# Patient Record
Sex: FEMALE | Race: BLACK OR AFRICAN AMERICAN | Employment: UNEMPLOYED | URBAN - METROPOLITAN AREA
[De-identification: names, ages, dates, MRNs, and addresses within clinical notes are randomized per-mention and may not be internally consistent; named-entity substitution may affect disease eponyms.]

---

## 2017-01-03 ENCOUNTER — HOSPITAL ENCOUNTER (OUTPATIENT)
Dept: INFUSION THERAPY | Age: 82
Discharge: HOME | End: 2017-01-03
Payer: MEDICARE

## 2017-01-03 VITALS
DIASTOLIC BLOOD PRESSURE: 71 MMHG | SYSTOLIC BLOOD PRESSURE: 137 MMHG | OXYGEN SATURATION: 93 % | TEMPERATURE: 97.2 F | RESPIRATION RATE: 18 BRPM | HEART RATE: 57 BPM

## 2017-01-03 LAB
BASO+EOS+MONOS # BLD AUTO: 0.4 K/UL (ref 0–2.3)
BASO+EOS+MONOS # BLD AUTO: 8 % (ref 0.1–17)
DIFFERENTIAL METHOD BLD: ABNORMAL
ERYTHROCYTE [DISTWIDTH] IN BLOOD BY AUTOMATED COUNT: 12.4 % (ref 11.5–14.5)
HCT VFR BLD AUTO: 30.8 % (ref 36–48)
HGB BLD-MCNC: 9.6 G/DL (ref 12–16)
LYMPHOCYTES # BLD AUTO: 48 % (ref 14–44)
LYMPHOCYTES # BLD: 2.6 K/UL (ref 1.1–5.9)
MCH RBC QN AUTO: 27.7 PG (ref 25–35)
MCHC RBC AUTO-ENTMCNC: 31.2 G/DL (ref 31–37)
MCV RBC AUTO: 88.8 FL (ref 78–102)
NEUTS SEG # BLD: 2.4 K/UL (ref 1.8–9.5)
NEUTS SEG NFR BLD AUTO: 44 % (ref 40–70)
PLATELET # BLD AUTO: 186 K/UL (ref 140–440)
RBC # BLD AUTO: 3.47 M/UL (ref 4.1–5.1)
WBC # BLD AUTO: 5.4 K/UL (ref 4.5–13)

## 2017-01-03 PROCEDURE — 36415 COLL VENOUS BLD VENIPUNCTURE: CPT

## 2017-01-03 PROCEDURE — 85025 COMPLETE CBC W/AUTO DIFF WBC: CPT | Performed by: INTERNAL MEDICINE

## 2017-01-03 PROCEDURE — 74011250636 HC RX REV CODE- 250/636: Performed by: INTERNAL MEDICINE

## 2017-01-03 PROCEDURE — 96372 THER/PROPH/DIAG INJ SC/IM: CPT

## 2017-01-03 RX ADMIN — ERYTHROPOIETIN 6000 UNITS: 3000 INJECTION, SOLUTION INTRAVENOUS; SUBCUTANEOUS at 14:19

## 2017-01-03 NOTE — PROGRESS NOTES
[facility] Progress Note    Date: January 3, 2017    Name: Lieutenant Madrigal    MRN: [id number]         : 1922     PROCRIT INJECTON    Ms. Harrison Craven was assessed and education was provided. Pt arrived to [facility] in wheelchair accompanied by her daughter at 0. Ms. Antony's vitals were reviewed and patient was observed for 5 minutes prior to treatment. Visit Vitals    /71 (BP 1 Location: Left arm, BP Patient Position: Sitting)    Pulse (!) 57    Temp 97.2 °F (36.2 °C)    Resp 18    SpO2 93%    Breastfeeding No       Blood drawn via left forearm venipuncture X1 attempt. Gauze and tape applied to site. Lab results were obtained and reviewed. Recent Results (from the past 24 hour(s))   CBC WITH 3 PART DIFF    Collection Time: 17  2:12 PM   Result Value Ref Range    WBC 5.4 4.5 - 13.0 K/uL    RBC 3.47 (L) 4.10 - 5.10 M/uL    HGB 9.6 (L) 12.0 - 16 g/dL    HCT 30.8 (L) 36 - 48 %    MCV 88.8 78 - 102 FL    MCH 27.7 25.0 - 35.0 PG    MCHC 31.2 31 - 37 g/dL    RDW 12.4 11.5 - 14.5 %    PLATELET 547 025 - 191 K/uL    NEUTROPHILS 44 40 - 70 %    MIXED CELLS 8 0.1 - 17 %    LYMPHOCYTES 48 (H) 14 - 44 %    ABS. NEUTROPHILS 2.4 1.8 - 9.5 K/UL    ABS. MIXED CELLS 0.4 0.0 - 2.3 K/uL    ABS. LYMPHOCYTES 2.6 1.1 - 5.9 K/UL    DF AUTOMATED       Results within ordered parameters to give procrit today. Procrit 6,000 units administered SQ in the back of the left arm. Band-aid applied. Patient armband removed and shredded. Ms. Harrison Craven was discharged from [place] [address] in stable condition at 1425. She is to return on 2017 at 1400 for her next Procrit appointment.     Audrey Kirkpatrick RN  January 3, 2017

## 2017-01-17 ENCOUNTER — HOSPITAL ENCOUNTER (OUTPATIENT)
Dept: INFUSION THERAPY | Age: 82
Discharge: HOME | End: 2017-01-17
Payer: MEDICARE

## 2017-01-17 VITALS
TEMPERATURE: 97.3 F | HEART RATE: 69 BPM | DIASTOLIC BLOOD PRESSURE: 64 MMHG | OXYGEN SATURATION: 95 % | SYSTOLIC BLOOD PRESSURE: 143 MMHG | RESPIRATION RATE: 18 BRPM

## 2017-01-17 LAB
BASO+EOS+MONOS # BLD AUTO: 0.4 K/UL (ref 0–2.3)
BASO+EOS+MONOS # BLD AUTO: 7 % (ref 0.1–17)
DIFFERENTIAL METHOD BLD: ABNORMAL
ERYTHROCYTE [DISTWIDTH] IN BLOOD BY AUTOMATED COUNT: 12.7 % (ref 11.5–14.5)
HCT VFR BLD AUTO: 31.6 % (ref 36–48)
HGB BLD-MCNC: 9.8 G/DL (ref 12–16)
LYMPHOCYTES # BLD AUTO: 34 % (ref 14–44)
LYMPHOCYTES # BLD: 1.8 K/UL (ref 1.1–5.9)
MCH RBC QN AUTO: 27.5 PG (ref 25–35)
MCHC RBC AUTO-ENTMCNC: 31 G/DL (ref 31–37)
MCV RBC AUTO: 88.8 FL (ref 78–102)
NEUTS SEG # BLD: 3.1 K/UL (ref 1.8–9.5)
NEUTS SEG NFR BLD AUTO: 59 % (ref 40–70)
PLATELET # BLD AUTO: 179 K/UL (ref 140–440)
RBC # BLD AUTO: 3.56 M/UL (ref 4.1–5.1)
WBC # BLD AUTO: 5.3 K/UL (ref 4.5–13)

## 2017-01-17 PROCEDURE — 96372 THER/PROPH/DIAG INJ SC/IM: CPT

## 2017-01-17 PROCEDURE — 85025 COMPLETE CBC W/AUTO DIFF WBC: CPT | Performed by: INTERNAL MEDICINE

## 2017-01-17 PROCEDURE — 36415 COLL VENOUS BLD VENIPUNCTURE: CPT

## 2017-01-17 PROCEDURE — 74011250636 HC RX REV CODE- 250/636: Performed by: INTERNAL MEDICINE

## 2017-01-17 RX ADMIN — ERYTHROPOIETIN 6000 UNITS: 3000 INJECTION, SOLUTION INTRAVENOUS; SUBCUTANEOUS at 14:08

## 2017-01-17 NOTE — PROGRESS NOTES
[facility] Progress Note    Date: 2017    Name: Dariel Vargas    MRN: [id number]         : 1922     PROCRIT INJECTON    Ms. Juanis Crowell was assessed and education was provided. Pt arrived to [facility] in wheelchair accompanied by her daughter at [phone]. Ms. Antony's vitals were reviewed and patient was observed for 5 minutes prior to treatment. Visit Vitals    /64 (BP 1 Location: Left arm, BP Patient Position: Sitting)    Pulse 69    Temp 97.3 °F (36.3 °C)    Resp 18    SpO2 95%       Blood drawn via left forearm venipuncture X1 attempt. Gauze and tape applied to site. Lab results were obtained and reviewed. Recent Results (from the past 24 hour(s))   CBC WITH 3 PART DIFF    Collection Time: 17  1:58 PM   Result Value Ref Range    WBC 5.3 4.5 - 13.0 K/uL    RBC 3.56 (L) 4.10 - 5.10 M/uL    HGB 9.8 (L) 12.0 - 16 g/dL    HCT 31.6 (L) 36 - 48 %    MCV 88.8 78 - 102 FL    MCH 27.5 25.0 - 35.0 PG    MCHC 31.0 31 - 37 g/dL    RDW 12.7 11.5 - 14.5 %    PLATELET 854 099 - 964 K/uL    NEUTROPHILS 59 40 - 70 %    MIXED CELLS 7 0.1 - 17 %    LYMPHOCYTES 34 14 - 44 %    ABS. NEUTROPHILS 3.1 1.8 - 9.5 K/UL    ABS. MIXED CELLS 0.4 0.0 - 2.3 K/uL    ABS. LYMPHOCYTES 1.8 1.1 - 5.9 K/UL    DF AUTOMATED       Results within ordered parameters to give procrit today. Procrit 6,000 units administered SQ in the back of the left arm. Band-aid applied. Patient armband removed and shredded. Ms. Juanis Crowell was discharged from [place] [address] in stable condition at [phone]. She is to return on 2017 at 1400 for her next Procrit appointment.     Luna Paiz RN  2017

## 2017-01-31 ENCOUNTER — HOSPITAL ENCOUNTER (OUTPATIENT)
Dept: INFUSION THERAPY | Age: 82
Discharge: HOME | End: 2017-01-31
Payer: MEDICARE

## 2017-01-31 VITALS
OXYGEN SATURATION: 96 % | RESPIRATION RATE: 18 BRPM | HEART RATE: 55 BPM | SYSTOLIC BLOOD PRESSURE: 152 MMHG | TEMPERATURE: 97.4 F | DIASTOLIC BLOOD PRESSURE: 57 MMHG

## 2017-01-31 LAB
BASO+EOS+MONOS # BLD AUTO: 0.4 K/UL (ref 0–2.3)
BASO+EOS+MONOS # BLD AUTO: 8 % (ref 0.1–17)
DIFFERENTIAL METHOD BLD: ABNORMAL
ERYTHROCYTE [DISTWIDTH] IN BLOOD BY AUTOMATED COUNT: 12.5 % (ref 11.5–14.5)
HCT VFR BLD AUTO: 32.9 % (ref 36–48)
HGB BLD-MCNC: 10 G/DL (ref 12–16)
LYMPHOCYTES # BLD AUTO: 53 % (ref 14–44)
LYMPHOCYTES # BLD: 2.4 K/UL (ref 1.1–5.9)
MCH RBC QN AUTO: 27.3 PG (ref 25–35)
MCHC RBC AUTO-ENTMCNC: 30.4 G/DL (ref 31–37)
MCV RBC AUTO: 89.9 FL (ref 78–102)
NEUTS SEG # BLD: 1.7 K/UL (ref 1.8–9.5)
NEUTS SEG NFR BLD AUTO: 39 % (ref 40–70)
PLATELET # BLD AUTO: 182 K/UL (ref 140–440)
RBC # BLD AUTO: 3.66 M/UL (ref 4.1–5.1)
WBC # BLD AUTO: 4.5 K/UL (ref 4.5–13)

## 2017-01-31 PROCEDURE — 36415 COLL VENOUS BLD VENIPUNCTURE: CPT

## 2017-01-31 PROCEDURE — 85025 COMPLETE CBC W/AUTO DIFF WBC: CPT | Performed by: INTERNAL MEDICINE

## 2017-01-31 NOTE — PROGRESS NOTES
[facility] Progress Note    Date: 2017    Name: Luz Campbell    MRN: [id number]         : 1922     PROCRIT INJECTON    Ms. Gus Covington was assessed and education was provided. Pt arrived to [facility] in wheelchair accompanied by her daughter at 26. Ms. Antony's vitals were reviewed and patient was observed for 5 minutes prior to treatment. Visit Vitals    /57 (BP 1 Location: Left arm, BP Patient Position: Sitting)    Pulse (!) 55    Temp 97.4 °F (36.3 °C)    Resp 18    SpO2 96%       Blood drawn via left AC venipuncture X1 attempt. Gauze and tape applied to site. Lab results were obtained and reviewed. Recent Results (from the past 24 hour(s))   CBC WITH 3 PART DIFF    Collection Time: 17  2:16 PM   Result Value Ref Range    WBC 4.5 4.5 - 13.0 K/uL    RBC 3.66 (L) 4.10 - 5.10 M/uL    HGB 10.0 (L) 12.0 - 16 g/dL    HCT 32.9 (L) 36 - 48 %    MCV 89.9 78 - 102 FL    MCH 27.3 25.0 - 35.0 PG    MCHC 30.4 (L) 31 - 37 g/dL    RDW 12.5 11.5 - 14.5 %    PLATELET 838 070 - 603 K/uL    NEUTROPHILS 39 (L) 40 - 70 %    MIXED CELLS 8 0.1 - 17 %    LYMPHOCYTES 53 (H) 14 - 44 %    ABS. NEUTROPHILS 1.7 (L) 1.8 - 9.5 K/UL    ABS. MIXED CELLS 0.4 0.0 - 2.3 K/uL    ABS. LYMPHOCYTES 2.4 1.1 - 5.9 K/UL    DF AUTOMATED       Procrit held per order parameter. Patient armband removed and shredded. Ms. Gus Covington was discharged from [place] [address] in stable condition at 1425. She is to return on 2017 at 1400 for her next Procrit appointment.     Bessie Peace RN  2017

## 2017-02-02 ENCOUNTER — OFFICE VISIT (OUTPATIENT)
Dept: PAIN MANAGEMENT | Age: 82
End: 2017-02-02

## 2017-02-02 VITALS
HEIGHT: 66 IN | DIASTOLIC BLOOD PRESSURE: 69 MMHG | WEIGHT: 135 LBS | SYSTOLIC BLOOD PRESSURE: 142 MMHG | HEART RATE: 53 BPM | BODY MASS INDEX: 21.69 KG/M2

## 2017-02-02 DIAGNOSIS — M25.569 ARTHRALGIA OF LOWER LEG, UNSPECIFIED LATERALITY: ICD-10-CM

## 2017-02-02 DIAGNOSIS — M25.50 PAIN IN JOINT, MULTIPLE SITES: ICD-10-CM

## 2017-02-02 DIAGNOSIS — M54.2 CERVICALGIA: ICD-10-CM

## 2017-02-02 DIAGNOSIS — Z79.899 ENCOUNTER FOR LONG-TERM (CURRENT) DRUG USE: ICD-10-CM

## 2017-02-02 DIAGNOSIS — Z79.899 ENCOUNTER FOR LONG-TERM (CURRENT) USE OF HIGH-RISK MEDICATION: ICD-10-CM

## 2017-02-02 DIAGNOSIS — G89.4 CHRONIC PAIN SYNDROME: Primary | ICD-10-CM

## 2017-02-02 RX ORDER — OXYCODONE HCL 20 MG/1
20 TABLET, FILM COATED, EXTENDED RELEASE ORAL EVERY 12 HOURS
Qty: 60 TAB | Refills: 0 | Status: SHIPPED | OUTPATIENT
Start: 2017-02-02 | End: 2017-03-04

## 2017-02-02 RX ORDER — OXYCODONE HCL 20 MG/1
20 TABLET, FILM COATED, EXTENDED RELEASE ORAL EVERY 12 HOURS
Qty: 60 TAB | Refills: 0 | Status: SHIPPED | OUTPATIENT
Start: 2017-02-02 | End: 2017-05-03 | Stop reason: SDUPTHER

## 2017-02-02 NOTE — PROGRESS NOTES
Nursing Notes    Patient presents to the office today in follow-up. Patient rates her pain at 5/10 on the numerical pain scale. Reviewed medications with counts as follows:    Rx Date filled Qty Dispensed Pill Count Last Dose Short   oxycontin 20 mg  01/01/17 60 2 today no                                  POC UDS was not performed in office today    Any new labs or imaging since last appointment? NO    Have you been to an emergency room (ER) or urgent care clinic since your last visit? NO            Have you been hospitalized since your last visit? NO     If yes, where, when, and reason for visit? Have you seen or consulted any other health care providers outside of the [address] [place] [address]  since your last visit? NO     If yes, where, when, and reason for visit? Pt is getting iron infusions    HM deferred to pcp.

## 2017-02-02 NOTE — PATIENT INSTRUCTIONS
1.  Continue medications as prescribed. 2.  Follow up in three months  3.   Letter for prior auth put on Bekah's desk after visit

## 2017-02-02 NOTE — PROGRESS NOTES
HISTORY OF PRESENT ILLNESS  Gareth Jiménez is a 80 y.o. female. HPI  The patient presents today for follow up of chronic  left knee pain, cervical and lumbar pain. The patient denies any significant changes since last f/u. She tolerates medications without side effects. Gareth Jiménez reports no change in sleep or constipation. She seroquel at bedtime. She takes miralax for constipation. The patient reports 50% relief with current medications. Her pain is constant and achy and increases with walking and activity. She has to use a wheelchair if she anticipates a lot of walking. Her neck and back pain comes and goes. Her daughter manages her medication. She is happy with her treatment plan and QOL. Her pain medication along with the compounding cream helps to manage her pain. Review of Systems   Constitutional: Negative for chills, fever and malaise/fatigue. HENT: Negative for congestion and sore throat. Eyes: Negative for blurred vision and double vision. Respiratory: Negative for shortness of breath. Cardiovascular: Negative for chest pain and leg swelling. Gastrointestinal: Positive for constipation. Negative for diarrhea, heartburn, nausea and vomiting. Genitourinary:        Some frequency \"weak bladder\"   Musculoskeletal: Positive for back pain, joint pain (left knee) and neck pain. Negative for falls and myalgias. Skin: Negative. Neurological: Negative for dizziness, tingling, tremors, seizures, weakness and headaches. Endo/Heme/Allergies: Positive for environmental allergies. Does not bruise/bleed easily. Psychiatric/Behavioral: Positive for depression and memory loss. Negative for suicidal ideas. The patient is nervous/anxious and has insomnia (varies). All other systems reviewed and are negative. Physical Exam   Constitutional: She is oriented to person, place, and time. She appears well-developed and well-nourished. No distress.    Daughter present HENT:   Head: Normocephalic. Right Ear: External ear normal.   Left Ear: External ear normal.   Eyes: Conjunctivae and EOM are normal. Pupils are equal, round, and reactive to light. Pulmonary/Chest: Effort normal. No respiratory distress. Musculoskeletal: She exhibits tenderness (left knee). She exhibits no edema. Right knee: She exhibits decreased range of motion (crepitus). No tenderness found. Left knee: She exhibits decreased range of motion (crepitus). Tenderness found. Cervical back: She exhibits pain. She exhibits normal range of motion and no tenderness. Lumbar back: She exhibits decreased range of motion, tenderness and pain. Back:    Neurological: She is alert and oriented to person, place, and time. Gait (antalgic) abnormal.   Skin: Skin is warm and dry. Psychiatric: She has a normal mood and affect. Her behavior is normal. Judgment and thought content normal.   Nursing note and vitals reviewed. ASSESSMENT and PLAN  Encounter Diagnoses   Name Primary?  Chronic pain syndrome Yes    Encounter for long-term (current) use of high-risk medication     Arthralgia of lower leg, unspecified laterality     Pain in joint, multiple sites     Cervicalgia     Encounter for long-term (current) drug use      Orders Placed This Encounter    oxyCODONE ER (OXYCONTIN) 20 mg ER tablet    oxyCODONE ER (OXYCONTIN) 20 mg ER tablet    oxyCODONE ER (OXYCONTIN) 20 mg ER tablet        Patient Instructions   1. Continue medications as prescribed. 2.  Follow up in three months  3.   Letter for prior auth put on Bekah's desk after visit

## 2017-02-02 NOTE — MR AVS SNAPSHOT
Visit Information Date & Time Provider Department Dept. Phone Encounter #  
 2/2/2017  3:20 PM Marco Cruz, [address] [facility] Management [phone] [id number] Follow-up Instructions Return in about 3 months (around 5/2/2017). Upcoming Health Maintenance Date Due DTaP/Tdap/Td series (1 - Tdap) 9/2/1943 ZOSTER VACCINE AGE 60> 9/2/1982 GLAUCOMA SCREENING Q2Y 9/2/1987 MEDICARE YEARLY EXAM 9/2/1987 Pneumococcal 65+ High/Highest Risk (2 of 2 - PCV13) 11/1/2016 Allergies as of 2/2/2017  Review Complete On: 2/2/2017 By: OJ Aguillon Severity Noted Reaction Type Reactions Celebrex [Celecoxib]    Unable to Obtain Codeine    Unable to Obtain Flexeril [Cyclobenzaprine]    Unable to Obtain Macrobid [Nitrofurantoin Monohyd/m-cryst]    Unable to Consolidated Alex Pcn [Penicillins]    Unable to Obtain Remeron [Mirtazapine]    Unable to Obtain  
 Sulfa (Sulfonamide Antibiotics)    Other (comments) Swelling and low heart rate Vicodin [Hydrocodone-acetaminophen]    Unable to Obtain Current Immunizations  Reviewed on 1/31/2017 Name Date Influenza Vaccine 11/1/2016, 11/1/2015 Pneumococcal Polysaccharide (PPSV-23) 11/1/2015 Not reviewed this visit You Were Diagnosed With   
  
 Codes Comments Chronic pain syndrome    -  Primary ICD-10-CM: G89.4 ICD-9-CM: 338.4 Encounter for long-term (current) use of high-risk medication     ICD-10-CM: Z79.899 ICD-9-CM: V58.69 Vitals BP Pulse Height(growth percentile) Weight(growth percentile) BMI OB Status 142/69 (!) 53 5' 6\" (1.676 m) 135 lb (61.2 kg) 21.79 kg/m2 Hysterectomy Smoking Status Never Smoker Vitals History BMI and BSA Data Body Mass Index Body Surface Area 21.79 kg/m 2 1.69 m 2 Your Updated Medication List  
  
   
This list is accurate as of: 2/2/17  4:48 PM.  Always use your most recent med list. amLODIPine 10 mg tablet Commonly known as:  Martín Presser Take  by mouth daily. donepezil 10 mg tablet Commonly known as:  ARICEPT Take 10 mg by mouth nightly. furosemide 20 mg tablet Commonly known as:  LASIX Take  by mouth daily. glipiZIDE 5 mg tablet Commonly known as:  Malik Meuse Take  by mouth two (2) times a day. NexIUM 40 mg capsule Generic drug:  esomeprazole Take 40 mg by mouth daily. * OxyCONTIN 20 mg CR tablet Generic drug:  oxyCODONE CR Take 20 mg by mouth every twelve (12) hours. * oxyCODONE ER 20 mg ER tablet Commonly known as:  OxyCONTIN Take 1 Tab by mouth every twelve (12) hours for 30 days. Max Daily Amount: 40 mg. For chronic pain. Due to fill 02/02/17 * oxyCODONE ER 20 mg ER tablet Commonly known as:  OxyCONTIN Take 1 Tab by mouth every twelve (12) hours for 30 days. Max Daily Amount: 40 mg. For chronic pain. Due to fill 03/03/17 * oxyCODONE ER 20 mg ER tablet Commonly known as:  OxyCONTIN Take 1 Tab by mouth every twelve (12) hours for 30 days. Max Daily Amount: 40 mg. For chronic pain. Due to fill 04/01/17  
  
 pantoprazole 40 mg tablet Commonly known as:  PROTONIX Take 40 mg by mouth daily. polyethylene glycol 17 gram packet Commonly known as:  [place] Take 17 g by mouth daily. SEROquel 100 mg tablet Generic drug:  QUEtiapine Take 50 mg by mouth two (2) times a day. * Notice: This list has 4 medication(s) that are the same as other medications prescribed for you. Read the directions carefully, and ask your doctor or other care provider to review them with you. Prescriptions Printed Refills  
 oxyCODONE ER (OXYCONTIN) 20 mg ER tablet 0 Sig: Take 1 Tab by mouth every twelve (12) hours for 30 days. Max Daily Amount: 40 mg. For chronic pain. Due to fill 02/02/17 Class: Print  Route: Oral  
 oxyCODONE ER (OXYCONTIN) 20 mg ER tablet 0  
 Sig: Take 1 Tab by mouth every twelve (12) hours for 30 days. Max Daily Amount: 40 mg. For chronic pain. Due to fill 03/03/17 Class: Print Route: Oral  
 oxyCODONE ER (OXYCONTIN) 20 mg ER tablet 0 Sig: Take 1 Tab by mouth every twelve (12) hours for 30 days. Max Daily Amount: 40 mg. For chronic pain. Due to fill 04/01/17 Class: Print Route: Oral  
  
Follow-up Instructions Return in about 3 months (around 5/2/2017). To-Do List   
 02/14/2017 2:00 PM  
  Appointment with [facility] FA[facility] TRACK NURSE at [facility]  
  
 02/28/2017 2:00 PM  
  Appointment with [facility] FAST TRACK NURSE at [facility] Patient Instructions 1. Continue medications as prescribed. 2.  Follow up in three months 3. Letter for prior auth put on Rexly's desk after visit Introducing [facility]! Pauline Del Castillo introduces [employer] patient portal. Now you can access parts of your medical record, email your doctor's office, and request medication refills online. 1. In your internet browser, go to [email or web address] "[email or web address] 2. Click on the First Time User? Click Here link in the Sign In box. You will see the New Member Sign Up page. 3. Enter your [employer] Access Code exactly as it appears below. You will not need to use this code after youve completed the sign-up process. If you do not sign up before the expiration date, you must request a new code. · [employer] Access Code: [email or web address] Expires: 3/30/2017  9:12 AM 
 
4. Enter the last four digits of your Social Security Number (xxxx) and Date of Birth (mm/dd/yyyy) as indicated and click Submit. You will be taken to the next sign-up page. 5. Create a [employer] ID. This will be your [employer] login ID and cannot be changed, so think of one that is secure and easy to remember. 6. Create a [employer] password. You can change your password at any time. 7. Enter your Password Reset Question and Answer.  This can be used at a later time if you forget your password. 8. Enter your e-mail address. You will receive e-mail notification when new information is available in [address] 9. Click Sign Up. You can now view and download portions of your medical record. 10. Click the Download Summary menu link to download a portable copy of your medical information. If you have questions, please visit the Frequently Asked Questions section of the [employer] website. Remember, [employer] is NOT to be used for urgent needs. For medical emergencies, dial 911. Now available from your iPhone and Android! Please provide this summary of care documentation to your next provider. Your primary care clinician is listed as [facility]. If you have any questions after today's visit, please call [phone].

## 2017-02-14 ENCOUNTER — HOSPITAL ENCOUNTER (OUTPATIENT)
Dept: INFUSION THERAPY | Age: 82
Discharge: HOME | End: 2017-02-14
Payer: MEDICARE

## 2017-02-14 VITALS
RESPIRATION RATE: 16 BRPM | SYSTOLIC BLOOD PRESSURE: 160 MMHG | OXYGEN SATURATION: 95 % | TEMPERATURE: 97.5 F | DIASTOLIC BLOOD PRESSURE: 90 MMHG | HEART RATE: 69 BPM

## 2017-02-14 LAB
BASO+EOS+MONOS # BLD AUTO: 0.5 K/UL (ref 0–2.3)
BASO+EOS+MONOS # BLD AUTO: 8 % (ref 0.1–17)
DIFFERENTIAL METHOD BLD: ABNORMAL
ERYTHROCYTE [DISTWIDTH] IN BLOOD BY AUTOMATED COUNT: 12.1 % (ref 11.5–14.5)
HCT VFR BLD AUTO: 35.4 % (ref 36–48)
HGB BLD-MCNC: 10.9 G/DL (ref 12–16)
LYMPHOCYTES # BLD AUTO: 47 % (ref 14–44)
LYMPHOCYTES # BLD: 2.8 K/UL (ref 1.1–5.9)
MCH RBC QN AUTO: 27.5 PG (ref 25–35)
MCHC RBC AUTO-ENTMCNC: 30.8 G/DL (ref 31–37)
MCV RBC AUTO: 89.2 FL (ref 78–102)
NEUTS SEG # BLD: 2.7 K/UL (ref 1.8–9.5)
NEUTS SEG NFR BLD AUTO: 45 % (ref 40–70)
PLATELET # BLD AUTO: 162 K/UL (ref 140–440)
RBC # BLD AUTO: 3.97 M/UL (ref 4.1–5.1)
WBC # BLD AUTO: 6 K/UL (ref 4.5–13)

## 2017-02-14 PROCEDURE — 85025 COMPLETE CBC W/AUTO DIFF WBC: CPT | Performed by: INTERNAL MEDICINE

## 2017-02-14 PROCEDURE — 36415 COLL VENOUS BLD VENIPUNCTURE: CPT

## 2017-02-14 NOTE — PROGRESS NOTES
[facility] Progress Note    Date: 2017    Name: Mounika Price    MRN: [id number]         : 1922     PROCRIT INJECTON    Ms. Francois Zamora was assessed and education was provided. Pt arrived to [facility] in wheelchair accompanied by her daughter at 0. Ms. Antony's vitals were reviewed and patient was observed for 5 minutes prior to treatment. Visit Vitals    /90 (BP 1 Location: Left arm, BP Patient Position: At rest)    Pulse 69    Temp 97.5 °F (36.4 °C)    Resp 16    SpO2 95%    Breastfeeding No       Blood drawn via left arm venipuncture X1 attempt. Gauze and tape applied to site. Lab results were obtained and reviewed. Recent Results (from the past 24 hour(s))   CBC WITH 3 PART DIFF    Collection Time: 17  2:05 PM   Result Value Ref Range    WBC 6.0 4.5 - 13.0 K/uL    RBC 3.97 (L) 4.10 - 5.10 M/uL    HGB 10.9 (L) 12.0 - 16 g/dL    HCT 35.4 (L) 36 - 48 %    MCV 89.2 78 - 102 FL    MCH 27.5 25.0 - 35.0 PG    MCHC 30.8 (L) 31 - 37 g/dL    RDW 12.1 11.5 - 14.5 %    PLATELET 657 435 - 565 K/uL    NEUTROPHILS 45 40 - 70 %    MIXED CELLS 8 0.1 - 17 %    LYMPHOCYTES 47 (H) 14 - 44 %    ABS. NEUTROPHILS 2.7 1.8 - 9.5 K/UL    ABS. MIXED CELLS 0.5 0.0 - 2.3 K/uL    ABS. LYMPHOCYTES 2.8 1.1 - 5.9 K/UL    DF AUTOMATED       Procrit held per order parameter. Patient armband removed and shredded. Ms. Francois Zamora was discharged from [place] [address] in stable condition at [phone]. She is to return on 2017 at 1400 for her next Procrit appointment.     Michelle Rajput RN  2017

## 2017-02-20 ENCOUNTER — TELEPHONE (OUTPATIENT)
Dept: PAIN MANAGEMENT | Age: 82
End: 2017-02-20

## 2017-02-20 NOTE — TELEPHONE ENCOUNTER
Returned dtr's Anitra Harrison) call re pa for oxycontin. Explained I had submitted the pa, but got a fax back saying [facility] could not find the pt in their system. Asked dtr what the id number is - [id number]. Called Derek - spoke with Edgar Carnes - was able to find pt. Did pa over the phone and oxycontin 20mg was approved. Called dtr to let her know the med was approved.

## 2017-02-28 ENCOUNTER — HOSPITAL ENCOUNTER (OUTPATIENT)
Dept: INFUSION THERAPY | Age: 82
Discharge: HOME | End: 2017-02-28
Payer: MEDICARE

## 2017-02-28 VITALS
TEMPERATURE: 97.7 F | OXYGEN SATURATION: 96 % | DIASTOLIC BLOOD PRESSURE: 68 MMHG | RESPIRATION RATE: 18 BRPM | SYSTOLIC BLOOD PRESSURE: 127 MMHG | HEART RATE: 57 BPM

## 2017-02-28 LAB
BASO+EOS+MONOS # BLD AUTO: 0.5 K/UL (ref 0–2.3)
BASO+EOS+MONOS # BLD AUTO: 9 % (ref 0.1–17)
DIFFERENTIAL METHOD BLD: ABNORMAL
ERYTHROCYTE [DISTWIDTH] IN BLOOD BY AUTOMATED COUNT: 11.7 % (ref 11.5–14.5)
HCT VFR BLD AUTO: 30.9 % (ref 36–48)
HGB BLD-MCNC: 9.6 G/DL (ref 12–16)
LYMPHOCYTES # BLD AUTO: 45 % (ref 14–44)
LYMPHOCYTES # BLD: 2.4 K/UL (ref 1.1–5.9)
MCH RBC QN AUTO: 27.6 PG (ref 25–35)
MCHC RBC AUTO-ENTMCNC: 31.1 G/DL (ref 31–37)
MCV RBC AUTO: 88.8 FL (ref 78–102)
NEUTS SEG # BLD: 2.4 K/UL (ref 1.8–9.5)
NEUTS SEG NFR BLD AUTO: 46 % (ref 40–70)
PLATELET # BLD AUTO: 176 K/UL (ref 140–440)
RBC # BLD AUTO: 3.48 M/UL (ref 4.1–5.1)
WBC # BLD AUTO: 5.3 K/UL (ref 4.5–13)

## 2017-02-28 PROCEDURE — 36415 COLL VENOUS BLD VENIPUNCTURE: CPT

## 2017-02-28 PROCEDURE — 85025 COMPLETE CBC W/AUTO DIFF WBC: CPT | Performed by: INTERNAL MEDICINE

## 2017-02-28 NOTE — PROGRESS NOTES
[facility] Progress Note    Date: 2017    Name: Tobi Coelho    MRN: [id number]         : 1922      Ms. Larissa Mahan arrived to [facility] at 1400. Ms. Larissa Mahan was assessed and education was provided. Ms. Antony's vitals were reviewed. Visit Vitals    /68 (BP 1 Location: Left arm, BP Patient Position: Sitting)    Pulse (!) 57    Temp 97.7 °F (36.5 °C)    Resp 18    SpO2 96%       Blood drawn for labs via right forearm venipuncture x1 attempt. Lab results were obtained and reviewed. Recent Results (from the past 12 hour(s))   CBC WITH 3 PART DIFF    Collection Time: 17  2:11 PM   Result Value Ref Range    WBC 5.3 4.5 - 13.0 K/uL    RBC 3.48 (L) 4.10 - 5.10 M/uL    HGB 9.6 (L) 12.0 - 16 g/dL    HCT 30.9 (L) 36 - 48 %    MCV 88.8 78 - 102 FL    MCH 27.6 25.0 - 35.0 PG    MCHC 31.1 31 - 37 g/dL    RDW 11.7 11.5 - 14.5 %    PLATELET 433 896 - 203 K/uL    NEUTROPHILS 46 40 - 70 %    MIXED CELLS 9 0.1 - 17 %    LYMPHOCYTES 45 (H) 14 - 44 %    ABS. NEUTROPHILS 2.4 1.8 - 9.5 K/UL    ABS. MIXED CELLS 0.5 0.0 - 2.3 K/uL    ABS. LYMPHOCYTES 2.4 1.1 - 5.9 K/UL    DF AUTOMATED         Procrit held at this time per order. Ms. Larissa Mahan tolerated well without complaints. Ms. Larissa Mahan was discharged from [place] [address] in stable condition at [phone]. She is to return on 3/14/17 at 1400 for her next appointment.     Pavan Wetzel RN  2017

## 2017-03-14 ENCOUNTER — HOSPITAL ENCOUNTER (OUTPATIENT)
Dept: INFUSION THERAPY | Age: 82
Discharge: HOME | End: 2017-03-14
Payer: MEDICARE

## 2017-03-14 VITALS
RESPIRATION RATE: 18 BRPM | TEMPERATURE: 97.3 F | OXYGEN SATURATION: 97 % | HEART RATE: 55 BPM | DIASTOLIC BLOOD PRESSURE: 63 MMHG | SYSTOLIC BLOOD PRESSURE: 146 MMHG

## 2017-03-14 LAB
BASO+EOS+MONOS # BLD AUTO: 0.5 K/UL (ref 0–2.3)
BASO+EOS+MONOS # BLD AUTO: 9 % (ref 0.1–17)
DIFFERENTIAL METHOD BLD: ABNORMAL
ERYTHROCYTE [DISTWIDTH] IN BLOOD BY AUTOMATED COUNT: 12.2 % (ref 11.5–14.5)
HCT VFR BLD AUTO: 31.7 % (ref 36–48)
HGB BLD-MCNC: 9.8 G/DL (ref 12–16)
LYMPHOCYTES # BLD AUTO: 42 % (ref 14–44)
LYMPHOCYTES # BLD: 2.4 K/UL (ref 1.1–5.9)
MCH RBC QN AUTO: 27.3 PG (ref 25–35)
MCHC RBC AUTO-ENTMCNC: 30.9 G/DL (ref 31–37)
MCV RBC AUTO: 88.3 FL (ref 78–102)
NEUTS SEG # BLD: 2.7 K/UL (ref 1.8–9.5)
NEUTS SEG NFR BLD AUTO: 49 % (ref 40–70)
PLATELET # BLD AUTO: 190 K/UL (ref 140–440)
RBC # BLD AUTO: 3.59 M/UL (ref 4.1–5.1)
WBC # BLD AUTO: 5.6 K/UL (ref 4.5–13)

## 2017-03-14 PROCEDURE — 36415 COLL VENOUS BLD VENIPUNCTURE: CPT

## 2017-03-14 PROCEDURE — 85025 COMPLETE CBC W/AUTO DIFF WBC: CPT | Performed by: INTERNAL MEDICINE

## 2017-03-14 NOTE — PROGRESS NOTES
[facility] Progress Note    Date: 2017    Name: Isabella Gonzales    MRN: [id number]         : 1922      Ms. Tammy Dior arrived to [facility] at [phone]. Ms. Tammy Dior was assessed and education was provided. Ms. Antony's vitals were reviewed. Visit Vitals    /63 (BP 1 Location: Left arm, BP Patient Position: Sitting)    Pulse (!) 55    Temp 97.3 °F (36.3 °C)    Resp 18    SpO2 97%    Breastfeeding No       Blood drawn for labs via right forearm venipuncture x2 attempts. Lab results were obtained and reviewed. Recent Results (from the past 12 hour(s))   CBC WITH 3 PART DIFF    Collection Time: 17  2:27 PM   Result Value Ref Range    WBC 5.6 4.5 - 13.0 K/uL    RBC 3.59 (L) 4.10 - 5.10 M/uL    HGB 9.8 (L) 12.0 - 16 g/dL    HCT 31.7 (L) 36 - 48 %    MCV 88.3 78 - 102 FL    MCH 27.3 25.0 - 35.0 PG    MCHC 30.9 (L) 31 - 37 g/dL    RDW 12.2 11.5 - 14.5 %    PLATELET 321 991 - 529 K/uL    NEUTROPHILS 49 40 - 70 %    MIXED CELLS 9 0.1 - 17 %    LYMPHOCYTES 42 14 - 44 %    ABS. NEUTROPHILS 2.7 1.8 - 9.5 K/UL    ABS. MIXED CELLS 0.5 0.0 - 2.3 K/uL    ABS. LYMPHOCYTES 2.4 1.1 - 5.9 K/UL    DF AUTOMATED         HGB= 9.8 and HCT= 13.7. Procrit HELD at this time per order. Ms. Tammy Dior tolerated well without complaints. Ms. Tammy Dior was discharged from [place] [address] in stable condition at 1436. She is to return on 3/28/17 at 1400 for her next appointment.     Queen Mirlande RN  2017

## 2017-03-28 ENCOUNTER — HOSPITAL ENCOUNTER (OUTPATIENT)
Dept: INFUSION THERAPY | Age: 82
Discharge: HOME | End: 2017-03-28
Payer: MEDICARE

## 2017-03-28 VITALS
TEMPERATURE: 98.2 F | HEART RATE: 62 BPM | OXYGEN SATURATION: 93 % | RESPIRATION RATE: 18 BRPM | DIASTOLIC BLOOD PRESSURE: 53 MMHG | SYSTOLIC BLOOD PRESSURE: 146 MMHG

## 2017-03-28 LAB
ALBUMIN SERPL BCP-MCNC: 3.3 G/DL (ref 3.4–5)
ANION GAP BLD CALC-SCNC: 7 MMOL/L (ref 3–18)
BASO+EOS+MONOS # BLD AUTO: 0.5 K/UL (ref 0–2.3)
BASO+EOS+MONOS # BLD AUTO: 12 % (ref 0.1–17)
BUN SERPL-MCNC: 26 MG/DL (ref 7–18)
BUN/CREAT SERPL: 13 (ref 12–20)
CALCIUM SERPL-MCNC: 8.7 MG/DL (ref 8.5–10.1)
CHLORIDE SERPL-SCNC: 101 MMOL/L (ref 100–108)
CO2 SERPL-SCNC: 32 MMOL/L (ref 21–32)
CREAT SERPL-MCNC: 1.96 MG/DL (ref 0.6–1.3)
DIFFERENTIAL METHOD BLD: ABNORMAL
ERYTHROCYTE [DISTWIDTH] IN BLOOD BY AUTOMATED COUNT: 12.2 % (ref 11.5–14.5)
FERRITIN SERPL-MCNC: 734 NG/ML (ref 8–388)
GLUCOSE SERPL-MCNC: 192 MG/DL (ref 74–99)
HCT VFR BLD AUTO: 28.9 % (ref 36–48)
HGB BLD-MCNC: 9.4 G/DL (ref 12–16)
IRON SATN MFR SERPL: 29 %
IRON SERPL-MCNC: 44 UG/DL (ref 50–175)
LYMPHOCYTES # BLD AUTO: 43 % (ref 14–44)
LYMPHOCYTES # BLD: 1.8 K/UL (ref 1.1–5.9)
MCH RBC QN AUTO: 28.5 PG (ref 25–35)
MCHC RBC AUTO-ENTMCNC: 32.5 G/DL (ref 31–37)
MCV RBC AUTO: 87.6 FL (ref 78–102)
NEUTS SEG # BLD: 1.9 K/UL (ref 1.8–9.5)
NEUTS SEG NFR BLD AUTO: 45 % (ref 40–70)
PHOSPHATE SERPL-MCNC: 3.2 MG/DL (ref 2.5–4.9)
PLATELET # BLD AUTO: 196 K/UL (ref 140–440)
POTASSIUM SERPL-SCNC: 4.3 MMOL/L (ref 3.5–5.5)
RBC # BLD AUTO: 3.3 M/UL (ref 4.1–5.1)
SODIUM SERPL-SCNC: 140 MMOL/L (ref 136–145)
TIBC SERPL-MCNC: 150 UG/DL (ref 250–450)
WBC # BLD AUTO: 4.2 K/UL (ref 4.5–13)

## 2017-03-28 PROCEDURE — 74011250636 HC RX REV CODE- 250/636: Performed by: INTERNAL MEDICINE

## 2017-03-28 PROCEDURE — 96372 THER/PROPH/DIAG INJ SC/IM: CPT

## 2017-03-28 PROCEDURE — 83540 ASSAY OF IRON: CPT | Performed by: INTERNAL MEDICINE

## 2017-03-28 PROCEDURE — 82728 ASSAY OF FERRITIN: CPT | Performed by: INTERNAL MEDICINE

## 2017-03-28 PROCEDURE — 85025 COMPLETE CBC W/AUTO DIFF WBC: CPT | Performed by: INTERNAL MEDICINE

## 2017-03-28 PROCEDURE — 36415 COLL VENOUS BLD VENIPUNCTURE: CPT

## 2017-03-28 PROCEDURE — 80069 RENAL FUNCTION PANEL: CPT | Performed by: INTERNAL MEDICINE

## 2017-03-28 RX ADMIN — ERYTHROPOIETIN 3000 UNITS: 3000 INJECTION, SOLUTION INTRAVENOUS; SUBCUTANEOUS at 14:40

## 2017-03-28 RX ADMIN — ERYTHROPOIETIN 4000 UNITS: 4000 INJECTION, SOLUTION INTRAVENOUS; SUBCUTANEOUS at 14:40

## 2017-03-28 NOTE — PROGRESS NOTES
[facility] Progress Note    Date: 2017    Name: Kirstie Duncan    MRN: [id number]         : 1922      Patient assessed and education was provided. Patient vitals were reviewed. Visit Vitals    /53 (BP 1 Location: Left arm, BP Patient Position: Sitting)    Pulse 62    Temp 98.2 °F (36.8 °C)    Resp 18    SpO2 93%     Blood sample by mere-puncture on 1st stick to left hand for cbc, renal panel, ferritin, iron. Cbc will be tested in [facility] but rest of labs sent out    Lab results were obtained and reviewed. Recent Results (from the past 12 hour(s))   CBC WITH 3 PART DIFF    Collection Time: 17  2:20 PM   Result Value Ref Range    WBC 4.2 (L) 4.5 - 13.0 K/uL    RBC 3.30 (L) 4.10 - 5.10 M/uL    HGB 9.4 (L) 12.0 - 16 g/dL    HCT 28.9 (L) 36 - 48 %    MCV 87.6 78 - 102 FL    MCH 28.5 25.0 - 35.0 PG    MCHC 32.5 31 - 37 g/dL    RDW 12.2 11.5 - 14.5 %    PLATELET 133 804 - 483 K/uL    NEUTROPHILS 45 40 - 70 %    MIXED CELLS 12 0.1 - 17 %    LYMPHOCYTES 43 14 - 44 %    ABS. NEUTROPHILS 1.9 1.8 - 9.5 K/UL    ABS. MIXED CELLS 0.5 0.0 - 2.3 K/uL    ABS. LYMPHOCYTES 1.8 1.1 - 5.9 K/UL    DF AUTOMATED       Procrit 7000 units given SQ to upper back of left arm. Tolerated well. bandaid appied to site      Patient was discharged from [place] [address] in stable condition at [phone] via wheel chair, accompanied by her daughter. She is to return on 17 at 2 pm for her next appointment.     Justin Thayer RN  2017  4:45 PM

## 2017-04-11 ENCOUNTER — HOSPITAL ENCOUNTER (OUTPATIENT)
Dept: INFUSION THERAPY | Age: 82
Discharge: HOME | End: 2017-04-11
Payer: MEDICARE

## 2017-04-11 VITALS
TEMPERATURE: 97.7 F | DIASTOLIC BLOOD PRESSURE: 62 MMHG | SYSTOLIC BLOOD PRESSURE: 162 MMHG | OXYGEN SATURATION: 94 % | RESPIRATION RATE: 18 BRPM | HEART RATE: 67 BPM

## 2017-04-11 LAB
BASO+EOS+MONOS # BLD AUTO: 0.7 K/UL (ref 0–2.3)
BASO+EOS+MONOS # BLD AUTO: 15 % (ref 0.1–17)
DIFFERENTIAL METHOD BLD: ABNORMAL
ERYTHROCYTE [DISTWIDTH] IN BLOOD BY AUTOMATED COUNT: 12.7 % (ref 11.5–14.5)
HCT VFR BLD AUTO: 30.6 % (ref 36–48)
HGB BLD-MCNC: 9.3 G/DL (ref 12–16)
LYMPHOCYTES # BLD AUTO: 47 % (ref 14–44)
LYMPHOCYTES # BLD: 2.1 K/UL (ref 1.1–5.9)
MCH RBC QN AUTO: 27.3 PG (ref 25–35)
MCHC RBC AUTO-ENTMCNC: 30.4 G/DL (ref 31–37)
MCV RBC AUTO: 89.7 FL (ref 78–102)
NEUTS SEG # BLD: 1.7 K/UL (ref 1.8–9.5)
NEUTS SEG NFR BLD AUTO: 39 % (ref 40–70)
PLATELET # BLD AUTO: 218 K/UL (ref 140–440)
RBC # BLD AUTO: 3.41 M/UL (ref 4.1–5.1)
WBC # BLD AUTO: 4.5 K/UL (ref 4.5–13)

## 2017-04-11 PROCEDURE — 36415 COLL VENOUS BLD VENIPUNCTURE: CPT

## 2017-04-11 PROCEDURE — 85025 COMPLETE CBC W/AUTO DIFF WBC: CPT | Performed by: INTERNAL MEDICINE

## 2017-04-11 NOTE — PROGRESS NOTES
[facility] Progress Note    Date: 2017    Name: Laith Phillips    MRN: [id number]         : 1922     PROCRIT INJECTON    Ms. Esvin Metz was assessed and education was provided. Pt arrived to [facility] in wheelchair accompanied by her daughter at 80. Ms. Antony's vitals were reviewed and patient was observed for 5 minutes prior to treatment. Visit Vitals    /62 (BP 1 Location: Left arm, BP Patient Position: Sitting)    Pulse 67    Temp 97.7 °F (36.5 °C)    Resp 18    SpO2 94%    Breastfeeding No       Blood drawn via left arm venipuncture X1 attempt. Gauze and band-aid applied to site. Lab results were obtained and reviewed. Recent Results (from the past 24 hour(s))   CBC WITH 3 PART DIFF    Collection Time: 17  2:28 PM   Result Value Ref Range    WBC 4.5 4.5 - 13.0 K/uL    RBC 3.41 (L) 4.10 - 5.10 M/uL    HGB 9.3 (L) 12.0 - 16 g/dL    HCT 30.6 (L) 36 - 48 %    MCV 89.7 78 - 102 FL    MCH 27.3 25.0 - 35.0 PG    MCHC 30.4 (L) 31 - 37 g/dL    RDW 12.7 11.5 - 14.5 %    PLATELET 829 257 - 073 K/uL    NEUTROPHILS 39 (L) 40 - 70 %    MIXED CELLS 15 0.1 - 17 %    LYMPHOCYTES 47 (H) 14 - 44 %    ABS. NEUTROPHILS 1.7 (L) 1.8 - 9.5 K/UL    ABS. MIXED CELLS 0.7 0.0 - 2.3 K/uL    ABS. LYMPHOCYTES 2.1 1.1 - 5.9 K/UL    DF AUTOMATED         HGB= 9.3 and HCT= 30.6. Procrit held per order parameter. Patient armband removed and shredded. Ms. Esvin Metz was discharged from [place] [address] in stable condition at 1440. She is to return on 2017 at 1400 for her next Procrit appointment.     Peter Sheldon RN  2017

## 2017-04-25 ENCOUNTER — HOSPITAL ENCOUNTER (OUTPATIENT)
Dept: INFUSION THERAPY | Age: 82
Discharge: HOME | End: 2017-04-25
Payer: MEDICARE

## 2017-04-25 VITALS
DIASTOLIC BLOOD PRESSURE: 66 MMHG | RESPIRATION RATE: 18 BRPM | HEART RATE: 83 BPM | TEMPERATURE: 97.5 F | OXYGEN SATURATION: 91 % | SYSTOLIC BLOOD PRESSURE: 147 MMHG

## 2017-04-25 LAB
BASO+EOS+MONOS # BLD AUTO: 0.5 K/UL (ref 0–2.3)
BASO+EOS+MONOS # BLD AUTO: 8 % (ref 0.1–17)
DIFFERENTIAL METHOD BLD: ABNORMAL
ERYTHROCYTE [DISTWIDTH] IN BLOOD BY AUTOMATED COUNT: 12.5 % (ref 11.5–14.5)
HCT VFR BLD AUTO: 31.2 % (ref 36–48)
HGB BLD-MCNC: 9.6 G/DL (ref 12–16)
LYMPHOCYTES # BLD AUTO: 60 % (ref 14–44)
LYMPHOCYTES # BLD: 3.5 K/UL (ref 1.1–5.9)
MCH RBC QN AUTO: 27.3 PG (ref 25–35)
MCHC RBC AUTO-ENTMCNC: 30.8 G/DL (ref 31–37)
MCV RBC AUTO: 88.6 FL (ref 78–102)
NEUTS SEG # BLD: 1.8 K/UL (ref 1.8–9.5)
NEUTS SEG NFR BLD AUTO: 32 % (ref 40–70)
PLATELET # BLD AUTO: 202 K/UL (ref 140–440)
RBC # BLD AUTO: 3.52 M/UL (ref 4.1–5.1)
WBC # BLD AUTO: 5.8 K/UL (ref 4.5–13)

## 2017-04-25 PROCEDURE — 36415 COLL VENOUS BLD VENIPUNCTURE: CPT

## 2017-04-25 PROCEDURE — 85025 COMPLETE CBC W/AUTO DIFF WBC: CPT | Performed by: INTERNAL MEDICINE

## 2017-04-25 NOTE — PROGRESS NOTES
[facility] Progress Note    Date: 2017    Name: Caterina Gongora    MRN: [id number]         : 1922     patient arrived via wheelchair, accompanied by her daughter. Alert and oriented x 3. No complaints voiced      Patient assessed and education was provided. Patient vitals were reviewed. Visit Vitals    /66 (BP 1 Location: Left arm, BP Patient Position: Sitting)    Pulse 83    Temp 97.5 °F (36.4 °C)    Resp 18    SpO2 91%       Blood sample by mere-puncture on 1st stick to left FA for cbc      Lab results were obtained and reviewed. Recent Results (from the past 12 hour(s))   CBC WITH 3 PART DIFF    Collection Time: 17  2:25 PM   Result Value Ref Range    WBC 5.8 4.5 - 13.0 K/uL    RBC 3.52 (L) 4.10 - 5.10 M/uL    HGB 9.6 (L) 12.0 - 16 g/dL    HCT 31.2 (L) 36 - 48 %    MCV 88.6 78 - 102 FL    MCH 27.3 25.0 - 35.0 PG    MCHC 30.8 (L) 31 - 37 g/dL    RDW 12.5 11.5 - 14.5 %    PLATELET 438 076 - 404 K/uL    NEUTROPHILS 32 (L) 40 - 70 %    MIXED CELLS 8 0.1 - 17 %    LYMPHOCYTES 60 (H) 14 - 44 %    ABS. NEUTROPHILS 1.8 1.8 - 9.5 K/UL    ABS. MIXED CELLS 0.5 0.0 - 2.3 K/uL    ABS. LYMPHOCYTES 3.5 1.1 - 5.9 K/UL    DF AUTOMATED           Procrit injection held per parameters. Patient was discharged from [place] [address] in stable condition at 1430 via wheel chair, accompanied by her daughter. She is to return on 17 at 2 pm for her next appointment.     Elsa Orozco RN  2017  4:45 PM

## 2017-05-03 RX ORDER — OXYCODONE HCL 20 MG/1
20 TABLET, FILM COATED, EXTENDED RELEASE ORAL EVERY 12 HOURS
Qty: 60 TAB | Refills: 0 | Status: SHIPPED | OUTPATIENT
Start: 2017-05-03 | End: 2017-05-19 | Stop reason: SDUPTHER

## 2017-05-08 ENCOUNTER — TELEPHONE (OUTPATIENT)
Dept: PAIN MANAGEMENT | Age: 82
End: 2017-05-08

## 2017-05-09 ENCOUNTER — HOSPITAL ENCOUNTER (OUTPATIENT)
Dept: INFUSION THERAPY | Age: 82
Discharge: HOME | End: 2017-05-09
Payer: MEDICARE

## 2017-05-09 VITALS
DIASTOLIC BLOOD PRESSURE: 55 MMHG | HEART RATE: 64 BPM | SYSTOLIC BLOOD PRESSURE: 146 MMHG | OXYGEN SATURATION: 94 % | RESPIRATION RATE: 18 BRPM | TEMPERATURE: 97 F

## 2017-05-09 LAB
BASO+EOS+MONOS # BLD AUTO: 0.4 K/UL (ref 0–2.3)
BASO+EOS+MONOS # BLD AUTO: 10 % (ref 0.1–17)
DIFFERENTIAL METHOD BLD: ABNORMAL
ERYTHROCYTE [DISTWIDTH] IN BLOOD BY AUTOMATED COUNT: 12.5 % (ref 11.5–14.5)
HCT VFR BLD AUTO: 30.4 % (ref 36–48)
HGB BLD-MCNC: 9.6 G/DL (ref 12–16)
LYMPHOCYTES # BLD AUTO: 54 % (ref 14–44)
LYMPHOCYTES # BLD: 2.5 K/UL (ref 1.1–5.9)
MCH RBC QN AUTO: 27.7 PG (ref 25–35)
MCHC RBC AUTO-ENTMCNC: 31.6 G/DL (ref 31–37)
MCV RBC AUTO: 87.9 FL (ref 78–102)
NEUTS SEG # BLD: 1.8 K/UL (ref 1.8–9.5)
NEUTS SEG NFR BLD AUTO: 37 % (ref 40–70)
PLATELET # BLD AUTO: 225 K/UL (ref 140–440)
RBC # BLD AUTO: 3.46 M/UL (ref 4.1–5.1)
WBC # BLD AUTO: 4.7 K/UL (ref 4.5–13)

## 2017-05-09 PROCEDURE — 36415 COLL VENOUS BLD VENIPUNCTURE: CPT

## 2017-05-09 PROCEDURE — 85025 COMPLETE CBC W/AUTO DIFF WBC: CPT | Performed by: INTERNAL MEDICINE

## 2017-05-09 NOTE — PROGRESS NOTES
[facility] Progress Note    Date: May 9, 2017    Name: Razia Gonzalez    MRN: [id number]         : 1922     PROCRIT INJECTON    Ms. Henna Sanchez was assessed and education was provided. Pt arrived to [facility] in wheelchair accompanied by her daughter at 0. Ms. Antony's vitals were reviewed and patient was observed for 5 minutes prior to treatment. Visit Vitals    /55 (BP 1 Location: Left arm, BP Patient Position: Sitting)    Pulse 64    Temp 97 °F (36.1 °C)    Resp 18    SpO2 94%       Blood drawn via left arm venipuncture X1 attempt. Gauze and tape applied to site. Lab results were obtained and reviewed. Recent Results (from the past 24 hour(s))   CBC WITH 3 PART DIFF    Collection Time: 17  2:05 PM   Result Value Ref Range    WBC 4.7 4.5 - 13.0 K/uL    RBC 3.46 (L) 4.10 - 5.10 M/uL    HGB 9.6 (L) 12.0 - 16 g/dL    HCT 30.4 (L) 36 - 48 %    MCV 87.9 78 - 102 FL    MCH 27.7 25.0 - 35.0 PG    MCHC 31.6 31 - 37 g/dL    RDW 12.5 11.5 - 14.5 %    PLATELET 450 829 - 446 K/uL    NEUTROPHILS 37 (L) 40 - 70 %    MIXED CELLS 10 0.1 - 17 %    LYMPHOCYTES 54 (H) 14 - 44 %    ABS. NEUTROPHILS 1.8 1.8 - 9.5 K/UL    ABS. MIXED CELLS 0.4 0.0 - 2.3 K/uL    ABS. LYMPHOCYTES 2.5 1.1 - 5.9 K/UL    DF AUTOMATED       Procrit held per order parameter. Patient armband removed and shredded. Ms. Henna Sanchez was discharged from [place] [address] in stable condition at [phone]. She is to return on 2017 at 1400 for her next Procrit appointment.     Promise Quiroz RN  May 9, 2017

## 2017-05-19 ENCOUNTER — OFFICE VISIT (OUTPATIENT)
Dept: PAIN MANAGEMENT | Age: 82
End: 2017-05-19

## 2017-05-19 VITALS
DIASTOLIC BLOOD PRESSURE: 50 MMHG | WEIGHT: 135 LBS | SYSTOLIC BLOOD PRESSURE: 133 MMHG | HEART RATE: 56 BPM | HEIGHT: 66 IN | BODY MASS INDEX: 21.69 KG/M2

## 2017-05-19 DIAGNOSIS — M54.2 CERVICALGIA: ICD-10-CM

## 2017-05-19 DIAGNOSIS — M54.5 CHRONIC BILATERAL LOW BACK PAIN, WITH SCIATICA PRESENCE UNSPECIFIED: ICD-10-CM

## 2017-05-19 DIAGNOSIS — M25.50 PAIN IN JOINT, MULTIPLE SITES: Primary | ICD-10-CM

## 2017-05-19 DIAGNOSIS — Z79.899 ENCOUNTER FOR LONG-TERM (CURRENT) DRUG USE: ICD-10-CM

## 2017-05-19 DIAGNOSIS — G89.29 CHRONIC BILATERAL LOW BACK PAIN, WITH SCIATICA PRESENCE UNSPECIFIED: ICD-10-CM

## 2017-05-19 RX ORDER — OXYCODONE HCL 20 MG/1
20 TABLET, FILM COATED, EXTENDED RELEASE ORAL EVERY 12 HOURS
Qty: 60 TAB | Refills: 0 | Status: SHIPPED | OUTPATIENT
Start: 2017-06-06 | End: 2017-05-19 | Stop reason: SDUPTHER

## 2017-05-19 RX ORDER — OXYCODONE HCL 20 MG/1
20 TABLET, FILM COATED, EXTENDED RELEASE ORAL EVERY 12 HOURS
Qty: 60 TAB | Refills: 0 | Status: SHIPPED | OUTPATIENT
Start: 2017-08-03 | End: 2017-09-05 | Stop reason: SDUPTHER

## 2017-05-19 RX ORDER — OXYCODONE HCL 20 MG/1
20 TABLET, FILM COATED, EXTENDED RELEASE ORAL EVERY 12 HOURS
Qty: 60 TAB | Refills: 0 | Status: SHIPPED | OUTPATIENT
Start: 2017-07-05 | End: 2017-05-19 | Stop reason: SDUPTHER

## 2017-05-19 NOTE — PROGRESS NOTES
HISTORY OF PRESENT ILLNESS  Jarrod Castrejon is a 80 y.o. female. 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 She is present with her daughter who acts as primary historian. She reports that pain has been stable and well controlled over the past several months. Her daughters, who are present, agree that her pain seems to be well controlled. She will be 89 in a few months, and she is healthy aside from dementia. Pain continues to predominate in the right shoulder and lower back, as well as the right knee. She describes her pain as aching, stabbing, stiff, and tender. Symptoms worsen with prolonged sitting, standing, and bending. She lives at home with her daughter, who is her primary caregiver. Pt reports average of 3-5/10 pain scale most days. She denies any new symptoms. Medications continue to work well for pain control overall. Jarrod Castrejon is tolerating medications well, with no untoward side effects noted. He is able to stay more active with less discomfort with these current doses. The patient reports an average of 60% relief with this regimen. Most recent UDS and  were consistent with prescribed medications. Pill counts are appropriate. He is informed of side effects, risks, and benefits of this regimen, and emphasizes that he derives a significant improvement in functionality and quality of life, and notes that non-opioid medications and therapies in the past have not offered significant benefit. He denies new or worsening insomnia or constipation issues. He denies any falls, injuries, or hospitalizations since the last visit. HPI--see above    ROS  Constitutional: Negative for fever, chills and malaise/fatigue. HENT: Positive for neck pain. Negative for congestion and sore throat. Eyes: Negative for blurred vision and double vision.    Respiratory: Negative for shortness of breath. Cardiovascular: Negative for chest pain and leg swelling. Gastrointestinal: Positive for constipation. Negative for heartburn, nausea, vomiting and diarrhea. Genitourinary:        Some frequency \"weak bladder\"   Musculoskeletal: Positive for back pain and joint pain (left knee). Negative for myalgias and falls. Skin: Negative. Neurological: Negative for dizziness, tingling, tremors, seizures, weakness and headaches. Endo/Heme/Allergies: Positive for environmental allergies. Does not bruise/bleed easily. Psychiatric/Behavioral: Positive for depression and memory loss. Negative for suicidal ideas. The patient is nervous/anxious and has insomnia   Physical Exam   Constitutional: She is oriented to person, place, and time. She appears well-developed and well-nourished. In visible discomfort  Pleasant demeanor     HENT:   Head: Normocephalic and atraumatic. Eyes: EOM are normal.   Neck: Spinous process tenderness and muscular tenderness present. Decreased range of motion present. Marked spasms of cervical paraspinous musculature noted  Hypertrophy of the SCM  significant anterocollis with kyphosis of the cervical spine. Very limited ROM in all directions   Neurological: She is alert and oriented to person, place, and time. No cranial nerve deficit. She exhibits normal muscle tone. Coordination normal.   Psychiatric: She has a normal mood and affect. Her behavior is normal. Judgment normal.   Poor historian  Dementia has worsened   ASSESSMENT and PLAN    ICD-10-CM ICD-9-CM    1. Pain in joint, multiple sites M25.50 719.49    2. Chronic bilateral low back pain, with sciatica presence unspecified M54.5 724.2     G89.29 338.29    3. Cervicalgia M54.2 723.1    4.  Encounter for long-term (current) drug use Z79.899 V58.69       Plan:  Continue same medications as prescribed for chronic pain  Follow up in 3 months or sooner if needed  Regular exercise and attention to emotional health and diet remain the most effective ways to treat chronic pain of all kinds  You may contact me with questions or concerns through [address]

## 2017-05-19 NOTE — PROGRESS NOTES
Nursing Notes    Patient presents to the office today in follow-up. Patient rates her pain at 6/10 on the numerical pain scale. Reviewed medications with counts as follows:    Rx Date filled Qty Dispensed Pill Count Last Dose Short   oxycontin 20 mg  05/08/17 60 39 today no                                  Mouth swab was performed in office today    Any new labs or imaging since last appointment? NO    Have you been to an emergency room (ER) or urgent care clinic since your last visit? NO            Have you been hospitalized since your last visit? NO     If yes, where, when, and reason for visit? Have you seen or consulted any other health care providers outside of the [address] [place] [address]  since your last visit? NO     If yes, where, when, and reason for visit? HM deferred to pcp.

## 2017-05-19 NOTE — MR AVS SNAPSHOT
Visit Information Date & Time Provider Department Dept. Phone Encounter #  
 5/19/2017  3:20 PM Alice Alejo Jimenez [facility] [phone] [id number] Upcoming Health Maintenance Date Due DTaP/Tdap/Td series (1 - Tdap) 9/2/1943 ZOSTER VACCINE AGE 60> 9/2/1982 GLAUCOMA SCREENING Q2Y 9/2/1987 MEDICARE YEARLY EXAM 9/2/1987 Pneumococcal 65+ Low/Medium Risk (2 of 2 - PCV13) 11/1/2016 INFLUENZA AGE 9 TO ADULT 8/1/2017 Allergies as of 5/19/2017  Review Complete On: 5/19/2017 By: Aubree Pagan LPN Severity Noted Reaction Type Reactions Celebrex [Celecoxib]    Unable to Obtain Codeine    Unable to Obtain Flexeril [Cyclobenzaprine]    Unable to Obtain Macrobid [Nitrofurantoin Monohyd/m-cryst]    Unable to Consolidated Alex Pcn [Penicillins]    Unable to Obtain Remeron [Mirtazapine]    Unable to Obtain  
 Sulfa (Sulfonamide Antibiotics)    Other (comments) Swelling and low heart rate Vicodin [Hydrocodone-acetaminophen]    Unable to Obtain Current Immunizations  Reviewed on 5/9/2017 Name Date Influenza Vaccine 11/1/2016, 11/1/2015 Pneumococcal Polysaccharide (PPSV-23) 11/1/2015 Not reviewed this visit You Were Diagnosed With   
  
 Codes Comments Pain in joint, multiple sites    -  Primary ICD-10-CM: M25.50 ICD-9-CM: 719.49 Chronic bilateral low back pain, with sciatica presence unspecified     ICD-10-CM: M54.5, G89.29 ICD-9-CM: 724.2, 338.29 Cervicalgia     ICD-10-CM: M54.2 ICD-9-CM: 723.1 Encounter for long-term (current) drug use     ICD-10-CM: Z79.899 ICD-9-CM: V58.69 Vitals BP Pulse Height(growth percentile) Weight(growth percentile) BMI OB Status 133/50 (!) 56 5' 6\" (1.676 m) 135 lb (61.2 kg) 21.79 kg/m2 Hysterectomy Smoking Status Never Smoker Vitals History BMI and BSA Data Body Mass Index Body Surface Area 21.79 kg/m 2 1.69 m 2 Your Updated Medication List  
  
   
This list is accurate as of: 5/19/17  4:24 PM.  Always use your most recent med list. amLODIPine 10 mg tablet Commonly known as:  Sherald Burkitt Take  by mouth daily. donepezil 10 mg tablet Commonly known as:  ARICEPT Take 10 mg by mouth nightly. furosemide 20 mg tablet Commonly known as:  LASIX Take  by mouth daily. glipiZIDE 5 mg tablet Commonly known as:  [place] Mcfadden Take  by mouth two (2) times a day. NexIUM 40 mg capsule Generic drug:  esomeprazole Take 40 mg by mouth daily. * OxyCONTIN 20 mg CR tablet Generic drug:  oxyCODONE CR Take 20 mg by mouth every twelve (12) hours. * oxyCODONE ER 20 mg ER tablet Commonly known as:  OxyCONTIN Take 1 Tab by mouth every twelve (12) hours for 30 days. Max Daily Amount: 40 mg. For chronic pain. Start taking on:  8/3/2017  
  
 pantoprazole 40 mg tablet Commonly known as:  PROTONIX Take 40 mg by mouth daily. polyethylene glycol 17 gram packet Commonly known as:  Terrell Mando Take 17 g by mouth daily. SEROquel 100 mg tablet Generic drug:  QUEtiapine Take 50 mg by mouth two (2) times a day. * Notice: This list has 2 medication(s) that are the same as other medications prescribed for you. Read the directions carefully, and ask your doctor or other care provider to review them with you. Prescriptions Printed Refills  
 oxyCODONE ER (OXYCONTIN) 20 mg ER tablet 0 Starting on: 8/3/2017 Sig: Take 1 Tab by mouth every twelve (12) hours for 30 days. Max Daily Amount: 40 mg. For chronic pain. Class: Print Route: Oral  
  
To-Do List   
 05/23/2017  2:00 PM  
  Appointment with HBV FAST TRACK NURSE at [facility] ([phone])  
  
 06/06/2017 2:00 PM  
  Appointment with HBV FAST TRACK NURSE at [address] [place] [address] OP INFUSION ([phone]) Patient Instructions Plan: Continue same medications as prescribed for chronic pain Follow up in 3 months or sooner if needed Regular exercise and attention to emotional health and diet remain the most effective ways to treat chronic pain of all kinds You may contact me with questions or concerns through [address] Introducing [facility]! Landy Diamond introduces [employer] patient portal. Now you can access parts of your medical record, email your doctor's office, and request medication refills online. 1. In your internet browser, go to https://[employer]. [employer] 2. Click on the First Time User? Click Here link in the Sign In box. You will see the New Member Sign Up page. 3. Enter your [employer] Access Code exactly as it appears below. You will not need to use this code after youve completed the sign-up process. If you do not sign up before the expiration date, you must request a new code. · [employer] Access Code: [email or web address] Expires: 7/9/2017  8:05 AM 
 
4. Enter the last four digits of your Social Security Number (xxxx) and Date of Birth (mm/dd/yyyy) as indicated and click Submit. You will be taken to the next sign-up page. 5. Create a [employer] ID. This will be your [employer] login ID and cannot be changed, so think of one that is secure and easy to remember. 6. Create a [employer] password. You can change your password at any time. 7. Enter your Password Reset Question and Answer. This can be used at a later time if you forget your password. 8. Enter your e-mail address. You will receive e-mail notification when new information is available in [address] 9. Click Sign Up. You can now view and download portions of your medical record. 10. Click the Download Summary menu link to download a portable copy of your medical information. If you have questions, please visit the Frequently Asked Questions section of the [employer] website.  Remember, [employer] is NOT to be used for urgent needs. For medical emergencies, dial 911. Now available from your iPhone and Android! Please provide this summary of care documentation to your next provider. Your primary care clinician is listed as [facility]. If you have any questions after today's visit, please call [phone].

## 2017-05-23 ENCOUNTER — HOSPITAL ENCOUNTER (OUTPATIENT)
Dept: INFUSION THERAPY | Age: 82
Discharge: HOME | End: 2017-05-23
Payer: MEDICARE

## 2017-05-23 VITALS
OXYGEN SATURATION: 94 % | DIASTOLIC BLOOD PRESSURE: 62 MMHG | RESPIRATION RATE: 20 BRPM | HEART RATE: 66 BPM | SYSTOLIC BLOOD PRESSURE: 149 MMHG

## 2017-05-23 LAB
BASO+EOS+MONOS # BLD AUTO: 0.4 K/UL (ref 0–2.3)
BASO+EOS+MONOS # BLD AUTO: 8 % (ref 0.1–17)
DIFFERENTIAL METHOD BLD: ABNORMAL
ERYTHROCYTE [DISTWIDTH] IN BLOOD BY AUTOMATED COUNT: 12.9 % (ref 11.5–14.5)
HCT VFR BLD AUTO: 29.9 % (ref 36–48)
HGB BLD-MCNC: 9.1 G/DL (ref 12–16)
LYMPHOCYTES # BLD AUTO: 55 % (ref 14–44)
LYMPHOCYTES # BLD: 2.7 K/UL (ref 1.1–5.9)
MCH RBC QN AUTO: 26.9 PG (ref 25–35)
MCHC RBC AUTO-ENTMCNC: 30.4 G/DL (ref 31–37)
MCV RBC AUTO: 88.5 FL (ref 78–102)
NEUTS SEG # BLD: 1.7 K/UL (ref 1.8–9.5)
NEUTS SEG NFR BLD AUTO: 37 % (ref 40–70)
PLATELET # BLD AUTO: 181 K/UL (ref 140–440)
RBC # BLD AUTO: 3.38 M/UL (ref 4.1–5.1)
WBC # BLD AUTO: 4.8 K/UL (ref 4.5–13)

## 2017-05-23 PROCEDURE — 74011250636 HC RX REV CODE- 250/636: Performed by: INTERNAL MEDICINE

## 2017-05-23 PROCEDURE — 85025 COMPLETE CBC W/AUTO DIFF WBC: CPT | Performed by: INTERNAL MEDICINE

## 2017-05-23 PROCEDURE — 96372 THER/PROPH/DIAG INJ SC/IM: CPT

## 2017-05-23 PROCEDURE — 36415 COLL VENOUS BLD VENIPUNCTURE: CPT

## 2017-05-23 RX ADMIN — ERYTHROPOIETIN 4000 UNITS: 4000 INJECTION, SOLUTION INTRAVENOUS; SUBCUTANEOUS at 14:18

## 2017-05-23 RX ADMIN — ERYTHROPOIETIN 3000 UNITS: 3000 INJECTION, SOLUTION INTRAVENOUS; SUBCUTANEOUS at 14:18

## 2017-05-23 NOTE — PROGRESS NOTES
[facility] Progress Note    Date: May 23, 2017    Name: Anival Guillaume    MRN: [id number]         : 1922     PROCRIT INJECTON    Ms. Vishal Ziegler was assessed and education was provided. Pt arrived to [facility] in wheelchair accompanied by her daughter at 0. Ms. Antony's vitals were reviewed and patient was observed for 5 minutes prior to treatment. Visit Vitals    /62 (BP 1 Location: Left arm, BP Patient Position: Sitting)    Pulse 66    Resp 20    SpO2 94%       Blood drawn via left A/C venipuncture X1 attempt. Gauze and tape applied to site. Lab results were obtained and reviewed. Recent Results (from the past 24 hour(s))   CBC WITH 3 PART DIFF    Collection Time: 17  2:08 PM   Result Value Ref Range    WBC 4.8 4.5 - 13.0 K/uL    RBC 3.38 (L) 4.10 - 5.10 M/uL    HGB 9.1 (L) 12.0 - 16 g/dL    HCT 29.9 (L) 36 - 48 %    MCV 88.5 78 - 102 FL    MCH 26.9 25.0 - 35.0 PG    MCHC 30.4 (L) 31 - 37 g/dL    RDW 12.9 11.5 - 14.5 %    PLATELET 170 531 - 983 K/uL    NEUTROPHILS 37 (L) 40 - 70 %    MIXED CELLS 8 0.1 - 17 %    LYMPHOCYTES 55 (H) 14 - 44 %    ABS. NEUTROPHILS 1.7 (L) 1.8 - 9.5 K/UL    ABS. MIXED CELLS 0.4 0.0 - 2.3 K/uL    ABS. LYMPHOCYTES 2.7 1.1 - 5.9 K/UL    DF AUTOMATED       Procrit given left upper arm SQ. Patient tolerated well. Band aid applied to site. Patient armband removed and shredded. Ms. Vishal Ziegler was discharged from [place] [address] in stable condition at 1425. She is to return on 2017 at 1400 for her next Procrit appointment.     Zoie Barrientos RN  May 23, 2017

## 2017-06-06 ENCOUNTER — HOSPITAL ENCOUNTER (OUTPATIENT)
Dept: INFUSION THERAPY | Age: 82
Discharge: HOME | End: 2017-06-06
Payer: MEDICARE

## 2017-06-06 VITALS
SYSTOLIC BLOOD PRESSURE: 155 MMHG | DIASTOLIC BLOOD PRESSURE: 73 MMHG | HEART RATE: 64 BPM | OXYGEN SATURATION: 91 % | RESPIRATION RATE: 20 BRPM | TEMPERATURE: 97.9 F

## 2017-06-06 LAB
BASO+EOS+MONOS # BLD AUTO: 0.5 K/UL (ref 0–2.3)
BASO+EOS+MONOS # BLD AUTO: 12 % (ref 0.1–17)
DIFFERENTIAL METHOD BLD: ABNORMAL
ERYTHROCYTE [DISTWIDTH] IN BLOOD BY AUTOMATED COUNT: 12.9 % (ref 11.5–14.5)
HCT VFR BLD AUTO: 31.2 % (ref 36–48)
HGB BLD-MCNC: 9.6 G/DL (ref 12–16)
LYMPHOCYTES # BLD AUTO: 45 % (ref 14–44)
LYMPHOCYTES # BLD: 1.8 K/UL (ref 1.1–5.9)
MCH RBC QN AUTO: 27.5 PG (ref 25–35)
MCHC RBC AUTO-ENTMCNC: 30.8 G/DL (ref 31–37)
MCV RBC AUTO: 89.4 FL (ref 78–102)
NEUTS SEG # BLD: 1.7 K/UL (ref 1.8–9.5)
NEUTS SEG NFR BLD AUTO: 44 % (ref 40–70)
PLATELET # BLD AUTO: 191 K/UL (ref 140–440)
RBC # BLD AUTO: 3.49 M/UL (ref 4.1–5.1)
WBC # BLD AUTO: 4 K/UL (ref 4.5–13)

## 2017-06-06 PROCEDURE — 85025 COMPLETE CBC W/AUTO DIFF WBC: CPT | Performed by: INTERNAL MEDICINE

## 2017-06-06 PROCEDURE — 36415 COLL VENOUS BLD VENIPUNCTURE: CPT

## 2017-06-06 NOTE — PROGRESS NOTES
[facility] Progress Note    Date: 2017    Name: Macarena Hardy    MRN: [id number]         : 1922     PROCRIT INJECTON    Ms. Yaya Calderon was assessed and education was provided. Pt arrived to [facility] in wheelchair accompanied by her daughter at 26. Ms. Antony's vitals were reviewed and patient was observed for 5 minutes prior to treatment. Visit Vitals    /73 (BP 1 Location: Left arm, BP Patient Position: Sitting)    Pulse 64    Temp 97.9 °F (36.6 °C)    Resp 20    SpO2 91%    Breastfeeding No       Blood drawn for CBC via left A/C venipuncture X1 attempt. Gauze and tape applied to site. Lab results were obtained and reviewed. Recent Results (from the past 24 hour(s))   CBC WITH 3 PART DIFF    Collection Time: 17  2:08 PM   Result Value Ref Range    WBC 4.0 (L) 4.5 - 13.0 K/uL    RBC 3.49 (L) 4.10 - 5.10 M/uL    HGB 9.6 (L) 12.0 - 16 g/dL    HCT 31.2 (L) 36 - 48 %    MCV 89.4 78 - 102 FL    MCH 27.5 25.0 - 35.0 PG    MCHC 30.8 (L) 31 - 37 g/dL    RDW 12.9 11.5 - 14.5 %    PLATELET 063 803 - 122 K/uL    NEUTROPHILS 44 40 - 70 %    MIXED CELLS 12 0.1 - 17 %    LYMPHOCYTES 45 (H) 14 - 44 %    ABS. NEUTROPHILS 1.7 (L) 1.8 - 9.5 K/UL    ABS. MIXED CELLS 0.5 0.0 - 2.3 K/uL    ABS. LYMPHOCYTES 1.8 1.1 - 5.9 K/UL    DF AUTOMATED       HGB= 9.6 HCT= 31.2  Procrit HELD per ordered parameters. Patient tolerated the procedure well with no complaints. Patient armband removed and shredded. Ms. Yaya Calderon was discharged from [place] [address] in stable condition at [phone]. She is to return on 2017 at 1430 for her next Procrit appointment.     Torsten Chilel RN  2017

## 2017-06-20 ENCOUNTER — HOSPITAL ENCOUNTER (OUTPATIENT)
Dept: INFUSION THERAPY | Age: 82
Discharge: HOME | End: 2017-06-20
Payer: MEDICARE

## 2017-06-20 VITALS
HEART RATE: 56 BPM | RESPIRATION RATE: 20 BRPM | OXYGEN SATURATION: 95 % | SYSTOLIC BLOOD PRESSURE: 160 MMHG | TEMPERATURE: 97 F | DIASTOLIC BLOOD PRESSURE: 62 MMHG

## 2017-06-20 LAB
ALBUMIN SERPL BCP-MCNC: 3.4 G/DL (ref 3.4–5)
ANION GAP BLD CALC-SCNC: 6 MMOL/L (ref 3–18)
BASO+EOS+MONOS # BLD AUTO: 0.5 K/UL (ref 0–2.3)
BASO+EOS+MONOS # BLD AUTO: 10 % (ref 0.1–17)
BUN SERPL-MCNC: 28 MG/DL (ref 7–18)
BUN/CREAT SERPL: 14 (ref 12–20)
CALCIUM SERPL-MCNC: 9.5 MG/DL (ref 8.5–10.1)
CHLORIDE SERPL-SCNC: 103 MMOL/L (ref 100–108)
CO2 SERPL-SCNC: 33 MMOL/L (ref 21–32)
CREAT SERPL-MCNC: 1.97 MG/DL (ref 0.6–1.3)
DIFFERENTIAL METHOD BLD: ABNORMAL
ERYTHROCYTE [DISTWIDTH] IN BLOOD BY AUTOMATED COUNT: 12.5 % (ref 11.5–14.5)
FERRITIN SERPL-MCNC: 620 NG/ML (ref 8–388)
GLUCOSE SERPL-MCNC: 136 MG/DL (ref 74–99)
HCT VFR BLD AUTO: 32.7 % (ref 36–48)
HGB BLD-MCNC: 10 G/DL (ref 12–16)
IRON SATN MFR SERPL: 35 %
IRON SERPL-MCNC: 62 UG/DL (ref 50–175)
LYMPHOCYTES # BLD AUTO: 48 % (ref 14–44)
LYMPHOCYTES # BLD: 2.7 K/UL (ref 1.1–5.9)
MCH RBC QN AUTO: 27.3 PG (ref 25–35)
MCHC RBC AUTO-ENTMCNC: 30.6 G/DL (ref 31–37)
MCV RBC AUTO: 89.3 FL (ref 78–102)
NEUTS SEG # BLD: 2.3 K/UL (ref 1.8–9.5)
NEUTS SEG NFR BLD AUTO: 42 % (ref 40–70)
PHOSPHATE SERPL-MCNC: 3.3 MG/DL (ref 2.5–4.9)
PLATELET # BLD AUTO: 183 K/UL (ref 140–440)
POTASSIUM SERPL-SCNC: 4.9 MMOL/L (ref 3.5–5.5)
RBC # BLD AUTO: 3.66 M/UL (ref 4.1–5.1)
SODIUM SERPL-SCNC: 142 MMOL/L (ref 136–145)
TIBC SERPL-MCNC: 177 UG/DL (ref 250–450)
WBC # BLD AUTO: 5.5 K/UL (ref 4.5–13)

## 2017-06-20 PROCEDURE — 83540 ASSAY OF IRON: CPT | Performed by: INTERNAL MEDICINE

## 2017-06-20 PROCEDURE — 36415 COLL VENOUS BLD VENIPUNCTURE: CPT

## 2017-06-20 PROCEDURE — 85025 COMPLETE CBC W/AUTO DIFF WBC: CPT | Performed by: INTERNAL MEDICINE

## 2017-06-20 PROCEDURE — 80069 RENAL FUNCTION PANEL: CPT | Performed by: INTERNAL MEDICINE

## 2017-06-20 PROCEDURE — 36415 COLL VENOUS BLD VENIPUNCTURE: CPT | Performed by: INTERNAL MEDICINE

## 2017-06-20 PROCEDURE — 82728 ASSAY OF FERRITIN: CPT | Performed by: INTERNAL MEDICINE

## 2017-06-20 NOTE — PROGRESS NOTES
[facility] Progress Note    Date: 2017    Name: Emily Butterfield    MRN: [id number]         : 1922     PROCRIT INJECTON    Ms. Cecelia Anderson was assessed and education was provided. Pt arrived to [facility] in wheelchair accompanied by her daughter at [address] Ms. Antony's vitals were reviewed and patient was observed for 5 minutes prior to treatment. Visit Vitals    /62 (BP 1 Location: Left arm, BP Patient Position: Sitting)    Pulse (!) 56    Temp 97 °F (36.1 °C)    Resp 20    SpO2 95%       Blood drawn via left A/C venipuncture X1 attempt. Gauze and tape applied to site. Lab results were obtained and reviewed. Recent Results (from the past 24 hour(s))   CBC WITH 3 PART DIFF    Collection Time: 17  2:05 PM   Result Value Ref Range    WBC 5.5 4.5 - 13.0 K/uL    RBC 3.66 (L) 4.10 - 5.10 M/uL    HGB 10.0 (L) 12.0 - 16 g/dL    HCT 32.7 (L) 36 - 48 %    MCV 89.3 78 - 102 FL    MCH 27.3 25.0 - 35.0 PG    MCHC 30.6 (L) 31 - 37 g/dL    RDW 12.5 11.5 - 14.5 %    PLATELET 947 797 - 452 K/uL    NEUTROPHILS 42 40 - 70 %    MIXED CELLS 10 0.1 - 17 %    LYMPHOCYTES 48 (H) 14 - 44 %    ABS. NEUTROPHILS 2.3 1.8 - 9.5 K/UL    ABS. MIXED CELLS 0.5 0.0 - 2.3 K/uL    ABS. LYMPHOCYTES 2.7 1.1 - 5.9 K/UL    DF AUTOMATED       Procrit held per order parameters. Patient armband removed and shredded. Ms. Cecelia Anderson was discharged from [place] [address] in stable condition at [phone]. She is to return on 7/3/2017 at 1400 for her next Procrit appointment.     Darline Parikh RN  2017

## 2017-07-03 ENCOUNTER — HOSPITAL ENCOUNTER (OUTPATIENT)
Dept: INFUSION THERAPY | Age: 82
Discharge: HOME | End: 2017-07-03
Payer: MEDICARE

## 2017-07-03 VITALS
RESPIRATION RATE: 18 BRPM | TEMPERATURE: 98.2 F | SYSTOLIC BLOOD PRESSURE: 164 MMHG | OXYGEN SATURATION: 97 % | DIASTOLIC BLOOD PRESSURE: 49 MMHG | HEART RATE: 53 BPM

## 2017-07-03 LAB
BASO+EOS+MONOS # BLD AUTO: 0.4 K/UL (ref 0–2.3)
BASO+EOS+MONOS # BLD AUTO: 10 % (ref 0.1–17)
DIFFERENTIAL METHOD BLD: ABNORMAL
ERYTHROCYTE [DISTWIDTH] IN BLOOD BY AUTOMATED COUNT: 12.5 % (ref 11.5–14.5)
HCT VFR BLD AUTO: 31.7 % (ref 36–48)
HGB BLD-MCNC: 9.9 G/DL (ref 12–16)
LYMPHOCYTES # BLD AUTO: 42 % (ref 14–44)
LYMPHOCYTES # BLD: 1.7 K/UL (ref 1.1–5.9)
MCH RBC QN AUTO: 27.7 PG (ref 25–35)
MCHC RBC AUTO-ENTMCNC: 31.2 G/DL (ref 31–37)
MCV RBC AUTO: 88.8 FL (ref 78–102)
NEUTS SEG # BLD: 1.9 K/UL (ref 1.8–9.5)
NEUTS SEG NFR BLD AUTO: 48 % (ref 40–70)
PLATELET # BLD AUTO: 175 K/UL (ref 140–440)
RBC # BLD AUTO: 3.57 M/UL (ref 4.1–5.1)
WBC # BLD AUTO: 4 K/UL (ref 4.5–13)

## 2017-07-03 PROCEDURE — 85025 COMPLETE CBC W/AUTO DIFF WBC: CPT | Performed by: INTERNAL MEDICINE

## 2017-07-03 PROCEDURE — 36415 COLL VENOUS BLD VENIPUNCTURE: CPT

## 2017-07-03 NOTE — PROGRESS NOTES
[facility] Progress Note    Date: July 3, 2017    Name: Lieutenant Madrigal    MRN: [id number]         : 1922     PROCRIT INJECTON    Ms. Harrison Craven was assessed and education was provided. Pt arrived to [facility] in wheelchair accompanied by her daughter at [phone]. Ms. Antony's vitals were reviewed and patient was observed for 5 minutes prior to treatment. Visit Vitals    /49 (BP 1 Location: Left arm, BP Patient Position: Sitting)    Pulse (!) 53    Temp 98.2 °F (36.8 °C)    Resp 18    SpO2 97%    Breastfeeding No       Blood drawn via left forearm venipuncture X1 attempt. Gauze and tape applied to site. Lab results were obtained and reviewed. Recent Results (from the past 24 hour(s))   CBC WITH 3 PART DIFF    Collection Time: 17  2:28 PM   Result Value Ref Range    WBC 4.0 (L) 4.5 - 13.0 K/uL    RBC 3.57 (L) 4.10 - 5.10 M/uL    HGB 9.9 (L) 12.0 - 16 g/dL    HCT 31.7 (L) 36 - 48 %    MCV 88.8 78 - 102 FL    MCH 27.7 25.0 - 35.0 PG    MCHC 31.2 31 - 37 g/dL    RDW 12.5 11.5 - 14.5 %    PLATELET 610 129 - 735 K/uL    NEUTROPHILS 48 40 - 70 %    MIXED CELLS 10 0.1 - 17 %    LYMPHOCYTES 42 14 - 44 %    ABS. NEUTROPHILS 1.9 1.8 - 9.5 K/UL    ABS. MIXED CELLS 0.4 0.0 - 2.3 K/uL    ABS. LYMPHOCYTES 1.7 1.1 - 5.9 K/UL    DF AUTOMATED       Results within ordered parameters to HOLD procrit today. Ms. Harrison Craven was discharged from [place] [address] in stable condition at 1440. She is to return on 2017 at 1400 for her next Procrit appointment.     Audrey Kirkpatrick RN  July 3, 2017

## 2017-07-05 ENCOUNTER — APPOINTMENT (OUTPATIENT)
Dept: INFUSION THERAPY | Age: 82
End: 2017-07-05
Payer: MEDICARE

## 2017-07-18 ENCOUNTER — HOSPITAL ENCOUNTER (OUTPATIENT)
Dept: INFUSION THERAPY | Age: 82
Discharge: HOME | End: 2017-07-18
Payer: MEDICARE

## 2017-07-18 VITALS
TEMPERATURE: 97.8 F | RESPIRATION RATE: 18 BRPM | DIASTOLIC BLOOD PRESSURE: 65 MMHG | HEART RATE: 63 BPM | OXYGEN SATURATION: 96 % | SYSTOLIC BLOOD PRESSURE: 147 MMHG

## 2017-07-18 LAB
BASO+EOS+MONOS # BLD AUTO: 0.5 K/UL (ref 0–2.3)
BASO+EOS+MONOS # BLD AUTO: 8 % (ref 0.1–17)
DIFFERENTIAL METHOD BLD: ABNORMAL
ERYTHROCYTE [DISTWIDTH] IN BLOOD BY AUTOMATED COUNT: 12.6 % (ref 11.5–14.5)
HCT VFR BLD AUTO: 31.5 % (ref 36–48)
HGB BLD-MCNC: 9.7 G/DL (ref 12–16)
LYMPHOCYTES # BLD AUTO: 43 % (ref 14–44)
LYMPHOCYTES # BLD: 2.5 K/UL (ref 1.1–5.9)
MCH RBC QN AUTO: 26.9 PG (ref 25–35)
MCHC RBC AUTO-ENTMCNC: 30.8 G/DL (ref 31–37)
MCV RBC AUTO: 87.5 FL (ref 78–102)
NEUTS SEG # BLD: 2.8 K/UL (ref 1.8–9.5)
NEUTS SEG NFR BLD AUTO: 49 % (ref 40–70)
PLATELET # BLD AUTO: 183 K/UL (ref 140–440)
RBC # BLD AUTO: 3.6 M/UL (ref 4.1–5.1)
WBC # BLD AUTO: 5.8 K/UL (ref 4.5–13)

## 2017-07-18 PROCEDURE — 85025 COMPLETE CBC W/AUTO DIFF WBC: CPT | Performed by: INTERNAL MEDICINE

## 2017-07-18 PROCEDURE — 36415 COLL VENOUS BLD VENIPUNCTURE: CPT

## 2017-07-18 NOTE — PROGRESS NOTES
[facility] Progress Note    Date: 2017    Name: Mary Jane Pak    MRN: [id number]         : 1922     PROCRIT INJECTON    Ms. Miya Fowler was assessed and education was provided. Pt arrived to [facility] in wheelchair accompanied by her daughter at 0. Ms. Antony's vitals were reviewed and patient was observed for 5 minutes prior to treatment. Visit Vitals    /65 (BP 1 Location: Left arm, BP Patient Position: Sitting)    Pulse 63    Temp 97.8 °F (36.6 °C)    Resp 18    SpO2 96%       Blood drawn via left AC venipuncture X1 attempt. Gauze and tape applied to site. Lab results were obtained and reviewed. Recent Results (from the past 24 hour(s))   CBC WITH 3 PART DIFF    Collection Time: 17  2:11 PM   Result Value Ref Range    WBC 5.8 4.5 - 13.0 K/uL    RBC 3.60 (L) 4.10 - 5.10 M/uL    HGB 9.7 (L) 12.0 - 16 g/dL    HCT 31.5 (L) 36 - 48 %    MCV 87.5 78 - 102 FL    MCH 26.9 25.0 - 35.0 PG    MCHC 30.8 (L) 31 - 37 g/dL    RDW 12.6 11.5 - 14.5 %    PLATELET 178 825 - 684 K/uL    NEUTROPHILS 49 40 - 70 %    MIXED CELLS 8 0.1 - 17 %    LYMPHOCYTES 43 14 - 44 %    ABS. NEUTROPHILS 2.8 1.8 - 9.5 K/UL    ABS. MIXED CELLS 0.5 0.0 - 2.3 K/uL    ABS. LYMPHOCYTES 2.5 1.1 - 5.9 K/UL    DF AUTOMATED       HGB= 9.7 and HCT= 31.5    Results within ordered parameters to HOLD procrit today. Ms. Miya Fowler was discharged from [place] [address] in stable condition at [phone]. She is to return on 2017 at 1400 for her next Procrit appointment.     Tatum Duggan RN  2017

## 2017-08-01 ENCOUNTER — HOSPITAL ENCOUNTER (OUTPATIENT)
Dept: INFUSION THERAPY | Age: 82
Discharge: HOME | End: 2017-08-01
Payer: MEDICARE

## 2017-08-01 VITALS
DIASTOLIC BLOOD PRESSURE: 53 MMHG | TEMPERATURE: 97.4 F | HEART RATE: 57 BPM | OXYGEN SATURATION: 95 % | RESPIRATION RATE: 19 BRPM | SYSTOLIC BLOOD PRESSURE: 159 MMHG

## 2017-08-01 LAB
BASO+EOS+MONOS # BLD AUTO: 0.4 K/UL (ref 0–2.3)
BASO+EOS+MONOS # BLD AUTO: 9 % (ref 0.1–17)
DIFFERENTIAL METHOD BLD: ABNORMAL
ERYTHROCYTE [DISTWIDTH] IN BLOOD BY AUTOMATED COUNT: 12.4 % (ref 11.5–14.5)
HCT VFR BLD AUTO: 31.9 % (ref 36–48)
HGB BLD-MCNC: 10.3 G/DL (ref 12–16)
LYMPHOCYTES # BLD AUTO: 46 % (ref 14–44)
LYMPHOCYTES # BLD: 2.1 K/UL (ref 1.1–5.9)
MCH RBC QN AUTO: 28.2 PG (ref 25–35)
MCHC RBC AUTO-ENTMCNC: 32.3 G/DL (ref 31–37)
MCV RBC AUTO: 87.4 FL (ref 78–102)
NEUTS SEG # BLD: 2.1 K/UL (ref 1.8–9.5)
NEUTS SEG NFR BLD AUTO: 46 % (ref 40–70)
PLATELET # BLD AUTO: 177 K/UL (ref 140–440)
RBC # BLD AUTO: 3.65 M/UL (ref 4.1–5.1)
WBC # BLD AUTO: 4.6 K/UL (ref 4.5–13)

## 2017-08-01 PROCEDURE — 85025 COMPLETE CBC W/AUTO DIFF WBC: CPT | Performed by: INTERNAL MEDICINE

## 2017-08-01 PROCEDURE — 36415 COLL VENOUS BLD VENIPUNCTURE: CPT

## 2017-08-01 NOTE — PROGRESS NOTES
[facility] Progress Note    Date: 2017    Name: Patricia Rogers    MRN: [id number]         : 1922     PROCRIT INJECTON    Ms. Edwin Stokes was assessed and education was provided. Pt arrived to [facility] in wheelchair accompanied by her daughter at 26. Ms. Antony's vitals were reviewed and patient was observed for 5 minutes prior to treatment. Visit Vitals    /53 (BP 1 Location: Left arm, BP Patient Position: Sitting)    Pulse (!) 57    Temp 97.4 °F (36.3 °C)    Resp 19    SpO2 95%    Breastfeeding No       Blood drawn via left A/C venipuncture X1 attempt. Gauze and tape applied to site. Lab results were obtained and reviewed. Recent Results (from the past 24 hour(s))   CBC WITH 3 PART DIFF    Collection Time: 17  2:22 PM   Result Value Ref Range    WBC 4.6 4.5 - 13.0 K/uL    RBC 3.65 (L) 4.10 - 5.10 M/uL    HGB 10.3 (L) 12.0 - 16 g/dL    HCT 31.9 (L) 36 - 48 %    MCV 87.4 78 - 102 FL    MCH 28.2 25.0 - 35.0 PG    MCHC 32.3 31 - 37 g/dL    RDW 12.4 11.5 - 14.5 %    PLATELET 963 079 - 990 K/uL    NEUTROPHILS 46 40 - 70 %    MIXED CELLS 9 0.1 - 17 %    LYMPHOCYTES 46 (H) 14 - 44 %    ABS. NEUTROPHILS 2.1 1.8 - 9.5 K/UL    ABS. MIXED CELLS 0.4 0.0 - 2.3 K/uL    ABS. LYMPHOCYTES 2.1 1.1 - 5.9 K/UL    DF AUTOMATED       Procrit held per order parameters. Patient armband removed and shredded. Ms. Edwin Stokes was discharged from [place] [address] in stable condition at 1435. She is to return on 8/15/2017 at 1400 for her next Procrit appointment.     Georgette Elizalde RN  2017

## 2017-08-15 ENCOUNTER — HOSPITAL ENCOUNTER (OUTPATIENT)
Dept: INFUSION THERAPY | Age: 82
Discharge: HOME | End: 2017-08-15
Payer: MEDICARE

## 2017-08-15 VITALS
RESPIRATION RATE: 18 BRPM | HEART RATE: 57 BPM | OXYGEN SATURATION: 95 % | SYSTOLIC BLOOD PRESSURE: 143 MMHG | DIASTOLIC BLOOD PRESSURE: 81 MMHG | TEMPERATURE: 98.4 F

## 2017-08-15 LAB
BASO+EOS+MONOS # BLD AUTO: 0.6 K/UL (ref 0–2.3)
BASO+EOS+MONOS # BLD AUTO: 12 % (ref 0.1–17)
DIFFERENTIAL METHOD BLD: ABNORMAL
ERYTHROCYTE [DISTWIDTH] IN BLOOD BY AUTOMATED COUNT: 12.7 % (ref 11.5–14.5)
HCT VFR BLD AUTO: 29.7 % (ref 36–48)
HGB BLD-MCNC: 9.6 G/DL (ref 12–16)
LYMPHOCYTES # BLD AUTO: 30 % (ref 14–44)
LYMPHOCYTES # BLD: 1.5 K/UL (ref 1.1–5.9)
MCH RBC QN AUTO: 28.2 PG (ref 25–35)
MCHC RBC AUTO-ENTMCNC: 32.3 G/DL (ref 31–37)
MCV RBC AUTO: 87.1 FL (ref 78–102)
NEUTS SEG # BLD: 2.8 K/UL (ref 1.8–9.5)
NEUTS SEG NFR BLD AUTO: 58 % (ref 40–70)
PLATELET # BLD AUTO: 171 K/UL (ref 140–440)
RBC # BLD AUTO: 3.41 M/UL (ref 4.1–5.1)
WBC # BLD AUTO: 4.9 K/UL (ref 4.5–13)

## 2017-08-15 PROCEDURE — 96372 THER/PROPH/DIAG INJ SC/IM: CPT

## 2017-08-15 PROCEDURE — 36415 COLL VENOUS BLD VENIPUNCTURE: CPT

## 2017-08-15 PROCEDURE — 74011250636 HC RX REV CODE- 250/636: Performed by: INTERNAL MEDICINE

## 2017-08-15 PROCEDURE — 85025 COMPLETE CBC W/AUTO DIFF WBC: CPT | Performed by: INTERNAL MEDICINE

## 2017-08-15 RX ADMIN — ERYTHROPOIETIN 4000 UNITS: 4000 INJECTION, SOLUTION INTRAVENOUS; SUBCUTANEOUS at 14:25

## 2017-08-15 RX ADMIN — ERYTHROPOIETIN 3000 UNITS: 3000 INJECTION, SOLUTION INTRAVENOUS; SUBCUTANEOUS at 14:25

## 2017-08-15 NOTE — PROGRESS NOTES
[facility] Progress Note    Date: August 15, 2017    Name: Jayme Meade    MRN: [id number]         : 1922     PROCRIT INJECTON    Ms. Tana Chatterjee was assessed and education was provided. Pt arrived to [facility] in wheelchair accompanied by her daughter at 0. Ms. Antony's vitals were reviewed and patient was observed for 5 minutes prior to treatment. Visit Vitals    /81 (BP 1 Location: Left arm, BP Patient Position: Sitting)    Pulse (!) 57    Temp 98.4 °F (36.9 °C)    Resp 18    SpO2 95%       Blood drawn via left A/C venipuncture X1 attempt. Gauze and tape applied to site. Lab results were obtained and reviewed. Recent Results (from the past 24 hour(s))   CBC WITH 3 PART DIFF    Collection Time: 08/15/17  2:18 PM   Result Value Ref Range    WBC 4.9 4.5 - 13.0 K/uL    RBC 3.41 (L) 4.10 - 5.10 M/uL    HGB 9.6 (L) 12.0 - 16 g/dL    HCT 29.7 (L) 36 - 48 %    MCV 87.1 78 - 102 FL    MCH 28.2 25.0 - 35.0 PG    MCHC 32.3 31 - 37 g/dL    RDW 12.7 11.5 - 14.5 %    PLATELET 350 007 - 236 K/uL    NEUTROPHILS 58 40 - 70 %    MIXED CELLS 12 0.1 - 17 %    LYMPHOCYTES 30 14 - 44 %    ABS. NEUTROPHILS 2.8 1.8 - 9.5 K/UL    ABS. MIXED CELLS 0.6 0.0 - 2.3 K/uL    ABS. LYMPHOCYTES 1.5 1.1 - 5.9 K/UL    DF AUTOMATED       HGB= 9.6 and HCT= 29.7    Procrit 7,000 units given SQ in the left upper arm per written order. Patient tolerated well. Band aid applied to site. Patient armband removed and shredded. Ms. Tana Chatterjee was discharged from [place] [address] in stable condition at 1430. She is to return on 2017 at 1400 for her next Procrit appointment.     Marjorie Olmstead RN  August 15, 2017

## 2017-08-29 ENCOUNTER — HOSPITAL ENCOUNTER (OUTPATIENT)
Dept: INFUSION THERAPY | Age: 82
Discharge: HOME | End: 2017-08-29
Payer: MEDICARE

## 2017-08-29 VITALS
RESPIRATION RATE: 18 BRPM | DIASTOLIC BLOOD PRESSURE: 58 MMHG | TEMPERATURE: 97.4 F | HEART RATE: 78 BPM | SYSTOLIC BLOOD PRESSURE: 148 MMHG | OXYGEN SATURATION: 93 %

## 2017-08-29 LAB
BASO+EOS+MONOS # BLD AUTO: 0.4 K/UL (ref 0–2.3)
BASO+EOS+MONOS # BLD AUTO: 10 % (ref 0.1–17)
DIFFERENTIAL METHOD BLD: ABNORMAL
ERYTHROCYTE [DISTWIDTH] IN BLOOD BY AUTOMATED COUNT: 12.9 % (ref 11.5–14.5)
HCT VFR BLD AUTO: 32 % (ref 36–48)
HGB BLD-MCNC: 10.2 G/DL (ref 12–16)
LYMPHOCYTES # BLD: 2.7 K/UL (ref 1.1–5.9)
LYMPHOCYTES NFR BLD: 58 % (ref 14–44)
MCH RBC QN AUTO: 28.1 PG (ref 25–35)
MCHC RBC AUTO-ENTMCNC: 31.9 G/DL (ref 31–37)
MCV RBC AUTO: 88.2 FL (ref 78–102)
NEUTS SEG # BLD: 1.5 K/UL (ref 1.8–9.5)
NEUTS SEG NFR BLD: 32 % (ref 40–70)
PLATELET # BLD AUTO: 191 K/UL (ref 140–440)
RBC # BLD AUTO: 3.63 M/UL (ref 4.1–5.1)
WBC # BLD AUTO: 4.6 K/UL (ref 4.5–13)

## 2017-08-29 PROCEDURE — 85025 COMPLETE CBC W/AUTO DIFF WBC: CPT | Performed by: INTERNAL MEDICINE

## 2017-08-29 PROCEDURE — 36415 COLL VENOUS BLD VENIPUNCTURE: CPT

## 2017-08-29 NOTE — PROGRESS NOTES
[facility] Progress Note    Date: 2017    Name: Ibis Thorpe    MRN: [id number]         : 1922     PROCRIT INJECTON    Ms. Shine Rossi was assessed and education was provided. Pt arrived to [facility] in wheelchair accompanied by her daughter at 80. Ms. Antony's vitals were reviewed and patient was observed for 5 minutes prior to treatment. Visit Vitals    /58 (BP 1 Location: Left arm, BP Patient Position: Sitting)    Pulse 78    Temp 97.4 °F (36.3 °C)    Resp 18    SpO2 93%       Blood drawn via left A/C venipuncture X1 attempt. Gauze and tape applied to site. Lab results were obtained and reviewed. Recent Results (from the past 24 hour(s))   CBC WITH 3 PART DIFF    Collection Time: 17  2:15 PM   Result Value Ref Range    WBC 4.6 4.5 - 13.0 K/uL    RBC 3.63 (L) 4.10 - 5.10 M/uL    HGB 10.2 (L) 12.0 - 16 g/dL    HCT 32.0 (L) 36 - 48 %    MCV 88.2 78 - 102 FL    MCH 28.1 25.0 - 35.0 PG    MCHC 31.9 31 - 37 g/dL    RDW 12.9 11.5 - 14.5 %    PLATELET 057 006 - 075 K/uL    NEUTROPHILS 32 (L) 40 - 70 %    MIXED CELLS 10 0.1 - 17 %    LYMPHOCYTES 58 (H) 14 - 44 %    ABS. NEUTROPHILS 1.5 (L) 1.8 - 9.5 K/UL    ABS. MIXED CELLS 0.4 0.0 - 2.3 K/uL    ABS. LYMPHOCYTES 2.7 1.1 - 5.9 K/UL    DF AUTOMATED       Procrit held per order parameters. Patient armband removed and shredded. Ms. Shine Rossi was discharged from [place] [address] in stable condition at 1425. She is to return on 2017 at 1400 for her next Procrit appointment.     Phuong Myesha  2017

## 2017-09-05 RX ORDER — OXYCODONE HCL 20 MG/1
20 TABLET, FILM COATED, EXTENDED RELEASE ORAL EVERY 12 HOURS
Qty: 60 TAB | Refills: 0 | Status: SHIPPED | OUTPATIENT
Start: 2017-09-05 | End: 2017-12-27 | Stop reason: SDUPTHER

## 2017-09-05 NOTE — TELEPHONE ENCOUNTER
The pt's daughter called and states that the pt has an appt next week with the office but she is out of her meds. A review of the pt's chart and thong schedule shows that the pt was over scheduled. A  was obtained and the last fill date for her oxycontin was 08/03/17. The pt will need meds to get to this appt. The request will be sent over to the provider for review. The pt and her daughter will be called with the outcome.

## 2017-09-12 ENCOUNTER — APPOINTMENT (OUTPATIENT)
Dept: INFUSION THERAPY | Age: 82
End: 2017-09-12
Payer: MEDICARE

## 2017-09-14 ENCOUNTER — HOSPITAL ENCOUNTER (OUTPATIENT)
Dept: INFUSION THERAPY | Age: 82
Discharge: HOME | End: 2017-09-14
Payer: MEDICARE

## 2017-09-14 VITALS
OXYGEN SATURATION: 96 % | SYSTOLIC BLOOD PRESSURE: 157 MMHG | DIASTOLIC BLOOD PRESSURE: 64 MMHG | RESPIRATION RATE: 18 BRPM | TEMPERATURE: 97.4 F | HEART RATE: 62 BPM

## 2017-09-14 LAB
ALBUMIN SERPL-MCNC: 3.5 G/DL (ref 3.4–5)
ANION GAP SERPL CALC-SCNC: 3 MMOL/L (ref 3–18)
BASO+EOS+MONOS # BLD AUTO: 0.4 K/UL (ref 0–2.3)
BASO+EOS+MONOS # BLD AUTO: 9 % (ref 0.1–17)
BUN SERPL-MCNC: 24 MG/DL (ref 7–18)
BUN/CREAT SERPL: 11 (ref 12–20)
CALCIUM SERPL-MCNC: 9.5 MG/DL (ref 8.5–10.1)
CHLORIDE SERPL-SCNC: 104 MMOL/L (ref 100–108)
CO2 SERPL-SCNC: 34 MMOL/L (ref 21–32)
CREAT SERPL-MCNC: 2.1 MG/DL (ref 0.6–1.3)
DIFFERENTIAL METHOD BLD: ABNORMAL
ERYTHROCYTE [DISTWIDTH] IN BLOOD BY AUTOMATED COUNT: 12.4 % (ref 11.5–14.5)
FERRITIN SERPL-MCNC: 637 NG/ML (ref 8–388)
GLUCOSE SERPL-MCNC: 100 MG/DL (ref 74–99)
HCT VFR BLD AUTO: 32 % (ref 36–48)
HGB BLD-MCNC: 10 G/DL (ref 12–16)
IRON SATN MFR SERPL: 33 %
IRON SERPL-MCNC: 76 UG/DL (ref 50–175)
LYMPHOCYTES # BLD: 2.1 K/UL (ref 1.1–5.9)
LYMPHOCYTES NFR BLD: 46 % (ref 14–44)
MCH RBC QN AUTO: 27.9 PG (ref 25–35)
MCHC RBC AUTO-ENTMCNC: 31.3 G/DL (ref 31–37)
MCV RBC AUTO: 89.4 FL (ref 78–102)
NEUTS SEG # BLD: 2 K/UL (ref 1.8–9.5)
NEUTS SEG NFR BLD: 45 % (ref 40–70)
PHOSPHATE SERPL-MCNC: 4 MG/DL (ref 2.5–4.9)
PLATELET # BLD AUTO: 177 K/UL (ref 140–440)
POTASSIUM SERPL-SCNC: 5.8 MMOL/L (ref 3.5–5.5)
RBC # BLD AUTO: 3.58 M/UL (ref 4.1–5.1)
SODIUM SERPL-SCNC: 141 MMOL/L (ref 136–145)
TIBC SERPL-MCNC: 232 UG/DL (ref 250–450)
WBC # BLD AUTO: 4.5 K/UL (ref 4.5–13)

## 2017-09-14 PROCEDURE — 82728 ASSAY OF FERRITIN: CPT | Performed by: INTERNAL MEDICINE

## 2017-09-14 PROCEDURE — 80069 RENAL FUNCTION PANEL: CPT | Performed by: INTERNAL MEDICINE

## 2017-09-14 PROCEDURE — 83540 ASSAY OF IRON: CPT | Performed by: INTERNAL MEDICINE

## 2017-09-14 PROCEDURE — 85025 COMPLETE CBC W/AUTO DIFF WBC: CPT | Performed by: INTERNAL MEDICINE

## 2017-09-14 PROCEDURE — 36415 COLL VENOUS BLD VENIPUNCTURE: CPT

## 2017-09-14 NOTE — PROGRESS NOTES
[facility] Progress Note    Date: 2017    Name: Juliette Bhardwaj    MRN: [id number]         : 1922     PROCRIT INJECTON    Ms. Geetha Avilez was assessed and education was provided. Pt arrived to [facility] in wheelchair accompanied by her daughter at 56. Ms. Antony's vitals were reviewed and patient was observed for 5 minutes prior to treatment. Visit Vitals    /64 (BP 1 Location: Left arm, BP Patient Position: Sitting)    Pulse 62    Temp 97.4 °F (36.3 °C)    Resp 18    SpO2 96%    Breastfeeding No       Blood drawn via left A/C venipuncture X1 attempt for CBC, Renal Panel, Ferritin, and Iron Profile. Gauze and tape applied to site. Lab results were obtained and reviewed. Recent Results (from the past 24 hour(s))   CBC WITH 3 PART DIFF    Collection Time: 17  2:50 PM   Result Value Ref Range    WBC 4.5 4.5 - 13.0 K/uL    RBC 3.58 (L) 4.10 - 5.10 M/uL    HGB 10.0 (L) 12.0 - 16 g/dL    HCT 32.0 (L) 36 - 48 %    MCV 89.4 78 - 102 FL    MCH 27.9 25.0 - 35.0 PG    MCHC 31.3 31 - 37 g/dL    RDW 12.4 11.5 - 14.5 %    PLATELET 091 600 - 512 K/uL    NEUTROPHILS 45 40 - 70 %    MIXED CELLS 9 0.1 - 17 %    LYMPHOCYTES 46 (H) 14 - 44 %    ABS. NEUTROPHILS 2.0 1.8 - 9.5 K/UL    ABS. MIXED CELLS 0.4 0.0 - 2.3 K/uL    ABS. LYMPHOCYTES 2.1 1.1 - 5.9 K/UL    DF AUTOMATED       Procrit held per order parameters. Patient armband removed and shredded. Ms. Geetha Avilez was discharged from [place] [address] in stable condition at 1505. She is to return on 10/3/2017 at 1400 for her next Procrit appointment.     Terresa Goldberg  2017

## 2017-10-03 ENCOUNTER — HOSPITAL ENCOUNTER (OUTPATIENT)
Dept: INFUSION THERAPY | Age: 82
Discharge: HOME | End: 2017-10-03
Payer: MEDICARE

## 2017-10-03 ENCOUNTER — APPOINTMENT (OUTPATIENT)
Dept: INFUSION THERAPY | Age: 82
End: 2017-10-03
Payer: MEDICARE

## 2017-10-03 VITALS
TEMPERATURE: 97.3 F | RESPIRATION RATE: 18 BRPM | HEART RATE: 65 BPM | OXYGEN SATURATION: 94 % | SYSTOLIC BLOOD PRESSURE: 139 MMHG | DIASTOLIC BLOOD PRESSURE: 51 MMHG

## 2017-10-03 LAB
BASO+EOS+MONOS # BLD AUTO: 0.5 K/UL (ref 0–2.3)
BASO+EOS+MONOS # BLD AUTO: 9 % (ref 0.1–17)
DIFFERENTIAL METHOD BLD: ABNORMAL
ERYTHROCYTE [DISTWIDTH] IN BLOOD BY AUTOMATED COUNT: 12.1 % (ref 11.5–14.5)
HCT VFR BLD AUTO: 32.2 % (ref 36–48)
HGB BLD-MCNC: 9.7 G/DL (ref 12–16)
LYMPHOCYTES # BLD: 2.9 K/UL (ref 1.1–5.9)
LYMPHOCYTES NFR BLD: 48 % (ref 14–44)
MCH RBC QN AUTO: 27.2 PG (ref 25–35)
MCHC RBC AUTO-ENTMCNC: 30.1 G/DL (ref 31–37)
MCV RBC AUTO: 90.4 FL (ref 78–102)
NEUTS SEG # BLD: 2.6 K/UL (ref 1.8–9.5)
NEUTS SEG NFR BLD: 43 % (ref 40–70)
PLATELET # BLD AUTO: 184 K/UL (ref 140–440)
RBC # BLD AUTO: 3.56 M/UL (ref 4.1–5.1)
WBC # BLD AUTO: 6 K/UL (ref 4.5–13)

## 2017-10-03 PROCEDURE — 96372 THER/PROPH/DIAG INJ SC/IM: CPT

## 2017-10-03 PROCEDURE — 36415 COLL VENOUS BLD VENIPUNCTURE: CPT

## 2017-10-03 PROCEDURE — 74011250636 HC RX REV CODE- 250/636: Performed by: INTERNAL MEDICINE

## 2017-10-03 PROCEDURE — 85025 COMPLETE CBC W/AUTO DIFF WBC: CPT | Performed by: INTERNAL MEDICINE

## 2017-10-03 RX ADMIN — ERYTHROPOIETIN 4000 UNITS: 4000 INJECTION, SOLUTION INTRAVENOUS; SUBCUTANEOUS at 14:30

## 2017-10-03 RX ADMIN — ERYTHROPOIETIN 3000 UNITS: 3000 INJECTION, SOLUTION INTRAVENOUS; SUBCUTANEOUS at 14:30

## 2017-10-03 NOTE — PROGRESS NOTES
[facility] Progress Note    Date: October 3, 2017    Name: Edvin Mendoza    MRN: [id number]         : 1922     patient arrived via wheelchair, accompanied by her daughter. Alert and oriented x 3. No complaints voiced      Patient assessed and education was provided. Patient vitals were reviewed. Visit Vitals    /51 (BP 1 Location: Left arm, BP Patient Position: Sitting)    Pulse 65    Temp 97.3 °F (36.3 °C)    Resp 18    SpO2 94%    Breastfeeding No       Blood sample by mere-puncture on 1st stick to left FA for cbc      Lab results were obtained and reviewed. Recent Results (from the past 12 hour(s))   CBC WITH 3 PART DIFF    Collection Time: 10/03/17  2:17 PM   Result Value Ref Range    WBC 6.0 4.5 - 13.0 K/uL    RBC 3.56 (L) 4.10 - 5.10 M/uL    HGB 9.7 (L) 12.0 - 16 g/dL    HCT 32.2 (L) 36 - 48 %    MCV 90.4 78 - 102 FL    MCH 27.2 25.0 - 35.0 PG    MCHC 30.1 (L) 31 - 37 g/dL    RDW 12.1 11.5 - 14.5 %    PLATELET 049 357 - 373 K/uL    NEUTROPHILS 43 40 - 70 %    MIXED CELLS 9 0.1 - 17 %    LYMPHOCYTES 48 (H) 14 - 44 %    ABS. NEUTROPHILS 2.6 1.8 - 9.5 K/UL    ABS. MIXED CELLS 0.5 0.0 - 2.3 K/uL    ABS. LYMPHOCYTES 2.9 1.1 - 5.9 K/UL    DF AUTOMATED           Procrit injection 7,000 units given to upper back of left arm. Tolerated well. Bandaid applied to site. Patient was discharged from [place] [address] in stable condition at , via wheel chair, accompanied by her daughter. She is to return on 10/17/17 at 2 pm for her next appointment.     Lenora Wing RN  October 3, 2017  4:45 PM

## 2017-10-04 ENCOUNTER — OFFICE VISIT (OUTPATIENT)
Dept: PAIN MANAGEMENT | Age: 82
End: 2017-10-04

## 2017-10-04 VITALS
HEIGHT: 66 IN | HEART RATE: 54 BPM | TEMPERATURE: 95.9 F | RESPIRATION RATE: 12 BRPM | BODY MASS INDEX: 21.69 KG/M2 | SYSTOLIC BLOOD PRESSURE: 137 MMHG | DIASTOLIC BLOOD PRESSURE: 50 MMHG | WEIGHT: 135 LBS

## 2017-10-04 DIAGNOSIS — M54.2 CERVICALGIA: Primary | ICD-10-CM

## 2017-10-04 DIAGNOSIS — G89.29 OTHER CHRONIC PAIN: ICD-10-CM

## 2017-10-04 DIAGNOSIS — M54.40 CHRONIC MIDLINE LOW BACK PAIN WITH SCIATICA, SCIATICA LATERALITY UNSPECIFIED: ICD-10-CM

## 2017-10-04 DIAGNOSIS — Z79.899 ENCOUNTER FOR LONG-TERM (CURRENT) DRUG USE: ICD-10-CM

## 2017-10-04 DIAGNOSIS — M25.50 PAIN IN JOINT, MULTIPLE SITES: ICD-10-CM

## 2017-10-04 DIAGNOSIS — M25.569 ARTHRALGIA OF LOWER LEG, UNSPECIFIED LATERALITY: ICD-10-CM

## 2017-10-04 DIAGNOSIS — G89.29 CHRONIC MIDLINE LOW BACK PAIN WITH SCIATICA, SCIATICA LATERALITY UNSPECIFIED: ICD-10-CM

## 2017-10-04 DIAGNOSIS — M54.5 CHRONIC BILATERAL LOW BACK PAIN, WITH SCIATICA PRESENCE UNSPECIFIED: ICD-10-CM

## 2017-10-04 DIAGNOSIS — G89.29 CHRONIC BILATERAL LOW BACK PAIN, WITH SCIATICA PRESENCE UNSPECIFIED: ICD-10-CM

## 2017-10-04 RX ORDER — OXYCODONE HCL 20 MG/1
20 TABLET, FILM COATED, EXTENDED RELEASE ORAL EVERY 12 HOURS
Qty: 60 TAB | Refills: 0 | Status: SHIPPED | OUTPATIENT
Start: 2017-12-05 | End: 2018-03-28 | Stop reason: SDUPTHER

## 2017-10-04 RX ORDER — OXYCODONE HCL 20 MG/1
20 TABLET, FILM COATED, EXTENDED RELEASE ORAL EVERY 12 HOURS
Qty: 60 TAB | Refills: 0 | Status: SHIPPED | OUTPATIENT
Start: 2017-11-05 | End: 2017-12-27 | Stop reason: SDUPTHER

## 2017-10-04 RX ORDER — OXYCODONE HCL 20 MG/1
20 TABLET, FILM COATED, EXTENDED RELEASE ORAL EVERY 12 HOURS
Qty: 60 TAB | Refills: 0 | Status: SHIPPED | OUTPATIENT
Start: 2017-10-06 | End: 2017-12-27 | Stop reason: SDUPTHER

## 2017-10-04 NOTE — PATIENT INSTRUCTIONS
PLAN / Pt Instructions:  1. Continue current plan with no evidence of addiction or diversion. 2. Stable on current medication without adverse events. 3. Refill Oxycontin 20 mg, take one tablet by mouth every 12 hours    4. Discussed risks of addiction, dependency, and opioid induced hyperalgesia. 5. Reviewed with patient benefits of home exercise, and lifestyle changes to assist the patient in self-management of symptoms.   6. Return to clinic in 3 month

## 2017-10-04 NOTE — PROGRESS NOTES
Nursing Notes    Patient presents to the office today in follow-up. Patient rates her pain at 0/10 on the numerical pain scale. Reviewed medications with counts as follows:    Rx Date filled Qty Dispensed Pill Count Last Dose Short   oxycontin CR 20mg 09/06/17 60 0 today no                                          Comments:     POC UDS was not performed in office today    Any new labs or imaging since last appointment? YES, lab    Have you been to an emergency room (ER) or urgent care clinic since your last visit? NO            Have you been hospitalized since your last visit? NO     If yes, where, when, and reason for visit? Have you seen or consulted any other health care providers outside of the [address] [place] [address]  since your last visit? YES     If yes, where, when, and reason for visit? HM deferred to pcp.

## 2017-10-04 NOTE — PROGRESS NOTES
HISTORY OF PRESENT ILLNESS  Miles Jeter is a 80 y.o. female    HPI: Ms. Alyson Harrison  presents for follow up of chronic pain due to lumbar degnerative disc disease, right shoulder pain due to osteoarthritis, and left knee pain due to osteoarthritis. Cervical degenerative disc disease. She is present with her daughter who acts as primary historian. She reports that pain has been stable and well controlled over the past several months. Her daughters, who are present, agree that her pain seems to be well controlled. She is healthy aside from dementia. Pain continues to predominate in the right shoulder and lower back, as well as the right knee. She describes her pain as aching, stabbing, stiff, and tender. Symptoms worsen with prolonged sitting, standing, and bending. She lives at home with her daughter, who is her primary caregiver. She is in good spirits today and joking about having a new \"boyfriend\". She tolerates medications without side effects. Miles Jeter reports no change in sleep or constipation. Current medication management consists of: Oxycontin 20 mg, take one tablet by mouth every 12 hours   Medications are helping with pain control and quality of life. His/Her pain is 4/10 with medication and 10/10 without. Pt describes pain as aching, stabbing, and burning. Aggravating factors include standing, sitting, and walking. Relieved with rest, medication, and avoiding painful activities. The patient reports 80% relief with current medications. Current treatment is helping to improve general activity, mood, walking, sleep, enjoyment of life    Measuring clinical outcomes of chronic pain patients: score 3/28; the lower the number the better the outcome. Pain Meds and Quality Of Life have been reviewed. Nonpharmacologic therapy and non-opioid pharmacologic therapy were considered. If opioid therapy is prescribed, this is only if the expected benefits are anticipated to outweigh risks. She  is otherwise doing well with no other complaints today. She denies any adverse events including nausea, vomiting, dizziness, increased constipation, hallucinations, or seizures. The patient reports functional improvement and QOL with pain medication. Vitals:    10/04/17 1446   BP: 137/50   Pulse: (!) 54   Resp: 12   Temp: 95.9 °F (35.5 °C)   Weight: 61.2 kg (135 lb)   Height: 5' 6\" (1.676 m)   PainSc:   0 - No pain   PainLoc: Neck         Allergies   Allergen Reactions    Celebrex [Celecoxib] Unable to Obtain    Codeine Unable to Obtain    Flexeril [Cyclobenzaprine] Unable to Obtain    Macrobid [Nitrofurantoin Monohyd/M-Cryst] Unable to Obtain    Pcn [Penicillins] Unable to Obtain    Remeron [Mirtazapine] Unable to Obtain    Sulfa (Sulfonamide Antibiotics) Other (comments)     Swelling and low heart rate    Vicodin [Hydrocodone-Acetaminophen] Unable to Obtain       Past Surgical History:   Procedure Laterality Date    HX BLADDER REPAIR      prolapsed    HX CATARACT REMOVAL      HX CHOLECYSTECTOMY  5/1998    HX HERNIA REPAIR  7/1998    x2    HX OOPHORECTOMY       ROS   Constitutional: Negative for fever, chills and malaise/fatigue. HENT: Positive for neck pain. Negative for congestion and sore throat. Eyes: Negative for blurred vision and double vision. Respiratory: Negative for shortness of breath. Cardiovascular: Negative for chest pain and leg swelling. Gastrointestinal: Positive for constipation. Negative for heartburn, nausea, vomiting and diarrhea. Genitourinary:        Some frequency \"weak bladder\"   Musculoskeletal: Positive for back pain and joint pain (left knee). Negative for myalgias and falls. Skin: Negative. Neurological: Negative for dizziness, tingling, tremors, seizures, weakness and headaches. Endo/Heme/Allergies: Positive for environmental allergies. Does not bruise/bleed easily. Psychiatric/Behavioral: Positive for depression and memory loss. Negative for suicidal ideas. The patient is nervous/anxious and has insomnia     Physical Exam   Constitutional: She is oriented to person, place, and time. She appears well-developed and well-nourished. Pleasant demeanor  HENT:   Head: Normocephalic and atraumatic. Eyes: EOM are normal.   Neck: Spinous process tenderness and muscular tenderness present. Decreased range of motion present. Marked spasms of cervical paraspinous musculature noted  Hypertrophy of the SCM  significant anterocollis with kyphosis of the cervical spine. Very limited ROM in all directions   Neurological: She is alert and oriented to person, place, and time. No cranial nerve deficit. She exhibits normal muscle tone. Coordination normal.   Psychiatric: She has a normal mood and affect. Her behavior is normal. Judgment normal.   Poor historian  Dementia has worsened     ASSESSMENT:    1. Cervicalgia    2. Pain in joint, multiple sites    3. Chronic bilateral low back pain, with sciatica presence unspecified    4. Chronic midline low back pain with sciatica, sciatica laterality unspecified    5. Arthralgia of lower leg, unspecified laterality    6. Other chronic pain    7. Encounter for long-term (current) drug use        [place] Prescription Monitoring Program was reviewed which does not demonstrate aberrancies and/or inconsistencies with regard to the historical prescribing of controlled medications to this patient by other providers. Medications were brought to visit today. Pill count was appropriate. When possible, non-drug therapy for chronic pain should be used as a first-line treatment. Physical therapy exercise regimens, chiropractic manipulation, meditation relaxation techniques, cognitive behavior therapy, acupuncture, yoga, Kike Chi,  transcutaneous electrical nerve stimulation (TENS), and application of moist heat can help alleviate pain .      Explained that realistic expectations and goals with chronic pain management are to maximize function and minimize pain with the understanding that limitations will exist both in the extent of relief that she may achieve, as well as thresholds of mg strengths that we will not exceed. Our role is to help the patient better cope with chronic pain utilizng a multimodal approach. The patients condition and plan were discussed. All questions were answered. The patient agrees with the plan. PLAN / Pt Instructions:  1. Continue current plan with no evidence of addiction or diversion. 2. Stable on current medication without adverse events. 3. Refill Oxycontin 20 mg, take one tablet by mouth every 12 hours    4. Discussed risks of addiction, dependency, and opioid induced hyperalgesia. 5. Reviewed with patient benefits of home exercise, and lifestyle changes to assist the patient in self-management of symptoms. 6. Return to clinic in 3 month       Medications Ordered Today   Medications    oxyCODONE ER (OXYCONTIN) 20 mg ER tablet     Sig: Take 1 Tab by mouth every twelve (12) hours for 30 days. Max Daily Amount: 40 mg. For chronic pain. Dispense:  60 Tab     Refill:  0    oxyCODONE ER (OXYCONTIN) 20 mg ER tablet     Sig: Take 1 Tab by mouth every twelve (12) hours for 30 days. Max Daily Amount: 40 mg. For chronic pain. Dispense:  60 Tab     Refill:  0    oxyCODONE ER (OXYCONTIN) 20 mg ER tablet     Sig: Take 1 Tab by mouth every twelve (12) hours for 30 days. Max Daily Amount: 40 mg. For chronic pain. Dispense:  60 Tab     Refill:  0         Prescription monitoring program reviewed. POC UDS today. (Oral Swab)    Pain medications prescribed with the objective of pain relief and improved physical and psychosocial function in this patient. DISPOSITION  · Counseled patient on proper use of prescribed medications.   · Counseled patient about chronic medical conditions and their relationship to anxiety and depression and recommended mental health support as needed. · Reviewed with patient self-help tools, home exercise, and lifestyle changes to assist the patient in self-management of symptoms. · Reviewed with patient the treatment plan, goals of treatment plan, and limitations of treatment plan, to include the potential for side effects from medications and procedures. If side effects occur, it is the responsibility of the patient to inform the clinic so that a change in the treatment plan can be made in a safe manner. The patient is advised that stopping prescribed medication may cause an increase in symptoms and possible medication withdrawal symptoms. The patient is informed an emergency room evaluation may be necessary if this occurs. Spent 25 minutes with patient today reviewing the treatment plan, goals of treatment plan, and limitations of the treatment plan, to include the potential for side effects from medications and procedures. Talib Mcfarland PA-C 10/4/2017        Note: Please excuse any typographical errors. Voice recognition software was used for this note and may cause mistakes.

## 2017-10-05 ENCOUNTER — HOSPITAL ENCOUNTER (OUTPATIENT)
Dept: LAB | Age: 82
Discharge: HOME | End: 2017-10-05
Payer: MEDICARE

## 2017-10-05 LAB
ANION GAP SERPL CALC-SCNC: 6 MMOL/L (ref 3–18)
BUN SERPL-MCNC: 20 MG/DL (ref 7–18)
BUN/CREAT SERPL: 10 (ref 12–20)
CALCIUM SERPL-MCNC: 8.9 MG/DL (ref 8.5–10.1)
CHLORIDE SERPL-SCNC: 102 MMOL/L (ref 100–108)
CO2 SERPL-SCNC: 31 MMOL/L (ref 21–32)
CREAT SERPL-MCNC: 1.97 MG/DL (ref 0.6–1.3)
GLUCOSE SERPL-MCNC: 149 MG/DL (ref 74–99)
POTASSIUM SERPL-SCNC: 4.3 MMOL/L (ref 3.5–5.5)
SODIUM SERPL-SCNC: 139 MMOL/L (ref 136–145)

## 2017-10-05 PROCEDURE — 80048 BASIC METABOLIC PNL TOTAL CA: CPT | Performed by: INTERNAL MEDICINE

## 2017-10-05 PROCEDURE — 36415 COLL VENOUS BLD VENIPUNCTURE: CPT | Performed by: INTERNAL MEDICINE

## 2017-10-17 ENCOUNTER — HOSPITAL ENCOUNTER (OUTPATIENT)
Dept: INFUSION THERAPY | Age: 82
Discharge: HOME | End: 2017-10-17
Payer: MEDICARE

## 2017-10-17 VITALS
OXYGEN SATURATION: 94 % | SYSTOLIC BLOOD PRESSURE: 150 MMHG | RESPIRATION RATE: 18 BRPM | HEART RATE: 55 BPM | DIASTOLIC BLOOD PRESSURE: 77 MMHG | TEMPERATURE: 97.6 F

## 2017-10-17 LAB
BASO+EOS+MONOS # BLD AUTO: 0.4 K/UL (ref 0–2.3)
BASO+EOS+MONOS # BLD AUTO: 10 % (ref 0.1–17)
DIFFERENTIAL METHOD BLD: ABNORMAL
ERYTHROCYTE [DISTWIDTH] IN BLOOD BY AUTOMATED COUNT: 12.5 % (ref 11.5–14.5)
HCT VFR BLD AUTO: 32.7 % (ref 36–48)
HGB BLD-MCNC: 9.7 G/DL (ref 12–16)
LYMPHOCYTES # BLD: 2 K/UL (ref 1.1–5.9)
LYMPHOCYTES NFR BLD: 52 % (ref 14–44)
MCH RBC QN AUTO: 26.9 PG (ref 25–35)
MCHC RBC AUTO-ENTMCNC: 29.7 G/DL (ref 31–37)
MCV RBC AUTO: 90.6 FL (ref 78–102)
NEUTS SEG # BLD: 1.4 K/UL (ref 1.8–9.5)
NEUTS SEG NFR BLD: 38 % (ref 40–70)
PLATELET # BLD AUTO: 195 K/UL (ref 140–440)
RBC # BLD AUTO: 3.61 M/UL (ref 4.1–5.1)
WBC # BLD AUTO: 3.8 K/UL (ref 4.5–13)

## 2017-10-17 PROCEDURE — 36415 COLL VENOUS BLD VENIPUNCTURE: CPT

## 2017-10-17 PROCEDURE — 85025 COMPLETE CBC W/AUTO DIFF WBC: CPT | Performed by: INTERNAL MEDICINE

## 2017-10-17 NOTE — PROGRESS NOTES
[facility] Progress Note    Date: 2017    Name: Kiley Archuleta    MRN: [id number]         : 1922     PROCRIT INJECTON    Ms. Alice Britton was assessed and education was provided. Pt arrived to [facility] in wheelchair accompanied by her daughter at 0. Ms. Antony's vitals were reviewed and patient was observed for 5 minutes prior to treatment. Visit Vitals    /77 (BP 1 Location: Left arm, BP Patient Position: Sitting)    Pulse (!) 55    Temp 97.6 °F (36.4 °C)    Resp 18    SpO2 94%       Blood drawn via left A/C venipuncture X1 attempt. Gauze and tape applied to site. Lab results were obtained and reviewed. Recent Results (from the past 24 hour(s))   CBC WITH 3 PART DIFF    Collection Time: 10/17/17  2:17 PM   Result Value Ref Range    WBC 3.8 (L) 4.5 - 13.0 K/uL    RBC 3.61 (L) 4.10 - 5.10 M/uL    HGB 9.7 (L) 12.0 - 16 g/dL    HCT 32.7 (L) 36 - 48 %    MCV 90.6 78 - 102 FL    MCH 26.9 25.0 - 35.0 PG    MCHC 29.7 (L) 31 - 37 g/dL    RDW 12.5 11.5 - 14.5 %    PLATELET 694 102 - 809 K/uL    NEUTROPHILS 38 (L) 40 - 70 %    MIXED CELLS 10 0.1 - 17 %    LYMPHOCYTES 52 (H) 14 - 44 %    ABS. NEUTROPHILS 1.4 (L) 1.8 - 9.5 K/UL    ABS. MIXED CELLS 0.4 0.0 - 2.3 K/uL    ABS. LYMPHOCYTES 2.0 1.1 - 5.9 K/UL    DF AUTOMATED       HGB= 9.7 and HCT= 32.7    Procrit HELD per order parameters. Patient armband removed and shredded. Ms. Alice Britton was discharged from [place] [address] in stable condition at 1430. She is to return on 10/31/2017 at 1400 for her next Procrit appointment.     Joyce Giraldo RN  2017

## 2017-10-31 ENCOUNTER — HOSPITAL ENCOUNTER (OUTPATIENT)
Dept: INFUSION THERAPY | Age: 82
Discharge: HOME | End: 2017-10-31
Payer: MEDICARE

## 2017-10-31 VITALS
HEART RATE: 61 BPM | RESPIRATION RATE: 18 BRPM | DIASTOLIC BLOOD PRESSURE: 92 MMHG | TEMPERATURE: 97.3 F | SYSTOLIC BLOOD PRESSURE: 163 MMHG

## 2017-10-31 LAB
BASO+EOS+MONOS # BLD AUTO: 0.5 K/UL (ref 0–2.3)
BASO+EOS+MONOS # BLD AUTO: 12 % (ref 0.1–17)
DIFFERENTIAL METHOD BLD: ABNORMAL
ERYTHROCYTE [DISTWIDTH] IN BLOOD BY AUTOMATED COUNT: 11.9 % (ref 11.5–14.5)
HCT VFR BLD AUTO: 31 % (ref 36–48)
HGB BLD-MCNC: 9.6 G/DL (ref 12–16)
LYMPHOCYTES # BLD: 2.2 K/UL (ref 1.1–5.9)
LYMPHOCYTES NFR BLD: 47 % (ref 14–44)
MCH RBC QN AUTO: 27.7 PG (ref 25–35)
MCHC RBC AUTO-ENTMCNC: 31 G/DL (ref 31–37)
MCV RBC AUTO: 89.3 FL (ref 78–102)
NEUTS SEG # BLD: 1.9 K/UL (ref 1.8–9.5)
NEUTS SEG NFR BLD: 41 % (ref 40–70)
PLATELET # BLD AUTO: 196 K/UL (ref 140–440)
RBC # BLD AUTO: 3.47 M/UL (ref 4.1–5.1)
WBC # BLD AUTO: 4.6 K/UL (ref 4.5–13)

## 2017-10-31 PROCEDURE — 85025 COMPLETE CBC W/AUTO DIFF WBC: CPT | Performed by: INTERNAL MEDICINE

## 2017-10-31 PROCEDURE — 36415 COLL VENOUS BLD VENIPUNCTURE: CPT

## 2017-10-31 NOTE — PROGRESS NOTES
[facility] Progress Note    Date: 2017    Name: Belen Trivedi    MRN: [id number]         : 1922     PROCRIT INJECTON    Ms. Kym Kennedy was assessed and education was provided. Pt arrived to [facility] in wheelchair accompanied by her daughter at [phone]. Ms. Antony's vitals were reviewed and patient was observed for 5 minutes prior to treatment. Visit Vitals    BP (!) 163/92 (BP 1 Location: Left arm, BP Patient Position: Sitting)    Pulse 61    Temp 97.3 °F (36.3 °C)    Resp 18       Blood drawn via left forearm venipuncture X1 attempt. Gauze and tape applied to site. Recent Results (from the past 12 hour(s))   CBC WITH 3 PART DIFF    Collection Time: 10/31/17  2:27 PM   Result Value Ref Range    WBC 4.6 4.5 - 13.0 K/uL    RBC 3.47 (L) 4.10 - 5.10 M/uL    HGB 9.6 (L) 12.0 - 16 g/dL    HCT 31.0 (L) 36 - 48 %    MCV 89.3 78 - 102 FL    MCH 27.7 25.0 - 35.0 PG    MCHC 31.0 31 - 37 g/dL    RDW 11.9 11.5 - 14.5 %    PLATELET 130 938 - 207 K/uL    NEUTROPHILS 41 40 - 70 %    MIXED CELLS 12 0.1 - 17 %    LYMPHOCYTES 47 (H) 14 - 44 %    ABS. NEUTROPHILS 1.9 1.8 - 9.5 K/UL    ABS. MIXED CELLS 0.5 0.0 - 2.3 K/uL    ABS. LYMPHOCYTES 2.2 1.1 - 5.9 K/UL    DF AUTOMATED         Results within ordered parameters to HOLD procrit today. Ms. Kym Kennedy was discharged from [place] [address] in stable condition at 1435. She is to return on 2017 at 1400 for her next Procrit appointment.     Anjana Pressley RN  2017

## 2017-11-14 ENCOUNTER — HOSPITAL ENCOUNTER (OUTPATIENT)
Dept: INFUSION THERAPY | Age: 82
Discharge: HOME | End: 2017-11-14
Payer: MEDICARE

## 2017-11-14 VITALS
DIASTOLIC BLOOD PRESSURE: 68 MMHG | RESPIRATION RATE: 18 BRPM | TEMPERATURE: 97.4 F | OXYGEN SATURATION: 93 % | HEART RATE: 60 BPM | SYSTOLIC BLOOD PRESSURE: 131 MMHG

## 2017-11-14 LAB
BASO+EOS+MONOS # BLD AUTO: 0.5 K/UL (ref 0–2.3)
BASO+EOS+MONOS # BLD AUTO: 8 % (ref 0.1–17)
DIFFERENTIAL METHOD BLD: ABNORMAL
ERYTHROCYTE [DISTWIDTH] IN BLOOD BY AUTOMATED COUNT: 12.1 % (ref 11.5–14.5)
HCT VFR BLD AUTO: 31.8 % (ref 36–48)
HGB BLD-MCNC: 9.8 G/DL (ref 12–16)
LYMPHOCYTES # BLD: 2.5 K/UL (ref 1.1–5.9)
LYMPHOCYTES NFR BLD: 43 % (ref 14–44)
MCH RBC QN AUTO: 27.6 PG (ref 25–35)
MCHC RBC AUTO-ENTMCNC: 30.8 G/DL (ref 31–37)
MCV RBC AUTO: 89.6 FL (ref 78–102)
NEUTS SEG # BLD: 2.8 K/UL (ref 1.8–9.5)
NEUTS SEG NFR BLD: 49 % (ref 40–70)
PLATELET # BLD AUTO: 182 K/UL (ref 140–440)
RBC # BLD AUTO: 3.55 M/UL (ref 4.1–5.1)
WBC # BLD AUTO: 5.8 K/UL (ref 4.5–13)

## 2017-11-14 PROCEDURE — 85025 COMPLETE CBC W/AUTO DIFF WBC: CPT | Performed by: INTERNAL MEDICINE

## 2017-11-14 PROCEDURE — 36415 COLL VENOUS BLD VENIPUNCTURE: CPT

## 2017-11-14 NOTE — PROGRESS NOTES
[facility] Progress Note    Date: 2017    Name: Lieutenant Madrigal    MRN: [id number]         : 1922     PROCRIT INJECTON    Ms. Harrison Craven was assessed and education was provided. Pt arrived to [facility] in wheelchair accompanied by her daughter at [address] Ms. Antony's vitals were reviewed and patient was observed for 5 minutes prior to treatment. Visit Vitals    /68 (BP 1 Location: Left arm, BP Patient Position: Sitting)    Pulse 60    Temp 97.4 °F (36.3 °C)    Resp 18    SpO2 93%    Breastfeeding No       Blood drawn via left forearm venipuncture X1 attempt. Gauze and tape applied to site. Recent Results (from the past 12 hour(s))   CBC WITH 3 PART DIFF    Collection Time: 17  2:13 PM   Result Value Ref Range    WBC 5.8 4.5 - 13.0 K/uL    RBC 3.55 (L) 4.10 - 5.10 M/uL    HGB 9.8 (L) 12.0 - 16 g/dL    HCT 31.8 (L) 36 - 48 %    MCV 89.6 78 - 102 FL    MCH 27.6 25.0 - 35.0 PG    MCHC 30.8 (L) 31 - 37 g/dL    RDW 12.1 11.5 - 14.5 %    PLATELET 605 889 - 524 K/uL    NEUTROPHILS 49 40 - 70 %    MIXED CELLS 8 0.1 - 17 %    LYMPHOCYTES 43 14 - 44 %    ABS. NEUTROPHILS 2.8 1.8 - 9.5 K/UL    ABS. MIXED CELLS 0.5 0.0 - 2.3 K/uL    ABS. LYMPHOCYTES 2.5 1.1 - 5.9 K/UL    DF AUTOMATED         Results within ordered parameters to HOLD procrit today. .8/31.8    Ms. Harrison Craven was discharged from [place] [address] in stable condition at 1425. She is to return on 2017 at 1400 for her next Procrit appointment.     Alexandra Gilbert RN  2017

## 2017-11-28 ENCOUNTER — HOSPITAL ENCOUNTER (OUTPATIENT)
Dept: INFUSION THERAPY | Age: 82
Discharge: HOME | End: 2017-11-28
Payer: MEDICARE

## 2017-11-28 VITALS
TEMPERATURE: 98.6 F | OXYGEN SATURATION: 93 % | HEART RATE: 57 BPM | SYSTOLIC BLOOD PRESSURE: 148 MMHG | RESPIRATION RATE: 18 BRPM | DIASTOLIC BLOOD PRESSURE: 64 MMHG

## 2017-11-28 LAB
BASO+EOS+MONOS # BLD AUTO: 0.5 K/UL (ref 0–2.3)
BASO+EOS+MONOS # BLD AUTO: 10 % (ref 0.1–17)
DIFFERENTIAL METHOD BLD: ABNORMAL
ERYTHROCYTE [DISTWIDTH] IN BLOOD BY AUTOMATED COUNT: 11.8 % (ref 11.5–14.5)
HCT VFR BLD AUTO: 32.1 % (ref 36–48)
HGB BLD-MCNC: 10.1 G/DL (ref 12–16)
LYMPHOCYTES # BLD: 2.1 K/UL (ref 1.1–5.9)
LYMPHOCYTES NFR BLD: 42 % (ref 14–44)
MCH RBC QN AUTO: 28.1 PG (ref 25–35)
MCHC RBC AUTO-ENTMCNC: 31.5 G/DL (ref 31–37)
MCV RBC AUTO: 89.4 FL (ref 78–102)
NEUTS SEG # BLD: 2.5 K/UL (ref 1.8–9.5)
NEUTS SEG NFR BLD: 48 % (ref 40–70)
PLATELET # BLD AUTO: 167 K/UL (ref 140–440)
RBC # BLD AUTO: 3.59 M/UL (ref 4.1–5.1)
WBC # BLD AUTO: 5.1 K/UL (ref 4.5–13)

## 2017-11-28 PROCEDURE — 36415 COLL VENOUS BLD VENIPUNCTURE: CPT

## 2017-11-28 PROCEDURE — 85025 COMPLETE CBC W/AUTO DIFF WBC: CPT | Performed by: INTERNAL MEDICINE

## 2017-11-28 NOTE — PROGRESS NOTES
[facility] Progress Note    Date: 2017    Name: Shaheen Adler    MRN: [id number]         : 1922     PROCRIT INJECTON    Ms. Westley Mane was assessed and education was provided. Pt arrived to [facility] in wheelchair accompanied by her daughter at 80. Ms. Antony's vitals were reviewed and patient was observed for 5 minutes prior to treatment. Visit Vitals    /64 (BP 1 Location: Left arm, BP Patient Position: Sitting)    Pulse (!) 57    Temp 98.6 °F (37 °C)    Resp 18    SpO2 93%       Blood drawn via left A/C venipuncture X1 attempt. Gauze and tape applied to site. Lab results were obtained and reviewed. Recent Results (from the past 24 hour(s))   CBC WITH 3 PART DIFF    Collection Time: 17  2:20 PM   Result Value Ref Range    WBC 5.1 4.5 - 13.0 K/uL    RBC 3.59 (L) 4.10 - 5.10 M/uL    HGB 10.1 (L) 12.0 - 16 g/dL    HCT 32.1 (L) 36 - 48 %    MCV 89.4 78 - 102 FL    MCH 28.1 25.0 - 35.0 PG    MCHC 31.5 31 - 37 g/dL    RDW 11.8 11.5 - 14.5 %    PLATELET 446 302 - 263 K/uL    NEUTROPHILS 48 40 - 70 %    MIXED CELLS 10 0.1 - 17 %    LYMPHOCYTES 42 14 - 44 %    ABS. NEUTROPHILS 2.5 1.8 - 9.5 K/UL    ABS. MIXED CELLS 0.5 0.0 - 2.3 K/uL    ABS. LYMPHOCYTES 2.1 1.1 - 5.9 K/UL    DF AUTOMATED         HGB= 10.1 and HCT= 32.1    Procrit HELD per order parameters. Patient armband removed and shredded. Ms. Westley Mane was discharged from [place] [address] in stable condition at 1430. She is to return on 2017 at 1400 for her next Procrit appointment.     Venessa Herrera RN  2017

## 2017-12-12 ENCOUNTER — HOSPITAL ENCOUNTER (OUTPATIENT)
Dept: INFUSION THERAPY | Age: 82
Discharge: HOME | End: 2017-12-12
Payer: MEDICARE

## 2017-12-12 VITALS
SYSTOLIC BLOOD PRESSURE: 145 MMHG | OXYGEN SATURATION: 94 % | DIASTOLIC BLOOD PRESSURE: 63 MMHG | HEART RATE: 57 BPM | RESPIRATION RATE: 18 BRPM

## 2017-12-12 LAB
ALBUMIN SERPL-MCNC: 3.2 G/DL (ref 3.4–5)
ANION GAP SERPL CALC-SCNC: 5 MMOL/L (ref 3–18)
BASO+EOS+MONOS # BLD AUTO: 0.4 K/UL (ref 0–2.3)
BASO+EOS+MONOS # BLD AUTO: 8 % (ref 0.1–17)
BUN SERPL-MCNC: 24 MG/DL (ref 7–18)
BUN/CREAT SERPL: 11 (ref 12–20)
CALCIUM SERPL-MCNC: 8.8 MG/DL (ref 8.5–10.1)
CHLORIDE SERPL-SCNC: 104 MMOL/L (ref 100–108)
CO2 SERPL-SCNC: 31 MMOL/L (ref 21–32)
CREAT SERPL-MCNC: 2.15 MG/DL (ref 0.6–1.3)
DIFFERENTIAL METHOD BLD: ABNORMAL
ERYTHROCYTE [DISTWIDTH] IN BLOOD BY AUTOMATED COUNT: 12 % (ref 11.5–14.5)
FERRITIN SERPL-MCNC: 652 NG/ML (ref 8–388)
GLUCOSE SERPL-MCNC: 114 MG/DL (ref 74–99)
HCT VFR BLD AUTO: 31.8 % (ref 36–48)
HGB BLD-MCNC: 9.8 G/DL (ref 12–16)
IRON SATN MFR SERPL: 43 %
IRON SERPL-MCNC: 72 UG/DL (ref 50–175)
LYMPHOCYTES # BLD: 3 K/UL (ref 1.1–5.9)
LYMPHOCYTES NFR BLD: 60 % (ref 14–44)
MCH RBC QN AUTO: 28.3 PG (ref 25–35)
MCHC RBC AUTO-ENTMCNC: 30.8 G/DL (ref 31–37)
MCV RBC AUTO: 91.9 FL (ref 78–102)
NEUTS SEG # BLD: 1.5 K/UL (ref 1.8–9.5)
NEUTS SEG NFR BLD: 32 % (ref 40–70)
PHOSPHATE SERPL-MCNC: 3.6 MG/DL (ref 2.5–4.9)
PLATELET # BLD AUTO: 185 K/UL (ref 140–440)
POTASSIUM SERPL-SCNC: 4.5 MMOL/L (ref 3.5–5.5)
RBC # BLD AUTO: 3.46 M/UL (ref 4.1–5.1)
SODIUM SERPL-SCNC: 140 MMOL/L (ref 136–145)
TIBC SERPL-MCNC: 169 UG/DL (ref 250–450)
WBC # BLD AUTO: 4.9 K/UL (ref 4.5–13)

## 2017-12-12 PROCEDURE — 80069 RENAL FUNCTION PANEL: CPT | Performed by: INTERNAL MEDICINE

## 2017-12-12 PROCEDURE — 36415 COLL VENOUS BLD VENIPUNCTURE: CPT

## 2017-12-12 PROCEDURE — 83540 ASSAY OF IRON: CPT | Performed by: INTERNAL MEDICINE

## 2017-12-12 PROCEDURE — 82728 ASSAY OF FERRITIN: CPT | Performed by: INTERNAL MEDICINE

## 2017-12-12 PROCEDURE — 85025 COMPLETE CBC W/AUTO DIFF WBC: CPT | Performed by: INTERNAL MEDICINE

## 2017-12-12 NOTE — PROGRESS NOTES
[facility] Progress Note    Date: 2017    Name: Yuri Garcia    MRN: [id number]         : 1922     PROCRIT INJECTON    Ms. Kary Salas was assessed and education was provided. Pt arrived to [place] in wheelchair accompanied by her daughter at [phone]. Ms. Antony's vitals were reviewed and patient was observed for 5 minutes prior to treatment. Visit Vitals    /63 (BP 1 Location: Left arm, BP Patient Position: Sitting)    Pulse (!) 57    Resp 18    SpO2 94%    Breastfeeding No       Blood drawn via left A/C venipuncture X2 attempt. Gauze and tape applied to site. Lab results were obtained and reviewed. Recent Results (from the past 24 hour(s))   CBC WITH 3 PART DIFF    Collection Time: 17  2:20 PM   Result Value Ref Range    WBC 4.9 4.5 - 13.0 K/uL    RBC 3.46 (L) 4.10 - 5.10 M/uL    HGB 9.8 (L) 12.0 - 16 g/dL    HCT 31.8 (L) 36 - 48 %    MCV 91.9 78 - 102 FL    MCH 28.3 25.0 - 35.0 PG    MCHC 30.8 (L) 31 - 37 g/dL    RDW 12.0 11.5 - 14.5 %    PLATELET 037 605 - 181 K/uL    NEUTROPHILS 32 (L) 40 - 70 %    MIXED CELLS 8 0.1 - 17 %    LYMPHOCYTES 60 (H) 14 - 44 %    ABS. NEUTROPHILS 1.5 (L) 1.8 - 9.5 K/UL    ABS. MIXED CELLS 0.4 0.0 - 2.3 K/uL    ABS. LYMPHOCYTES 3.0 1.1 - 5.9 K/UL    DF AUTOMATED         HGB= 9.8 and HCT= 31.8    Procrit HELD per order parameters. Patient armband removed and shredded. Ms. Kary Salas was discharged from [place] [address] in stable condition at 1425. She is to return on 2017 at 1400 for her next Procrit appointment.     Maurice Rodriguez RN  2017

## 2017-12-19 ENCOUNTER — APPOINTMENT (OUTPATIENT)
Dept: INFUSION THERAPY | Age: 82
End: 2017-12-19
Payer: MEDICARE

## 2017-12-26 ENCOUNTER — HOSPITAL ENCOUNTER (OUTPATIENT)
Dept: INFUSION THERAPY | Age: 82
Discharge: HOME | End: 2017-12-26
Payer: MEDICARE

## 2017-12-26 VITALS
SYSTOLIC BLOOD PRESSURE: 157 MMHG | RESPIRATION RATE: 18 BRPM | TEMPERATURE: 98.3 F | HEART RATE: 66 BPM | DIASTOLIC BLOOD PRESSURE: 68 MMHG | OXYGEN SATURATION: 93 %

## 2017-12-26 LAB
BASO+EOS+MONOS # BLD AUTO: 0.4 K/UL (ref 0–2.3)
BASO+EOS+MONOS # BLD AUTO: 9 % (ref 0.1–17)
DIFFERENTIAL METHOD BLD: ABNORMAL
ERYTHROCYTE [DISTWIDTH] IN BLOOD BY AUTOMATED COUNT: 12 % (ref 11.5–14.5)
HCT VFR BLD AUTO: 29.8 % (ref 36–48)
HGB BLD-MCNC: 9.1 G/DL (ref 12–16)
LYMPHOCYTES # BLD: 2.2 K/UL (ref 1.1–5.9)
LYMPHOCYTES NFR BLD: 46 % (ref 14–44)
MCH RBC QN AUTO: 28.2 PG (ref 25–35)
MCHC RBC AUTO-ENTMCNC: 30.5 G/DL (ref 31–37)
MCV RBC AUTO: 92.3 FL (ref 78–102)
NEUTS SEG # BLD: 2.1 K/UL (ref 1.8–9.5)
NEUTS SEG NFR BLD: 45 % (ref 40–70)
PLATELET # BLD AUTO: 166 K/UL (ref 140–440)
RBC # BLD AUTO: 3.23 M/UL (ref 4.1–5.1)
WBC # BLD AUTO: 4.7 K/UL (ref 4.5–13)

## 2017-12-26 PROCEDURE — 74011250636 HC RX REV CODE- 250/636: Performed by: INTERNAL MEDICINE

## 2017-12-26 PROCEDURE — 96372 THER/PROPH/DIAG INJ SC/IM: CPT

## 2017-12-26 PROCEDURE — 36415 COLL VENOUS BLD VENIPUNCTURE: CPT

## 2017-12-26 PROCEDURE — 85025 COMPLETE CBC W/AUTO DIFF WBC: CPT | Performed by: INTERNAL MEDICINE

## 2017-12-26 RX ADMIN — ERYTHROPOIETIN 3000 UNITS: 3000 INJECTION, SOLUTION INTRAVENOUS; SUBCUTANEOUS at 14:38

## 2017-12-26 RX ADMIN — ERYTHROPOIETIN 4000 UNITS: 4000 INJECTION, SOLUTION INTRAVENOUS; SUBCUTANEOUS at 14:38

## 2017-12-26 NOTE — PROGRESS NOTES
[facility] Progress Note    Date: 2017    Name: Vel Forde    MRN: [id number]         : 1922     patient arrived via wheelchair, accompanied by her daughter. Alert and oriented x 3. No complaints voiced      Patient assessed and education was provided. Patient vitals were reviewed. Visit Vitals    /68 (BP 1 Location: Left arm, BP Patient Position: Sitting)    Pulse 66    Temp 98.3 °F (36.8 °C)    Resp 18    SpO2 93%       Blood sample by mere-puncture on 1st stick to left FA for cbc. Hematoma 1' by 1.5\" to site  Noted after blood draw. Gauze/tape to site       Lab results were obtained and reviewed. Recent Results (from the past 12 hour(s))   CBC WITH 3 PART DIFF    Collection Time: 17  2:25 PM   Result Value Ref Range    WBC 4.7 4.5 - 13.0 K/uL    RBC 3.23 (L) 4.10 - 5.10 M/uL    HGB 9.1 (L) 12.0 - 16 g/dL    HCT 29.8 (L) 36 - 48 %    MCV 92.3 78 - 102 FL    MCH 28.2 25.0 - 35.0 PG    MCHC 30.5 (L) 31 - 37 g/dL    RDW 12.0 11.5 - 14.5 %    PLATELET 492 215 - 925 K/uL    NEUTROPHILS 45 40 - 70 %    MIXED CELLS 9 0.1 - 17 %    LYMPHOCYTES 46 (H) 14 - 44 %    ABS. NEUTROPHILS 2.1 1.8 - 9.5 K/UL    ABS. MIXED CELLS 0.4 0.0 - 2.3 K/uL    ABS. LYMPHOCYTES 2.2 1.1 - 5.9 K/UL    DF AUTOMATED           Procrit injection 7,000 units given to upper back of left arm. Tolerated well. Declined bandaid. No further increase in size of hematoma to left FA, and no pain to site per patient. Daughter to observe and if worsens and instructed to take patient to ER. Patient was discharged from [place] [address] in stable condition at 1440, via wheel chair, accompanied by her daughter. She is to return on 18 at 2 pm for her next appointment.     Gabriel Hernandez RN  2017  4:45 PM

## 2017-12-27 ENCOUNTER — OFFICE VISIT (OUTPATIENT)
Dept: PAIN MANAGEMENT | Age: 82
End: 2017-12-27

## 2017-12-27 VITALS
HEART RATE: 62 BPM | TEMPERATURE: 98.2 F | SYSTOLIC BLOOD PRESSURE: 138 MMHG | RESPIRATION RATE: 18 BRPM | BODY MASS INDEX: 21.86 KG/M2 | HEIGHT: 66 IN | WEIGHT: 136 LBS | DIASTOLIC BLOOD PRESSURE: 62 MMHG

## 2017-12-27 DIAGNOSIS — M25.50 PAIN IN JOINT, MULTIPLE SITES: ICD-10-CM

## 2017-12-27 DIAGNOSIS — Z79.899 ENCOUNTER FOR LONG-TERM (CURRENT) USE OF HIGH-RISK MEDICATION: ICD-10-CM

## 2017-12-27 DIAGNOSIS — M54.40 CHRONIC MIDLINE LOW BACK PAIN WITH SCIATICA, SCIATICA LATERALITY UNSPECIFIED: Primary | ICD-10-CM

## 2017-12-27 DIAGNOSIS — M54.5 CHRONIC BILATERAL LOW BACK PAIN, WITH SCIATICA PRESENCE UNSPECIFIED: ICD-10-CM

## 2017-12-27 DIAGNOSIS — G89.29 CHRONIC BILATERAL LOW BACK PAIN, WITH SCIATICA PRESENCE UNSPECIFIED: ICD-10-CM

## 2017-12-27 DIAGNOSIS — M25.569 ARTHRALGIA OF LOWER LEG, UNSPECIFIED LATERALITY: ICD-10-CM

## 2017-12-27 DIAGNOSIS — G89.29 CHRONIC MIDLINE LOW BACK PAIN WITH SCIATICA, SCIATICA LATERALITY UNSPECIFIED: Primary | ICD-10-CM

## 2017-12-27 DIAGNOSIS — M54.2 CERVICALGIA: ICD-10-CM

## 2017-12-27 RX ORDER — OXYCODONE HCL 20 MG/1
20 TABLET, FILM COATED, EXTENDED RELEASE ORAL EVERY 12 HOURS
Qty: 60 TAB | Refills: 0 | Status: SHIPPED | OUTPATIENT
Start: 2018-03-02 | End: 2018-04-01

## 2017-12-27 RX ORDER — OXYCODONE HCL 20 MG/1
20 TABLET, FILM COATED, EXTENDED RELEASE ORAL EVERY 12 HOURS
Qty: 60 TAB | Refills: 0 | Status: SHIPPED | OUTPATIENT
Start: 2018-01-04 | End: 2018-03-28 | Stop reason: SDUPTHER

## 2017-12-27 RX ORDER — OXYCODONE HCL 20 MG/1
20 TABLET, FILM COATED, EXTENDED RELEASE ORAL EVERY 12 HOURS
Qty: 60 TAB | Refills: 0 | Status: SHIPPED | OUTPATIENT
Start: 2018-02-03 | End: 2018-03-28 | Stop reason: SDUPTHER

## 2017-12-27 NOTE — MR AVS SNAPSHOT
Visit Information Date & Time Provider Department Dept. Phone Encounter #  
 12/27/2017  2:20 PM [facility] [phone] Follow-up Instructions Return in about 3 months (around 3/27/2018). Upcoming Health Maintenance Date Due DTaP/Tdap/Td series (1 - Tdap) 9/2/1943 ZOSTER VACCINE AGE 60> 7/2/1982 GLAUCOMA SCREENING Q2Y 9/2/1987 MEDICARE YEARLY EXAM 9/2/1987 Pneumococcal 65+ Low/Medium Risk (2 of 2 - PCV13) 11/1/2016 Allergies as of 12/27/2017  Review Complete On: 12/27/2017 By: Elias Tenorio PA-C Severity Noted Reaction Type Reactions Celebrex [Celecoxib]    Unable to Obtain Codeine    Unable to Obtain Flexeril [Cyclobenzaprine]    Unable to Obtain Macrobid [Nitrofurantoin Monohyd/m-cryst]    Unable to Consolidated Alex Pcn [Penicillins]    Unable to Obtain Remeron [Mirtazapine]    Unable to Obtain  
 Sulfa (Sulfonamide Antibiotics)    Other (comments) Swelling and low heart rate Vicodin [Hydrocodone-acetaminophen]    Unable to Obtain Current Immunizations  Reviewed on 12/26/2017 Name Date Influenza Vaccine 9/26/2017, 11/1/2016, 11/1/2015 Pneumococcal Polysaccharide (PPSV-23) 11/1/2015 Not reviewed this visit You Were Diagnosed With   
  
 Codes Comments Chronic midline low back pain with sciatica, sciatica laterality unspecified    -  Primary ICD-10-CM: M54.40, G89.29 ICD-9-CM: 724.2, 724.3, 338.29 Cervicalgia     ICD-10-CM: M54.2 ICD-9-CM: 723.1 Chronic bilateral low back pain, with sciatica presence unspecified     ICD-10-CM: M54.5, G89.29 ICD-9-CM: 724.2, 338.29 Pain in joint, multiple sites     ICD-10-CM: M25.50 ICD-9-CM: 719.49 Arthralgia of lower leg, unspecified laterality     ICD-10-CM: M25.569 ICD-9-CM: 719.46 Encounter for long-term (current) use of high-risk medication     ICD-10-CM: Z79.899 ICD-9-CM: V58.69 Vitals BP Pulse Temp Resp Height(growth percentile) Weight(growth percentile)  
 138/62 (BP 1 Location: Right arm, BP Patient Position: Sitting) 62 98.2 °F (36.8 °C) (Oral) 18 5' 6\" (1.676 m) 136 lb (61.7 kg) BMI OB Status Smoking Status 21.95 kg/m2 Hysterectomy Never Smoker Vitals History BMI and BSA Data Body Mass Index Body Surface Area  
 21.95 kg/m 2 1.7 m 2 Your Updated Medication List  
  
   
This list is accurate as of: 12/27/17  2:48 PM.  Always use your most recent med list. amLODIPine 10 mg tablet Commonly known as:  Richardson Del Take  by mouth daily. donepezil 10 mg tablet Commonly known as:  ARICEPT Take 10 mg by mouth nightly. furosemide 20 mg tablet Commonly known as:  LASIX Take  by mouth daily. glipiZIDE 5 mg tablet Commonly known as:  Dariel Botello Take  by mouth two (2) times a day. NexIUM 40 mg capsule Generic drug:  esomeprazole Take 40 mg by mouth daily. * OxyCONTIN 20 mg CR tablet Generic drug:  oxyCODONE CR Take 20 mg by mouth every twelve (12) hours. * oxyCODONE ER 20 mg ER tablet Commonly known as:  OxyCONTIN Take 1 Tab by mouth every twelve (12) hours for 30 days. Max Daily Amount: 40 mg. For chronic pain. * oxyCODONE ER 20 mg ER tablet Commonly known as:  OxyCONTIN Take 1 Tab by mouth every twelve (12) hours for 30 days. Max Daily Amount: 40 mg. For chronic pain. Start taking on:  1/4/2018 * oxyCODONE ER 20 mg ER tablet Commonly known as:  OxyCONTIN Take 1 Tab by mouth every twelve (12) hours for 30 days. Max Daily Amount: 40 mg. For chronic pain. Start taking on:  2/3/2018 * oxyCODONE ER 20 mg ER tablet Commonly known as:  OxyCONTIN Take 1 Tab by mouth every twelve (12) hours for 30 days. Max Daily Amount: 40 mg. For chronic pain. Start taking on:  3/2/2018  
  
 pantoprazole 40 mg tablet Commonly known as:  PROTONIX Take 40 mg by mouth daily. polyethylene glycol 17 gram packet Commonly known as:  Tawny Meyer Take 17 g by mouth daily. SEROquel 100 mg tablet Generic drug:  QUEtiapine Take 50 mg by mouth two (2) times a day. * Notice: This list has 5 medication(s) that are the same as other medications prescribed for you. Read the directions carefully, and ask your doctor or other care provider to review them with you. Prescriptions Printed Refills  
 oxyCODONE ER (OXYCONTIN) 20 mg ER tablet 0 Starting on: 3/2/2018 Sig: Take 1 Tab by mouth every twelve (12) hours for 30 days. Max Daily Amount: 40 mg. For chronic pain. Class: Print Route: Oral  
 oxyCODONE ER (OXYCONTIN) 20 mg ER tablet 0 Starting on: 2/3/2018 Sig: Take 1 Tab by mouth every twelve (12) hours for 30 days. Max Daily Amount: 40 mg. For chronic pain. Class: Print Route: Oral  
 oxyCODONE ER (OXYCONTIN) 20 mg ER tablet 0 Starting on: 1/4/2018 Sig: Take 1 Tab by mouth every twelve (12) hours for 30 days. Max Daily Amount: 40 mg. For chronic pain. Class: Print Route: Oral  
  
Follow-up Instructions Return in about 3 months (around 3/27/2018). To-Do List   
 01/09/2018 2:00 PM  
  Appointment with [facility] FAST TRACK NURSE at [facility] ([phone]) Introducing [facility] SERVICES! [employer] introduces [employer] patient portal. Now you can access parts of your medical record, email your doctor's office, and request medication refills online. 1. In your internet browser, go to [email or web address] 2. Click on the First Time User? Click Here link in the Sign In box. You will see the New Member Sign Up page. 3. Enter your [employer] Access Code exactly as it appears below. You will not need to use this code after youve completed the sign-up process. If you do not sign up before the expiration date, you must request a new code. · [employer] Access Code: [email or web address] Expires: 1/14/2018  8:28 AM 
 
4. Enter the last four digits of your Social Security Number (xxxx) and Date of Birth (mm/dd/yyyy) as indicated and click Submit. You will be taken to the next sign-up page. 5. Create a [employer] ID. This will be your [employer] login ID and cannot be changed, so think of one that is secure and easy to remember. 6. Create a [employer] password. You can change your password at any time. 7. Enter your Password Reset Question and Answer. This can be used at a later time if you forget your password. 8. Enter your e-mail address. You will receive e-mail notification when new information is available in [address] 9. Click Sign Up. You can now view and download portions of your medical record. 10. Click the Download Summary menu link to download a portable copy of your medical information. If you have questions, please visit the Frequently Asked Questions section of the [employer] website. Remember, [employer] is NOT to be used for urgent needs. For medical emergencies, dial 911. Now available from your iPhone and Android! Please provide this summary of care documentation to your next provider. Your primary care clinician is listed as [facility]. If you have any questions after today's visit, please call [phone].

## 2017-12-27 NOTE — PROGRESS NOTES
HISTORY OF PRESENT ILLNESS  Bronson Reilly is a 80 y.o. female    Ms. Cruz Marker presents for follow up of chronic pain due to lumbar degenerative disc disease, right shoulder pain due to osteoarthritis, and left knee pain due to osteoarthritis. Cervical degenerative disc disease. She is present with her daughter who acts as primary historian. She reports that her routine labs were done at her PCP appointment last week. This patient reports that pain has been stable and well controlled over the past several months. Her daughter was present and agrees that her mom's pain seems to be well controlled. She is healthy aside from dementia. Pain continues to predominate in the right shoulder and lower back, as well as the right knee. She lives at home with her daughter, who is her primary caregiver. She is in good spirits today. She tolerates her medications without side effects. Bronson Reilly reports no change in sleep or constipation. We discussed her current condition and medications in detail today. Current medication management consists of: Oxycontin 20 mg, take one tablet by mouth every 12 hours   Medications are helping with pain control and quality of life. Her pain is 4/10 with medication and 10/10 without. Pt describes pain as aching, stabbing, and burning. Aggravating factors include standing, sitting, and walking. Relieved with rest, medication, and avoiding painful activities. The patient reports 80% relief with current medications. Current treatment is helping to improve general activity, mood, walking, sleep, enjoyment of life    Measuring clinical outcomes of chronic pain patients: score 5/28; the lower the number the better the outcome. Pain Meds and Quality Of Life have been reviewed. Nonpharmacologic therapy and non-opioid pharmacologic therapy were considered. If opioid therapy is prescribed, this is only if the expected benefits are anticipated to outweigh risks.      She  is otherwise doing well with no other complaints today. She denies any adverse events including nausea, vomiting, dizziness, increased constipation, hallucinations, or seizures. The patient reports functional improvement and QOL with pain medication. Vitals:    12/27/17 1421   BP: 145/63   Pulse: 78   Resp: 18   Temp: 98.2 °F (36.8 °C)   TempSrc: Oral   Weight: 61.7 kg (136 lb)   Height: 5' 6\" (1.676 m)   PainSc:   0 - No pain   PainLoc: Neck         Allergies   Allergen Reactions    Celebrex [Celecoxib] Unable to Obtain    Codeine Unable to Obtain    Flexeril [Cyclobenzaprine] Unable to Obtain    Macrobid [Nitrofurantoin Monohyd/M-Cryst] Unable to Obtain    Pcn [Penicillins] Unable to Obtain    Remeron [Mirtazapine] Unable to Obtain    Sulfa (Sulfonamide Antibiotics) Other (comments)     Swelling and low heart rate    Vicodin [Hydrocodone-Acetaminophen] Unable to Obtain       Past Surgical History:   Procedure Laterality Date    HX BLADDER REPAIR      prolapsed    HX CATARACT REMOVAL      HX CHOLECYSTECTOMY  5/1998    HX HERNIA REPAIR  7/1998    x2    HX OOPHORECTOMY         ROS   Constitutional: Negative for fever, chills and malaise/fatigue. HENT: Positive for neck pain. Negative for congestion and sore throat.    Eyes: Negative for blurred vision and double vision. Respiratory: Negative for shortness of breath.    Cardiovascular: Negative for chest pain and leg swelling. Gastrointestinal: Positive for constipation. Negative for heartburn, nausea, vomiting and diarrhea. Genitourinary:        Some frequency \"weak bladder\"   Musculoskeletal: Positive for back pain and joint pain (left knee). Negative for myalgias and falls. Skin: Negative.    Neurological: Negative for dizziness, tingling, tremors, seizures, weakness and headaches. Endo/Heme/Allergies: Positive for environmental allergies. Does not bruise/bleed easily.    Psychiatric/Behavioral: Positive for depression and memory loss. Negative for suicidal ideas. The patient is nervous/anxious and has insomnia        Physical Exam   Constitutional: She is oriented to person, place, and time. She appears well-developed and well-nourished. Pleasant demeanor  HENT:   Head: Normocephalic and atraumatic. Eyes: EOM are normal.   Neck: Spinous process tenderness and muscular tenderness present. Decreased range of motion present. Marked spasms of cervical paraspinous musculature noted  Hypertrophy of the SCM  significant anterocollis with kyphosis of the cervical spine. Very limited ROM in all directions   Neurological: She is alert and oriented to person, place, and time. No cranial nerve deficit. She exhibits normal muscle tone. Coordination normal.   Psychiatric: She has a normal mood and affect. Her behavior is normal. Judgment normal.   Poor historian  Dementia has worsened        ASSESSMENT:    1. Chronic midline low back pain with sciatica, sciatica laterality unspecified    2. Cervicalgia    3. Chronic bilateral low back pain, with sciatica presence unspecified    4. Pain in joint, multiple sites    5. Arthralgia of lower leg, unspecified laterality    6. Encounter for long-term (current) use of high-risk medication          [place] Prescription Monitoring Program was reviewed which does not demonstrate aberrancies and/or inconsistencies with regard to the historical prescribing of controlled medications to this patient by other providers. Medications were brought to visit today. Pill count was appropriate. When possible, non-drug therapy for chronic pain should be used as a first-line treatment. Physical therapy exercise regimens, chiropractic manipulation, meditation relaxation techniques, cognitive behavior therapy, acupuncture, yoga, Kike Chi,  transcutaneous electrical nerve stimulation (TENS), and application of moist heat can help alleviate pain .      Explained that realistic expectations and goals with chronic pain management are to maximize function and minimize pain with the understanding that limitations will exist both in the extent of relief that she may achieve, as well as thresholds of mg strengths that we will not exceed. Our role is to help the patient better cope with chronic pain utilizng a multimodal approach. The patients condition and plan were discussed. All questions were answered. The patient agrees with the plan. PLAN / Pt Instructions:  1. Continue current plan with no evidence of addiction or diversion. 2. Stable on current medication without adverse events. 3. Refill Oxycontin 20 mg, take one tablet by mouth every 12 hours    4. Discussed risks of addiction, dependency, and opioid induced hyperalgesia. 5. Reviewed with patient benefits of home exercise, and lifestyle changes to assist in self-management of symptoms. 6. Return to clinic in 3 month       Prescription monitoring program reviewed. Pain medications prescribed with the objective of pain relief and improved physical and psychosocial function in this patient. DISPOSITION  · Counseled patient on proper use of prescribed medications. · Counseled patient about chronic medical conditions and their relationship to anxiety and depression and recommended mental health support as needed. · Reviewed with patient self-help tools, home exercise, and lifestyle changes to assist the patient in self-management of symptoms. · Reviewed with patient the treatment plan, goals of treatment plan, and limitations of treatment plan, to include the potential for side effects from medications and procedures. If side effects occur, it is the responsibility of the patient to inform the clinic so that a change in the treatment plan can be made in a safe manner. The patient is advised that stopping prescribed medication may cause an increase in symptoms and possible medication withdrawal symptoms.  The patient is informed an emergency room evaluation may be necessary if this occurs. Spent 25 minutes with patient today reviewing the treatment plan, goals of treatment plan, and limitations of the treatment plan, to include the potential for side effects from medications and procedures. More than 50% of the visit time was spent counseling the patient. Alejandra Peace PA-C 12/27/2017        Note: Please excuse any typographical errors. Voice recognition software was used for this note and may cause mistakes.

## 2017-12-27 NOTE — PROGRESS NOTES
Nursing Notes    Patient presents to the office today in follow-up. Patient rates her pain at 0/10 on the numerical pain scale. Reviewed medications with counts as follows:    Rx Date filled Qty Dispensed Pill Count Last Dose Short   Oxycontin 20 mg 12/05/17 60 19 Last night no                                        POC UDS was not performed in office today    Any new labs or imaging since last appointment? NO    Have you been to an emergency room (ER) or urgent care clinic since your last visit? NO            Have you been hospitalized since your last visit? NO     If yes, where, when, and reason for visit? Have you seen or consulted any other health care providers outside of the [address] [place] [address]  since your last visit? YES, PCP     If yes, where, when, and reason for visit? HM deferred to pcp.

## 2018-01-03 ENCOUNTER — TELEPHONE (OUTPATIENT)
Dept: PAIN MANAGEMENT | Age: 83
End: 2018-01-03

## 2018-01-09 ENCOUNTER — HOSPITAL ENCOUNTER (OUTPATIENT)
Dept: INFUSION THERAPY | Age: 83
Discharge: HOME | End: 2018-01-09
Payer: MEDICARE

## 2018-01-09 VITALS
SYSTOLIC BLOOD PRESSURE: 135 MMHG | HEART RATE: 55 BPM | DIASTOLIC BLOOD PRESSURE: 71 MMHG | OXYGEN SATURATION: 93 % | RESPIRATION RATE: 16 BRPM | TEMPERATURE: 97.7 F

## 2018-01-09 LAB
BASO+EOS+MONOS # BLD AUTO: 0.5 K/UL (ref 0–2.3)
BASO+EOS+MONOS # BLD AUTO: 9 % (ref 0.1–17)
DIFFERENTIAL METHOD BLD: ABNORMAL
ERYTHROCYTE [DISTWIDTH] IN BLOOD BY AUTOMATED COUNT: 12.6 % (ref 11.5–14.5)
HCT VFR BLD AUTO: 31.7 % (ref 36–48)
HGB BLD-MCNC: 9.9 G/DL (ref 12–16)
LYMPHOCYTES # BLD: 1.3 K/UL (ref 1.1–5.9)
LYMPHOCYTES NFR BLD: 24 % (ref 14–44)
MCH RBC QN AUTO: 28 PG (ref 25–35)
MCHC RBC AUTO-ENTMCNC: 31.2 G/DL (ref 31–37)
MCV RBC AUTO: 89.8 FL (ref 78–102)
NEUTS SEG # BLD: 3.7 K/UL (ref 1.8–9.5)
NEUTS SEG NFR BLD: 67 % (ref 40–70)
PLATELET # BLD AUTO: 169 K/UL (ref 140–440)
RBC # BLD AUTO: 3.53 M/UL (ref 4.1–5.1)
WBC # BLD AUTO: 5.5 K/UL (ref 4.5–13)

## 2018-01-09 PROCEDURE — 85025 COMPLETE CBC W/AUTO DIFF WBC: CPT | Performed by: INTERNAL MEDICINE

## 2018-01-09 PROCEDURE — 36415 COLL VENOUS BLD VENIPUNCTURE: CPT

## 2018-01-09 NOTE — PROGRESS NOTES
[facility] Progress Note    Date: 2018    Name: Louise Upton    MRN: [id number]         : 1922     PROCRIT INJECTON    Ms. Sabina Abarca was assessed and education was provided. Pt arrived to [facility] in wheelchair accompanied by her daughter at 0. Ms. Antony's vitals were reviewed and patient was observed for 5 minutes prior to treatment. Visit Vitals    /71 (BP 1 Location: Left arm, BP Patient Position: Sitting)    Pulse (!) 55    Temp 97.7 °F (36.5 °C)    Resp 16    SpO2 93%    Breastfeeding No       Blood drawn via left A/C venipuncture X1 attempt. Gauze and tape applied to site. Lab results were obtained and reviewed. Recent Results (from the past 24 hour(s))   CBC WITH 3 PART DIFF    Collection Time: 18  2:22 PM   Result Value Ref Range    WBC 5.5 4.5 - 13.0 K/uL    RBC 3.53 (L) 4.10 - 5.10 M/uL    HGB 9.9 (L) 12.0 - 16 g/dL    HCT 31.7 (L) 36 - 48 %    MCV 89.8 78 - 102 FL    MCH 28.0 25.0 - 35.0 PG    MCHC 31.2 31 - 37 g/dL    RDW 12.6 11.5 - 14.5 %    PLATELET 002 163 - 896 K/uL    NEUTROPHILS 67 40 - 70 %    MIXED CELLS 9 0.1 - 17 %    LYMPHOCYTES 24 14 - 44 %    ABS. NEUTROPHILS 3.7 1.8 - 9.5 K/UL    ABS. MIXED CELLS 0.5 0.0 - 2.3 K/uL    ABS. LYMPHOCYTES 1.3 1.1 - 5.9 K/UL    DF AUTOMATED          Procrit HELD per order parameters. Patient armband removed and shredded. Ms. Sabina Abarca was discharged from [place] [address] in stable condition at 1435. She is to return on 18 at 1500 for her next Procrit appointment.     Viri Esteban RN  2018

## 2018-01-23 ENCOUNTER — HOSPITAL ENCOUNTER (OUTPATIENT)
Dept: INFUSION THERAPY | Age: 83
Discharge: HOME | End: 2018-01-23
Payer: MEDICARE

## 2018-01-23 VITALS
TEMPERATURE: 98.4 F | RESPIRATION RATE: 18 BRPM | HEART RATE: 61 BPM | DIASTOLIC BLOOD PRESSURE: 55 MMHG | OXYGEN SATURATION: 90 % | SYSTOLIC BLOOD PRESSURE: 157 MMHG

## 2018-01-23 LAB
BASO+EOS+MONOS # BLD AUTO: 0.4 K/UL (ref 0–2.3)
BASO+EOS+MONOS # BLD AUTO: 9 % (ref 0.1–17)
DIFFERENTIAL METHOD BLD: ABNORMAL
ERYTHROCYTE [DISTWIDTH] IN BLOOD BY AUTOMATED COUNT: 11.9 % (ref 11.5–14.5)
HCT VFR BLD AUTO: 31.7 % (ref 36–48)
HGB BLD-MCNC: 9.7 G/DL (ref 12–16)
LYMPHOCYTES # BLD: 2.4 K/UL (ref 1.1–5.9)
LYMPHOCYTES NFR BLD: 52 % (ref 14–44)
MCH RBC QN AUTO: 27.3 PG (ref 25–35)
MCHC RBC AUTO-ENTMCNC: 30.6 G/DL (ref 31–37)
MCV RBC AUTO: 89.3 FL (ref 78–102)
NEUTS SEG # BLD: 1.8 K/UL (ref 1.8–9.5)
NEUTS SEG NFR BLD: 39 % (ref 40–70)
PLATELET # BLD AUTO: 189 K/UL (ref 140–440)
RBC # BLD AUTO: 3.55 M/UL (ref 4.1–5.1)
WBC # BLD AUTO: 4.6 K/UL (ref 4.5–13)

## 2018-01-23 PROCEDURE — 85025 COMPLETE CBC W/AUTO DIFF WBC: CPT | Performed by: INTERNAL MEDICINE

## 2018-01-23 PROCEDURE — 36415 COLL VENOUS BLD VENIPUNCTURE: CPT

## 2018-01-23 NOTE — PROGRESS NOTES
[facility] Progress Note    Date: 2018    Name: Yuri Garcia    MRN: [id number]         : 1922     PROCRIT INJECTON    Ms. Kary Salas was assessed and education was provided. Pt arrived to [place] in wheelchair accompanied by her daughter at 1500. Ms. Antony's vitals were reviewed and patient was observed for 5 minutes prior to treatment. Visit Vitals    /55 (BP 1 Location: Left arm, BP Patient Position: Sitting)    Pulse 61    Temp 98.4 °F (36.9 °C)    Resp 18    SpO2 90%       Blood drawn via left forearm venipuncture X2 attempt. Gauze and tape applied to site. Recent Results (from the past 12 hour(s))   CBC WITH 3 PART DIFF    Collection Time: 18  3:12 PM   Result Value Ref Range    WBC 4.6 4.5 - 13.0 K/uL    RBC 3.55 (L) 4.10 - 5.10 M/uL    HGB 9.7 (L) 12.0 - 16 g/dL    HCT 31.7 (L) 36 - 48 %    MCV 89.3 78 - 102 FL    MCH 27.3 25.0 - 35.0 PG    MCHC 30.6 (L) 31 - 37 g/dL    RDW 11.9 11.5 - 14.5 %    PLATELET 149 015 - 468 K/uL    NEUTROPHILS 39 (L) 40 - 70 %    MIXED CELLS 9 0.1 - 17 %    LYMPHOCYTES 52 (H) 14 - 44 %    ABS. NEUTROPHILS 1.8 1.8 - 9.5 K/UL    ABS. MIXED CELLS 0.4 0.0 - 2.3 K/uL    ABS. LYMPHOCYTES 2.4 1.1 - 5.9 K/UL    DF AUTOMATED         Results within ordered parameters to HOLD procrit today. Ms. Kary Salas was discharged from [place] [address] in stable condition at 1520. She is to return on 2018 at 1400 for her next Procrit appointment.     Victoria Galeana RN  2018

## 2018-02-06 ENCOUNTER — HOSPITAL ENCOUNTER (OUTPATIENT)
Dept: INFUSION THERAPY | Age: 83
Discharge: HOME | End: 2018-02-06
Payer: MEDICARE

## 2018-02-06 ENCOUNTER — TELEPHONE (OUTPATIENT)
Dept: PAIN MANAGEMENT | Age: 83
End: 2018-02-06

## 2018-02-06 VITALS
OXYGEN SATURATION: 91 % | DIASTOLIC BLOOD PRESSURE: 69 MMHG | RESPIRATION RATE: 18 BRPM | TEMPERATURE: 98.3 F | SYSTOLIC BLOOD PRESSURE: 148 MMHG | HEART RATE: 72 BPM

## 2018-02-06 LAB
ANION GAP BLD CALC-SCNC: 16 MMOL/L (ref 10–20)
BUN BLD-MCNC: 23 MG/DL (ref 7–18)
CA-I BLD-MCNC: 1.21 MMOL/L (ref 1.12–1.32)
CHLORIDE BLD-SCNC: 100 MMOL/L (ref 100–108)
CO2 BLD-SCNC: 30 MMOL/L (ref 19–24)
CREAT UR-MCNC: 2.3 MG/DL (ref 0.6–1.3)
GLUCOSE BLD STRIP.AUTO-MCNC: 182 MG/DL (ref 74–106)
HCT VFR BLD CALC: 29 % (ref 36–49)
HGB BLD-MCNC: 9.9 G/DL (ref 12–16)
POTASSIUM BLD-SCNC: 4.2 MMOL/L (ref 3.5–5.5)
SODIUM BLD-SCNC: 141 MMOL/L (ref 136–145)

## 2018-02-06 PROCEDURE — 96372 THER/PROPH/DIAG INJ SC/IM: CPT

## 2018-02-06 PROCEDURE — 36415 COLL VENOUS BLD VENIPUNCTURE: CPT

## 2018-02-06 PROCEDURE — 74011250636 HC RX REV CODE- 250/636: Performed by: INTERNAL MEDICINE

## 2018-02-06 PROCEDURE — 80047 BASIC METABLC PNL IONIZED CA: CPT

## 2018-02-06 RX ADMIN — ERYTHROPOIETIN 4000 UNITS: 4000 INJECTION, SOLUTION INTRAVENOUS; SUBCUTANEOUS at 14:25

## 2018-02-06 RX ADMIN — ERYTHROPOIETIN 3000 UNITS: 3000 INJECTION, SOLUTION INTRAVENOUS; SUBCUTANEOUS at 14:25

## 2018-02-06 NOTE — PROGRESS NOTES
[facility] Progress Note    Date: 2018    Name: Radha Gonzalez    MRN: [id number]         : 1922     PROCRIT INJECTON    Ms. Inocencio Lagos was assessed and education was provided. Pt arrived to [facility] in wheelchair accompanied by her daughter at 80. Ms. Antony's vitals were reviewed and patient was observed for 5 minutes prior to treatment. Visit Vitals    /69 (BP 1 Location: Left arm, BP Patient Position: Sitting)    Pulse 72    Temp 98.3 °F (36.8 °C)    Resp 18    SpO2 91%    Breastfeeding No       Blood drawn via left forearm venipuncture X1 attempt. Gauze and tape applied to site. Recent Results (from the past 12 hour(s))   POC CHEM8    Collection Time: 18  2:23 PM   Result Value Ref Range    CO2, POC 30 (H) 19 - 24 MMOL/L    Glucose,  (H) 74 - 106 MG/DL    BUN, POC 23 (H) 7 - 18 MG/DL    Creatinine, POC 2.3 (H) 0.6 - 1.3 MG/DL    GFRAA, POC 24 (L) >60 ml/min/1.73m2    GFRNA, POC 20 (L) >60 ml/min/1.73m2    Sodium,  136 - 145 MMOL/L    Potassium, POC 4.2 3.5 - 5.5 MMOL/L    Calcium, ionized (POC) 1.21 1.12 - 1.32 MMOL/L    Chloride,  100 - 108 MMOL/L    Anion gap, POC 16 10 - 20      Hematocrit, POC 29 (L) 36 - 49 %    Hemoglobin, POC 9.9 (L) 12 - 16 G/DL       Results within ordered parameters to administer procrit today. Procrit 7,000 units administered SQ in the back of the left arm. Band-aid applied. Ms. Inocencio Lagos was discharged from [place] [address] in stable condition at 1435. She is to return on 2018 at 1330 for her next Procrit appointment.     Scott Alas RN  2018

## 2018-02-20 ENCOUNTER — HOSPITAL ENCOUNTER (OUTPATIENT)
Dept: INFUSION THERAPY | Age: 83
Discharge: HOME | End: 2018-02-20
Payer: MEDICARE

## 2018-02-20 VITALS
DIASTOLIC BLOOD PRESSURE: 67 MMHG | RESPIRATION RATE: 18 BRPM | TEMPERATURE: 97.4 F | OXYGEN SATURATION: 91 % | SYSTOLIC BLOOD PRESSURE: 149 MMHG | HEART RATE: 57 BPM

## 2018-02-20 LAB
BASO+EOS+MONOS # BLD AUTO: 0.5 K/UL (ref 0–2.3)
BASO+EOS+MONOS # BLD AUTO: 9 % (ref 0.1–17)
DIFFERENTIAL METHOD BLD: ABNORMAL
ERYTHROCYTE [DISTWIDTH] IN BLOOD BY AUTOMATED COUNT: 12.6 % (ref 11.5–14.5)
HCT VFR BLD AUTO: 32.7 % (ref 36–48)
HGB BLD-MCNC: 9.8 G/DL (ref 12–16)
LYMPHOCYTES # BLD: 2 K/UL (ref 1.1–5.9)
LYMPHOCYTES NFR BLD: 36 % (ref 14–44)
MCH RBC QN AUTO: 27.5 PG (ref 25–35)
MCHC RBC AUTO-ENTMCNC: 30 G/DL (ref 31–37)
MCV RBC AUTO: 91.9 FL (ref 78–102)
NEUTS SEG # BLD: 3.1 K/UL (ref 1.8–9.5)
NEUTS SEG NFR BLD: 55 % (ref 40–70)
PLATELET # BLD AUTO: 178 K/UL (ref 140–440)
RBC # BLD AUTO: 3.56 M/UL (ref 4.1–5.1)
WBC # BLD AUTO: 5.6 K/UL (ref 4.5–13)

## 2018-02-20 PROCEDURE — 85025 COMPLETE CBC W/AUTO DIFF WBC: CPT | Performed by: INTERNAL MEDICINE

## 2018-02-20 PROCEDURE — 36415 COLL VENOUS BLD VENIPUNCTURE: CPT

## 2018-02-20 NOTE — PROGRESS NOTES
[facility] Progress Note    Date: 2018    Name: Briana Cat    MRN: [id number]         : 1922     PROCRIT INJECTON    Ms. Cornell Stout was assessed and education was provided. Pt arrived to [facility] in wheelchair accompanied by her daughter at 1. Ms. Antony's vitals were reviewed and patient was observed for 5 minutes prior to treatment. Visit Vitals    /67 (BP 1 Location: Left arm, BP Patient Position: Sitting)    Pulse (!) 57    Temp 97.4 °F (36.3 °C)    Resp 18    SpO2 91%       Blood drawn via left AC venipuncture X1 attempt. Gauze and coban applied to site. Recent Results (from the past 12 hour(s))   CBC WITH 3 PART DIFF    Collection Time: 18  1:40 PM   Result Value Ref Range    WBC 5.6 4.5 - 13.0 K/uL    RBC 3.56 (L) 4.10 - 5.10 M/uL    HGB 9.8 (L) 12.0 - 16 g/dL    HCT 32.7 (L) 36 - 48 %    MCV 91.9 78 - 102 FL    MCH 27.5 25.0 - 35.0 PG    MCHC 30.0 (L) 31 - 37 g/dL    RDW 12.6 11.5 - 14.5 %    PLATELET 363 651 - 962 K/uL    NEUTROPHILS 55 40 - 70 %    MIXED CELLS 9 0.1 - 17 %    LYMPHOCYTES 36 14 - 44 %    ABS. NEUTROPHILS 3.1 1.8 - 9.5 K/UL    ABS. MIXED CELLS 0.5 0.0 - 2.3 K/uL    ABS. LYMPHOCYTES 2.0 1.1 - 5.9 K/UL    DF AUTOMATED         HGB= 9.8 and HCT= 32.7    Results within ordered parameters to HOLD procrit today. Ms. Cornell Stout was discharged from [place] [address] in stable condition at 1350. She is to return on 2018 at 1400 for her next Procrit appointment.     Santi Alonzo RN  2018

## 2018-03-06 ENCOUNTER — HOSPITAL ENCOUNTER (OUTPATIENT)
Dept: INFUSION THERAPY | Age: 83
Discharge: HOME | End: 2018-03-06
Payer: MEDICARE

## 2018-03-06 VITALS
HEART RATE: 68 BPM | RESPIRATION RATE: 18 BRPM | SYSTOLIC BLOOD PRESSURE: 143 MMHG | DIASTOLIC BLOOD PRESSURE: 93 MMHG | TEMPERATURE: 97.2 F | OXYGEN SATURATION: 93 %

## 2018-03-06 LAB
BASO+EOS+MONOS # BLD AUTO: 0.6 K/UL (ref 0–2.3)
BASO+EOS+MONOS # BLD AUTO: 10 % (ref 0.1–17)
DIFFERENTIAL METHOD BLD: ABNORMAL
ERYTHROCYTE [DISTWIDTH] IN BLOOD BY AUTOMATED COUNT: 12.3 % (ref 11.5–14.5)
HCT VFR BLD AUTO: 31.3 % (ref 36–48)
HGB BLD-MCNC: 9.8 G/DL (ref 12–16)
LYMPHOCYTES # BLD: 2.1 K/UL (ref 1.1–5.9)
LYMPHOCYTES NFR BLD: 38 % (ref 14–44)
MCH RBC QN AUTO: 28.2 PG (ref 25–35)
MCHC RBC AUTO-ENTMCNC: 31.3 G/DL (ref 31–37)
MCV RBC AUTO: 90.2 FL (ref 78–102)
NEUTS SEG # BLD: 2.8 K/UL (ref 1.8–9.5)
NEUTS SEG NFR BLD: 52 % (ref 40–70)
PLATELET # BLD AUTO: 203 K/UL (ref 140–440)
RBC # BLD AUTO: 3.47 M/UL (ref 4.1–5.1)
WBC # BLD AUTO: 5.5 K/UL (ref 4.5–13)

## 2018-03-06 PROCEDURE — 85025 COMPLETE CBC W/AUTO DIFF WBC: CPT | Performed by: INTERNAL MEDICINE

## 2018-03-06 PROCEDURE — 36415 COLL VENOUS BLD VENIPUNCTURE: CPT

## 2018-03-06 NOTE — PROGRESS NOTES
[facility] Progress Note    Date: 2018    Name: Macarena Hardy    MRN: [id number]         : 1922     PROCRIT INJECTON    Ms. Yaya Calderon was assessed and education was provided. Pt arrived to [facility] in wheelchair accompanied by her daughter at 80. Ms. Antony's vitals were reviewed and patient was observed for 5 minutes prior to treatment. Visit Vitals    BP (!) 143/93 (BP 1 Location: Left arm, BP Patient Position: Sitting)    Pulse 68    Temp 97.2 °F (36.2 °C)    Resp 18    SpO2 93%    Breastfeeding No       Blood drawn via left A/C venipuncture X1 attempt. Gauze and tape applied to site. Lab results were obtained and reviewed. Recent Results (from the past 24 hour(s))   CBC WITH 3 PART DIFF    Collection Time: 18  2:23 PM   Result Value Ref Range    WBC 5.5 4.5 - 13.0 K/uL    RBC 3.47 (L) 4.10 - 5.10 M/uL    HGB 9.8 (L) 12.0 - 16 g/dL    HCT 31.3 (L) 36 - 48 %    MCV 90.2 78 - 102 FL    MCH 28.2 25.0 - 35.0 PG    MCHC 31.3 31 - 37 g/dL    RDW 12.3 11.5 - 14.5 %    PLATELET 919 862 - 506 K/uL    NEUTROPHILS 52 40 - 70 %    MIXED CELLS 10 0.1 - 17 %    LYMPHOCYTES 38 14 - 44 %    ABS. NEUTROPHILS 2.8 1.8 - 9.5 K/UL    ABS. MIXED CELLS 0.6 0.0 - 2.3 K/uL    ABS. LYMPHOCYTES 2.1 1.1 - 5.9 K/UL    DF AUTOMATED          Procrit HELD per order parameters. Patient armband removed and shredded. Ms. Yaya Calderon was discharged from [place] [address] in stable condition at 1430. She is to return on 3/20/18 at 1500 for her next Procrit appointment.     Deepak Moore RN  2018

## 2018-03-20 ENCOUNTER — HOSPITAL ENCOUNTER (OUTPATIENT)
Dept: INFUSION THERAPY | Age: 83
Discharge: HOME | End: 2018-03-20
Payer: MEDICARE

## 2018-03-20 VITALS
DIASTOLIC BLOOD PRESSURE: 62 MMHG | RESPIRATION RATE: 18 BRPM | SYSTOLIC BLOOD PRESSURE: 124 MMHG | TEMPERATURE: 98.1 F | OXYGEN SATURATION: 94 % | HEART RATE: 57 BPM

## 2018-03-20 LAB
ALBUMIN SERPL-MCNC: 3.4 G/DL (ref 3.4–5)
ANION GAP SERPL CALC-SCNC: 9 MMOL/L (ref 3–18)
BASO+EOS+MONOS # BLD AUTO: 0.8 K/UL (ref 0–2.3)
BASO+EOS+MONOS # BLD AUTO: 14 % (ref 0.1–17)
BUN SERPL-MCNC: 33 MG/DL (ref 7–18)
BUN/CREAT SERPL: 14 (ref 12–20)
CALCIUM SERPL-MCNC: 9.2 MG/DL (ref 8.5–10.1)
CHLORIDE SERPL-SCNC: 104 MMOL/L (ref 100–108)
CO2 SERPL-SCNC: 31 MMOL/L (ref 21–32)
CREAT SERPL-MCNC: 2.38 MG/DL (ref 0.6–1.3)
DIFFERENTIAL METHOD BLD: ABNORMAL
ERYTHROCYTE [DISTWIDTH] IN BLOOD BY AUTOMATED COUNT: 12.1 % (ref 11.5–14.5)
FERRITIN SERPL-MCNC: 701 NG/ML (ref 8–388)
GLUCOSE SERPL-MCNC: 179 MG/DL (ref 74–99)
HCT VFR BLD AUTO: 31.8 % (ref 36–48)
HGB BLD-MCNC: 10 G/DL (ref 12–16)
IRON SATN MFR SERPL: 26 %
IRON SERPL-MCNC: 42 UG/DL (ref 50–175)
LYMPHOCYTES # BLD: 2.1 K/UL (ref 1.1–5.9)
LYMPHOCYTES NFR BLD: 37 % (ref 14–44)
MCH RBC QN AUTO: 28.6 PG (ref 25–35)
MCHC RBC AUTO-ENTMCNC: 31.4 G/DL (ref 31–37)
MCV RBC AUTO: 90.9 FL (ref 78–102)
NEUTS SEG # BLD: 2.8 K/UL (ref 1.8–9.5)
NEUTS SEG NFR BLD: 50 % (ref 40–70)
PHOSPHATE SERPL-MCNC: 3.6 MG/DL (ref 2.5–4.9)
PLATELET # BLD AUTO: 170 K/UL (ref 140–440)
POTASSIUM SERPL-SCNC: 4.6 MMOL/L (ref 3.5–5.5)
RBC # BLD AUTO: 3.5 M/UL (ref 4.1–5.1)
SODIUM SERPL-SCNC: 144 MMOL/L (ref 136–145)
TIBC SERPL-MCNC: 164 UG/DL (ref 250–450)
WBC # BLD AUTO: 5.7 K/UL (ref 4.5–13)

## 2018-03-20 PROCEDURE — 82728 ASSAY OF FERRITIN: CPT | Performed by: INTERNAL MEDICINE

## 2018-03-20 PROCEDURE — 36415 COLL VENOUS BLD VENIPUNCTURE: CPT

## 2018-03-20 PROCEDURE — 80069 RENAL FUNCTION PANEL: CPT | Performed by: INTERNAL MEDICINE

## 2018-03-20 PROCEDURE — 85025 COMPLETE CBC W/AUTO DIFF WBC: CPT | Performed by: INTERNAL MEDICINE

## 2018-03-20 PROCEDURE — 83540 ASSAY OF IRON: CPT | Performed by: INTERNAL MEDICINE

## 2018-03-28 ENCOUNTER — OFFICE VISIT (OUTPATIENT)
Dept: PAIN MANAGEMENT | Age: 83
End: 2018-03-28

## 2018-03-28 VITALS
TEMPERATURE: 96.1 F | HEART RATE: 47 BPM | WEIGHT: 136 LBS | HEIGHT: 62 IN | RESPIRATION RATE: 14 BRPM | DIASTOLIC BLOOD PRESSURE: 51 MMHG | BODY MASS INDEX: 25.03 KG/M2 | SYSTOLIC BLOOD PRESSURE: 136 MMHG

## 2018-03-28 DIAGNOSIS — G89.29 CHRONIC MIDLINE LOW BACK PAIN WITH SCIATICA, SCIATICA LATERALITY UNSPECIFIED: ICD-10-CM

## 2018-03-28 DIAGNOSIS — G89.29 CHRONIC BILATERAL LOW BACK PAIN, WITH SCIATICA PRESENCE UNSPECIFIED: ICD-10-CM

## 2018-03-28 DIAGNOSIS — Z79.899 ENCOUNTER FOR LONG-TERM (CURRENT) DRUG USE: ICD-10-CM

## 2018-03-28 DIAGNOSIS — M54.2 CERVICALGIA: ICD-10-CM

## 2018-03-28 DIAGNOSIS — M54.40 CHRONIC MIDLINE LOW BACK PAIN WITH SCIATICA, SCIATICA LATERALITY UNSPECIFIED: ICD-10-CM

## 2018-03-28 DIAGNOSIS — G89.29 OTHER CHRONIC PAIN: ICD-10-CM

## 2018-03-28 DIAGNOSIS — M54.5 CHRONIC BILATERAL LOW BACK PAIN, WITH SCIATICA PRESENCE UNSPECIFIED: ICD-10-CM

## 2018-03-28 DIAGNOSIS — M25.50 PAIN IN JOINT, MULTIPLE SITES: Primary | ICD-10-CM

## 2018-03-28 RX ORDER — OXYCODONE HCL 20 MG/1
20 TABLET, FILM COATED, EXTENDED RELEASE ORAL EVERY 12 HOURS
Qty: 60 TAB | Refills: 0 | Status: SHIPPED | OUTPATIENT
Start: 2018-04-09 | End: 2018-05-09

## 2018-03-28 RX ORDER — OXYCODONE HCL 20 MG/1
20 TABLET, FILM COATED, EXTENDED RELEASE ORAL EVERY 12 HOURS
Qty: 60 TAB | Refills: 0 | Status: SHIPPED | OUTPATIENT
Start: 2018-06-07 | End: 2018-07-07

## 2018-03-28 RX ORDER — OXYCODONE HCL 20 MG/1
20 TABLET, FILM COATED, EXTENDED RELEASE ORAL EVERY 12 HOURS
Qty: 60 TAB | Refills: 0 | Status: SHIPPED | OUTPATIENT
Start: 2018-05-08 | End: 2018-06-08 | Stop reason: SDUPTHER

## 2018-03-28 NOTE — MR AVS SNAPSHOT
[address] [place] [address] 
[phone] Patient: Kevin Duran MRN: [id number] GJT:[birth year] Visit Information Date & Time Provider Department Dept. Phone Encounter #  
 3/28/2018  2:20 PM Lettylester Kraig [address] [facility] 26 [id number] Upcoming Health Maintenance Date Due DTaP/Tdap/Td series (1 - Tdap) 9/2/1943 ZOSTER VACCINE AGE 60> 7/2/1982 GLAUCOMA SCREENING Q2Y 9/2/1987 Pneumococcal 65+ Low/Medium Risk (2 of 2 - PCV13) 11/1/2016 MEDICARE YEARLY EXAM 3/20/2018 Allergies as of 3/28/2018  Review Complete On: 3/28/2018 By: Joy Huang PA-C Severity Noted Reaction Type Reactions Celebrex [Celecoxib]    Unable to Obtain Codeine    Unable to Obtain Flexeril [Cyclobenzaprine]    Unable to Obtain Macrobid [Nitrofurantoin Monohyd/m-cryst]    Unable to Consolidated Alex Pcn [Penicillins]    Unable to Obtain Remeron [Mirtazapine]    Unable to Obtain  
 Sulfa (Sulfonamide Antibiotics)    Other (comments) Swelling and low heart rate Vicodin [Hydrocodone-acetaminophen]    Unable to Obtain Current Immunizations  Reviewed on 3/20/2018 Name Date Influenza Vaccine 9/26/2017, 11/1/2016, 11/1/2015 Pneumococcal Polysaccharide (PPSV-23) 11/1/2015 Not reviewed this visit You Were Diagnosed With   
  
 Codes Comments Pain in joint, multiple sites    -  Primary ICD-10-CM: M25.50 ICD-9-CM: 719.49 Other chronic pain     ICD-10-CM: G89.29 ICD-9-CM: 338.29 Chronic bilateral low back pain, with sciatica presence unspecified     ICD-10-CM: M54.5, G89.29 ICD-9-CM: 724.2, 338.29 Cervicalgia     ICD-10-CM: M54.2 ICD-9-CM: 723.1 Chronic midline low back pain with sciatica, sciatica laterality unspecified     ICD-10-CM: M54.40, G89.29 ICD-9-CM: 724.2, 724.3, 338.29 Encounter for long-term (current) drug use     ICD-10-CM: Z79.899 ICD-9-CM: V58.69 Vitals BP Pulse Temp Resp Height(growth percentile) Weight(growth percentile) 136/51 (BP 1 Location: Left arm, BP Patient Position: Sitting) (!) 47 96.1 °F (35.6 °C) (Oral) 14 5' 2\" (1.575 m) 136 lb (61.7 kg) BMI OB Status Smoking Status 24.87 kg/m2 Hysterectomy Never Smoker Vitals History BMI and BSA Data Body Mass Index Body Surface Area  
 24.87 kg/m 2 1.64 m 2 Your Updated Medication List  
  
   
This list is accurate as of 3/28/18  3:16 PM.  Always use your most recent med list. amLODIPine 10 mg tablet Commonly known as:  Rico Sharla Take  by mouth daily. donepezil 10 mg tablet Commonly known as:  ARICEPT Take 10 mg by mouth nightly. furosemide 20 mg tablet Commonly known as:  LASIX Take  by mouth two (2) times a week. glipiZIDE 5 mg tablet Commonly known as:  Reola Wakefield Take  by mouth two (2) times a day. NexIUM 40 mg capsule Generic drug:  esomeprazole Take 40 mg by mouth daily. * OxyCONTIN 20 mg CR tablet Generic drug:  oxyCODONE CR Take 20 mg by mouth every twelve (12) hours. * oxyCODONE ER 20 mg ER tablet Commonly known as:  OxyCONTIN Take 1 Tab by mouth every twelve (12) hours for 30 days. Max Daily Amount: 40 mg. For chronic pain. * oxyCODONE ER 20 mg ER tablet Commonly known as:  OxyCONTIN Take 1 Tab by mouth every twelve (12) hours for 30 days. Max Daily Amount: 40 mg. For chronic pain. Start taking on:  4/9/2018 * oxyCODONE ER 20 mg ER tablet Commonly known as:  OxyCONTIN Take 1 Tab by mouth every twelve (12) hours for 30 days. Max Daily Amount: 40 mg. For chronic pain. Start taking on:  5/8/2018 * oxyCODONE ER 20 mg ER tablet Commonly known as:  OxyCONTIN Take 1 Tab by mouth every twelve (12) hours for 30 days. Max Daily Amount: 40 mg. For chronic pain. Start taking on:  6/7/2018  
  
 pantoprazole 40 mg tablet Commonly known as:  PROTONIX Take 40 mg by mouth daily. polyethylene glycol 17 gram packet Commonly known as:  Jayy Santy Take 17 g by mouth daily. SEROquel 100 mg tablet Generic drug:  QUEtiapine Take 50 mg by mouth two (2) times a day. * Notice: This list has 5 medication(s) that are the same as other medications prescribed for you. Read the directions carefully, and ask your doctor or other care provider to review them with you. Prescriptions Printed Refills  
 oxyCODONE ER (OXYCONTIN) 20 mg ER tablet 0 Starting on: 6/7/2018 Sig: Take 1 Tab by mouth every twelve (12) hours for 30 days. Max Daily Amount: 40 mg. For chronic pain. Class: Print Route: Oral  
 oxyCODONE ER (OXYCONTIN) 20 mg ER tablet 0 Starting on: 5/8/2018 Sig: Take 1 Tab by mouth every twelve (12) hours for 30 days. Max Daily Amount: 40 mg. For chronic pain. Class: Print Route: Oral  
 oxyCODONE ER (OXYCONTIN) 20 mg ER tablet 0 Starting on: 4/9/2018 Sig: Take 1 Tab by mouth every twelve (12) hours for 30 days. Max Daily Amount: 40 mg. For chronic pain. Class: Print Route: Oral  
  
To-Do List   
 04/03/2018  3:00 PM  
  Appointment with HBV FAST TRACK NURSE at [facility] ([phone])  
  
 04/17/2018 2:00 PM  
  Appointment with HBV FAST TRACK NURSE at [address] [facility] ([phone]) [facility]! Fawad Fiore introduces [employer] patient portal. Now you can access parts of your medical record, email your doctor's office, and request medication refills online. 1. In your internet browser, go to [email or web address] 2. Click on the First Time User? Click Here link in the Sign In box. You will see the New Member Sign Up page. 3. Enter your [employer] Access Code exactly as it appears below. You will not need to use this code after youve completed the sign-up process.  If you do not sign up before the expiration date, you must request a new code. · [employer] Access Code: [email or web address] Expires: 4/22/2018 10:08 AM 
 
4. Enter the last four digits of your Social Security Number (xxxx) and Date of Birth (mm/dd/yyyy) as indicated and click Submit. You will be taken to the next sign-up page. 5. Create a [employer] ID. This will be your [employer] login ID and cannot be changed, so think of one that is secure and easy to remember. 6. Create a [employer] password. You can change your password at any time. 7. Enter your Password Reset Question and Answer. This can be used at a later time if you forget your password. 8. Enter your e-mail address. You will receive e-mail notification when new information is available in [address] 9. Click Sign Up. You can now view and download portions of your medical record. 10. Click the Download Summary menu link to download a portable copy of your medical information. If you have questions, please visit the Frequently Asked Questions section of the [employer] website. Remember, [employer] is NOT to be used for urgent needs. For medical emergencies, dial 911. Now available from your iPhone and Android! Please provide this summary of care documentation to your next provider. Your primary care clinician is listed as [facility]. If you have any questions after today's visit, please call [phone].

## 2018-03-28 NOTE — PROGRESS NOTES
Nursing Notes    Patient presents to the office today in follow-up. Patient rates her pain at 3/10 on the numerical pain scale. Reviewed medications with counts as follows:    Rx Date filled Qty Dispensed Pill Count Last Dose Short   OxyContin 20 mg tab 3/10/18 60 18 today 1 day and 1 nino day short (4 tabs)                                        Comments: Patient's daughter stated that the patient has missing pills in pill container counter at home. POC UDS was not performed in office today    Any new labs or imaging since last appointment? NO    Have you been to an emergency room (ER) or urgent care clinic since your last visit? NO            Have you been hospitalized since your last visit? NO     If yes, where, when, and reason for visit? Have you seen or consulted any other health care providers outside of the [address] [place] [address]  since your last visit? YES, PCP. If yes, where, when, and reason for visit? HM deferred to pcp.

## 2018-03-28 NOTE — PROGRESS NOTES
HISTORY OF PRESENT ILLNESS  Kevin Duran is a 80 y.o. female    Ms. Zohaib Jasso presents for follow up of chronic pain due to lumbar degenerative disc disease, right shoulder pain due to osteoarthritis, and left knee pain due to osteoarthritis. Cervical degenerative disc disease.        Ms. Zohaib Jasso presents today in her wheelchair with her both of her daughters present. Her daughter reports that this patient saw her primary care provider last week. She had routine blood work drawn during a routine checkup. She is healthy aside from dementia.  Pain continues to predominate in the right shoulder and lower back, as well as the right knee. She lives at home with her daughter, who is her primary caregiver.  She is in good spirits today. She tolerates her medications without side effects. Jamie Antony reports no change in sleep or constipation. We discussed her current condition and medications in detail today.     Current medication management consists of: Oxycontin 20 mg, take one tablet by mouth every 12 hours   Medications are helping with pain control and quality of life. Her pain is 4/10 with medication and 10/10 without.  Pt describes pain as aching, stabbing, and burning. Aggravating factors include standing, sitting, and walking. Relieved with rest, medication, and avoiding painful activities. The patient reports 80% relief with current medications.   Current treatment is helping to improve general activity, mood, walking, sleep, enjoyment of life     Measuring clinical outcomes of chronic pain patients: score 7/28; the lower the number the better the outcome. Pain Meds and Quality Of Life have been reviewed. Nonpharmacologic therapy and non-opioid pharmacologic therapy were considered. If opioid therapy is prescribed, this is only if the expected benefits are anticipated to outweigh risks. She  is otherwise doing well with no other complaints today.  She denies any adverse events including nausea, vomiting, dizziness, increased constipation, hallucinations, or seizures. The patient reports functional improvement and QOL with pain medication. Vitals:    03/28/18 1433   BP: 136/51   Pulse: (!) 47   Resp: 14   Temp: 96.1 °F (35.6 °C)   TempSrc: Oral   Weight: 61.7 kg (136 lb)   Height: 5' 2\" (1.575 m)   PainSc:   3   PainLoc: Back         Allergies   Allergen Reactions    Celebrex [Celecoxib] Unable to Obtain    Codeine Unable to Obtain    Flexeril [Cyclobenzaprine] Unable to Obtain    Macrobid [Nitrofurantoin Monohyd/M-Cryst] Unable to Obtain    Pcn [Penicillins] Unable to Obtain    Remeron [Mirtazapine] Unable to Obtain    Sulfa (Sulfonamide Antibiotics) Other (comments)     Swelling and low heart rate    Vicodin [Hydrocodone-Acetaminophen] Unable to Obtain       Past Surgical History:   Procedure Laterality Date    HX BLADDER REPAIR      prolapsed    HX CATARACT REMOVAL      HX CHOLECYSTECTOMY  5/1998    HX HERNIA REPAIR  7/1998    x2    HX OOPHORECTOMY         ROS   Constitutional: Negative for fever, chills and malaise/fatigue. HENT: Positive for neck pain. Negative for congestion and sore throat.    Eyes: Negative for blurred vision and double vision. Respiratory: Negative for shortness of breath.    Cardiovascular: Negative for chest pain and leg swelling. Gastrointestinal: Positive for constipation. Negative for heartburn, nausea, vomiting and diarrhea. Genitourinary:        Some frequency \"weak bladder\"   Musculoskeletal: Positive for back pain and joint pain (left knee). Negative for myalgias and falls. Skin: Negative.    Neurological: Negative for dizziness, tingling, tremors, seizures, weakness and headaches. Endo/Heme/Allergies: Positive for environmental allergies. Does not bruise/bleed easily. Psychiatric/Behavioral: Positive for depression and memory loss. Negative for suicidal ideas.  The patient is nervous/anxious and has insomnia     Physical Exam Constitutional: She is oriented to person, place, and time. She appears well-developed and well-nourished. Pleasant demeanor  HENT:   Head: Normocephalic and atraumatic. Eyes: EOM are normal.   Neck: Spinous process tenderness and muscular tenderness present. Decreased range of motion present. Marked spasms of cervical paraspinous musculature noted  Hypertrophy of the SCM  significant anterocollis with kyphosis of the cervical spine. Very limited ROM in all directions   Neurological: She is alert and oriented to person, place, and time. No cranial nerve deficit. She exhibits normal muscle tone. Coordination normal.   Psychiatric: She has a normal mood and affect. Her behavior is normal. Judgment normal.   Poor historian  Dementia has worsened         ASSESSMENT:    1. Pain in joint, multiple sites    2. Other chronic pain    3. Chronic bilateral low back pain, with sciatica presence unspecified    4. Cervicalgia    5. Chronic midline low back pain with sciatica, sciatica laterality unspecified    6. Encounter for long-term (current) drug use          [place] Prescription Monitoring Program was reviewed which does not demonstrate aberrancies and/or inconsistencies with regard to the historical prescribing of controlled medications to this patient by other providers. Medications were brought to visit today. Pill count was not appropriate. When possible, non-drug therapy for chronic pain should be used as a first-line treatment. Physical therapy exercise regimens, chiropractic manipulation, meditation relaxation techniques, cognitive behavior therapy, acupuncture, yoga, Kike Chi,  transcutaneous electrical nerve stimulation (TENS), and application of moist heat can help alleviate pain .      Explained that realistic expectations and goals with chronic pain management are to maximize function and minimize pain with the understanding that limitations will exist both in the extent of relief that she may achieve, as well as thresholds of mg strengths that we will not exceed. Our role is to help the patient better cope with chronic pain utilizng a multimodal approach. The patients condition and plan were discussed. All questions were answered. The patient agrees with the plan. PLAN / Pt Instructions:  1. Continue current plan with no evidence of addiction or diversion. 2. Stable on current medication without adverse events. 3. Refill Oxycontin 20 mg, take one tablet by mouth every 12 hours    4. Discussed risks of addiction, dependency, and opioid induced hyperalgesia. 5. Reviewed with patient benefits of home exercise, and lifestyle changes to assist in self-management of symptoms. 6. Return to clinic in 3 month      Medications Ordered Today   Medications    oxyCODONE ER (OXYCONTIN) 20 mg ER tablet     Sig: Take 1 Tab by mouth every twelve (12) hours for 30 days. Max Daily Amount: 40 mg. For chronic pain. Dispense:  60 Tab     Refill:  0    oxyCODONE ER (OXYCONTIN) 20 mg ER tablet     Sig: Take 1 Tab by mouth every twelve (12) hours for 30 days. Max Daily Amount: 40 mg. For chronic pain. Dispense:  60 Tab     Refill:  0    oxyCODONE ER (OXYCONTIN) 20 mg ER tablet     Sig: Take 1 Tab by mouth every twelve (12) hours for 30 days. Max Daily Amount: 40 mg. For chronic pain. Dispense:  60 Tab     Refill:  0         Prescription monitoring program reviewed. Pain medications prescribed with the objective of pain relief and improved physical and psychosocial function in this patient. DISPOSITION  · Counseled patient on proper use of prescribed medications. · Counseled patient about chronic medical conditions and their relationship to anxiety and depression and recommended mental health support as needed. · Reviewed with patient self-help tools, home exercise, and lifestyle changes to assist the patient in self-management of symptoms.   · Reviewed with patient the treatment plan, goals of treatment plan, and limitations of treatment plan, to include the potential for side effects from medications and procedures. If side effects occur, it is the responsibility of the patient to inform the clinic so that a change in the treatment plan can be made in a safe manner. The patient is advised that stopping prescribed medication may cause an increase in symptoms and possible medication withdrawal symptoms. The patient is informed an emergency room evaluation may be necessary if this occurs. Spent 25 minutes with patient today reviewing the treatment plan, goals of treatment plan, and limitations of the treatment plan, to include the potential for side effects from medications and procedures. More than 50% of the visit time was spent counseling the patient. Arnaldo Musa PA-C 3/28/2018        Note: Please excuse any typographical errors. Voice recognition software was used for this note and may cause mistakes.

## 2018-04-03 ENCOUNTER — HOSPITAL ENCOUNTER (OUTPATIENT)
Dept: INFUSION THERAPY | Age: 83
Discharge: HOME | End: 2018-04-03
Payer: MEDICARE

## 2018-04-03 VITALS
DIASTOLIC BLOOD PRESSURE: 52 MMHG | HEART RATE: 50 BPM | TEMPERATURE: 98.1 F | RESPIRATION RATE: 18 BRPM | SYSTOLIC BLOOD PRESSURE: 134 MMHG | OXYGEN SATURATION: 91 %

## 2018-04-03 LAB
BASO+EOS+MONOS # BLD AUTO: 0.7 K/UL (ref 0–2.3)
BASO+EOS+MONOS # BLD AUTO: 14 % (ref 0.1–17)
DIFFERENTIAL METHOD BLD: ABNORMAL
ERYTHROCYTE [DISTWIDTH] IN BLOOD BY AUTOMATED COUNT: 12.4 % (ref 11.5–14.5)
HCT VFR BLD AUTO: 31 % (ref 36–48)
HGB BLD-MCNC: 9.7 G/DL (ref 12–16)
LYMPHOCYTES # BLD: 1.6 K/UL (ref 1.1–5.9)
LYMPHOCYTES NFR BLD: 33 % (ref 14–44)
MCH RBC QN AUTO: 28 PG (ref 25–35)
MCHC RBC AUTO-ENTMCNC: 31.3 G/DL (ref 31–37)
MCV RBC AUTO: 89.6 FL (ref 78–102)
NEUTS SEG # BLD: 2.6 K/UL (ref 1.8–9.5)
NEUTS SEG NFR BLD: 53 % (ref 40–70)
PLATELET # BLD AUTO: 190 K/UL (ref 140–440)
RBC # BLD AUTO: 3.46 M/UL (ref 4.1–5.1)
WBC # BLD AUTO: 4.9 K/UL (ref 4.5–13)

## 2018-04-03 PROCEDURE — 85025 COMPLETE CBC W/AUTO DIFF WBC: CPT | Performed by: INTERNAL MEDICINE

## 2018-04-03 PROCEDURE — 36415 COLL VENOUS BLD VENIPUNCTURE: CPT

## 2018-04-03 NOTE — PROGRESS NOTES
[facility] Progress Note    Date: April 3, 2018    Name: Mounika Price    MRN: [id number]         : 1922     PROCRIT INJECTON    Ms. Francois Zamora was assessed and education was provided. Pt arrived to [facility] in wheelchair accompanied by her daughter at 1500. Ms. Antony's vitals were reviewed and patient was observed for 5 minutes prior to treatment. Visit Vitals    /52 (BP 1 Location: Left arm, BP Patient Position: Sitting)    Pulse (!) 50    Temp 98.1 °F (36.7 °C)    Resp 18    SpO2 91%    Breastfeeding No       Blood drawn via left A/C venipuncture X1 attempt for CBC per written order. Gauze and paper tape applied to site. Lab results were obtained and reviewed. Recent Results (from the past 24 hour(s))   CBC WITH 3 PART DIFF    Collection Time: 18  3:10 PM   Result Value Ref Range    WBC 4.9 4.5 - 13.0 K/uL    RBC 3.46 (L) 4.10 - 5.10 M/uL    HGB 9.7 (L) 12.0 - 16 g/dL    HCT 31.0 (L) 36 - 48 %    MCV 89.6 78 - 102 FL    MCH 28.0 25.0 - 35.0 PG    MCHC 31.3 31 - 37 g/dL    RDW 12.4 11.5 - 14.5 %    PLATELET 920 175 - 223 K/uL    NEUTROPHILS 53 40 - 70 %    MIXED CELLS 14 0.1 - 17 %    LYMPHOCYTES 33 14 - 44 %    ABS. NEUTROPHILS 2.6 1.8 - 9.5 K/UL    ABS. MIXED CELLS 0.7 0.0 - 2.3 K/uL    ABS. LYMPHOCYTES 1.6 1.1 - 5.9 K/UL    DF AUTOMATED       HGB= 9.7 and HCT= 31.0    Procrit HELD per order parameters. Patient armband removed and shredded. Ms. Francois Zamora was discharged from [place] [address] in stable condition at 1520. She is to return on 2018 at 1400 for her next Procrit appointment.     Diana Ramirez RN  April 3, 2018

## 2018-04-17 ENCOUNTER — HOSPITAL ENCOUNTER (OUTPATIENT)
Dept: INFUSION THERAPY | Age: 83
Discharge: HOME | End: 2018-04-17
Payer: MEDICARE

## 2018-04-17 VITALS
HEART RATE: 66 BPM | OXYGEN SATURATION: 90 % | TEMPERATURE: 97.5 F | DIASTOLIC BLOOD PRESSURE: 66 MMHG | RESPIRATION RATE: 18 BRPM | SYSTOLIC BLOOD PRESSURE: 132 MMHG

## 2018-04-17 LAB
BASO+EOS+MONOS # BLD AUTO: 0.5 K/UL (ref 0–2.3)
BASO+EOS+MONOS # BLD AUTO: 8 % (ref 0.1–17)
DIFFERENTIAL METHOD BLD: ABNORMAL
ERYTHROCYTE [DISTWIDTH] IN BLOOD BY AUTOMATED COUNT: 12.3 % (ref 11.5–14.5)
HCT VFR BLD AUTO: 31.2 % (ref 36–48)
HGB BLD-MCNC: 9.6 G/DL (ref 12–16)
LYMPHOCYTES # BLD: 2.5 K/UL (ref 1.1–5.9)
LYMPHOCYTES NFR BLD: 40 % (ref 14–44)
MCH RBC QN AUTO: 27.7 PG (ref 25–35)
MCHC RBC AUTO-ENTMCNC: 30.8 G/DL (ref 31–37)
MCV RBC AUTO: 90.2 FL (ref 78–102)
NEUTS SEG # BLD: 3.2 K/UL (ref 1.8–9.5)
NEUTS SEG NFR BLD: 52 % (ref 40–70)
PLATELET # BLD AUTO: 212 K/UL (ref 140–440)
RBC # BLD AUTO: 3.46 M/UL (ref 4.1–5.1)
WBC # BLD AUTO: 6.2 K/UL (ref 4.5–13)

## 2018-04-17 PROCEDURE — 85025 COMPLETE CBC W/AUTO DIFF WBC: CPT | Performed by: INTERNAL MEDICINE

## 2018-04-17 PROCEDURE — 36415 COLL VENOUS BLD VENIPUNCTURE: CPT

## 2018-05-01 ENCOUNTER — HOSPITAL ENCOUNTER (OUTPATIENT)
Dept: INFUSION THERAPY | Age: 83
Discharge: HOME | End: 2018-05-01
Payer: MEDICARE

## 2018-05-01 VITALS
RESPIRATION RATE: 18 BRPM | HEART RATE: 61 BPM | OXYGEN SATURATION: 91 % | TEMPERATURE: 97.4 F | DIASTOLIC BLOOD PRESSURE: 72 MMHG | SYSTOLIC BLOOD PRESSURE: 163 MMHG

## 2018-05-01 LAB
BASO+EOS+MONOS # BLD AUTO: 0.6 K/UL (ref 0–2.3)
BASO+EOS+MONOS # BLD AUTO: 10 % (ref 0.1–17)
DIFFERENTIAL METHOD BLD: ABNORMAL
ERYTHROCYTE [DISTWIDTH] IN BLOOD BY AUTOMATED COUNT: 12.2 % (ref 11.5–14.5)
HCT VFR BLD AUTO: 30.3 % (ref 36–48)
HGB BLD-MCNC: 9.5 G/DL (ref 12–16)
LYMPHOCYTES # BLD: 1.6 K/UL (ref 1.1–5.9)
LYMPHOCYTES NFR BLD: 27 % (ref 14–44)
MCH RBC QN AUTO: 28.2 PG (ref 25–35)
MCHC RBC AUTO-ENTMCNC: 31.4 G/DL (ref 31–37)
MCV RBC AUTO: 89.9 FL (ref 78–102)
NEUTS SEG # BLD: 3.8 K/UL (ref 1.8–9.5)
NEUTS SEG NFR BLD: 64 % (ref 40–70)
PLATELET # BLD AUTO: 191 K/UL (ref 140–440)
RBC # BLD AUTO: 3.37 M/UL (ref 4.1–5.1)
WBC # BLD AUTO: 6 K/UL (ref 4.5–13)

## 2018-05-01 PROCEDURE — 85025 COMPLETE CBC W/AUTO DIFF WBC: CPT | Performed by: INTERNAL MEDICINE

## 2018-05-01 PROCEDURE — 36415 COLL VENOUS BLD VENIPUNCTURE: CPT

## 2018-05-01 NOTE — PROGRESS NOTES
[facility] Progress Note    Date: May 1, 2018    Name: Elsy Hampton    MRN: [id number]         : 1922    Cbc/procrit q 2 weeks     patient arrived via wheelchair, accompanied by her daughter. Alert and oriented x 3. No complaints voiced      Patient assessed and education was provided. Patient vitals were reviewed. Visit Vitals    /72 (BP 1 Location: Left arm, BP Patient Position: Sitting)    Pulse 61    Temp 97.4 °F (36.3 °C)    Resp 18    SpO2 91%    Breastfeeding No       Blood sample by mere-puncture on 1st stick to left hand for cbc. Bruising noted to site due to fragile vein and skin, after blood draw. Gauze/tape to site       Lab results were obtained and reviewed. Recent Results (from the past 12 hour(s))   CBC WITH 3 PART DIFF    Collection Time: 18  2:18 PM   Result Value Ref Range    WBC 6.0 4.5 - 13.0 K/uL    RBC 3.37 (L) 4.10 - 5.10 M/uL    HGB 9.5 (L) 12.0 - 16 g/dL    HCT 30.3 (L) 36 - 48 %    MCV 89.9 78 - 102 FL    MCH 28.2 25.0 - 35.0 PG    MCHC 31.4 31 - 37 g/dL    RDW 12.2 11.5 - 14.5 %    PLATELET 596 025 - 496 K/uL    NEUTROPHILS 64 40 - 70 %    MIXED CELLS 10 0.1 - 17 %    LYMPHOCYTES 27 14 - 44 %    ABS. NEUTROPHILS 3.8 1.8 - 9.5 K/UL    ABS. MIXED CELLS 0.6 0.0 - 2.3 K/uL    ABS. LYMPHOCYTES 1.6 1.1 - 5.9 K/UL    DF AUTOMATED           Procrit held per parameters. Patient was discharged from [place] [address] in stable condition at 1430, via wheel chair, accompanied by her daughter. She is to return on 5/15/18 at 2 pm for her next appointment.     Suraj Ho RN  May 1, 2018  4:45 PM

## 2018-05-15 ENCOUNTER — HOSPITAL ENCOUNTER (OUTPATIENT)
Dept: INFUSION THERAPY | Age: 83
Discharge: HOME | End: 2018-05-15
Payer: MEDICARE

## 2018-05-15 VITALS
TEMPERATURE: 97.6 F | DIASTOLIC BLOOD PRESSURE: 64 MMHG | OXYGEN SATURATION: 94 % | SYSTOLIC BLOOD PRESSURE: 154 MMHG | HEART RATE: 59 BPM | RESPIRATION RATE: 18 BRPM

## 2018-05-15 LAB
BASO+EOS+MONOS # BLD AUTO: 0.5 K/UL (ref 0–2.3)
BASO+EOS+MONOS # BLD AUTO: 10 % (ref 0.1–17)
DIFFERENTIAL METHOD BLD: ABNORMAL
ERYTHROCYTE [DISTWIDTH] IN BLOOD BY AUTOMATED COUNT: 12 % (ref 11.5–14.5)
HCT VFR BLD AUTO: 30.6 % (ref 36–48)
HGB BLD-MCNC: 9.7 G/DL (ref 12–16)
LYMPHOCYTES # BLD: 2.6 K/UL (ref 1.1–5.9)
LYMPHOCYTES NFR BLD: 51 % (ref 14–44)
MCH RBC QN AUTO: 28.4 PG (ref 25–35)
MCHC RBC AUTO-ENTMCNC: 31.7 G/DL (ref 31–37)
MCV RBC AUTO: 89.5 FL (ref 78–102)
NEUTS SEG # BLD: 1.9 K/UL (ref 1.8–9.5)
NEUTS SEG NFR BLD: 39 % (ref 40–70)
PLATELET # BLD AUTO: 207 K/UL (ref 140–440)
RBC # BLD AUTO: 3.42 M/UL (ref 4.1–5.1)
WBC # BLD AUTO: 5 K/UL (ref 4.5–13)

## 2018-05-15 PROCEDURE — 85025 COMPLETE CBC W/AUTO DIFF WBC: CPT | Performed by: INTERNAL MEDICINE

## 2018-05-15 PROCEDURE — 36415 COLL VENOUS BLD VENIPUNCTURE: CPT

## 2018-05-15 NOTE — PROGRESS NOTES
[facility] Progress Note    Date: May 15, 2018    Name: Kiley Archuleta    MRN: [id number]         : 1922     PROCRIT INJECTON    Ms. Alice Britton was assessed and education was provided. Pt arrived to [facility] in wheelchair accompanied by her daughter at [phone]. Ms. Antony's vitals were reviewed and patient was observed for 5 minutes prior to treatment. Visit Vitals    /64 (BP 1 Location: Left arm, BP Patient Position: Sitting)    Pulse (!) 59    Temp 97.6 °F (36.4 °C)    Resp 18    SpO2 94%       Blood drawn via left forearm venipuncture X1 attempt. Gauze and coban applied to site. Recent Results (from the past 12 hour(s))   CBC WITH 3 PART DIFF    Collection Time: 05/15/18  2:18 PM   Result Value Ref Range    WBC 5.0 4.5 - 13.0 K/uL    RBC 3.42 (L) 4.10 - 5.10 M/uL    HGB 9.7 (L) 12.0 - 16 g/dL    HCT 30.6 (L) 36 - 48 %    MCV 89.5 78 - 102 FL    MCH 28.4 25.0 - 35.0 PG    MCHC 31.7 31 - 37 g/dL    RDW 12.0 11.5 - 14.5 %    PLATELET 487 359 - 396 K/uL    NEUTROPHILS 39 (L) 40 - 70 %    MIXED CELLS 10 0.1 - 17 %    LYMPHOCYTES 51 (H) 14 - 44 %    ABS. NEUTROPHILS 1.9 1.8 - 9.5 K/UL    ABS. MIXED CELLS 0.5 0.0 - 2.3 K/uL    ABS. LYMPHOCYTES 2.6 1.1 - 5.9 K/UL    DF AUTOMATED       Results within ordered parameters to HOLD procrit today. Ms. Alice Britton was discharged from [place] [address] in stable condition at 1425. She is to return on 2018 at 1400 for her next Procrit appointment.     Madhavi Adler RN  May 15, 2018

## 2018-05-29 ENCOUNTER — HOSPITAL ENCOUNTER (OUTPATIENT)
Dept: INFUSION THERAPY | Age: 83
Discharge: HOME | End: 2018-05-29
Payer: MEDICARE

## 2018-05-29 VITALS
RESPIRATION RATE: 18 BRPM | DIASTOLIC BLOOD PRESSURE: 57 MMHG | SYSTOLIC BLOOD PRESSURE: 134 MMHG | HEART RATE: 56 BPM | OXYGEN SATURATION: 92 % | TEMPERATURE: 98.1 F

## 2018-05-29 LAB
25(OH)D3 SERPL-MCNC: 55.2 NG/ML (ref 30–100)
ALBUMIN SERPL-MCNC: 3.2 G/DL (ref 3.4–5)
ANION GAP SERPL CALC-SCNC: 10 MMOL/L (ref 3–18)
BASO+EOS+MONOS # BLD AUTO: 0.7 K/UL (ref 0–2.3)
BASO+EOS+MONOS # BLD AUTO: 13 % (ref 0.1–17)
BUN SERPL-MCNC: 22 MG/DL (ref 7–18)
BUN/CREAT SERPL: 10 (ref 12–20)
CALCIUM SERPL-MCNC: 9 MG/DL (ref 8.5–10.1)
CALCIUM SERPL-MCNC: 9.5 MG/DL (ref 8.5–10.1)
CHLORIDE SERPL-SCNC: 99 MMOL/L (ref 100–108)
CO2 SERPL-SCNC: 31 MMOL/L (ref 21–32)
CREAT SERPL-MCNC: 2.21 MG/DL (ref 0.6–1.3)
DIFFERENTIAL METHOD BLD: ABNORMAL
ERYTHROCYTE [DISTWIDTH] IN BLOOD BY AUTOMATED COUNT: 11.6 % (ref 11.5–14.5)
GLUCOSE SERPL-MCNC: 179 MG/DL (ref 74–99)
HCT VFR BLD AUTO: 30.3 % (ref 36–48)
HGB BLD-MCNC: 9.4 G/DL (ref 12–16)
LYMPHOCYTES # BLD: 1.8 K/UL (ref 1.1–5.9)
LYMPHOCYTES NFR BLD: 35 % (ref 14–44)
MCH RBC QN AUTO: 28 PG (ref 25–35)
MCHC RBC AUTO-ENTMCNC: 31 G/DL (ref 31–37)
MCV RBC AUTO: 90.2 FL (ref 78–102)
NEUTS SEG # BLD: 2.6 K/UL (ref 1.8–9.5)
NEUTS SEG NFR BLD: 52 % (ref 40–70)
PHOSPHATE SERPL-MCNC: 4.1 MG/DL (ref 2.5–4.9)
PLATELET # BLD AUTO: 194 K/UL (ref 140–440)
POTASSIUM SERPL-SCNC: 4.6 MMOL/L (ref 3.5–5.5)
PTH-INTACT SERPL-MCNC: 59.3 PG/ML (ref 18.4–88)
RBC # BLD AUTO: 3.36 M/UL (ref 4.1–5.1)
SODIUM SERPL-SCNC: 140 MMOL/L (ref 136–145)
WBC # BLD AUTO: 5.1 K/UL (ref 4.5–13)

## 2018-05-29 PROCEDURE — 83970 ASSAY OF PARATHORMONE: CPT | Performed by: INTERNAL MEDICINE

## 2018-05-29 PROCEDURE — 82306 VITAMIN D 25 HYDROXY: CPT | Performed by: INTERNAL MEDICINE

## 2018-05-29 PROCEDURE — 80069 RENAL FUNCTION PANEL: CPT | Performed by: INTERNAL MEDICINE

## 2018-05-29 PROCEDURE — 36415 COLL VENOUS BLD VENIPUNCTURE: CPT

## 2018-05-29 PROCEDURE — 36415 COLL VENOUS BLD VENIPUNCTURE: CPT | Performed by: INTERNAL MEDICINE

## 2018-05-29 PROCEDURE — 85025 COMPLETE CBC W/AUTO DIFF WBC: CPT | Performed by: INTERNAL MEDICINE

## 2018-05-29 NOTE — PROGRESS NOTES
[facility] Progress Note    Date: 2018    Name: Belen Trivedi    MRN: [id number]         : 1922     CBC every 2 weeks for possible Procrit injection      Ms. Kym Kennedy was assessed and education was provided. Ms. Cagles vitals were reviewed and patient was observed for 5 minutes prior to treatment. Visit Vitals    /66 (BP 1 Location: Left arm, BP Patient Position: Sitting)    Pulse 66    Temp 97.5 °F (36.4 °C)    Resp 18    SpO2 90%     Lab drawn from [facility] by MADHAVI Foss, PCT w/o difficulty. No irritation noted at site, 2x2 guaze and Coban applied. Lab results were obtained and reviewed. Recent Results (from the past 12 hour(s))   CBC WITH 3 PART DIFF    Collection Time: 18  2:23 PM   Result Value Ref Range    WBC 6.2 4.5 - 13.0 K/uL    RBC 3.46 (L) 4.10 - 5.10 M/uL    HGB 9.6 (L) 12.0 - 16 g/dL    HCT 31.2 (L) 36 - 48 %    MCV 90.2 78 - 102 FL    MCH 27.7 25.0 - 35.0 PG    MCHC 30.8 (L) 31 - 37 g/dL    RDW 12.3 11.5 - 14.5 %    PLATELET 888 776 - 706 K/uL    NEUTROPHILS 52 40 - 70 %    MIXED CELLS 8 0.1 - 17 %    LYMPHOCYTES 40 14 - 44 %    ABS. NEUTROPHILS 3.2 1.8 - 9.5 K/UL    ABS. MIXED CELLS 0.5 0.0 - 2.3 K/uL    ABS. LYMPHOCYTES 2.5 1.1 - 5.9 K/UL    DF AUTOMATED       Procrit parameters are to hold if Hgb is greater than 10 or Hct greater than 30. Today's results are Hgb 9.6/Hct 31.2, so   Procrit HELD. Ms. Kym Kennedy tolerated well, and had no complaints. Patient armband removed and shredded. Ms. Kym Kennedy was discharged from [place] [address] in stable condition at 1435. She is to return on 2018 at 1400 for her next appointment for CBC and possible Procrit injection.     Yuvonne Opitz, RN  2018  2:39 PM
soft/nontender/no distention/bowel sounds normal

## 2018-06-08 ENCOUNTER — OFFICE VISIT (OUTPATIENT)
Dept: PAIN MANAGEMENT | Age: 83
End: 2018-06-08

## 2018-06-08 VITALS
WEIGHT: 136 LBS | TEMPERATURE: 98.6 F | RESPIRATION RATE: 18 BRPM | DIASTOLIC BLOOD PRESSURE: 52 MMHG | HEART RATE: 51 BPM | BODY MASS INDEX: 25.03 KG/M2 | HEIGHT: 62 IN | SYSTOLIC BLOOD PRESSURE: 139 MMHG

## 2018-06-08 DIAGNOSIS — M25.50 PAIN IN JOINT, MULTIPLE SITES: ICD-10-CM

## 2018-06-08 DIAGNOSIS — M54.5 CHRONIC BILATERAL LOW BACK PAIN, WITH SCIATICA PRESENCE UNSPECIFIED: ICD-10-CM

## 2018-06-08 DIAGNOSIS — G89.29 CHRONIC MIDLINE LOW BACK PAIN WITH SCIATICA, SCIATICA LATERALITY UNSPECIFIED: Primary | ICD-10-CM

## 2018-06-08 DIAGNOSIS — G89.29 OTHER CHRONIC PAIN: ICD-10-CM

## 2018-06-08 DIAGNOSIS — M54.40 CHRONIC MIDLINE LOW BACK PAIN WITH SCIATICA, SCIATICA LATERALITY UNSPECIFIED: Primary | ICD-10-CM

## 2018-06-08 DIAGNOSIS — G89.29 CHRONIC BILATERAL LOW BACK PAIN, WITH SCIATICA PRESENCE UNSPECIFIED: ICD-10-CM

## 2018-06-08 DIAGNOSIS — Z79.899 ENCOUNTER FOR LONG-TERM (CURRENT) USE OF HIGH-RISK MEDICATION: ICD-10-CM

## 2018-06-08 DIAGNOSIS — M25.569 ARTHRALGIA OF LOWER LEG, UNSPECIFIED LATERALITY: ICD-10-CM

## 2018-06-08 DIAGNOSIS — M54.2 CERVICALGIA: ICD-10-CM

## 2018-06-08 LAB
ALCOHOL UR POC: NORMAL
AMPHETAMINES UR POC: NEGATIVE
BARBITURATES UR POC: NORMAL
BENZODIAZEPINES UR POC: NEGATIVE
BUPRENORPHINE UR POC: NEGATIVE
CANNABINOIDS UR POC: NEGATIVE
CARISOPRODOL UR POC: NORMAL
COCAINE UR POC: NEGATIVE
FENTANYL UR POC: NORMAL
MDMA/ECSTASY UR POC: NORMAL
METHADONE UR POC: NEGATIVE
METHAMPHETAMINE UR POC: NORMAL
METHYLPHENIDATE UR POC: NORMAL
OPIATES UR POC: NORMAL
OXYCODONE UR POC: NORMAL
PHENCYCLIDINE UR POC: NORMAL
PROPOXYPHENE UR POC: NORMAL
TRAMADOL UR POC: NORMAL
TRICYCLICS UR POC: NORMAL

## 2018-06-08 RX ORDER — OXYCODONE HCL 20 MG/1
20 TABLET, FILM COATED, EXTENDED RELEASE ORAL EVERY 12 HOURS
Qty: 60 TAB | Refills: 0 | Status: SHIPPED | OUTPATIENT
Start: 2018-07-07 | End: 2018-08-06

## 2018-06-08 NOTE — PROGRESS NOTES
HISTORY OF PRESENT ILLNESS  Meli Ann is a 80 y.o. female    Ms. Tayler Scott presents for follow up of chronic pain due to lumbar degenerative disc disease, right shoulder pain due to osteoarthritis, and left knee pain due to osteoarthritis. Cervical degenerative disc disease.         Ms. Tayler Scott presents today in her wheelchair with her both of her daughters present. This patient saw her PCP 3 months ago. She has done well since last visit. She is healthy aside from dementia.  Pain continues to predominate in the right shoulder and lower back, as well as the right knee.  She lives at home with her daughter, who is her primary caregiver.  She is in good spirits today. She tolerates her medications without side effects. Jamie Antony reports no change in sleep or constipation. We discussed her current condition and medications in detail today. Patient understands current practice transition and taper plan in future. Patient is planning on transferring her continued pain management care to another practice. She will sign a medical release form today. I will provide 1 month transition prescription. We will assist patient with any medical records transfer as needed. Current MME dose as of today:120    Current medication management consists of: Oxycontin 20 mg, take one tablet by mouth every 12 hours   Medications are helping with pain control and quality of life. Her pain is 4/10 with medication and 10/10 without.  Pt describes pain as aching, stabbing, and burning. Aggravating factors include standing, sitting, and walking. Relieved with rest, medication, and avoiding painful activities. The patient reports 80% relief with current medications.   Current treatment is helping to improve general activity, mood, walking, sleep, enjoyment of life     Pain Meds and Quality Of Life have been reviewed. Nonpharmacologic therapy and non-opioid pharmacologic therapy were considered.   If opioid therapy is prescribed, this is only if the expected benefits are anticipated to outweigh risks. She  is otherwise doing well with no other complaints today. She denies any adverse events including nausea, vomiting, dizziness, increased constipation, hallucinations, or seizures. The patient reports functional improvement and QOL with pain medication. Vitals:    06/08/18 1411 06/08/18 1412   BP: 142/52 139/52   Pulse: (!) 51 (!) 51   Resp: 18    Temp: 98.6 °F (37 °C)    TempSrc: Oral    Weight: 61.7 kg (136 lb)    Height: 5' 2\" (1.575 m)    PainSc:   1    PainLoc: Back          Allergies   Allergen Reactions    Celebrex [Celecoxib] Unable to Obtain    Codeine Unable to Obtain    Flexeril [Cyclobenzaprine] Unable to Obtain    Macrobid [Nitrofurantoin Monohyd/M-Cryst] Unable to Obtain    Pcn [Penicillins] Unable to Obtain    Remeron [Mirtazapine] Unable to Obtain    Sulfa (Sulfonamide Antibiotics) Other (comments)     Swelling and low heart rate    Vicodin [Hydrocodone-Acetaminophen] Unable to Obtain       Past Surgical History:   Procedure Laterality Date    HX BLADDER REPAIR      prolapsed    HX CATARACT REMOVAL      HX CHOLECYSTECTOMY  5/1998    HX HERNIA REPAIR  7/1998    x2    HX OOPHORECTOMY         ROS   Constitutional: Negative for fever, chills and malaise/fatigue. HENT: Positive for neck pain. Negative for congestion and sore throat.    Eyes: Negative for blurred vision and double vision. Respiratory: Negative for shortness of breath.    Cardiovascular: Negative for chest pain and leg swelling. Gastrointestinal: Positive for constipation. Negative for heartburn, nausea, vomiting and diarrhea. Genitourinary:        Some frequency \"weak bladder\"   Musculoskeletal: Positive for back pain and joint pain (left knee). Negative for myalgias and falls. Skin: Negative.    Neurological: Negative for dizziness, tingling, tremors, seizures, weakness and headaches.    Endo/Heme/Allergies: Positive for environmental allergies. Does not bruise/bleed easily. Psychiatric/Behavioral: Positive for depression and memory loss. Negative for suicidal ideas. The patient is nervous/anxious and has insomnia     Physical Exam   Constitutional: She is oriented to person, place, and time. She appears well-developed and well-nourished. Pleasant demeanor  HENT:   Head: Normocephalic and atraumatic. Eyes: EOM are normal.   Neck: Spinous process tenderness and muscular tenderness present. Decreased range of motion present. Marked spasms of cervical paraspinous musculature noted  Hypertrophy of the SCM  significant anterocollis with kyphosis of the cervical spine. Very limited ROM in all directions   Neurological: She is alert and oriented to person, place, and time. No cranial nerve deficit. She exhibits normal muscle tone. Coordination normal.   Psychiatric: She has a normal mood and affect. Her behavior is normal. Judgment normal.   Poor historian  Dementia has worsened         ASSESSMENT:    1. Chronic midline low back pain with sciatica, sciatica laterality unspecified    2. Encounter for long-term (current) use of high-risk medication    3. Cervicalgia    4. Chronic bilateral low back pain, with sciatica presence unspecified    5. Pain in joint, multiple sites    6. Arthralgia of lower leg, unspecified laterality    7. Other chronic pain          COMM:   Not available    [place] Prescription Monitoring Program was reviewed which does not demonstrate aberrancies and/or inconsistencies with regard to the historical prescribing of controlled medications to this patient by other providers. Medications were brought to visit today. Pill count was appropriate. When possible, non-drug therapy for chronic pain should be used as a first-line treatment.  Physical therapy exercise regimens, chiropractic manipulation, meditation relaxation techniques, cognitive behavior therapy, acupuncture, yoga, Kike Chi,  transcutaneous electrical nerve stimulation (TENS), and application of moist heat can help alleviate pain . Explained that realistic expectations and goals with chronic pain management are to maximize function and minimize pain with the understanding that limitations will exist both in the extent of relief that she may achieve, as well as thresholds of mg strengths that we will not exceed. Our role is to help the patient better cope with chronic pain utilizng a multimodal approach. The patients condition and plan were discussed. All questions were answered. The patient agrees with the plan. PLAN / Pt Instructions:  1. Continue current plan with no evidence of addiction or diversion. 2. Stable on current medication without adverse events. 3. Refill Oxycontin 20 mg, take one tablet by mouth every 12 hours    4. Discussed risks of addiction, dependency, and opioid induced hyperalgesia. 5. Reviewed with patient benefits of home exercise, and lifestyle changes to assist in self-management of symptoms. 6. Patient plans on moving her pain management care to another provider. 7. She will tentatively schedule for 3 month follow-up. We will assist with medical records transfer as needed. Prescription monitoring program reviewed. POC UDS today. Pain medications prescribed with the objective of pain relief and improved physical and psychosocial function in this patient. DISPOSITION  · Counseled patient on proper use of prescribed medications. · Counseled patient about chronic medical conditions and their relationship to anxiety and depression and recommended mental health support as needed. · Reviewed with patient self-help tools, home exercise, and lifestyle changes to assist the patient in self-management of symptoms. · Reviewed with patient the treatment plan, goals of treatment plan, and limitations of treatment plan, to include the potential for side effects from medications and procedures.   If side effects occur, it is the responsibility of the patient to inform the clinic so that a change in the treatment plan can be made in a safe manner. The patient is advised that stopping prescribed medication may cause an increase in symptoms and possible medication withdrawal symptoms. The patient is informed an emergency room evaluation may be necessary if this occurs. Spent 25 minutes with patient today reviewing the treatment plan, goals of treatment plan, and limitations of the treatment plan, to include the potential for side effects from medications and procedures. More than 50% of the visit time was spent counseling the patient. Damari Shin PA-C 6/8/2018        Note: Please excuse any typographical errors. Voice recognition software was used for this note and may cause mistakes.

## 2018-06-08 NOTE — PROGRESS NOTES
Nursing Notes    Patient presents to the office today in follow-up. Patient rates her pain at 1/10 on the numerical pain scale. Reviewed medications with counts as follows:    Rx Date filled Qty Dispensed Pill Count Last Dose Short   oxycontin 20 mg+1 prescription 05/10/18 60 5 today no                                        POC UDS was performed in office today    Any new labs or imaging since last appointment? NO    Have you been to an emergency room (ER) or urgent care clinic since your last visit? NO            Have you been hospitalized since your last visit? NO     If yes, where, when, and reason for visit? Have you seen or consulted any other health care providers outside of the [facility]  since your last visit? NO     If yes, where, when, and reason for visit? Ms. Malik Hitchcock has a reminder for a \"due or due soon\" health maintenance. I have asked that she contact her primary care provider for follow-up on this health maintenance.

## 2018-06-12 ENCOUNTER — HOSPITAL ENCOUNTER (OUTPATIENT)
Dept: INFUSION THERAPY | Age: 83
Discharge: HOME | End: 2018-06-12
Payer: MEDICARE

## 2018-06-12 VITALS
OXYGEN SATURATION: 93 % | TEMPERATURE: 97.9 F | HEART RATE: 58 BPM | RESPIRATION RATE: 18 BRPM | DIASTOLIC BLOOD PRESSURE: 67 MMHG | SYSTOLIC BLOOD PRESSURE: 146 MMHG

## 2018-06-12 LAB
BASO+EOS+MONOS # BLD AUTO: 0.3 K/UL (ref 0–2.3)
BASO+EOS+MONOS # BLD AUTO: 8 % (ref 0.1–17)
DIFFERENTIAL METHOD BLD: ABNORMAL
ERYTHROCYTE [DISTWIDTH] IN BLOOD BY AUTOMATED COUNT: 12.3 % (ref 11.5–14.5)
FERRITIN SERPL-MCNC: 670 NG/ML (ref 8–388)
HCT VFR BLD AUTO: 31.3 % (ref 36–48)
HGB BLD-MCNC: 9.5 G/DL (ref 12–16)
IRON SATN MFR SERPL: 27 %
IRON SERPL-MCNC: 45 UG/DL (ref 50–175)
LYMPHOCYTES # BLD: 1.7 K/UL (ref 1.1–5.9)
LYMPHOCYTES NFR BLD: 41 % (ref 14–44)
MCH RBC QN AUTO: 27.4 PG (ref 25–35)
MCHC RBC AUTO-ENTMCNC: 30.4 G/DL (ref 31–37)
MCV RBC AUTO: 90.2 FL (ref 78–102)
NEUTS SEG # BLD: 2.2 K/UL (ref 1.8–9.5)
NEUTS SEG NFR BLD: 51 % (ref 40–70)
PLATELET # BLD AUTO: 186 K/UL (ref 140–440)
RBC # BLD AUTO: 3.47 M/UL (ref 4.1–5.1)
TIBC SERPL-MCNC: 166 UG/DL (ref 250–450)
WBC # BLD AUTO: 4.2 K/UL (ref 4.5–13)

## 2018-06-12 PROCEDURE — 85025 COMPLETE CBC W/AUTO DIFF WBC: CPT | Performed by: INTERNAL MEDICINE

## 2018-06-12 PROCEDURE — 82728 ASSAY OF FERRITIN: CPT | Performed by: INTERNAL MEDICINE

## 2018-06-12 PROCEDURE — 83540 ASSAY OF IRON: CPT | Performed by: INTERNAL MEDICINE

## 2018-06-12 PROCEDURE — 36415 COLL VENOUS BLD VENIPUNCTURE: CPT

## 2018-06-12 NOTE — PROGRESS NOTES
[facility] Progress Note    Date: 2018    Name: Camila Merritt    MRN: [id number]         : 1922     PROCRIT INJECTON    Ms. Kanchan Avitia was assessed and education was provided. Pt arrived to [facility] in wheelchair accompanied by her daughter at [phone]. Ms. Antony's vitals were reviewed and patient was observed for 5 minutes prior to treatment. Visit Vitals    /67 (BP 1 Location: Left arm, BP Patient Position: Sitting)    Pulse (!) 58    Temp 97.9 °F (36.6 °C)    Resp 18    SpO2 93%    Breastfeeding No       Blood drawn via left forearm venipuncture X1 attempt for CBC and ferritin and iron profile. Gauze and coban applied to site. Recent Results (from the past 12 hour(s))   CBC WITH 3 PART DIFF    Collection Time: 18  2:26 PM   Result Value Ref Range    WBC 4.2 (L) 4.5 - 13.0 K/uL    RBC 3.47 (L) 4.10 - 5.10 M/uL    HGB 9.5 (L) 12.0 - 16 g/dL    HCT 31.3 (L) 36 - 48 %    MCV 90.2 78 - 102 FL    MCH 27.4 25.0 - 35.0 PG    MCHC 30.4 (L) 31 - 37 g/dL    RDW 12.3 11.5 - 14.5 %    PLATELET 952 592 - 644 K/uL    NEUTROPHILS 51 40 - 70 %    MIXED CELLS 8 0.1 - 17 %    LYMPHOCYTES 41 14 - 44 %    ABS. NEUTROPHILS 2.2 1.8 - 9.5 K/UL    ABS. MIXED CELLS 0.3 0.0 - 2.3 K/uL    ABS. LYMPHOCYTES 1.7 1.1 - 5.9 K/UL    DF AUTOMATED       Results within ordered parameters to HOLD procrit today. Ms. Kanchan Avitia was discharged from [place] [address] in stable condition at 1435. She is to return on 2018 at 1400 for her next Procrit appointment.     Efrem Lombardi, TORRI  2018

## 2018-06-26 ENCOUNTER — HOSPITAL ENCOUNTER (OUTPATIENT)
Dept: INFUSION THERAPY | Age: 83
Discharge: HOME | End: 2018-06-26
Payer: MEDICARE

## 2018-06-26 VITALS
HEART RATE: 73 BPM | DIASTOLIC BLOOD PRESSURE: 69 MMHG | TEMPERATURE: 98.5 F | SYSTOLIC BLOOD PRESSURE: 157 MMHG | OXYGEN SATURATION: 91 % | RESPIRATION RATE: 18 BRPM

## 2018-06-26 LAB
BASO+EOS+MONOS # BLD AUTO: 0.6 K/UL (ref 0–2.3)
BASO+EOS+MONOS # BLD AUTO: 9 % (ref 0.1–17)
DIFFERENTIAL METHOD BLD: ABNORMAL
ERYTHROCYTE [DISTWIDTH] IN BLOOD BY AUTOMATED COUNT: 11.9 % (ref 11.5–14.5)
HCT VFR BLD AUTO: 32.3 % (ref 36–48)
HGB BLD-MCNC: 10 G/DL (ref 12–16)
LYMPHOCYTES # BLD: 3.8 K/UL (ref 1.1–5.9)
LYMPHOCYTES NFR BLD: 54 % (ref 14–44)
MCH RBC QN AUTO: 27.7 PG (ref 25–35)
MCHC RBC AUTO-ENTMCNC: 31 G/DL (ref 31–37)
MCV RBC AUTO: 89.5 FL (ref 78–102)
NEUTS SEG # BLD: 2.6 K/UL (ref 1.8–9.5)
NEUTS SEG NFR BLD: 38 % (ref 40–70)
PLATELET # BLD AUTO: 215 K/UL (ref 140–440)
RBC # BLD AUTO: 3.61 M/UL (ref 4.1–5.1)
WBC # BLD AUTO: 7 K/UL (ref 4.5–13)

## 2018-06-26 PROCEDURE — 85025 COMPLETE CBC W/AUTO DIFF WBC: CPT | Performed by: INTERNAL MEDICINE

## 2018-06-26 PROCEDURE — 36415 COLL VENOUS BLD VENIPUNCTURE: CPT

## 2018-06-26 NOTE — PROGRESS NOTES
[facility] Progress Note    Date: 2018    Name: Briana Cat    MRN: [id number]         : 1922     PROCRIT INJECTON    Ms. Cornell Stout was assessed and education was provided. Pt arrived to [facility] in wheelchair accompanied by her daughter at [phone]. Ms. Antony's vitals were reviewed and patient was observed for 5 minutes prior to treatment. Visit Vitals    /69 (BP 1 Location: Left arm, BP Patient Position: Sitting)    Pulse 73    Temp 98.5 °F (36.9 °C)    Resp 18    SpO2 91%       Blood drawn via right forearm venipuncture X1 attempt for CBC. Gauze and coban applied to site. Recent Results (from the past 12 hour(s))   CBC WITH 3 PART DIFF    Collection Time: 18  2:27 PM   Result Value Ref Range    WBC 7.0 4.5 - 13.0 K/uL    RBC 3.61 (L) 4.10 - 5.10 M/uL    HGB 10.0 (L) 12.0 - 16 g/dL    HCT 32.3 (L) 36 - 48 %    MCV 89.5 78 - 102 FL    MCH 27.7 25.0 - 35.0 PG    MCHC 31.0 31 - 37 g/dL    RDW 11.9 11.5 - 14.5 %    PLATELET 407 185 - 120 K/uL    NEUTROPHILS 38 (L) 40 - 70 %    MIXED CELLS 9 0.1 - 17 %    LYMPHOCYTES 54 (H) 14 - 44 %    ABS. NEUTROPHILS 2.6 1.8 - 9.5 K/UL    ABS. MIXED CELLS 0.6 0.0 - 2.3 K/uL    ABS. LYMPHOCYTES 3.8 1.1 - 5.9 K/UL    DF AUTOMATED       HGB= 10.0 and HCT= 32.3    Results within ordered parameters to HOLD procrit today. Ms. Cornell Stout was discharged from [place] [address] in stable condition at 1435. She is to return on 2018 at 1400 for her next Procrit appointment.     Santi Alonzo RN  2018

## 2018-07-10 ENCOUNTER — HOSPITAL ENCOUNTER (OUTPATIENT)
Dept: INFUSION THERAPY | Age: 83
Discharge: HOME | End: 2018-07-10
Payer: MEDICARE

## 2018-07-10 VITALS
OXYGEN SATURATION: 92 % | DIASTOLIC BLOOD PRESSURE: 62 MMHG | TEMPERATURE: 97.8 F | HEART RATE: 55 BPM | RESPIRATION RATE: 20 BRPM | SYSTOLIC BLOOD PRESSURE: 130 MMHG

## 2018-07-10 LAB
BASO+EOS+MONOS # BLD AUTO: 0.6 K/UL (ref 0–2.3)
BASO+EOS+MONOS # BLD AUTO: 14 % (ref 0.1–17)
DIFFERENTIAL METHOD BLD: ABNORMAL
ERYTHROCYTE [DISTWIDTH] IN BLOOD BY AUTOMATED COUNT: 11.8 % (ref 11.5–14.5)
HCT VFR BLD AUTO: 30.8 % (ref 36–48)
HGB BLD-MCNC: 9.7 G/DL (ref 12–16)
LYMPHOCYTES # BLD: 2 K/UL (ref 1.1–5.9)
LYMPHOCYTES NFR BLD: 48 % (ref 14–44)
MCH RBC QN AUTO: 28.2 PG (ref 25–35)
MCHC RBC AUTO-ENTMCNC: 31.5 G/DL (ref 31–37)
MCV RBC AUTO: 89.5 FL (ref 78–102)
NEUTS SEG # BLD: 1.6 K/UL (ref 1.8–9.5)
NEUTS SEG NFR BLD: 38 % (ref 40–70)
PLATELET # BLD AUTO: 187 K/UL (ref 140–440)
RBC # BLD AUTO: 3.44 M/UL (ref 4.1–5.1)
WBC # BLD AUTO: 4.2 K/UL (ref 4.5–13)

## 2018-07-10 PROCEDURE — 36415 COLL VENOUS BLD VENIPUNCTURE: CPT

## 2018-07-10 PROCEDURE — 85025 COMPLETE CBC W/AUTO DIFF WBC: CPT | Performed by: INTERNAL MEDICINE

## 2018-07-10 NOTE — PROGRESS NOTES
[facility] Progress Note    Date: July 10, 2018    Name: Elsy Hampton    MRN: [id number]         : 1922     PROCRIT INJECTON    Ms. Kendra Barton was assessed and education was provided. Pt arrived to [facility] in wheelchair accompanied by her daughter at 0. Ms. Antony's vitals were reviewed and patient was observed for 5 minutes prior to treatment. Visit Vitals    /62 (BP 1 Location: Left arm, BP Patient Position: Sitting)    Pulse (!) 55    Temp 97.8 °F (36.6 °C)    Resp 20    SpO2 92%       Blood drawn via right forearm venipuncture X1 attempt for CBC. Gauze and coban applied to site. Recent Results (from the past 12 hour(s))   CBC WITH 3 PART DIFF    Collection Time: 07/10/18  2:18 PM   Result Value Ref Range    WBC 4.2 (L) 4.5 - 13.0 K/uL    RBC 3.44 (L) 4.10 - 5.10 M/uL    HGB 9.7 (L) 12.0 - 16 g/dL    HCT 30.8 (L) 36 - 48 %    MCV 89.5 78 - 102 FL    MCH 28.2 25.0 - 35.0 PG    MCHC 31.5 31 - 37 g/dL    RDW 11.8 11.5 - 14.5 %    PLATELET 718 852 - 925 K/uL    NEUTROPHILS 38 (L) 40 - 70 %    MIXED CELLS 14 0.1 - 17 %    LYMPHOCYTES 48 (H) 14 - 44 %    ABS. NEUTROPHILS 1.6 (L) 1.8 - 9.5 K/UL    ABS. MIXED CELLS 0.6 0.0 - 2.3 K/uL    ABS. LYMPHOCYTES 2.0 1.1 - 5.9 K/UL    DF AUTOMATED       HGB= 9.7 and HCT= 30.8    Results within ordered parameters to HOLD procrit today. Ms. Kendra Barton was discharged from [place] [address] in stable condition at 1430. She is to return on 2018 at 1400 for her next Procrit appointment.     Lanette Mckinley RN  July 10, 2018

## 2018-07-24 ENCOUNTER — HOSPITAL ENCOUNTER (OUTPATIENT)
Dept: INFUSION THERAPY | Age: 83
Discharge: HOME | End: 2018-07-24
Payer: MEDICARE

## 2018-07-24 VITALS
OXYGEN SATURATION: 96 % | DIASTOLIC BLOOD PRESSURE: 51 MMHG | HEART RATE: 55 BPM | TEMPERATURE: 97.6 F | RESPIRATION RATE: 18 BRPM | SYSTOLIC BLOOD PRESSURE: 165 MMHG

## 2018-07-24 LAB
BASO+EOS+MONOS # BLD AUTO: 0.5 K/UL (ref 0–2.3)
BASO+EOS+MONOS # BLD AUTO: 12 % (ref 0.1–17)
DIFFERENTIAL METHOD BLD: ABNORMAL
ERYTHROCYTE [DISTWIDTH] IN BLOOD BY AUTOMATED COUNT: 11.9 % (ref 11.5–14.5)
HCT VFR BLD AUTO: 31.2 % (ref 36–48)
HGB BLD-MCNC: 9.5 G/DL (ref 12–16)
LYMPHOCYTES # BLD: 2.5 K/UL (ref 1.1–5.9)
LYMPHOCYTES NFR BLD: 52 % (ref 14–44)
MCH RBC QN AUTO: 27.2 PG (ref 25–35)
MCHC RBC AUTO-ENTMCNC: 30.4 G/DL (ref 31–37)
MCV RBC AUTO: 89.4 FL (ref 78–102)
NEUTS SEG # BLD: 1.7 K/UL (ref 1.8–9.5)
NEUTS SEG NFR BLD: 36 % (ref 40–70)
PLATELET # BLD AUTO: 175 K/UL (ref 140–440)
RBC # BLD AUTO: 3.49 M/UL (ref 4.1–5.1)
WBC # BLD AUTO: 4.7 K/UL (ref 4.5–13)

## 2018-07-24 PROCEDURE — 85025 COMPLETE CBC W/AUTO DIFF WBC: CPT | Performed by: INTERNAL MEDICINE

## 2018-07-24 PROCEDURE — 36415 COLL VENOUS BLD VENIPUNCTURE: CPT

## 2018-07-24 NOTE — PROGRESS NOTES
[facility] Progress Note    Date: 2018    Name: Mayra Ying    MRN: [id number]         : 1922    Cbc/procrit q 2 weeks       patient arrived via wheelchair at 26, accompanied by her daughter. Alert and oriented x 3. No complaints voiced      Patient assessed and education was provided. Patient vitals were reviewed. Visit Vitals    /51 (BP 1 Location: Left arm, BP Patient Position: Sitting)    Pulse (!) 55    Temp 97.6 °F (36.4 °C)    Resp 18    SpO2 96%    Breastfeeding No       Blood sample by mere-puncture on 1st stick to left AC for cbc. Lab results were obtained and reviewed. Recent Results (from the past 12 hour(s))   CBC WITH 3 PART DIFF    Collection Time: 18  2:30 PM   Result Value Ref Range    WBC 4.7 4.5 - 13.0 K/uL    RBC 3.49 (L) 4.10 - 5.10 M/uL    HGB 9.5 (L) 12.0 - 16 g/dL    HCT 31.2 (L) 36 - 48 %    MCV 89.4 78 - 102 FL    MCH 27.2 25.0 - 35.0 PG    MCHC 30.4 (L) 31 - 37 g/dL    RDW 11.9 11.5 - 14.5 %    PLATELET 562 854 - 217 K/uL    NEUTROPHILS 36 (L) 40 - 70 %    MIXED CELLS 12 0.1 - 17 %    LYMPHOCYTES 52 (H) 14 - 44 %    ABS. NEUTROPHILS 1.7 (L) 1.8 - 9.5 K/UL    ABS. MIXED CELLS 0.5 0.0 - 2.3 K/uL    ABS. LYMPHOCYTES 2.5 1.1 - 5.9 K/UL    DF AUTOMATED           Procrit held per parameters. Patient was discharged from [place] [address] in stable condition at 1440, via wheel chair, accompanied by her daughter. She is to return on 18 at 2 pm for her next appointment.     Ryley Gonzales RN  2018  4:45 PM

## 2018-08-07 ENCOUNTER — HOSPITAL ENCOUNTER (OUTPATIENT)
Dept: INFUSION THERAPY | Age: 83
Discharge: HOME | End: 2018-08-07
Payer: MEDICARE

## 2018-08-07 VITALS
OXYGEN SATURATION: 94 % | TEMPERATURE: 98.7 F | HEART RATE: 60 BPM | SYSTOLIC BLOOD PRESSURE: 119 MMHG | DIASTOLIC BLOOD PRESSURE: 72 MMHG | RESPIRATION RATE: 18 BRPM

## 2018-08-07 LAB
BASO+EOS+MONOS # BLD AUTO: 0.5 K/UL (ref 0–2.3)
BASO+EOS+MONOS # BLD AUTO: 14 % (ref 0.1–17)
DIFFERENTIAL METHOD BLD: ABNORMAL
ERYTHROCYTE [DISTWIDTH] IN BLOOD BY AUTOMATED COUNT: 12.1 % (ref 11.5–14.5)
HCT VFR BLD AUTO: 30.7 % (ref 36–48)
HGB BLD-MCNC: 9.5 G/DL (ref 12–16)
LYMPHOCYTES # BLD: 1.7 K/UL (ref 1.1–5.9)
LYMPHOCYTES NFR BLD: 45 % (ref 14–44)
MCH RBC QN AUTO: 27.8 PG (ref 25–35)
MCHC RBC AUTO-ENTMCNC: 30.9 G/DL (ref 31–37)
MCV RBC AUTO: 89.8 FL (ref 78–102)
NEUTS SEG # BLD: 1.5 K/UL (ref 1.8–9.5)
NEUTS SEG NFR BLD: 41 % (ref 40–70)
PLATELET # BLD AUTO: 184 K/UL (ref 140–440)
RBC # BLD AUTO: 3.42 M/UL (ref 4.1–5.1)
WBC # BLD AUTO: 3.7 K/UL (ref 4.5–13)

## 2018-08-07 PROCEDURE — 85025 COMPLETE CBC W/AUTO DIFF WBC: CPT | Performed by: INTERNAL MEDICINE

## 2018-08-07 PROCEDURE — 36415 COLL VENOUS BLD VENIPUNCTURE: CPT

## 2018-08-07 NOTE — PROGRESS NOTES
[facility] Progress Note    Date: 2018    Name: Luz Campbell    MRN: [id number]         : 1922    Cbc/procrit q 2 weeks       patient arrived via wheelchair at [phone], accompanied by her daughter. Alert and oriented x 3. No complaints voiced      Patient assessed and education was provided. Patient vitals were reviewed. Visit Vitals    /72 (BP 1 Location: Left arm, BP Patient Position: At rest;Standing)    Pulse 60    Temp 98.7 °F (37.1 °C)    Resp 18    SpO2 94%    Breastfeeding No       Blood sample by mere-puncture on 1st stick to left AC for cbc. Lab results were obtained and reviewed. Recent Results (from the past 12 hour(s))   CBC WITH 3 PART DIFF    Collection Time: 18  2:25 PM   Result Value Ref Range    WBC 3.7 (L) 4.5 - 13.0 K/uL    RBC 3.42 (L) 4.10 - 5.10 M/uL    HGB 9.5 (L) 12.0 - 16 g/dL    HCT 30.7 (L) 36 - 48 %    MCV 89.8 78 - 102 FL    MCH 27.8 25.0 - 35.0 PG    MCHC 30.9 (L) 31 - 37 g/dL    RDW 12.1 11.5 - 14.5 %    PLATELET 591 916 - 307 K/uL    NEUTROPHILS 41 40 - 70 %    MIXED CELLS 14 0.1 - 17 %    LYMPHOCYTES 45 (H) 14 - 44 %    ABS. NEUTROPHILS 1.5 (L) 1.8 - 9.5 K/UL    ABS. MIXED CELLS 0.5 0.0 - 2.3 K/uL    ABS. LYMPHOCYTES 1.7 1.1 - 5.9 K/UL    DF AUTOMATED           Procrit held per parameters. Patient was discharged from [place] [address] in stable condition at , via wheel chair, accompanied by her daughter. She is to return on 18 at 2 pm for her next appointment.     Bessie Peace RN  2018  [id number]

## 2018-08-08 ENCOUNTER — TELEPHONE (OUTPATIENT)
Dept: PAIN MANAGEMENT | Age: 83
End: 2018-08-08

## 2018-08-08 DIAGNOSIS — Z79.899 HIGH RISK MEDICATION USE: Primary | ICD-10-CM

## 2018-08-08 DIAGNOSIS — G89.4 CHRONIC PAIN SYNDROME: Primary | ICD-10-CM

## 2018-08-08 RX ORDER — NALOXONE HYDROCHLORIDE 4 MG/.1ML
SPRAY NASAL
Qty: 1 EACH | Refills: 0 | Status: SHIPPED | OUTPATIENT
Start: 2018-08-08 | End: 2021-01-01

## 2018-08-08 RX ORDER — OXYCODONE HCL 20 MG/1
20 TABLET, FILM COATED, EXTENDED RELEASE ORAL EVERY 12 HOURS
Qty: 60 TAB | Refills: 0 | Status: SHIPPED | OUTPATIENT
Start: 2018-08-08 | End: 2018-09-11 | Stop reason: SDUPTHER

## 2018-08-08 NOTE — TELEPHONE ENCOUNTER
[facility]ey[facility] Syed has called requesting a refill of their controlled medication, oxyContin, for the management of chronic low back pain. Last office visit date: 06/18/18    Date last  was pulled and reviewed : 08/08/18 oxyContin filled 7/13/18    Was the patient compliant when the above report was pulled? yes    Analgesia: 80%    Aberrancies: none    ADL's: no    Adverse Reaction: none    Provider's last note and plan of care reviewed? yes  Request forwarded to provider for review.

## 2018-08-08 NOTE — TELEPHONE ENCOUNTER
Called spoke to pt daughter and care giver, that prescription ready for , restated that no ETOH to be taken while on this medication including any OTC medication with ETOH in them. Daughter stated understanding.

## 2018-08-21 ENCOUNTER — HOSPITAL ENCOUNTER (OUTPATIENT)
Dept: INFUSION THERAPY | Age: 83
Discharge: HOME | End: 2018-08-21
Payer: MEDICARE

## 2018-08-21 VITALS
OXYGEN SATURATION: 95 % | SYSTOLIC BLOOD PRESSURE: 167 MMHG | TEMPERATURE: 98.2 F | DIASTOLIC BLOOD PRESSURE: 51 MMHG | HEART RATE: 49 BPM | RESPIRATION RATE: 18 BRPM

## 2018-08-21 LAB
BASO+EOS+MONOS # BLD AUTO: 0.4 K/UL (ref 0–2.3)
BASO+EOS+MONOS # BLD AUTO: 8 % (ref 0.1–17)
DIFFERENTIAL METHOD BLD: ABNORMAL
ERYTHROCYTE [DISTWIDTH] IN BLOOD BY AUTOMATED COUNT: 12.1 % (ref 11.5–14.5)
HCT VFR BLD AUTO: 32.5 % (ref 36–48)
HGB BLD-MCNC: 10.1 G/DL (ref 12–16)
LYMPHOCYTES # BLD: 2 K/UL (ref 1.1–5.9)
LYMPHOCYTES NFR BLD: 40 % (ref 14–44)
MCH RBC QN AUTO: 27.7 PG (ref 25–35)
MCHC RBC AUTO-ENTMCNC: 31.1 G/DL (ref 31–37)
MCV RBC AUTO: 89.3 FL (ref 78–102)
NEUTS SEG # BLD: 2.7 K/UL (ref 1.8–9.5)
NEUTS SEG NFR BLD: 52 % (ref 40–70)
PLATELET # BLD AUTO: 181 K/UL (ref 140–440)
RBC # BLD AUTO: 3.64 M/UL (ref 4.1–5.1)
WBC # BLD AUTO: 5.1 K/UL (ref 4.5–13)

## 2018-08-21 PROCEDURE — 36415 COLL VENOUS BLD VENIPUNCTURE: CPT

## 2018-08-21 PROCEDURE — 85025 COMPLETE CBC W/AUTO DIFF WBC: CPT | Performed by: INTERNAL MEDICINE

## 2018-08-21 NOTE — PROGRESS NOTES
[facility] Progress Note    Date: 2018    Name: Radha Gonzalez    MRN: [id number]         : 1922     PROCRIT INJECTON    Ms. Inocencio Lagos was assessed and education was provided. Pt arrived to [facility] in wheelchair accompanied by her daughter at [phone]. Ms. Antony's vitals were reviewed and patient was observed for 5 minutes prior to treatment. Visit Vitals    /51 (BP 1 Location: Left arm, BP Patient Position: Sitting)    Pulse (!) 49    Temp 98.2 °F (36.8 °C)    Resp 18    SpO2 95%       Blood drawn via left forearm venipuncture X1 attempt for CBC. Gauze and coban applied to site. Recent Results (from the past 12 hour(s))   CBC WITH 3 PART DIFF    Collection Time: 18  2:26 PM   Result Value Ref Range    WBC 5.1 4.5 - 13.0 K/uL    RBC 3.64 (L) 4.10 - 5.10 M/uL    HGB 10.1 (L) 12.0 - 16 g/dL    HCT 32.5 (L) 36 - 48 %    MCV 89.3 78 - 102 FL    MCH 27.7 25.0 - 35.0 PG    MCHC 31.1 31 - 37 g/dL    RDW 12.1 11.5 - 14.5 %    PLATELET 687 535 - 623 K/uL    NEUTROPHILS 52 40 - 70 %    MIXED CELLS 8 0.1 - 17 %    LYMPHOCYTES 40 14 - 44 %    ABS. NEUTROPHILS 2.7 1.8 - 9.5 K/UL    ABS. MIXED CELLS 0.4 0.0 - 2.3 K/uL    ABS. LYMPHOCYTES 2.0 1.1 - 5.9 K/UL    DF AUTOMATED       Results within ordered parameters to HOLD procrit today. Ms. Inocencio Lagos was discharged from [place] [address] in stable condition at 1435. She is to return on 2018 at 1400 for her next Procrit appointment.     Scott Alas RN  2018

## 2018-09-04 ENCOUNTER — HOSPITAL ENCOUNTER (OUTPATIENT)
Dept: INFUSION THERAPY | Age: 83
Discharge: HOME | End: 2018-09-04
Payer: MEDICARE

## 2018-09-04 VITALS
SYSTOLIC BLOOD PRESSURE: 146 MMHG | HEART RATE: 54 BPM | DIASTOLIC BLOOD PRESSURE: 69 MMHG | RESPIRATION RATE: 18 BRPM | OXYGEN SATURATION: 96 % | TEMPERATURE: 97.9 F

## 2018-09-04 LAB
ALBUMIN SERPL-MCNC: 3.5 G/DL (ref 3.4–5)
ANION GAP SERPL CALC-SCNC: 2 MMOL/L (ref 3–18)
BASO+EOS+MONOS # BLD AUTO: 0.4 K/UL (ref 0–2.3)
BASO+EOS+MONOS # BLD AUTO: 10 % (ref 0.1–17)
BUN SERPL-MCNC: 44 MG/DL (ref 7–18)
BUN/CREAT SERPL: 19 (ref 12–20)
CALCIUM SERPL-MCNC: 9.3 MG/DL (ref 8.5–10.1)
CHLORIDE SERPL-SCNC: 102 MMOL/L (ref 100–108)
CO2 SERPL-SCNC: 37 MMOL/L (ref 21–32)
CREAT SERPL-MCNC: 2.36 MG/DL (ref 0.6–1.3)
DIFFERENTIAL METHOD BLD: ABNORMAL
ERYTHROCYTE [DISTWIDTH] IN BLOOD BY AUTOMATED COUNT: 12 % (ref 11.5–14.5)
FERRITIN SERPL-MCNC: 750 NG/ML (ref 8–388)
GLUCOSE SERPL-MCNC: 138 MG/DL (ref 74–99)
HCT VFR BLD AUTO: 32.1 % (ref 36–48)
HGB BLD-MCNC: 9.9 G/DL (ref 12–16)
IRON SATN MFR SERPL: 25 %
IRON SERPL-MCNC: 46 UG/DL (ref 50–175)
LYMPHOCYTES # BLD: 1.9 K/UL (ref 1.1–5.9)
LYMPHOCYTES NFR BLD: 42 % (ref 14–44)
MCH RBC QN AUTO: 27.6 PG (ref 25–35)
MCHC RBC AUTO-ENTMCNC: 30.8 G/DL (ref 31–37)
MCV RBC AUTO: 89.4 FL (ref 78–102)
NEUTS SEG # BLD: 2.1 K/UL (ref 1.8–9.5)
NEUTS SEG NFR BLD: 48 % (ref 40–70)
PHOSPHATE SERPL-MCNC: 3.9 MG/DL (ref 2.5–4.9)
PLATELET # BLD AUTO: 196 K/UL (ref 140–440)
POTASSIUM SERPL-SCNC: 5.3 MMOL/L (ref 3.5–5.5)
RBC # BLD AUTO: 3.59 M/UL (ref 4.1–5.1)
SODIUM SERPL-SCNC: 141 MMOL/L (ref 136–145)
TIBC SERPL-MCNC: 184 UG/DL (ref 250–450)
WBC # BLD AUTO: 4.4 K/UL (ref 4.5–13)

## 2018-09-04 PROCEDURE — 80069 RENAL FUNCTION PANEL: CPT | Performed by: INTERNAL MEDICINE

## 2018-09-04 PROCEDURE — 85025 COMPLETE CBC W/AUTO DIFF WBC: CPT | Performed by: INTERNAL MEDICINE

## 2018-09-04 PROCEDURE — 83540 ASSAY OF IRON: CPT | Performed by: INTERNAL MEDICINE

## 2018-09-04 PROCEDURE — 82728 ASSAY OF FERRITIN: CPT | Performed by: INTERNAL MEDICINE

## 2018-09-04 PROCEDURE — 36415 COLL VENOUS BLD VENIPUNCTURE: CPT

## 2018-09-04 NOTE — PROGRESS NOTES
[facility] Progress Note Date: 2018 Name: Luz Campbell MRN: [id number] : 1922 PROCRIT INJECTON Ms. Gus Covington was assessed and education was provided. Pt arrived to [facility] in wheelchair accompanied by her daughter at 80. Ms. Antony's vitals were reviewed and patient was observed for 5 minutes prior to treatment. Visit Vitals  /69 (BP 1 Location: Left arm, BP Patient Position: Sitting)  Pulse (!) 54  Temp 97.9 °F (36.6 °C)  Resp 18  SpO2 96% Blood drawn via left a/c venipuncture X1 attempt for CBC, renal panel, ferritin and iron profile. Gauze and coban applied to site. Recent Results (from the past 12 hour(s)) CBC WITH 3 PART DIFF Collection Time: 18  2:24 PM  
Result Value Ref Range WBC 4.4 (L) 4.5 - 13.0 K/uL  
 RBC 3.59 (L) 4.10 - 5.10 M/uL HGB 9.9 (L) 12.0 - 16 g/dL HCT 32.1 (L) 36 - 48 % MCV 89.4 78 - 102 FL  
 MCH 27.6 25.0 - 35.0 PG  
 MCHC 30.8 (L) 31 - 37 g/dL  
 RDW 12.0 11.5 - 14.5 % PLATELET 579 706 - 408 K/uL NEUTROPHILS 48 40 - 70 % MIXED CELLS 10 0.1 - 17 % LYMPHOCYTES 42 14 - 44 % ABS. NEUTROPHILS 2.1 1.8 - 9.5 K/UL  
 ABS. MIXED CELLS 0.4 0.0 - 2.3 K/uL  
 ABS. LYMPHOCYTES 1.9 1.1 - 5.9 K/UL  
 DF AUTOMATED Results within ordered parameters to HOLD procrit today. Ms. Gus Covington was discharged from [place] [address] in stable condition at 1430. She is to return on 2018 at 1430 for her next Procrit appointment. Landy Iglesias, TORRI 2018

## 2018-09-11 DIAGNOSIS — G89.4 CHRONIC PAIN SYNDROME: ICD-10-CM

## 2018-09-11 RX ORDER — OXYCODONE HCL 20 MG/1
20 TABLET, FILM COATED, EXTENDED RELEASE ORAL EVERY 12 HOURS
Qty: 30 TAB | Refills: 0 | Status: SHIPPED | OUTPATIENT
Start: 2018-09-12 | End: 2018-09-26 | Stop reason: SDUPTHER

## 2018-09-11 NOTE — TELEPHONE ENCOUNTER
Jacquelin Iyer has called requesting a refill of their controlled medication, OxyContin 20 mg, for the management of lumbar degenerative disc disease, right shoulder pain due to osteoarthritis, and left knee pain due to osteoarthritis. Cervical degenerative disc disease. Last office visit date: 6/8/18 with Anne Neal. Next appointment scheduled on 9/26/18 with Kelly Chavez PA-C. Date last  was pulled and reviewed : 9/11/18. Last fill date on 8/14/18. Was the patient compliant when the above report was pulled? yes    Analgesia: Per S.W. Patient reports 80% pain relief on current regimen. Aberrancies: None reported in the last 30 days. ADL's: Per S.W. Patient states they are not able to perform ADL's on current regimen. Adverse Reaction: Per S.W. Patient reports none at this time. Provider's last note and plan of care reviewed? yes  Request forwarded to provider for review. Since patient hasn't been seen since Kirk Lam PA-C, will route to supervising physician Santi Meneses for prescription to allow patient to get through until next appointment on 9/26/18.

## 2018-09-11 NOTE — TELEPHONE ENCOUNTER
Pt called in for a prescription refill on 9/11/18. Please give pt a call when the prescription is ready for .       Medication:Oxycontin  Relief:80%  Daily Task:No  Side Effects:No

## 2018-09-18 ENCOUNTER — HOSPITAL ENCOUNTER (OUTPATIENT)
Dept: INFUSION THERAPY | Age: 83
Discharge: HOME | End: 2018-09-18
Payer: MEDICARE

## 2018-09-18 VITALS
DIASTOLIC BLOOD PRESSURE: 55 MMHG | TEMPERATURE: 98 F | RESPIRATION RATE: 18 BRPM | HEART RATE: 54 BPM | OXYGEN SATURATION: 94 % | SYSTOLIC BLOOD PRESSURE: 141 MMHG

## 2018-09-18 LAB
BASO+EOS+MONOS # BLD AUTO: 0.5 K/UL (ref 0–2.3)
BASO+EOS+MONOS # BLD AUTO: 9 % (ref 0.1–17)
DIFFERENTIAL METHOD BLD: ABNORMAL
ERYTHROCYTE [DISTWIDTH] IN BLOOD BY AUTOMATED COUNT: 12 % (ref 11.5–14.5)
HCT VFR BLD AUTO: 31.9 % (ref 36–48)
HGB BLD-MCNC: 9.7 G/DL (ref 12–16)
LYMPHOCYTES # BLD: 2 K/UL (ref 1.1–5.9)
LYMPHOCYTES NFR BLD: 37 % (ref 14–44)
MCH RBC QN AUTO: 27.4 PG (ref 25–35)
MCHC RBC AUTO-ENTMCNC: 30.4 G/DL (ref 31–37)
MCV RBC AUTO: 90.1 FL (ref 78–102)
NEUTS SEG # BLD: 2.9 K/UL (ref 1.8–9.5)
NEUTS SEG NFR BLD: 55 % (ref 40–70)
PLATELET # BLD AUTO: 186 K/UL (ref 140–440)
RBC # BLD AUTO: 3.54 M/UL (ref 4.1–5.1)
WBC # BLD AUTO: 5.4 K/UL (ref 4.5–13)

## 2018-09-18 PROCEDURE — 36415 COLL VENOUS BLD VENIPUNCTURE: CPT

## 2018-09-18 PROCEDURE — 85025 COMPLETE CBC W/AUTO DIFF WBC: CPT | Performed by: INTERNAL MEDICINE

## 2018-09-18 NOTE — PROGRESS NOTES
[facility] Progress Note Date: 2018 Name: Razia Gonzalez MRN: [id number] : 1922 PROCRIT INJECTON Ms. Henna Sanchez was assessed and education was provided. Pt arrived to [facility] in wheelchair accompanied by her daughter at [phone]. Ms. Antony's vitals were reviewed and patient was observed for 5 minutes prior to treatment. Visit Vitals  /55 (BP 1 Location: Left arm, BP Patient Position: Sitting)  Pulse (!) 54  Temp 98 °F (36.7 °C)  Resp 18  SpO2 94% Blood drawn via left forearm venipuncture X1 attempt for CBC. Gauze and coban applied to site. Recent Results (from the past 12 hour(s)) CBC WITH 3 PART DIFF Collection Time: 18  2:57 PM  
Result Value Ref Range WBC 5.4 4.5 - 13.0 K/uL  
 RBC 3.54 (L) 4.10 - 5.10 M/uL HGB 9.7 (L) 12.0 - 16 g/dL HCT 31.9 (L) 36 - 48 % MCV 90.1 78 - 102 FL  
 MCH 27.4 25.0 - 35.0 PG  
 MCHC 30.4 (L) 31 - 37 g/dL  
 RDW 12.0 11.5 - 14.5 % PLATELET 981 747 - 365 K/uL NEUTROPHILS 55 40 - 70 % MIXED CELLS 9 0.1 - 17 % LYMPHOCYTES 37 14 - 44 % ABS. NEUTROPHILS 2.9 1.8 - 9.5 K/UL  
 ABS. MIXED CELLS 0.5 0.0 - 2.3 K/uL  
 ABS. LYMPHOCYTES 2.0 1.1 - 5.9 K/UL  
 DF AUTOMATED Results within ordered parameters to HOLD procrit today. Ms. Henna Sanchez was discharged from [place] [address] in stable condition at 1500. She is to return on 10/02/2018 at 1400 for her next Procrit appointment. Blu Marino RN 2018

## 2018-09-26 ENCOUNTER — OFFICE VISIT (OUTPATIENT)
Dept: PAIN MANAGEMENT | Age: 83
End: 2018-09-26

## 2018-09-26 VITALS
TEMPERATURE: 97.9 F | SYSTOLIC BLOOD PRESSURE: 168 MMHG | RESPIRATION RATE: 14 BRPM | BODY MASS INDEX: 25.03 KG/M2 | HEIGHT: 62 IN | DIASTOLIC BLOOD PRESSURE: 53 MMHG | HEART RATE: 57 BPM | WEIGHT: 136 LBS

## 2018-09-26 DIAGNOSIS — M54.40 CHRONIC MIDLINE LOW BACK PAIN WITH SCIATICA, SCIATICA LATERALITY UNSPECIFIED: ICD-10-CM

## 2018-09-26 DIAGNOSIS — G89.29 CHRONIC MIDLINE LOW BACK PAIN WITH SCIATICA, SCIATICA LATERALITY UNSPECIFIED: ICD-10-CM

## 2018-09-26 DIAGNOSIS — G89.4 CHRONIC PAIN SYNDROME: ICD-10-CM

## 2018-09-26 DIAGNOSIS — M54.2 CERVICALGIA: ICD-10-CM

## 2018-09-26 DIAGNOSIS — M25.569 ARTHRALGIA OF LOWER LEG, UNSPECIFIED LATERALITY: ICD-10-CM

## 2018-09-26 DIAGNOSIS — M25.50 PAIN IN JOINT, MULTIPLE SITES: ICD-10-CM

## 2018-09-26 DIAGNOSIS — M54.5 CHRONIC BILATERAL LOW BACK PAIN, WITH SCIATICA PRESENCE UNSPECIFIED: ICD-10-CM

## 2018-09-26 DIAGNOSIS — G89.29 CHRONIC BILATERAL LOW BACK PAIN, WITH SCIATICA PRESENCE UNSPECIFIED: ICD-10-CM

## 2018-09-26 DIAGNOSIS — G89.29 OTHER CHRONIC PAIN: ICD-10-CM

## 2018-09-26 RX ORDER — OXYCODONE HYDROCHLORIDE 15 MG/1
15 TABLET, FILM COATED, EXTENDED RELEASE ORAL EVERY 12 HOURS
Qty: 60 TAB | Refills: 0 | Status: SHIPPED | OUTPATIENT
Start: 2018-11-10 | End: 2018-10-03 | Stop reason: SDUPTHER

## 2018-09-26 RX ORDER — OXYCODONE HYDROCHLORIDE 15 MG/1
15 TABLET, FILM COATED, EXTENDED RELEASE ORAL EVERY 12 HOURS
Qty: 30 TAB | Refills: 0 | Status: SHIPPED | OUTPATIENT
Start: 2018-10-11 | End: 2018-10-03 | Stop reason: SDUPTHER

## 2018-09-26 NOTE — PATIENT INSTRUCTIONS
1. Modify current plan with no evidence of addiction or diversion. Stable on current medication without adverse events. 2. Refill and adjust OxyContin 20 mg down to 15 mg every 12 hours  3. Discussed risks of addiction, dependency, and opioid induced hyperalgesia. Please remember to call at least 5 business days prior to your medication refill. 4. Return to clinic in 2 months with Dr. Miha Baptiste. Please call and cancel your appointment and pain management agreement if you do decide to transfer your care.

## 2018-09-26 NOTE — PROGRESS NOTES
Nursing Notes    Patient presents to the office today in follow-up. Patient rates her pain at 4/10 on the numerical pain scale. Reviewed medications with counts as follows:    Rx Date filled Qty Dispensed Pill Count Last Dose Short   Oxycontin 20 mg  09/12/18 30 10 Last pm             Comments:  Patient is here today for a follow up appt today she states her pain level today is a 4  PHQ 9 was done patient denies any depression. She states she has 8 pills at home. Her daughter states that she stayed at her house and she left the pill counter in the house     POC UDS was not performed in office today    Any new labs or imaging since last appointment? NO    Have you been to an emergency room (ER) or urgent care clinic since your last visit? NO            Have you been hospitalized since your last visit? NO     If yes, where, when, and reason for visit? Have you seen or consulted any other health care providers outside of the [facility]  since your last visit? NO     If yes, where, when, and reason for visit? Ms. Jessie Alvarez has a reminder for a \"due or due soon\" health maintenance. I have asked that she contact her primary care provider for follow-up on this health maintenance.

## 2018-09-26 NOTE — PROGRESS NOTES
HISTORY OF PRESENT ILLNESS  Susan العراقي is a 80 y.o. female    HPI: Ms. Angle Waite  returns today for f/u of chronic pain due to lumbar degnerative disc disease, right shoulder pain due to osteoarthritis, and left knee pain due to osteoarthritis. Ms. Angle Waite is here today with 2 of her daughters. She was last seen by provider who is no longer with our practice. We had a lengthy conversation today regarding changes to our practice since her last visit. I explained to her that moving forward we will be focusing on a more conservative and comprehensive plan of care that will result in changes to her current treatment plan. She does report that she has been doing well with her treatment plan which continues to offer significant pain control. She reports no significant changes since her last visit. Both of her daughters assisted with Ms. Moises danielson during her visit today. They report that she is not used to getting up early in the mornings and her appointments are usually later in the afternoon. She does take Seroquel from her psychiatrist which can leave her sleepy during the day. After lengthy conversation regarding her current condition and medications we agreed to adjust her OxyContin down to 15 mg every 12 hours. I have agreed to continue with this dosage until she is reevaluated by Dr. Jenny Babinski next visit. Current medication management consists of OxyContin 20 mg every 12 hours. Medications are helping with pain control and quality of life. Her pain is 5/10 with medication and 9/10 without. Pt describes pain as aching, stabbing, stiff, and tender. Aggravating factors include standing, bending, and walking. Relieved with rest, medication, and avoiding painful activities. Current treatment is helping to improve general activity, mood, walking, sleep, enjoyment of life    Ms. Angle Waite is tolerating medications well, with no side effects noted.  She is able to stay more active with less discomfort with these current doses. In the past 30 days, the patient reports an average of 45% pain relief with current treatment/medications. She is informed of side effects, risks, and benefits of this regimen, and emphasizes that she derives a significant improvement in functionality and quality of life, and notes that non-opioid medications and therapies in the past have not offered significant benefit. She  is otherwise doing well with no other complaints today. She denies any adverse events including nausea, vomiting, dizziness, increased constipation, hallucinations, or seizures. Because the patient's current regimen places him/her at increased risk for possible overdose, a prescription for naloxone nasal spray has been provided. The patient understands that this medication is only to be used in the setting of a possible overdose and that inadvertent use of this medication could precipitate overt withdrawal.      MME: 60  COMM: not completed  OSWESTRY: not completed  Last UDS reviewed         Allergies   Allergen Reactions    Celebrex [Celecoxib] Unable to Obtain    Codeine Unable to Obtain    Flexeril [Cyclobenzaprine] Unable to [place] [address] [Nitrofurantoin Monohyd/M-Cryst] Unable to Obtain    Pcn [Penicillins] Unable to Obtain    Remeron [Mirtazapine] Unable to Obtain    Sulfa (Sulfonamide Antibiotics) Other (comments)     Swelling and low heart rate    Vicodin [Hydrocodone-Acetaminophen] Unable to Obtain       Past Surgical History:   Procedure Laterality Date    HX BLADDER REPAIR      prolapsed    HX CATARACT REMOVAL      HX CHOLECYSTECTOMY  5/1998    HX HERNIA REPAIR  7/1998    x2    HX OOPHORECTOMY           Review of Systems   Constitutional: Negative for chills, fever and malaise/fatigue. HENT: Negative for congestion and sore throat. Eyes: Negative for blurred vision and double vision. Respiratory: Negative for shortness of breath.     Cardiovascular: Negative for chest pain and leg swelling. Gastrointestinal: Positive for constipation. Negative for diarrhea, heartburn, nausea and vomiting. Musculoskeletal: Positive for back pain, joint pain and neck pain. Negative for falls and myalgias. Skin: Negative. Neurological: Negative for dizziness, tingling, tremors, seizures, weakness and headaches. Endo/Heme/Allergies: Positive for environmental allergies. Does not bruise/bleed easily. Psychiatric/Behavioral: Positive for depression and memory loss. Negative for suicidal ideas. The patient is nervous/anxious and has insomnia. All other systems reviewed and are negative. Physical Exam   Constitutional: She is oriented to person, place, and time and well-developed, well-nourished, and in no distress. No distress. In wheelchair   HENT:   Head: Normocephalic and atraumatic. Eyes: EOM are normal.   Neck: Normal range of motion. Musculoskeletal:        Right shoulder: She exhibits decreased range of motion and tenderness. Cervical back: She exhibits decreased range of motion, tenderness and pain. Lumbar back: She exhibits decreased range of motion, tenderness and pain. Limited range of motion in cervical and lumbar aspect. Significant cervical kyphosis   Neurological: She is alert and oriented to person, place, and time. Skin: Skin is warm and dry. No rash noted. She is not diaphoretic. No erythema. Psychiatric: Mood, memory, affect and judgment normal.   Nursing note and vitals reviewed. ASSESSMENT:    1. Chronic pain syndrome    2. Cervicalgia    3. Chronic bilateral low back pain, with sciatica presence unspecified    4. Pain in joint, multiple sites    5. Other chronic pain    6. Chronic midline low back pain with sciatica, sciatica laterality unspecified    7.  Arthralgia of lower leg, unspecified laterality           [place] Prescription Monitoring Program was reviewed which does not demonstrate aberrancies and/or inconsistencies with regard to the historical prescribing of controlled medications to this patient by other providers. PLAN / Pt Instructions:  1. Modify current plan with no evidence of addiction or diversion. Stable on current medication without adverse events. 2. Refill and adjust OxyContin 20 mg down to 15 mg every 12 hours  3. Discussed risks of addiction, dependency, and opioid induced hyperalgesia. Please remember to call at least 5 business days prior to your medication refill. 4. Return to clinic in 2 months with Dr. Ross Herzog. Please call and cancel your appointment and pain management agreement if you do decide to transfer your care. Medications Ordered Today   Medications    oxyCODONE ER (OXYCONTIN) 15 mg ER tablet     Sig: Take 1 Tab by mouth every twelve (12) hours for 30 days. Max Daily Amount: 30 mg. No alcohol use while on opioid medications. Indications: Chronic Pain, Severe Pain     Dispense:  30 Tab     Refill:  0    oxyCODONE ER (OXYCONTIN) 15 mg ER tablet     Sig: Take 1 Tab by mouth every twelve (12) hours for 30 days. Max Daily Amount: 30 mg. No alcohol use while on opioid medications. Dispense:  60 Tab     Refill:  0       DISPOSITION   Pain medications are prescribed with the objective of pain relief and improved physical and psychosocial function in this patient.  Patient has been counseled on proper use of prescribed medications.  Patient has been counseled about chronic medical conditions and their relationship to anxiety and depression and recommended mental health support as needed.  Reviewed with patient self-help tools, home exercise, and lifestyle changes to assist the patient in self-management of symptoms.  Reviewed with patient the treatment plan, goals of treatment plan, and limitations of treatment plan, to include the potential for side effects from medications and procedures.   If side effects occur, it is the responsibility of the patient to inform the clinic so that a change in the treatment plan can be made in a safe manner. The patient is advised that stopping prescribed medication may cause an increase in symptoms and possible medication withdrawal symptoms. The patient is informed an emergency room evaluation may be necessary if this occurs. Spent 25 minutes with patient today which more than 50% of that time was spent on counseling and coordination of care. Xavier Quiles 9/26/2018      Note: Please excuse any typographical errors. Voice recognition software was used for this note and may cause mistakes.

## 2018-09-26 NOTE — MR AVS SNAPSHOT
[address] [facility] [address] 
[phone] Patient: Bronson Reilly MRN: [id number] HEI:[birth year] Visit Information Date & Time Provider Department Dept. Phone Encounter #  
 9/26/2018 11:40 AM Sheela Mathews, [address] for Pain Management [phone] [id number] Follow-up Instructions Return in about 2 months (around 11/26/2018). Upcoming Health Maintenance Date Due DTaP/Tdap/Td series (1 - Tdap) 9/2/1943 Shingrix Vaccine Age 50> (1 of 2) 9/2/1972 GLAUCOMA SCREENING Q2Y 9/2/1987 Pneumococcal 65+ Low/Medium Risk (2 of 2 - PCV13) 11/1/2016 MEDICARE YEARLY EXAM 3/20/2018 Influenza Age 5 to Adult 8/1/2018 Allergies as of 9/26/2018  Review Complete On: 9/26/2018 By: Thad Jaramillo LPN Severity Noted Reaction Type Reactions Celebrex [Celecoxib]    Unable to Obtain Codeine    Unable to Obtain Flexeril [Cyclobenzaprine]    Unable to Obtain Macrobid [Nitrofurantoin Monohyd/m-cryst]    Unable to [employer] Pcn [Penicillins]    Unable to Obtain Remeron [Mirtazapine]    Unable to Obtain  
 Sulfa (Sulfonamide Antibiotics)    Other (comments) Swelling and low heart rate Vicodin [Hydrocodone-acetaminophen]    Unable to Obtain Current Immunizations  Reviewed on 9/4/2018 Name Date Influenza Vaccine 9/26/2017, 11/1/2016, 11/1/2015 Pneumococcal Polysaccharide (PPSV-23) 11/1/2015 Not reviewed this visit You Were Diagnosed With   
  
 Codes Comments Chronic pain syndrome     ICD-10-CM: G89.4 ICD-9-CM: 338.4 Cervicalgia     ICD-10-CM: M54.2 ICD-9-CM: 723.1 Chronic bilateral low back pain, with sciatica presence unspecified     ICD-10-CM: M54.5, G89.29 ICD-9-CM: 724.2, 338.29 Pain in joint, multiple sites     ICD-10-CM: M25.50 ICD-9-CM: 719.49 Other chronic pain     ICD-10-CM: G89.29 ICD-9-CM: 338.29   
 Chronic midline low back pain with sciatica, sciatica laterality unspecified     ICD-10-CM: M54.40, G89.29 ICD-9-CM: 724.2, 724.3, 338.29 Arthralgia of lower leg, unspecified laterality     ICD-10-CM: M25.569 ICD-9-CM: 719.46 Vitals BP Pulse Temp Resp Height(growth percentile) Weight(growth percentile) 168/53 (BP 1 Location: Right arm, BP Patient Position: Sitting) (!) 57 97.9 °F (36.6 °C) 14 5' 2\" (1.575 m) 136 lb (61.7 kg) BMI OB Status Smoking Status 24.87 kg/m2 Hysterectomy Never Smoker BMI and BSA Data Body Mass Index Body Surface Area  
 24.87 kg/m 2 1.64 m 2 Your Updated Medication List  
  
   
This list is accurate as of 9/26/18 12:19 PM.  Always use your most recent med list. amLODIPine 10 mg tablet Commonly known as:  [place] Take  by mouth daily. donepezil 10 mg tablet Commonly known as:  ARICEPT Take 10 mg by mouth nightly. furosemide 20 mg tablet Commonly known as:  LASIX Take  by mouth two (2) times a week.  
  
 naloxone 4 mg/actuation nasal spray Commonly known as:  [employer] Use 1 spray intranasally, then discard. Repeat with new spray every 2 min as needed for opioid overdose symptoms, alternating nostrils. Indications: OPIATE-INDUCED RESPIRATORY DEPRESSION, Opioid Toxicity NexIUM 40 mg capsule Generic drug:  esomeprazole Take 40 mg by mouth daily. * oxyCODONE ER 15 mg ER tablet Commonly known as:  OxyCONTIN Take 1 Tab by mouth every twelve (12) hours for 30 days. Max Daily Amount: 30 mg. No alcohol use while on opioid medications. Indications: Chronic Pain, Severe Pain Start taking on:  10/11/2018 * oxyCODONE ER 15 mg ER tablet Commonly known as:  OxyCONTIN Take 1 Tab by mouth every twelve (12) hours for 30 days. Max Daily Amount: 30 mg. No alcohol use while on opioid medications. Start taking on:  11/10/2018  
  
 pantoprazole 40 mg tablet Commonly known as:  PROTONIX Take 40 mg by mouth daily. polyethylene glycol 17 gram packet Commonly known as:  Scot Isabelle Take 17 g by mouth daily. SEROquel 100 mg tablet Generic drug:  QUEtiapine Take 50 mg by mouth two (2) times a day. * Notice: This list has 2 medication(s) that are the same as other medications prescribed for you. Read the directions carefully, and ask your doctor or other care provider to review them with you. Prescriptions Printed Refills  
 oxyCODONE ER (OXYCONTIN) 15 mg ER tablet 0 Starting on: 10/11/2018 Sig: Take 1 Tab by mouth every twelve (12) hours for 30 days. Max Daily Amount: 30 mg. No alcohol use while on opioid medications. Indications: Chronic Pain, Severe Pain Class: Print Route: Oral  
 oxyCODONE ER (OXYCONTIN) 15 mg ER tablet 0 Starting on: 11/10/2018 Sig: Take 1 Tab by mouth every twelve (12) hours for 30 days. Max Daily Amount: 30 mg. No alcohol use while on opioid medications. Class: Print Route: Oral  
  
Follow-up Instructions Return in about 2 months (around 11/26/2018). To-Do List   
 10/02/2018  2:00 PM  
  Appointment with [facility] TRACK NURSE at [facility] ([phone]) 10/16/2018 2:30 PM  
  Appointment with [facility] FA[facility] TRACK NURSE at [facility] ([phone]) 10/30/2018 2:00 PM  
  Appointment with [facility] TRACK NURSE at [facility] ([phone]) Patient Instructions 1. Modify current plan with no evidence of addiction or diversion. Stable on current medication without adverse events. 2. Refill and adjust OxyContin 20 mg down to 15 mg every 12 hours 3. Discussed risks of addiction, dependency, and opioid induced hyperalgesia. Please remember to call at least 5 business days prior to your medication refill. 4. Return to clinic in 2 months with Dr. Nova Phillips.  Please call and cancel your appointment and pain management agreement if you do decide to transfer your care. Introducing [facility]! [employer] introduces [employer] patient portal. Now you can access parts of your medical record, email your doctor's office, and request medication refills online. 1. In your internet browser, go to [email or web address] 2. Click on the First Time User? Click Here link in the Sign In box. You will see the New Member Sign Up page. 3. Enter your [email or web address] Access Code exactly as it appears below. You will not need to use this code after youve completed the sign-up process. If you do not sign up before the expiration date, you must request a new code. · [email or web address] Access Code: [email or web address] Expires: 11/4/2018 10:34 AM 
 
4. Enter the last four digits of your Social Security Number (xxxx) and Date of Birth (mm/dd/yyyy) as indicated and click Submit. You will be taken to the next sign-up page. 5. Create a Te[employer].com ID. This will be your [employer].com login ID and cannot be changed, so think of one that is secure and easy to remember. 6. Create a [employer] password. You can change your password at any time. 7. Enter your Password Reset Question and Answer. This can be used at a later time if you forget your password. 8. Enter your e-mail address. You will receive e-mail notification when new information is available in [address] 9. Click Sign Up. You can now view and download portions of your medical record. 10. Click the Download Summary menu link to download a portable copy of your medical information. If you have questions, please visit the Frequently Asked Questions section of the [email or web address] website. Remember, [email or web address] is NOT to be used for urgent needs. For medical emergencies, dial 911. Now available from your iPhone and Android! Please provide this summary of care documentation to your next provider. Your primary care clinician is listed as [facility].  If you have any questions after today's visit, please call [phone].

## 2018-10-02 ENCOUNTER — HOSPITAL ENCOUNTER (OUTPATIENT)
Dept: INFUSION THERAPY | Age: 83
Discharge: HOME | End: 2018-10-02
Payer: MEDICARE

## 2018-10-02 ENCOUNTER — TELEPHONE (OUTPATIENT)
Dept: PAIN MANAGEMENT | Age: 83
End: 2018-10-02

## 2018-10-02 VITALS
DIASTOLIC BLOOD PRESSURE: 59 MMHG | RESPIRATION RATE: 18 BRPM | TEMPERATURE: 98 F | SYSTOLIC BLOOD PRESSURE: 149 MMHG | HEART RATE: 54 BPM

## 2018-10-02 LAB
BASO+EOS+MONOS # BLD AUTO: 0.6 K/UL (ref 0–2.3)
BASO+EOS+MONOS # BLD AUTO: 11 % (ref 0.1–17)
DIFFERENTIAL METHOD BLD: ABNORMAL
ERYTHROCYTE [DISTWIDTH] IN BLOOD BY AUTOMATED COUNT: 12.1 % (ref 11.5–14.5)
HCT VFR BLD AUTO: 32.4 % (ref 36–48)
HGB BLD-MCNC: 10.2 G/DL (ref 12–16)
LYMPHOCYTES # BLD: 1.9 K/UL (ref 1.1–5.9)
LYMPHOCYTES NFR BLD: 36 % (ref 14–44)
MCH RBC QN AUTO: 28 PG (ref 25–35)
MCHC RBC AUTO-ENTMCNC: 31.5 G/DL (ref 31–37)
MCV RBC AUTO: 89 FL (ref 78–102)
NEUTS SEG # BLD: 2.9 K/UL (ref 1.8–9.5)
NEUTS SEG NFR BLD: 53 % (ref 40–70)
PLATELET # BLD AUTO: 173 K/UL (ref 140–440)
RBC # BLD AUTO: 3.64 M/UL (ref 4.1–5.1)
WBC # BLD AUTO: 5.4 K/UL (ref 4.5–13)

## 2018-10-02 PROCEDURE — 85025 COMPLETE CBC W/AUTO DIFF WBC: CPT | Performed by: INTERNAL MEDICINE

## 2018-10-02 PROCEDURE — 36416 COLLJ CAPILLARY BLOOD SPEC: CPT

## 2018-10-02 NOTE — TELEPHONE ENCOUNTER
The office received a call from the pt's daughter, Mrs. Demetri Amanda about the pt. She states that the pt has less pills than she thought at home. She thought she had 10 pills and the pt actually has 2. The next fill date on the oxycontin 15 mg prescription is not until 10/11/18 (based on last fill date). Pt's daughter wants to know if the fill date on the prescription can be changed so that the pt does not go over a week without her medication. Please advise.

## 2018-10-02 NOTE — PROGRESS NOTES
[facility] Progress Note Date: 2018 Name: Kiley Archuleta MRN: [id number] : 1922 PROCRIT INJECTON Ms. Alice Britton was assessed and education was provided. Pt arrived to [facility] in wheelchair accompanied by her daughter at [phone]. Ms. Antony's vitals were reviewed and patient was observed for 5 minutes prior to treatment. Visit Vitals  /59 (BP 1 Location: Left arm, BP Patient Position: At rest)  Pulse (!) 54  Temp 98 °F (36.7 °C)  Resp 18 Blood drawn via left forearm venipuncture X1 attempt for CBC. Gauze and coban applied to site. Recent Results (from the past 12 hour(s)) CBC WITH 3 PART DIFF Collection Time: 10/02/18  2:15 PM  
Result Value Ref Range WBC 5.4 4.5 - 13.0 K/uL  
 RBC 3.64 (L) 4.10 - 5.10 M/uL  
 HGB 10.2 (L) 12.0 - 16 g/dL HCT 32.4 (L) 36 - 48 % MCV 89.0 78 - 102 FL  
 MCH 28.0 25.0 - 35.0 PG  
 MCHC 31.5 31 - 37 g/dL  
 RDW 12.1 11.5 - 14.5 % PLATELET 622 055 - 854 K/uL NEUTROPHILS 53 40 - 70 % MIXED CELLS 11 0.1 - 17 % LYMPHOCYTES 36 14 - 44 % ABS. NEUTROPHILS 2.9 1.8 - 9.5 K/UL  
 ABS. MIXED CELLS 0.6 0.0 - 2.3 K/uL  
 ABS. LYMPHOCYTES 1.9 1.1 - 5.9 K/UL  
 DF AUTOMATED Results within ordered parameters to HOLD procrit today. Ms. Alice Britton was discharged from [place] [address] in stable condition at 1430. She is to return on 10/16/2018 at 1430 for her next Procrit appointment. Anthony Alfonso RN 2018

## 2018-10-03 DIAGNOSIS — G89.4 CHRONIC PAIN SYNDROME: ICD-10-CM

## 2018-10-03 RX ORDER — OXYCODONE HYDROCHLORIDE 15 MG/1
15 TABLET, FILM COATED, EXTENDED RELEASE ORAL EVERY 12 HOURS
Qty: 60 TAB | Refills: 0 | Status: SHIPPED | OUTPATIENT
Start: 2018-10-03 | End: 2018-11-02

## 2018-10-03 RX ORDER — OXYCODONE HYDROCHLORIDE 15 MG/1
15 TABLET, FILM COATED, EXTENDED RELEASE ORAL EVERY 12 HOURS
Qty: 60 TAB | Refills: 0 | Status: SHIPPED | OUTPATIENT
Start: 2018-11-02 | End: 2018-11-29 | Stop reason: SDUPTHER

## 2018-10-03 NOTE — TELEPHONE ENCOUNTER
Pt's daughter came into the office with the oxycontin prescription. The provider did a chart review and found that the last issued prescription was only for 30 tabs and should have been for 60. The directions on the script were written to take 1 every 12 hrs. The provider adjusted the prescription and made the corrections in [facility]. The incorrect prescription will be voided and placed in shred bin. Pt's daughter was given the October prescription.  She is on pt's HIPAA list.

## 2018-10-03 NOTE — TELEPHONE ENCOUNTER
Antwan to investigate reason for deviation from the opioid care agreement. Also reemphasize importance of strict compliance with the opioid care agreement. Consider providing patient with an opioid withdrawal cocktail.   Reeducate patient on signs and symptoms of opioid withdrawal.

## 2018-10-16 ENCOUNTER — HOSPITAL ENCOUNTER (OUTPATIENT)
Dept: INFUSION THERAPY | Age: 83
Discharge: HOME | End: 2018-10-16
Payer: MEDICARE

## 2018-10-16 VITALS
HEART RATE: 58 BPM | DIASTOLIC BLOOD PRESSURE: 67 MMHG | RESPIRATION RATE: 18 BRPM | SYSTOLIC BLOOD PRESSURE: 161 MMHG | TEMPERATURE: 98.7 F

## 2018-10-16 LAB
BASO+EOS+MONOS # BLD AUTO: 0.6 K/UL (ref 0–2.3)
BASO+EOS+MONOS # BLD AUTO: 11 % (ref 0.1–17)
DIFFERENTIAL METHOD BLD: ABNORMAL
ERYTHROCYTE [DISTWIDTH] IN BLOOD BY AUTOMATED COUNT: 11.8 % (ref 11.5–14.5)
HCT VFR BLD AUTO: 32.7 % (ref 36–48)
HGB BLD-MCNC: 10.3 G/DL (ref 12–16)
LYMPHOCYTES # BLD: 2 K/UL (ref 1.1–5.9)
LYMPHOCYTES NFR BLD: 38 % (ref 14–44)
MCH RBC QN AUTO: 28 PG (ref 25–35)
MCHC RBC AUTO-ENTMCNC: 31.5 G/DL (ref 31–37)
MCV RBC AUTO: 88.9 FL (ref 78–102)
NEUTS SEG # BLD: 2.8 K/UL (ref 1.8–9.5)
NEUTS SEG NFR BLD: 52 % (ref 40–70)
PLATELET # BLD AUTO: 195 K/UL (ref 140–440)
RBC # BLD AUTO: 3.68 M/UL (ref 4.1–5.1)
WBC # BLD AUTO: 5.4 K/UL (ref 4.5–13)

## 2018-10-16 PROCEDURE — 36415 COLL VENOUS BLD VENIPUNCTURE: CPT

## 2018-10-16 PROCEDURE — 85025 COMPLETE CBC W/AUTO DIFF WBC: CPT | Performed by: INTERNAL MEDICINE

## 2018-10-16 NOTE — PROGRESS NOTES
[facility] Progress Note Date: 2018 Name: Mary Jane Pak MRN: [id number] : 1922 PROCRIT INJECTON Ms. Miya Fowler was assessed and education was provided. Pt arrived to [facility] in wheelchair accompanied by her daughter at 26. Ms. Antony's vitals were reviewed and patient was observed for 5 minutes prior to treatment. Visit Vitals  /67 (BP 1 Location: Left arm, BP Patient Position: At rest)  Pulse (!) 58  Temp 98.7 °F (37.1 °C)  Resp 18 Blood drawn via left a/c venipuncture X1 attempt for CBC. Gauze and coban applied to site. Recent Results (from the past 12 hour(s)) CBC WITH 3 PART DIFF Collection Time: 10/16/18  2:25 PM  
Result Value Ref Range WBC 5.4 4.5 - 13.0 K/uL  
 RBC 3.68 (L) 4.10 - 5.10 M/uL  
 HGB 10.3 (L) 12.0 - 16 g/dL HCT 32.7 (L) 36 - 48 % MCV 88.9 78 - 102 FL  
 MCH 28.0 25.0 - 35.0 PG  
 MCHC 31.5 31 - 37 g/dL  
 RDW 11.8 11.5 - 14.5 % PLATELET 992 781 - 359 K/uL NEUTROPHILS 52 40 - 70 % MIXED CELLS 11 0.1 - 17 % LYMPHOCYTES 38 14 - 44 % ABS. NEUTROPHILS 2.8 1.8 - 9.5 K/UL  
 ABS. MIXED CELLS 0.6 0.0 - 2.3 K/uL  
 ABS. LYMPHOCYTES 2.0 1.1 - 5.9 K/UL  
 DF AUTOMATED Results within ordered parameters to HOLD procrit today. Ms. Miya Fowler was discharged from [place] [address] in stable condition at 1435. She is to return on 10/30/2018 at 1400 for her next Procrit appointment. Ethan Preston RN 2018

## 2018-10-30 ENCOUNTER — HOSPITAL ENCOUNTER (OUTPATIENT)
Dept: INFUSION THERAPY | Age: 83
Discharge: HOME | End: 2018-10-30
Payer: MEDICARE

## 2018-10-30 VITALS
HEART RATE: 54 BPM | SYSTOLIC BLOOD PRESSURE: 150 MMHG | RESPIRATION RATE: 18 BRPM | DIASTOLIC BLOOD PRESSURE: 73 MMHG | TEMPERATURE: 98.3 F

## 2018-10-30 LAB
BASO+EOS+MONOS # BLD AUTO: 0.5 K/UL (ref 0–2.3)
BASO+EOS+MONOS # BLD AUTO: 9 % (ref 0.1–17)
DIFFERENTIAL METHOD BLD: ABNORMAL
ERYTHROCYTE [DISTWIDTH] IN BLOOD BY AUTOMATED COUNT: 12.3 % (ref 11.5–14.5)
HCT VFR BLD AUTO: 31.3 % (ref 36–48)
HGB BLD-MCNC: 9.7 G/DL (ref 12–16)
LYMPHOCYTES # BLD: 2.4 K/UL (ref 1.1–5.9)
LYMPHOCYTES NFR BLD: 47 % (ref 14–44)
MCH RBC QN AUTO: 27.9 PG (ref 25–35)
MCHC RBC AUTO-ENTMCNC: 31 G/DL (ref 31–37)
MCV RBC AUTO: 89.9 FL (ref 78–102)
NEUTS SEG # BLD: 2.3 K/UL (ref 1.8–9.5)
NEUTS SEG NFR BLD: 44 % (ref 40–70)
PLATELET # BLD AUTO: 182 K/UL (ref 140–440)
RBC # BLD AUTO: 3.48 M/UL (ref 4.1–5.1)
WBC # BLD AUTO: 5.2 K/UL (ref 4.5–13)

## 2018-10-30 PROCEDURE — 85025 COMPLETE CBC W/AUTO DIFF WBC: CPT | Performed by: INTERNAL MEDICINE

## 2018-10-30 PROCEDURE — 36415 COLL VENOUS BLD VENIPUNCTURE: CPT

## 2018-10-30 NOTE — PROGRESS NOTES
[facility] Progress Note Date: 2018 Name: Razia Gonzalez MRN: [id number] : 1922 Cbc/procrit q 2 weeks 
 
 
 patient arrived via wheelchair at [phone], accompanied by her daughter. Alert and oriented x 3. No complaints voiced Patient assessed and education was provided. Patient vitals were reviewed. Visit Vitals /73 (BP 1 Location: Left arm, BP Patient Position: Sitting) Pulse (!) 54 Temp 98.3 °F (36.8 °C) Resp 18 Blood sample by mere-puncture on 1st stick to left AC for cbc. Lab results were obtained and reviewed. Recent Results (from the past 12 hour(s)) CBC WITH 3 PART DIFF Collection Time: 10/30/18  2:10 PM  
Result Value Ref Range WBC 5.2 4.5 - 13.0 K/uL  
 RBC 3.48 (L) 4.10 - 5.10 M/uL HGB 9.7 (L) 12.0 - 16 g/dL HCT 31.3 (L) 36 - 48 % MCV 89.9 78 - 102 FL  
 MCH 27.9 25.0 - 35.0 PG  
 MCHC 31.0 31 - 37 g/dL  
 RDW 12.3 11.5 - 14.5 % PLATELET 021 468 - 184 K/uL NEUTROPHILS 44 40 - 70 % MIXED CELLS 9 0.1 - 17 % LYMPHOCYTES 47 (H) 14 - 44 % ABS. NEUTROPHILS 2.3 1.8 - 9.5 K/UL  
 ABS. MIXED CELLS 0.5 0.0 - 2.3 K/uL  
 ABS. LYMPHOCYTES 2.4 1.1 - 5.9 K/UL  
 DF AUTOMATED Procrit held per parameters. Patient was discharged from [place] [address] in stable condition at [phone], via wheel chair, accompanied by her daughter. She is to return on 18 at 2:30 pm for her next appointment. Edward Thomas RN 2018 4:45 PM

## 2018-10-30 NOTE — TELEPHONE ENCOUNTER
Lorena Hope has called requesting a refill of their controlled medication Oxycodone  for the management of chronic pain syndrome     Last office visit date: 09/06/18    Date last  was pulled and reviewed : 10/30/18    Was the patient compliant when the above report was pulled? yes    Analgesia: The patient states that she receives 75% of pain relief from the medications     Aberrancies: There are no aberrancies noted at this time     ADL's: The patient states that she is not tamika to perform her adls her daughter is her caregiver    Adverse Reaction: the patient reports constipation as a side effect and is taking medication for it. Provider's last note and plan of care reviewed? yes  Request forwarded to provider for review.     Script is preprinted

## 2018-10-30 NOTE — TELEPHONE ENCOUNTER
Please advise patient that they can take over-the-counter Colace and/or senna as needed for constipation. Reviewed. OK to distribute prescription.

## 2018-10-30 NOTE — TELEPHONE ENCOUNTER
Name:VERIFIED  Name of medication:OXYCODONE  Phone #:[phone]  % of pain relief:75%  Daily Task (ex: personal hygiene, cleaning, cooking):NO (DAUGHTER IS THE CARE GIVER)  Side effects:CONSTIPATION (TAKING MEDICATION)

## 2018-11-02 NOTE — TELEPHONE ENCOUNTER
Patient's daughter (who's on patient's HIPPA list) came to  patient's prescription and was notified of 's instructions/recommendations regarding patient's constipation issues. Patient's daughter verbalized understanding and had no further questions at this time.

## 2018-11-13 ENCOUNTER — HOSPITAL ENCOUNTER (OUTPATIENT)
Dept: INFUSION THERAPY | Age: 83
Discharge: HOME | End: 2018-11-13
Payer: MEDICARE

## 2018-11-13 VITALS
TEMPERATURE: 97.9 F | OXYGEN SATURATION: 92 % | SYSTOLIC BLOOD PRESSURE: 133 MMHG | HEART RATE: 55 BPM | RESPIRATION RATE: 18 BRPM | DIASTOLIC BLOOD PRESSURE: 69 MMHG

## 2018-11-13 LAB
BASO+EOS+MONOS # BLD AUTO: 0.4 K/UL (ref 0–2.3)
BASO+EOS+MONOS # BLD AUTO: 9 % (ref 0.1–17)
DIFFERENTIAL METHOD BLD: ABNORMAL
ERYTHROCYTE [DISTWIDTH] IN BLOOD BY AUTOMATED COUNT: 12 % (ref 11.5–14.5)
HCT VFR BLD AUTO: 31.9 % (ref 36–48)
HGB BLD-MCNC: 9.7 G/DL (ref 12–16)
LYMPHOCYTES # BLD: 2.1 K/UL (ref 1.1–5.9)
LYMPHOCYTES NFR BLD: 41 % (ref 14–44)
MCH RBC QN AUTO: 27.6 PG (ref 25–35)
MCHC RBC AUTO-ENTMCNC: 30.4 G/DL (ref 31–37)
MCV RBC AUTO: 90.6 FL (ref 78–102)
NEUTS SEG # BLD: 2.5 K/UL (ref 1.8–9.5)
NEUTS SEG NFR BLD: 50 % (ref 40–70)
PLATELET # BLD AUTO: 172 K/UL (ref 140–440)
RBC # BLD AUTO: 3.52 M/UL (ref 4.1–5.1)
WBC # BLD AUTO: 5 K/UL (ref 4.5–13)

## 2018-11-13 PROCEDURE — 36415 COLL VENOUS BLD VENIPUNCTURE: CPT

## 2018-11-13 PROCEDURE — 85025 COMPLETE CBC W/AUTO DIFF WBC: CPT

## 2018-11-13 NOTE — PROGRESS NOTES
[facility] Progress Note Date: 2018 Name: Vel Forde MRN: [id number] : 1922 PROCRIT INJECTON Ms. Favio Camara was assessed and education was provided. Pt arrived to [facility] in wheelchair accompanied by her daughter at 26. Ms. Antony's vitals were reviewed and patient was observed for 5 minutes prior to treatment. Visit Vitals /69 (BP 1 Location: Left arm, BP Patient Position: Sitting) Pulse (!) 55 Temp 97.9 °F (36.6 °C) Resp 18 SpO2 92% Breastfeeding? No  
 
 
Blood drawn via left a/c venipuncture X1 attempt for CBC. Gauze and coban applied to site. Recent Results (from the past 12 hour(s)) CBC WITH 3 PART DIFF Collection Time: 18  2:36 PM  
Result Value Ref Range WBC 5.0 4.5 - 13.0 K/uL  
 RBC 3.52 (L) 4.10 - 5.10 M/uL HGB 9.7 (L) 12.0 - 16 g/dL HCT 31.9 (L) 36 - 48 % MCV 90.6 78 - 102 FL  
 MCH 27.6 25.0 - 35.0 PG  
 MCHC 30.4 (L) 31 - 37 g/dL  
 RDW 12.0 11.5 - 14.5 % PLATELET 308 248 - 314 K/uL NEUTROPHILS 50 40 - 70 % MIXED CELLS 9 0.1 - 17 % LYMPHOCYTES 41 14 - 44 % ABS. NEUTROPHILS 2.5 1.8 - 9.5 K/UL  
 ABS. MIXED CELLS 0.4 0.0 - 2.3 K/uL  
 ABS. LYMPHOCYTES 2.1 1.1 - 5.9 K/UL  
 DF AUTOMATED Results within ordered parameters to HOLD procrit today. Ms. Favio Camara was discharged from [place] [address] in stable condition at 1440. She is to return on 2018 at 1400 for her next Procrit appointment. Timothy Wise RN 2018 
1440

## 2018-11-27 ENCOUNTER — HOSPITAL ENCOUNTER (OUTPATIENT)
Dept: INFUSION THERAPY | Age: 83
Discharge: HOME | End: 2018-11-27
Payer: MEDICARE

## 2018-11-27 VITALS
DIASTOLIC BLOOD PRESSURE: 72 MMHG | TEMPERATURE: 97.9 F | OXYGEN SATURATION: 95 % | SYSTOLIC BLOOD PRESSURE: 166 MMHG | HEART RATE: 61 BPM | RESPIRATION RATE: 18 BRPM

## 2018-11-27 LAB
BASO+EOS+MONOS # BLD AUTO: 0.5 K/UL (ref 0–2.3)
BASO+EOS+MONOS # BLD AUTO: 9 % (ref 0.1–17)
DIFFERENTIAL METHOD BLD: ABNORMAL
ERYTHROCYTE [DISTWIDTH] IN BLOOD BY AUTOMATED COUNT: 12.3 % (ref 11.5–14.5)
HCT VFR BLD AUTO: 33.2 % (ref 36–48)
HGB BLD-MCNC: 10.6 G/DL (ref 12–16)
LYMPHOCYTES # BLD: 2.8 K/UL (ref 1.1–5.9)
LYMPHOCYTES NFR BLD: 50 % (ref 14–44)
MCH RBC QN AUTO: 28.8 PG (ref 25–35)
MCHC RBC AUTO-ENTMCNC: 31.9 G/DL (ref 31–37)
MCV RBC AUTO: 90.2 FL (ref 78–102)
NEUTS SEG # BLD: 2.3 K/UL (ref 1.8–9.5)
NEUTS SEG NFR BLD: 41 % (ref 40–70)
PLATELET # BLD AUTO: 155 K/UL (ref 140–440)
RBC # BLD AUTO: 3.68 M/UL (ref 4.1–5.1)
WBC # BLD AUTO: 5.6 K/UL (ref 4.5–13)

## 2018-11-27 PROCEDURE — 85025 COMPLETE CBC W/AUTO DIFF WBC: CPT

## 2018-11-27 PROCEDURE — 36415 COLL VENOUS BLD VENIPUNCTURE: CPT

## 2018-11-27 NOTE — PROGRESS NOTES
[facility] Progress Note Date: 2018 Name: Louis Contreras MRN: [id number] : 1922 [address] Ms. Shania Maddox was assessed and education was provided. Pt arrived to [facility] in wheelchair accompanied by her daughter at [phone]. Ms. Antony's vitals were reviewed and patient was observed for 5 minutes prior to treatment. Visit Vitals /72 (BP 1 Location: Right arm, BP Patient Position: Sitting) Pulse 61 Temp 97.9 °F (36.6 °C) Resp 18 SpO2 95% Blood drawn via right forearm venipuncture X2 attempt for CBC. Gauze and coban applied to site. Recent Results (from the past 12 hour(s)) CBC WITH 3 PART DIFF Collection Time: 18  2:30 PM  
Result Value Ref Range WBC 5.6 4.5 - 13.0 K/uL  
 RBC 3.68 (L) 4.10 - 5.10 M/uL  
 HGB 10.6 (L) 12.0 - 16 g/dL HCT 33.2 (L) 36 - 48 % MCV 90.2 78 - 102 FL  
 MCH 28.8 25.0 - 35.0 PG  
 MCHC 31.9 31 - 37 g/dL  
 RDW 12.3 11.5 - 14.5 % PLATELET 399 762 - 438 K/uL NEUTROPHILS 41 40 - 70 % MIXED CELLS 9 0.1 - 17 % LYMPHOCYTES 50 (H) 14 - 44 % ABS. NEUTROPHILS 2.3 1.8 - 9.5 K/UL  
 ABS. MIXED CELLS 0.5 0.0 - 2.3 K/uL  
 ABS. LYMPHOCYTES 2.8 1.1 - 5.9 K/UL  
 DF AUTOMATED Results within ordered parameters to HOLD procrit today. Ms. Shania Maddox was discharged via wheelchair with daughter from [place] [address] in stable condition at [phone]. She is to return on 2018 at 1400 for her next Procrit appointment. Jonathan Diaz RN 2018 
1440

## 2018-11-29 ENCOUNTER — OFFICE VISIT (OUTPATIENT)
Dept: PAIN MANAGEMENT | Age: 83
End: 2018-11-29

## 2018-11-29 VITALS
RESPIRATION RATE: 13 BRPM | BODY MASS INDEX: 24.87 KG/M2 | OXYGEN SATURATION: 92 % | DIASTOLIC BLOOD PRESSURE: 43 MMHG | HEART RATE: 51 BPM | TEMPERATURE: 98 F | SYSTOLIC BLOOD PRESSURE: 144 MMHG | HEIGHT: 62 IN

## 2018-11-29 DIAGNOSIS — G89.29 CHRONIC BILATERAL LOW BACK PAIN, WITH SCIATICA PRESENCE UNSPECIFIED: ICD-10-CM

## 2018-11-29 DIAGNOSIS — M54.5 CHRONIC BILATERAL LOW BACK PAIN, WITH SCIATICA PRESENCE UNSPECIFIED: ICD-10-CM

## 2018-11-29 DIAGNOSIS — M54.2 CERVICALGIA: Primary | ICD-10-CM

## 2018-11-29 DIAGNOSIS — G89.29 CHRONIC PAIN OF LEFT KNEE: ICD-10-CM

## 2018-11-29 DIAGNOSIS — Z79.899 ENCOUNTER FOR LONG-TERM (CURRENT) USE OF HIGH-RISK MEDICATION: ICD-10-CM

## 2018-11-29 DIAGNOSIS — G89.4 CHRONIC PAIN SYNDROME: ICD-10-CM

## 2018-11-29 DIAGNOSIS — M25.562 CHRONIC PAIN OF LEFT KNEE: ICD-10-CM

## 2018-11-29 RX ORDER — CAPSAICIN 0.07 G/100G
CREAM TOPICAL 3 TIMES DAILY
Qty: 57 G | Refills: 3 | Status: SHIPPED | OUTPATIENT
Start: 2018-11-29 | End: 2018-12-04 | Stop reason: CLARIF

## 2018-11-29 RX ORDER — LIDOCAINE 50 MG/G
1 PATCH TOPICAL EVERY 24 HOURS
Qty: 30 EACH | Refills: 3 | Status: SHIPPED | OUTPATIENT
Start: 2018-11-29 | End: 2018-12-29

## 2018-11-29 RX ORDER — OXYCODONE HYDROCHLORIDE 15 MG/1
15 TABLET, FILM COATED, EXTENDED RELEASE ORAL EVERY 12 HOURS
Qty: 60 TAB | Refills: 0 | Status: SHIPPED | OUTPATIENT
Start: 2018-12-02 | End: 2019-01-03 | Stop reason: SDUPTHER

## 2018-11-29 NOTE — PATIENT INSTRUCTIONS
Preventing Falls: Care Instructions Your Care Instructions Getting around your home safely can be a challenge if you have injuries or health problems that make it easy for you to fall. Loose rugs and furniture in walkways are among the dangers for many older people who have problems walking or who have poor eyesight. People who have conditions such as arthritis, osteoporosis, or dementia also have to be careful not to fall. You can make your home safer with a few simple measures. Follow-up care is a key part of your treatment and safety. Be sure to make and go to all appointments, and call your doctor if you are having problems. It's also a good idea to know your test results and keep a list of the medicines you take. How can you care for yourself at home? Taking care of yourself · You may get dizzy if you do not drink enough water. To prevent dehydration, drink plenty of fluids, enough so that your urine is light yellow or clear like water. Choose water and other caffeine-free clear liquids. If you have kidney, heart, or liver disease and have to limit fluids, talk with your doctor before you increase the amount of fluids you drink. · Exercise regularly to improve your strength, muscle tone, and balance. Walk if you can. Swimming may be a good choice if you cannot walk easily. · Have your vision and hearing checked each year or any time you notice a change. If you have trouble seeing and hearing, you might not be able to avoid objects and could lose your balance. · Know the side effects of the medicines you take. Ask your doctor or pharmacist whether the medicines you take can affect your balance. Sleeping pills or sedatives can affect your balance. · Limit the amount of alcohol you drink. Alcohol can impair your balance and other senses. · Ask your doctor whether calluses or corns on your feet need to be removed.  If you wear loose-fitting shoes because of calluses or corns, you can lose your balance and fall. · Talk to your doctor if you have numbness in your feet. Preventing falls at home · Remove raised doorway thresholds, throw rugs, and clutter. Repair loose carpet or raised areas in the floor. · Move furniture and electrical cords to keep them out of walking paths. · Use nonskid floor wax, and wipe up spills right away, especially on ceramic tile floors. · If you use a walker or cane, put rubber tips on it. If you use crutches, clean the bottoms of them regularly with an abrasive pad, such as steel wool. · Keep your house well lit, especially Elsa Jimenez, and outside walkways. Use night-lights in areas such as hallways and bathrooms. Add extra light switches or use remote switches (such as switches that go on or off when you clap your hands) to make it easier to turn lights on if you have to get up during the night. · Install sturdy handrails on stairways. · Move items in your cabinets so that the things you use a lot are on the lower shelves (about waist level). · Keep a cordless phone and a flashlight with new batteries by your bed. If possible, put a phone in each of the main rooms of your house, or carry a cell phone in case you fall and cannot reach a phone. Or, you can wear a device around your neck or wrist. You push a button that sends a signal for help. · Wear low-heeled shoes that fit well and give your feet good support. Use footwear with nonskid soles. Check the heels and soles of your shoes for wear. Repair or replace worn heels or soles. · Do not wear socks without shoes on wood floors. · Walk on the grass when the sidewalks are slippery. If you live in an area that gets snow and ice in the winter, sprinkle salt on slippery steps and sidewalks. Preventing falls in the bath · Install grab bars and nonskid mats inside and outside your shower or tub and near the toilet and sinks. · Use shower chairs and bath benches. · Use a hand-held shower head that will allow you to sit while showering. · Get into a tub or shower by putting the weaker leg in first. Get out of a tub or shower with your strong side first. 
· Repair loose toilet seats and consider installing a raised toilet seat to make getting on and off the toilet easier. · Keep your bathroom door unlocked while you are in the shower. Where can you learn more? Go to http://yandel-olga.info/. Enter [id number] [id number] in the search box to learn more about \"Preventing Falls: Care Instructions. \" Current as of: March 16, 2018 Content Version: 11.8 © 8272-8280 [employer]. Care instructions adapted under license by [employer] (which disclaims liability or warranty for this information). If you have questions about a medical condition or this instruction, always ask your healthcare professional. [place] [address] any warranty or liability for your use of this information. Safe Use of Opioid Pain Medicine: Care Instructions Your Care Instructions Pain is your body's way of warning you that something is wrong. Pain feels different for everybody. Only you can describe your pain. A doctor can suggest or prescribe many types of medicines for pain. These range from over-the-counter medicines like acetaminophen (Tylenol) to powerful medicines called opioids. Examples of opioids are fentanyl, hydrocodone, morphine, and oxycodone. Heroin is an illegal opioid Opioids are strong medicines. They can help you manage pain when you use them the right way. But if you misuse them, they can cause serious harm and even death. For these reasons, doctors are very careful about how they prescribe opioids. If you decide to take opioids, here are some things to remember. · Keep your doctor informed. You can get addicted to opioids. The risk is higher if you have a history of substance use.  Your doctor will monitor you closely for signs of misuse and addiction and to figure out when you no longer need to take opioids. · Make a treatment plan. The goal of your plan is to be able to function and do the things you need to do, even if you still have some pain. You might be able to manage your pain with other non-opioid options like physical therapy, relaxation, or over-the-counter pain medicines. · Be aware of the side effects. Opioids can cause serious side effects, such as constipation, dry mouth, and nausea. And over time, you may need a higher dose to get pain relief. This is called tolerance. Your body also gets used to opioids. This is called physical dependence. If you suddenly stop taking them, you may have withdrawal symptoms. The doctor carefully considered what pain medicine is right for you. You may not have received opioids if your doctor was concerned about drug interactions or your safety, or if he or she had other concerns. It is best to have one doctor or clinic treat your pain. This way you will get the pain medicine that will help you the most. And a doctor will be able to watch for any problems that the medicine might cause. The doctor has checked you carefully, but problems can develop later. If you notice any problems or new symptoms,  get medical treatment right away. Follow-up care is a key part of your treatment and safety. Be sure to make and go to all appointments, and call your doctor if you are having problems. It's also a good idea to know your test results and keep a list of the medicines you take. How can you care for yourself at home? · If you need to take opioids to manage your pain, remember these safety tips. ? Follow directions carefully. It's easy to misuse opioids if you take a dose other than what's prescribed by your doctor. This can lead to overdose and even death. Even sharing them with someone they weren't meant for is misuse. ? Be cautious. Opioids may affect your judgment and decision making. Do not drive or operate machinery until you can think clearly. Talk with your doctor about when it is safe to drive. ? Reduce the risk of drug interactions. Opioids can be dangerous if you take them with alcohol or with certain drugs like sleeping pills and muscle relaxers. Make sure your doctor knows about all the other medicines you take, including over-the-counter medicines. Don't start any new medicines before you talk to your doctor or pharmacist. 
? Keep others safe. Store opioids in a safe and secure place. Make sure that pets, children, friends, and family can't get to them. When you're done using opioids, make sure to properly dispose of them. You can either use a community drug take-back program or your drugstore's mail-back program. If one of these programs isn't available, you can flush opioid skin patches and unused opioid pills down the toilet. ? Reduce the risk of overdose. Misuse of opioids can be very dangerous. Protect yourself by asking your doctor about a naloxone rescue kit. It can help youand even save your lifeif you take too much of an opioid. · Try other ways to reduce pain. ? Relax, and reduce stress. Relaxation techniques such as deep breathing or meditation can help. ? Keep moving. Gentle, daily exercise can help reduce pain over the long run. Try low- or no-impact exercises such as walking, swimming, and stationary biking. Do stretches to stay flexible. ? Try heat, cold packs, and massage. ? Get enough sleep. Pain can make you tired and drain your energy. Talk with your doctor if you have trouble sleeping because of pain. ? Think positive. Your thoughts can affect your pain level. Do things that you enjoy to distract yourself when you have pain instead of focusing on the pain. See a movie, read a book, listen to music, or spend time with a friend. · If you are not taking a prescription pain medicine, ask your doctor if you can take an over-the-counter medicine. When should you call for help? Call your doctor now or seek immediate medical care if: 
  · You have a new kind of pain.  
  · You have new symptoms, such as a fever or rash, along with the pain.  
 Watch closely for changes in your health, and be sure to contact your doctor if: 
  · You think you might be using too much pain medicine, and you need help to use less or stop.  
  · Your pain gets worse.  
  · You would like a referral to a doctor or clinic that specializes in pain management. Where can you learn more? Go to http://yandel-olga.info/. Enter R108 in the search box to learn more about \"Safe Use of Opioid Pain Medicine: Care Instructions. \" Current as of: September 10, 2017 Content Version: 11.8 © 7771-8395 [employer]. Care instructions adapted under license by [employer] (which disclaims liability or warranty for this information). If you have questions about a medical condition or this instruction, always ask your healthcare professional. [address] any warranty or liability for your use of this information.

## 2018-11-29 NOTE — PROGRESS NOTES
Nursing Notes Patient presents to the office today in follow-up. Patient rates her pain at 0/10 on the numerical pain scale. Reviewed medications with counts as follows:   
Rx Date filled Qty Dispensed Pill Count Last Dose Short  
oxycontin 15 mg  11/02/18 60 6 today no Last opioid agreement 12/27/17 Last urine drug screen 06/08/18 Comments: POC UDS was not performed in office today. The pt provided an oral specimen in the office today for routine drug testing. Any new labs or imaging since last appointment? NO Have you been to an emergency room (ER) or urgent care clinic since your last visit? NO Have you been hospitalized since your last visit? NO If yes, where, when, and reason for visit? Have you seen or consulted any other health care providers outside of the [address] [place] [address]  since your last visit? YES If yes, where, when, and reason for visit? pcp Ms. Sathya Stroud has a reminder for a \"due or due soon\" health maintenance. I have asked that she contact her primary care provider for follow-up on this health maintenance. PHQ over the last two weeks 11/29/2018 PHQ Not Done Medical Reason (indicate in comments) Little interest or pleasure in doing things - Feeling down, depressed, irritable, or hopeless - Total Score PHQ 2 -

## 2018-11-29 NOTE — PROGRESS NOTES
Social History Socioeconomic History  Marital status: SINGLE Spouse name: Not on file  Number of children: Not on file  Years of education: Not on file  Highest education level: Not on file Social Needs  Financial resource strain: Not on file  Food insecurity - worry: Not on file  Food insecurity - inability: Not on file  Transportation needs - medical: Not on file  Transportation needs - non-medical: Not on file Occupational History  Not on file Tobacco Use  Smoking status: Never Smoker  Smokeless tobacco: Never Used Substance and Sexual Activity  Alcohol use: No  
 Drug use: No  
 Sexual activity: Not on file Other Topics Concern  Not on file Social History Narrative  Not on file Family History Problem Relation Age of Onset  Diabetes Other  Hypertension Other  Stroke Other Allergies Allergen Reactions  Celebrex [Celecoxib] Unable to Obtain  Codeine Unable to Obtain  Flexeril [Cyclobenzaprine] Unable to Obtain  Macrobid [Nitrofurantoin Monohyd/M-Cryst] Unable to Consolidated Alex  Pcn [Penicillins] Unable to Consolidated Alex  Remeron [Mirtazapine] Unable to Consolidated Alex  Sulfa (Sulfonamide Antibiotics) Other (comments) Swelling and low heart rate  Vicodin [Hydrocodone-Acetaminophen] Unable to Obtain Past Medical History:  
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  
 left shoulder  GERD (gastroesophageal reflux disease)  Heart murmur  Hyperkalemia 2011  Hypertension  Lumbago  Pain in joint, lower leg  Pain in joint, multiple sites Past Surgical History:  
Procedure Laterality Date  HX BLADDER REPAIR    
 prolapsed  HX CATARACT REMOVAL    
 HX CHOLECYSTECTOMY  5/1998  HX HERNIA REPAIR  7/1998  
 x2  HX OOPHORECTOMY Current Outpatient Medications on File Prior to Visit Medication Sig  polyethylene glycol (MIRALAX) 17 gram packet Take 17 g by mouth daily.  pantoprazole (PROTONIX) 40 mg tablet Take 40 mg by mouth daily.  esomeprazole (NEXIUM) 40 mg capsule Take 40 mg by mouth daily.  amlodipine (NORVASC) 10 mg tablet Take 10 mg by mouth daily.  furosemide (LASIX) 20 mg tablet Take  by mouth two (2) times a week.  quetiapine (SEROQUEL) 100 mg tablet Take 50 mg by mouth two (2) times a day.  donepezil (ARICEPT) 10 mg tablet Take 10 mg by mouth nightly.  naloxone (NARCAN) 4 mg/actuation nasal spray Use 1 spray intranasally, then discard. Repeat with new spray every 2 min as needed for opioid overdose symptoms, alternating nostrils. Indications: OPIATE-INDUCED RESPIRATORY DEPRESSION, Opioid Toxicity No current facility-administered medications on file prior to visit. Referred prior to 2011 by primary care provider for back shoulder and knee pain Pt was last seen here on 9/26/2018 Calculated MEQ -45 Naloxone rescue -yes Prophylactic bowel program -yes Date of last OCA 11/20/2019 signed by medical surrogate Last UDS June 8, 2018 , date checked , findings GIC-unobtainable due to dementia MIGUEL ÁNGEL -unobtainable due to dementia COMM-unobtainable due to dementia HPI: 
Yoselyn Prado is a 80 y.o. female here for f/u visit for ongoing evaluation of neck pain knee pain and neck pain. Patient's medical surrogate denies interval changes in the character or distribution of pain. Pain is located in the neck, the left knee, and the lower back. The pain is described as aching pain.   Pain intensity as best we can determine speaking with the patient herself and with the patient's daughter is that it ranges from 4-7/10. She receives benefit use of Biofreeze. There have been no additional complaints of pain or increased intensity with the recent opioid reduction from OxyContin 20 mg daily down to OxyContin 15 mg daily. The patient does respond to questioning today with a positive attitude stating that she feels pretty good all over. ROS: Unobtainable secondary to dementia Opioid specific risk: Dementia, anxiety, diabetes, GERD Opioid specific history: On chronic opioid therapy for 20-30 years Vitals:  
 11/29/18 1436 BP: 144/43 Pulse: (!) 51 Resp: 13 Temp: 98 °F (36.7 °C) TempSrc: Oral  
SpO2: 92% Height: 5' 2\" (1.575 m) PainSc:   0 - No pain PE: 
AFVSS with bradycardia, no acute distress, normal body habitus. A&O to name only. normocephalic, atraumatic. Conjugate gaze, clear sclerae. Speech is clear but not appropriate. Mood is pleasant and appropriate. Patient is cooperative. Patient is seated slumped in a manual wheelchair with extreme cervical flexion with slight rotation to the right. Primary Care Physician Linnette Lei No address on file None PHQ --unobtainable PHQ over the last two weeks 11/29/2018 PHQ Not Done Medical Reason (indicate in comments) Little interest or pleasure in doing things - Feeling down, depressed, irritable, or hopeless - Total Score PHQ 2 -  
 
 
DSM V-OUD Screen--unobtainable Assessment/Plan: ICD-10-CM ICD-9-CM 1. Cervicalgia M54.2 723.1 oxyCODONE ER (OXYCONTIN) 15 mg ER tablet  
   lidocaine (LIDODERM) 5 %  
   menthol (BIOFREEZE, MENTHOL,) 4 % gel  
   capsaicin 0.075 % topical cream  
   TENS Units (TENS 504) gregorio 2. Chronic pain of left knee M25.562 719.46 oxyCODONE ER (OXYCONTIN) 15 mg ER tablet G89.29 338.29 lidocaine (LIDODERM) 5 %  
   menthol (BIOFREEZE, MENTHOL,) 4 % gel  
   capsaicin 0.075 % topical cream  
   TENS Units (TENS 504) gregorio 3. Encounter for long-term (current) use of high-risk medication Z79.899 V58.69   
4. Chronic bilateral low back pain, with sciatica presence unspecified M54.5 724.2 oxyCODONE ER (OXYCONTIN) 15 mg ER tablet G89.29 338.29 lidocaine (LIDODERM) 5 %  
   menthol (BIOFREEZE, MENTHOL,) 4 % gel  
   capsaicin 0.075 % topical cream  
   TENS Units (TENS 504) gregorio 5. Chronic pain syndrome G89.4 338.4 oxyCODONE ER (OXYCONTIN) 15 mg ER tablet  
   lidocaine (LIDODERM) 5 %  
   menthol (BIOFREEZE, MENTHOL,) 4 % gel  
   capsaicin 0.075 % topical cream  
   TENS Units (TENS 504) gregorio  
  
 
--I feel this patient's chronic pain, due to the advanced dementia, should be handled as a palliative service. I therefore do not recommend further opioid wean at this time as she appears to be comfortable on the current dosage of OxyContin 20 mg twice daily. The medical surrogate has been reminded about signs of withdrawal or overdose and reminded that Narcan rescue nasal spray has been made available to enhance safety during this time. She expresses understanding. --I requested a TENS unit to help reduce the patient's pain. --As requested Biofreeze as this reportedly has been beneficial for reducing her pain. --I have requested Lidoderm patches --I have requested capsaicin cream 
 
GOALS: 
To establish complementary and integrative plan of care to address chronic pain issues while minimizing pharmaceuticals to maximize patient's function improve quality of life. F/u:. Follow-up Disposition: 
Return in about 3 months (around 2/28/2019) for 30 min.

## 2018-12-03 ENCOUNTER — TELEPHONE (OUTPATIENT)
Dept: PAIN MANAGEMENT | Age: 83
End: 2018-12-03

## 2018-12-03 NOTE — TELEPHONE ENCOUNTER
Pharmacy called and asked if you would change the dose for the Capsaicin cream to 0.025 due to 0.075 not existing please advise

## 2018-12-04 DIAGNOSIS — G89.29 CHRONIC MIDLINE LOW BACK PAIN WITH SCIATICA, SCIATICA LATERALITY UNSPECIFIED: ICD-10-CM

## 2018-12-04 DIAGNOSIS — M54.2 CERVICALGIA: Primary | ICD-10-CM

## 2018-12-04 DIAGNOSIS — M54.40 CHRONIC MIDLINE LOW BACK PAIN WITH SCIATICA, SCIATICA LATERALITY UNSPECIFIED: ICD-10-CM

## 2018-12-04 RX ORDER — CAPSAICIN 0.1 %
CREAM (GRAM) TOPICAL 3 TIMES DAILY
Qty: 56.6 G | Refills: 2 | Status: SHIPPED | OUTPATIENT
Start: 2018-12-04 | End: 2019-01-23 | Stop reason: ALTCHOICE

## 2018-12-07 ENCOUNTER — TELEPHONE (OUTPATIENT)
Dept: PAIN MANAGEMENT | Age: 83
End: 2018-12-07

## 2018-12-07 NOTE — TELEPHONE ENCOUNTER
The office received a call from a rep from the pt's insurance at [employer]. The rep states that because the pt does not have a diagnosis of post herpetic neuralgia, diabetic neuropathy or acute contusions or bruising, they will not be able to approve coverage for the medication. Appeal cannot be done.

## 2018-12-10 ENCOUNTER — TELEPHONE (OUTPATIENT)
Dept: PAIN MANAGEMENT | Age: 83
End: 2018-12-10

## 2018-12-10 NOTE — TELEPHONE ENCOUNTER
Lidocaine patch was denied by [facility]. Medicare does not cover medications that are available otc or without a prescription.

## 2018-12-10 NOTE — TELEPHONE ENCOUNTER
10:03 am I LVM for patient to call back. This was regarding the denial by medicare of Lidocaine 5% due to that it can be bought OTC.

## 2018-12-11 ENCOUNTER — HOSPITAL ENCOUNTER (OUTPATIENT)
Dept: INFUSION THERAPY | Age: 83
Discharge: HOME | End: 2018-12-11
Payer: MEDICARE

## 2018-12-11 VITALS
SYSTOLIC BLOOD PRESSURE: 159 MMHG | RESPIRATION RATE: 18 BRPM | OXYGEN SATURATION: 92 % | HEART RATE: 59 BPM | DIASTOLIC BLOOD PRESSURE: 71 MMHG

## 2018-12-11 LAB
25(OH)D3 SERPL-MCNC: 55.8 NG/ML (ref 30–100)
ALBUMIN SERPL-MCNC: 3.6 G/DL (ref 3.4–5)
ANION GAP SERPL CALC-SCNC: 8 MMOL/L (ref 3–18)
BASO+EOS+MONOS # BLD AUTO: 0.4 K/UL (ref 0–2.3)
BASO+EOS+MONOS # BLD AUTO: 7 % (ref 0.1–17)
BUN SERPL-MCNC: 41 MG/DL (ref 7–18)
BUN/CREAT SERPL: 15 (ref 12–20)
CALCIUM SERPL-MCNC: 9.4 MG/DL (ref 8.5–10.1)
CALCIUM SERPL-MCNC: 9.5 MG/DL (ref 8.5–10.1)
CHLORIDE SERPL-SCNC: 108 MMOL/L (ref 100–108)
CO2 SERPL-SCNC: 30 MMOL/L (ref 21–32)
CREAT SERPL-MCNC: 2.65 MG/DL (ref 0.6–1.3)
DIFFERENTIAL METHOD BLD: ABNORMAL
ERYTHROCYTE [DISTWIDTH] IN BLOOD BY AUTOMATED COUNT: 11.6 % (ref 11.5–14.5)
FERRITIN SERPL-MCNC: 717 NG/ML (ref 8–388)
GLUCOSE SERPL-MCNC: 195 MG/DL (ref 74–99)
HCT VFR BLD AUTO: 33.3 % (ref 36–48)
HGB BLD-MCNC: 10.3 G/DL (ref 12–16)
IRON SATN MFR SERPL: 29 %
IRON SERPL-MCNC: 56 UG/DL (ref 50–175)
LYMPHOCYTES # BLD: 2.5 K/UL (ref 1.1–5.9)
LYMPHOCYTES NFR BLD: 43 % (ref 14–44)
MCH RBC QN AUTO: 28.6 PG (ref 25–35)
MCHC RBC AUTO-ENTMCNC: 30.9 G/DL (ref 31–37)
MCV RBC AUTO: 92.5 FL (ref 78–102)
NEUTS SEG # BLD: 2.8 K/UL (ref 1.8–9.5)
NEUTS SEG NFR BLD: 50 % (ref 40–70)
PHOSPHATE SERPL-MCNC: 3.2 MG/DL (ref 2.5–4.9)
PLATELET # BLD AUTO: 178 K/UL (ref 140–440)
POTASSIUM SERPL-SCNC: 4 MMOL/L (ref 3.5–5.5)
PTH-INTACT SERPL-MCNC: 60.9 PG/ML (ref 18.4–88)
RBC # BLD AUTO: 3.6 M/UL (ref 4.1–5.1)
SODIUM SERPL-SCNC: 146 MMOL/L (ref 136–145)
TIBC SERPL-MCNC: 191 UG/DL (ref 250–450)
WBC # BLD AUTO: 5.7 K/UL (ref 4.5–13)

## 2018-12-11 PROCEDURE — 82728 ASSAY OF FERRITIN: CPT

## 2018-12-11 PROCEDURE — 80069 RENAL FUNCTION PANEL: CPT

## 2018-12-11 PROCEDURE — 83540 ASSAY OF IRON: CPT

## 2018-12-11 PROCEDURE — 36415 COLL VENOUS BLD VENIPUNCTURE: CPT

## 2018-12-11 PROCEDURE — 85025 COMPLETE CBC W/AUTO DIFF WBC: CPT

## 2018-12-11 PROCEDURE — 83970 ASSAY OF PARATHORMONE: CPT

## 2018-12-11 PROCEDURE — 82306 VITAMIN D 25 HYDROXY: CPT

## 2018-12-11 NOTE — PROGRESS NOTES
[facility] Progress Note Date: 2018 Name: Louis Contreras MRN: [id number] : 1922 PROCRIT INJECTON Ms. Shania Maddox was assessed and education was provided. Pt arrived to [facility] in wheelchair accompanied by her daughter at 0. Ms. Antony's vitals were reviewed and patient was observed for 5 minutes prior to treatment. Visit Vitals Breastfeeding? No  
 
 
Blood drawn via left a/c venipuncture X1 attempt for CBC and labs per Dr. Hermelindo Bradford. Gauze and coban applied to site. Unable to get urine specimen, joe attempted to assist patient in restroom. Cup provided for home use and patient to return as soon as possible. Recent Results (from the past 12 hour(s)) CBC WITH 3 PART DIFF Collection Time: 18  2:10 PM  
Result Value Ref Range WBC 5.7 4.5 - 13.0 K/uL  
 RBC 3.60 (L) 4.10 - 5.10 M/uL  
 HGB 10.3 (L) 12.0 - 16 g/dL HCT 33.3 (L) 36 - 48 % MCV 92.5 78 - 102 FL  
 MCH 28.6 25.0 - 35.0 PG  
 MCHC 30.9 (L) 31 - 37 g/dL  
 RDW 11.6 11.5 - 14.5 % PLATELET 828 534 - 123 K/uL NEUTROPHILS 50 40 - 70 % MIXED CELLS 7 0.1 - 17 % LYMPHOCYTES 43 14 - 44 % ABS. NEUTROPHILS 2.8 1.8 - 9.5 K/UL  
 ABS. MIXED CELLS 0.4 0.0 - 2.3 K/uL  
 ABS. LYMPHOCYTES 2.5 1.1 - 5.9 K/UL  
 DF AUTOMATED Results within ordered parameters to HOLD procrit today. Ms. Shania Maddox was discharged from [place] [address] in stable condition at 1430. She is to return on 2018 at 1400 for her next Procrit appointment. Marilyn Almanzar RN 2018 
1430

## 2018-12-13 LAB
CREAT UR-MCNC: 80.1 MG/DL (ref 30–125)
PROT UR-MCNC: 49 MG/DL
PROT/CREAT UR-RTO: 0.6

## 2018-12-13 PROCEDURE — 84156 ASSAY OF PROTEIN URINE: CPT

## 2018-12-26 ENCOUNTER — APPOINTMENT (OUTPATIENT)
Dept: INFUSION THERAPY | Age: 83
End: 2018-12-26
Payer: MEDICARE

## 2018-12-27 DIAGNOSIS — G89.29 CHRONIC PAIN OF LEFT KNEE: ICD-10-CM

## 2018-12-27 DIAGNOSIS — G89.4 CHRONIC PAIN SYNDROME: ICD-10-CM

## 2018-12-27 DIAGNOSIS — M25.562 CHRONIC PAIN OF LEFT KNEE: ICD-10-CM

## 2018-12-27 DIAGNOSIS — M54.2 CERVICALGIA: ICD-10-CM

## 2018-12-27 DIAGNOSIS — M54.5 CHRONIC BILATERAL LOW BACK PAIN, WITH SCIATICA PRESENCE UNSPECIFIED: ICD-10-CM

## 2018-12-27 DIAGNOSIS — G89.29 CHRONIC BILATERAL LOW BACK PAIN, WITH SCIATICA PRESENCE UNSPECIFIED: ICD-10-CM

## 2019-01-03 ENCOUNTER — APPOINTMENT (OUTPATIENT)
Dept: INFUSION THERAPY | Age: 84
End: 2019-01-03
Payer: MEDICARE

## 2019-01-03 RX ORDER — OXYCODONE HYDROCHLORIDE 15 MG/1
15 TABLET, FILM COATED, EXTENDED RELEASE ORAL EVERY 12 HOURS
Qty: 60 TAB | Refills: 0 | Status: SHIPPED | OUTPATIENT
Start: 2019-01-03 | End: 2019-01-30 | Stop reason: SDUPTHER

## 2019-01-03 NOTE — TELEPHONE ENCOUNTER
[facility] has called requesting a refill of their controlled medication, OxyContin 15 mg tab, for the management of chronic neck and knee pain. Last office visit date: 11/29/18 with Arben Lindsay and has a f/u appt 2/27/19 with eRn. Last opioid care agreement 11/19/18  Last UDS was done 11/29/18 (Oral Swab)    Date last  was pulled and reviewed : 1/3/19. Last fill date: 12/2/18    Was the patient compliant when the above report was pulled? yes    Analgesia: Patient reports 80% pain relief on current regimen. Aberrancies: No aberrancies noted in the last 30 days. ADL's: Patient states they are not able to perform ADL's on current regimen. Adverse Reaction: Patient reports no adverse reactions at this time. Provider's last note and plan of care reviewed? yes  Request forwarded to provider for review.

## 2019-01-08 ENCOUNTER — HOSPITAL ENCOUNTER (OUTPATIENT)
Dept: INFUSION THERAPY | Age: 84
Discharge: HOME | End: 2019-01-08
Payer: MEDICARE

## 2019-01-08 VITALS
OXYGEN SATURATION: 92 % | SYSTOLIC BLOOD PRESSURE: 154 MMHG | DIASTOLIC BLOOD PRESSURE: 50 MMHG | RESPIRATION RATE: 16 BRPM | HEART RATE: 56 BPM | TEMPERATURE: 97.9 F

## 2019-01-08 LAB
BASO+EOS+MONOS # BLD AUTO: 0.6 K/UL (ref 0–2.3)
BASO+EOS+MONOS NFR BLD AUTO: 10 % (ref 0.1–17)
DIFFERENTIAL METHOD BLD: ABNORMAL
ERYTHROCYTE [DISTWIDTH] IN BLOOD BY AUTOMATED COUNT: 12.3 % (ref 11.5–14.5)
HCT VFR BLD AUTO: 32.2 % (ref 36–48)
HGB BLD-MCNC: 10.1 G/DL (ref 12–16)
LYMPHOCYTES # BLD: 2.1 K/UL (ref 1.1–5.9)
LYMPHOCYTES NFR BLD: 36 % (ref 14–44)
MCH RBC QN AUTO: 28.6 PG (ref 25–35)
MCHC RBC AUTO-ENTMCNC: 31.4 G/DL (ref 31–37)
MCV RBC AUTO: 91.2 FL (ref 78–102)
NEUTS SEG # BLD: 3.3 K/UL (ref 1.8–9.5)
NEUTS SEG NFR BLD: 55 % (ref 40–70)
PLATELET # BLD AUTO: 181 K/UL (ref 140–440)
RBC # BLD AUTO: 3.53 M/UL (ref 4.1–5.1)
WBC # BLD AUTO: 6 K/UL (ref 4.5–13)

## 2019-01-08 PROCEDURE — 85025 COMPLETE CBC W/AUTO DIFF WBC: CPT

## 2019-01-08 PROCEDURE — 36415 COLL VENOUS BLD VENIPUNCTURE: CPT

## 2019-01-08 NOTE — PROGRESS NOTES
[facility] Progress Note Date: 2019 Name: Pita Murphy MRN: [id number] : 1922 PROCRIT INJECTON Ms. Yovanny Fortune was assessed and education was provided. Pt arrived to [facility] in wheelchair accompanied by her daughter at 0. Ms. Antony's vitals were reviewed and patient was observed for 5 minutes prior to treatment. Visit Vitals /50 (BP 1 Location: Left arm, BP Patient Position: Sitting) Pulse (!) 56 Temp 97.9 °F (36.6 °C) Resp 16 SpO2 92% Breastfeeding? No  
 
 
Blood drawn via left a/c venipuncture X1 attempt for CBC. Gauze and coban applied to site. Recent Results (from the past 12 hour(s)) CBC WITH 3 PART DIFF Collection Time: 19  2:17 PM  
Result Value Ref Range WBC 6.0 4.5 - 13.0 K/uL  
 RBC 3.53 (L) 4.10 - 5.10 M/uL  
 HGB 10.1 (L) 12.0 - 16 g/dL HCT 32.2 (L) 36 - 48 % MCV 91.2 78 - 102 FL  
 MCH 28.6 25.0 - 35.0 PG  
 MCHC 31.4 31 - 37 g/dL  
 RDW 12.3 11.5 - 14.5 % PLATELET 599 161 - 236 K/uL NEUTROPHILS 55 40 - 70 % MIXED CELLS 10 0.1 - 17 % LYMPHOCYTES 36 14 - 44 % ABS. NEUTROPHILS 3.3 1.8 - 9.5 K/UL  
 ABS. MIXED CELLS 0.6 0.0 - 2.3 K/uL  
 ABS. LYMPHOCYTES 2.1 1.1 - 5.9 K/UL  
 DF AUTOMATED Results within ordered parameters to HOLD procrit today. Ms. Yovanny Fortune was discharged from [place] [address] in stable condition at 1430. She is to return on 19 at 1400 for her next Procrit appointment. Talib Boggs RN 
2019 
1430

## 2019-01-21 ENCOUNTER — TELEPHONE (OUTPATIENT)
Dept: INTERNAL MEDICINE CLINIC | Age: 84
End: 2019-01-21

## 2019-01-22 ENCOUNTER — HOSPITAL ENCOUNTER (OUTPATIENT)
Dept: INFUSION THERAPY | Age: 84
Discharge: HOME | End: 2019-01-22
Payer: MEDICARE

## 2019-01-23 ENCOUNTER — TELEPHONE (OUTPATIENT)
Dept: INTERNAL MEDICINE CLINIC | Age: 84
End: 2019-01-23

## 2019-01-23 ENCOUNTER — OFFICE VISIT (OUTPATIENT)
Dept: INTERNAL MEDICINE CLINIC | Age: 84
End: 2019-01-23

## 2019-01-23 VITALS
HEIGHT: 62 IN | BODY MASS INDEX: 24.87 KG/M2 | SYSTOLIC BLOOD PRESSURE: 148 MMHG | TEMPERATURE: 98.8 F | DIASTOLIC BLOOD PRESSURE: 46 MMHG | RESPIRATION RATE: 20 BRPM | HEART RATE: 53 BPM | OXYGEN SATURATION: 93 %

## 2019-01-23 DIAGNOSIS — E53.8 B12 DEFICIENCY: ICD-10-CM

## 2019-01-23 DIAGNOSIS — M25.562 CHRONIC PAIN OF LEFT KNEE: ICD-10-CM

## 2019-01-23 DIAGNOSIS — G89.4 CHRONIC PAIN SYNDROME: ICD-10-CM

## 2019-01-23 DIAGNOSIS — M54.5 CHRONIC LOW BACK PAIN, UNSPECIFIED BACK PAIN LATERALITY, WITH SCIATICA PRESENCE UNSPECIFIED: ICD-10-CM

## 2019-01-23 DIAGNOSIS — G30.9 ALZHEIMER'S DEMENTIA WITH BEHAVIORAL DISTURBANCE, UNSPECIFIED TIMING OF DEMENTIA ONSET: ICD-10-CM

## 2019-01-23 DIAGNOSIS — D63.8 ANEMIA OF CHRONIC DISEASE: ICD-10-CM

## 2019-01-23 DIAGNOSIS — F02.81 ALZHEIMER'S DEMENTIA WITH BEHAVIORAL DISTURBANCE, UNSPECIFIED TIMING OF DEMENTIA ONSET: ICD-10-CM

## 2019-01-23 DIAGNOSIS — E61.1 IRON DEFICIENCY: ICD-10-CM

## 2019-01-23 DIAGNOSIS — Z00.00 ROUTINE GENERAL MEDICAL EXAMINATION AT HEALTH CARE FACILITY: ICD-10-CM

## 2019-01-23 DIAGNOSIS — Z00.00 ENCOUNTER FOR MEDICAL EXAMINATION TO ESTABLISH CARE: Primary | ICD-10-CM

## 2019-01-23 DIAGNOSIS — M54.2 CERVICALGIA: ICD-10-CM

## 2019-01-23 DIAGNOSIS — E87.5 CHRONIC HYPERKALEMIA: ICD-10-CM

## 2019-01-23 DIAGNOSIS — G89.29 CHRONIC LOW BACK PAIN, UNSPECIFIED BACK PAIN LATERALITY, WITH SCIATICA PRESENCE UNSPECIFIED: ICD-10-CM

## 2019-01-23 DIAGNOSIS — N18.9 CHRONIC KIDNEY DISEASE (CKD), ACTIVE MEDICAL MANAGEMENT WITHOUT DIALYSIS, UNSPECIFIED STAGE: ICD-10-CM

## 2019-01-23 DIAGNOSIS — G89.29 CHRONIC PAIN OF LEFT KNEE: ICD-10-CM

## 2019-01-23 RX ORDER — CHOLECALCIFEROL (VITAMIN D3) 25 MCG
2500 TABLET,CHEWABLE ORAL DAILY
COMMUNITY
End: 2021-01-01

## 2019-01-23 RX ORDER — CHOLECALCIFEROL (VITAMIN D3) 125 MCG
1 CAPSULE ORAL DAILY
COMMUNITY
End: 2021-01-01

## 2019-01-23 NOTE — PROGRESS NOTES
[address] [facility] presents today for   Chief Complaint   Patient presents with   [address] [place] preferred language for health care discussion is english/other. Depression Screening:  PHQ over the last two weeks 1/23/2019 11/29/2018 9/26/2018 6/8/2018 3/28/2018 10/4/2017 2/2/2017   PHQ Not Done - Medical Reason (indicate in comments) - - - - Active Diagnosis of Depression or Bipolar Disorder   Little interest or pleasure in doing things Not at all - Not at all Not at all Several days Not at all -   Feeling down, depressed, irritable, or hopeless Not at all - Not at all Not at all Several days Not at all -   Total Score PHQ 2 0 - 0 0 2 0 -       Learning Assessment:  Learning Assessment 6/8/2018 10/4/2017 3/31/2015 1/10/2014   PRIMARY LEARNER Patient Patient Patient Patient   HIGHEST LEVEL OF EDUCATION - PRIMARY LEARNER  GRADUATED [id number] GED - - -   BARRIERS PRIMARY LEARNER NONE - - -   CO-LEARNER CAREGIVER No - - -   PRIMARY LANGUAGE ENGLISH ENGLISH ENGLISH ENGLISH   LEARNER PREFERENCE PRIMARY LISTENING LISTENING LISTENING READING   ANSWERED BY self patient patient patient   RELATIONSHIP SELF SELF SELF SELF       Abuse Screening:  Abuse Screening Questionnaire 6/8/2018 10/4/2017   Do you ever feel afraid of your partner? N N   Are you in a relationship with someone who physically or mentally threatens you? N N   Is it safe for you to go home? Y Y       Fall Risk  Fall Risk Assessment, last 12 mths 1/23/2019 9/26/2018 6/8/2018 3/28/2018 10/4/2017   Able to walk? No Yes Yes Yes Yes   Fall in past 12 months? - No No No No       Visit Vitals  /46 (BP 1 Location: Left arm, BP Patient Position: Sitting)   Pulse (!) 53   Temp 98.8 °F (37.1 °C) (Oral)   Resp 20   Ht 5' 2\" (1.575 m)   SpO2 93%   BMI 24.87 kg/m²       Health Maintenance reviewed and discussed per provider.  Yes    Bailey Cline is due for   Health Maintenance Due   Topic Date Due    DTaP/Tdap/Td series (1 - Tdap) 09[birth year]02/1943    Shingrix Vaccine Age 50> (1 of 2) 09/02/1972    GLAUCOMA SCREENING Q2Y  09/02/1987    Pneumococcal 65+ Low/Medium Risk (2 of 2 - PCV13) 11/01/2016    MEDICARE YEARLY EXAM  03/20/2018    Influenza Age 9 to Adult  08/01/2018     Please order/place referral if appropriate. Advance Directive:  1. Do you have an advance directive in place? Patient Reply: Yes    2. If not, would you like material regarding how to put one in place? Patient Reply: No    Coordination of Care:  1. Have you been to the ER, urgent care clinic since your last visit? Hospitalized since your last visit?  NO

## 2019-01-23 NOTE — PROGRESS NOTES
Sy Sylvester is a 80 y.o.  female and presents with    Chief Complaint   Patient presents with    Establish Care       Subjective:  HPI  Mrs. Clay Martin presents today with her family to establish care. She lives with her oldest daughter, Braxton Friend, and visits with other daughter, Cathi Pike. Mrs. Clay Martin was under the care- Dr. Patty Zuluaga, Dr. Eber Michael (left practice)- psychologist. She is taking Seroquel for dementia as she was acting with aggressive behavior related to diagnosis of alzheimer dementia. Seroquel dose is 200 mg at bedtime as causes daytime drowsiness. She is also under the care of pain management and would like to see if I can manage her pain medications. Last pain management note reports chronic pain management x 20-30 years for cervicalgia, low back pain, and left knee pain. They recommend that the patient's chronic pain be handled as a palliative service and DO NOT recommend further opioid wean at this time. They have also requested a TENs unit, Biofreeze, Lidoderm patches(denied by insurance), and capsaicin cream.     She has a history of B12 deficiency and iron deficiency. She receives iron checks monthly and has not needed infusion x 1 year. Dr. Darya López follows for hyperkalemia and anemia of chronic disease. Vitamin d 55.8 12/218. BMP K 4.0, Cr 2.65, GFR 20. CBC hgb 10.3, hct 33.3, rdw normal    Additional Concerns: none     ROS   Review of Systems   Constitutional: Negative. HENT: Negative. Eyes: Negative. Respiratory: Negative. Cardiovascular: Negative. Gastrointestinal: Negative. Genitourinary: Negative. Musculoskeletal: Negative. Skin: Positive for rash. Neurological: Negative. Endo/Heme/Allergies: Negative. Psychiatric/Behavioral: Negative.         Allergies   Allergen Reactions    Celebrex [Celecoxib] Unable to Obtain    Codeine Unable to Obtain    Flexeril [Cyclobenzaprine] Unable to Obtain    Macrobid [Nitrofurantoin Monohyd/M-Cryst] Unable to Obtain    Pcn [Penicillins] Unable to Obtain    Remeron [Mirtazapine] Unable to Obtain    Sulfa (Sulfonamide Antibiotics) Other (comments)     Swelling and low heart rate    Vicodin [Hydrocodone-Acetaminophen] Unable to Obtain       Current Outpatient Medications   Medication Sig Dispense Refill    cyanocobalamin, vitamin B-12, 2,500 mcg chew Take  by mouth.  cholecalciferol, vitamin D3, (VITAMIN D3) 2,000 unit tab Take  by mouth.  menthol (BIOFREEZE, MENTHOL,) 4 % gel Apply generous amount to affected area up to 3 times daily. 1 Tube 3    naloxone (NARCAN) 4 mg/actuation nasal spray Use 1 spray intranasally, then discard. Repeat with new spray every 2 min as needed for opioid overdose symptoms, alternating nostrils. Indications: OPIATE-INDUCED RESPIRATORY DEPRESSION, Opioid Toxicity 1 Each 0    polyethylene glycol (MIRALAX) 17 gram packet Take 17 g by mouth daily.  pantoprazole (PROTONIX) 40 mg tablet Take 40 mg by mouth daily.  amlodipine (NORVASC) 10 mg tablet Take 10 mg by mouth daily.  furosemide (LASIX) 20 mg tablet Take  by mouth two (2) times a week.  quetiapine (SEROQUEL) 100 mg tablet Take 200 mg by mouth nightly.  donepezil (ARICEPT) 10 mg tablet Take 10 mg by mouth nightly.  [START ON 2/1/2019] oxyCODONE ER (OXYCONTIN) 15 mg ER tablet Take 1 Tab by mouth every twelve (12) hours for 30 days.  Max Daily Amount: 30 mg. 60 Tab 0       Social History     Socioeconomic History    Marital status: SINGLE     Spouse name: Not on file    Number of children: Not on file    Years of education: Not on file    Highest education level: Not on file   Social Needs    Financial resource strain: Not on file    Food insecurity - worry: Not on file    Food insecurity - inability: Not on file    Transportation needs - medical: Not on file   [employer] needs - non-medical: Not on file   Occupational History    Not on file   Tobacco Use    Smoking status: Never Smoker    Smokeless tobacco: Never Used   Substance and Sexual Activity    Alcohol use: No    Drug use: No    Sexual activity: No   Other Topics Concern    Not on file   Social History Narrative    Not on file       Past Medical History:   Diagnosis Date    Anxiety     Brachial neuritis or radiculitis NOS     Cervicalgia     Cervicalgia     Dementia     Diabetes ([facility] [address]     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     DJD (degenerative joint disease)     Encounter for long-term (current) use of other medications     Fracture 2014    left shoulder    GERD (gastroesophageal reflux disease)     Heart murmur     Hyperkalemia 2011    Hypertension     Lumbago     Pain in joint, lower leg     Pain in joint, multiple sites        Past Surgical History:   Procedure Laterality Date    HX BLADDER REPAIR      prolapsed    HX CATARACT REMOVAL  1991    HX CATARACT REMOVAL  1990    HX CHOLECYSTECTOMY  5/1998    HX HERNIA REPAIR  7/1998    x2    HX HYSTERECTOMY  1966    HX OOPHORECTOMY         Family History   Problem Relation Age of Onset    Diabetes Other     Hypertension Other     Stroke Other     Diabetes Mother        Objective:  Vitals:    01/23/19 1320   BP: 148/46   Pulse: (!) 53   Resp: 20   Temp: 98.8 °F (37.1 °C)   TempSrc: Oral   SpO2: 93%   Height: 5' 2\" (1.575 m)   PainSc:   4   PainLoc: Generalized       LABS   Results for orders placed or performed during the hospital encounter of 01/08/19   CBC WITH 3 PART DIFF   Result Value Ref Range    WBC 6.0 4.5 - 13.0 K/uL    RBC 3.53 (L) 4.10 - 5.10 M/uL    HGB 10.1 (L) 12.0 - 16 g/dL    HCT 32.2 (L) 36 - 48 %    MCV 91.2 78 - 102 FL    MCH 28.6 25.0 - 35.0 PG    MCHC 31.4 31 - 37 g/dL    RDW 12.3 11.5 - 14.5 %    PLATELET 109 949 - 889 K/uL    NEUTROPHILS 55 40 - 70 %    MIXED CELLS 10 0.1 - 17 %    LYMPHOCYTES 36 14 - 44 %    ABS. NEUTROPHILS 3.3 1.8 - 9.5 K/UL    ABS.  MIXED CELLS 0.6 0.0 - 2.3 K/uL    ABS. LYMPHOCYTES 2.1 1.1 - 5.9 K/UL    DF AUTOMATED         TESTS  none    PE  Physical Exam   Constitutional: She appears well-developed and well-nourished. No distress. HENT:   Head: Normocephalic and atraumatic. Right Ear: External ear normal.   Left Ear: External ear normal.   Nose: Nose normal.   Mouth/Throat: Oropharynx is clear and moist. No oropharyngeal exudate. Eyes: Conjunctivae and EOM are normal. Pupils are equal, round, and reactive to light. Neck: Normal range of motion. Cardiovascular: Normal rate, regular rhythm and intact distal pulses. Murmur heard. Pulmonary/Chest: Effort normal and breath sounds normal. No respiratory distress. She has no wheezes. She has no rales. She exhibits no tenderness. Abdominal: Soft. Bowel sounds are normal. She exhibits no distension. There is no tenderness. Musculoskeletal: Normal range of motion. Lymphadenopathy:     She has no cervical adenopathy. Neurological: She is alert. No cranial nerve deficit. She is in a wheelchair during visit however instructed to walk to examination table and with minimal assistance/guidance she was able to do so with mostly stable gait, she did reach for the examination table,    Skin: Skin is warm and dry. Rash noted. She is not diaphoretic. Psychiatric: She has a normal mood and affect. Her behavior is normal. Judgment and thought content normal.   Vitals reviewed. Assessment/Plan:    1. Establish care- CPE today, no labs today as reviewed prior. 2. CKD with chronic anemia- Under the care of Dr. Amber Walker and Dr. Dante Dubon.     3. Chronic pain syndrome- I understand concern to minimize providers due to age however upon review of pain managements last note I feel it is better suited for the patient to remain under their care since the management is palliative and due to extensive time on the medication if weaning is needed this would be a delicate situation related to withdrawal symptoms and alzheimer dementia. 4. Alzheimer Dementia with history of behavioral disturbance- She is taking Aricept 10 mg daily and Seroquel 200 mg. I do not mind managing this therapy however I would like to discuss possibly weaning. I did speak with Marychuy MADSEN and due to chronicity of therapy recommends if wean that do so at 50 mg over a month at a time. Research has reported that atypical neuroleptics may increase mortality and are not approved by the FDA for the treatment of behavioral disorders in patients with dementia, however the benefit may outweigh the risk. So I will review this with the family at our next meeting. 5. Abrasion/skin breakdown to buttock- monitor, looks to be resolving with barrier cream. No s/s of secondary infection. Lab review: no lab studies available for review at time of visit, reviewed last labs dated 1/8/19, 12/13/18, 12/11/18- no need on my end to collect labs today. Today's Visit:   Diagnoses and all orders for this visit:    1. Encounter for medical examination to establish care    2. Routine general medical examination at health care facility    3. Chronic pain syndrome  Assessment & Plan:            4. Alzheimer's dementia with behavioral disturbance, unspecified timing of dementia onset    5. B12 deficiency    6. Iron deficiency    7. Chronic hyperkalemia    8. Anemia of chronic disease    9. Chronic kidney disease (CKD), active medical management without dialysis, unspecified stage    10. Cervicalgia    11. Chronic low back pain, unspecified back pain laterality, with sciatica presence unspecified    12. Chronic pain of left knee          Health Maintenance: To be discussed on follow up. I have discussed the diagnosis with the patient and the intended plan as seen in the above orders. The patient has received an after-visit summary and questions were answered concerning future plans. I have discussed medication side effects and warnings with the patient as well.  I have reviewed the plan of care with the patient, accepted their input and they are in agreement with the treatment goals. Follow-up Disposition: Not on File   More than 1/2 of this 60 minute visit was spent in counseling and coordination of care, as described above.     CAIN GraysonC  Internist of [address], [address] [place] [address].  Phone: [phone]  Fax: [phone]

## 2019-01-23 NOTE — TELEPHONE ENCOUNTER
Patient contacted, patient identified with two identifiers (Name & ). Patient is establishing care due to previous PCP is no longer accepting her insurance. Patient understood directions and had no further questions at this time.

## 2019-01-29 ENCOUNTER — HOSPITAL ENCOUNTER (OUTPATIENT)
Dept: INFUSION THERAPY | Age: 84
Discharge: HOME | End: 2019-01-29
Payer: MEDICARE

## 2019-01-29 VITALS
HEART RATE: 58 BPM | SYSTOLIC BLOOD PRESSURE: 166 MMHG | DIASTOLIC BLOOD PRESSURE: 62 MMHG | OXYGEN SATURATION: 92 % | RESPIRATION RATE: 18 BRPM | TEMPERATURE: 97.8 F

## 2019-01-29 LAB
BASO+EOS+MONOS # BLD AUTO: 0.6 K/UL (ref 0–2.3)
BASO+EOS+MONOS NFR BLD AUTO: 10 % (ref 0.1–17)
DIFFERENTIAL METHOD BLD: ABNORMAL
ERYTHROCYTE [DISTWIDTH] IN BLOOD BY AUTOMATED COUNT: 12.1 % (ref 11.5–14.5)
HCT VFR BLD AUTO: 32.2 % (ref 36–48)
HGB BLD-MCNC: 9.9 G/DL (ref 12–16)
LYMPHOCYTES # BLD: 2.6 K/UL (ref 1.1–5.9)
LYMPHOCYTES NFR BLD: 44 % (ref 14–44)
MCH RBC QN AUTO: 28 PG (ref 25–35)
MCHC RBC AUTO-ENTMCNC: 30.7 G/DL (ref 31–37)
MCV RBC AUTO: 91 FL (ref 78–102)
NEUTS SEG # BLD: 2.8 K/UL (ref 1.8–9.5)
NEUTS SEG NFR BLD: 47 % (ref 40–70)
PLATELET # BLD AUTO: 149 K/UL (ref 140–440)
RBC # BLD AUTO: 3.54 M/UL (ref 4.1–5.1)
WBC # BLD AUTO: 6 K/UL (ref 4.5–13)

## 2019-01-29 PROCEDURE — 85025 COMPLETE CBC W/AUTO DIFF WBC: CPT

## 2019-01-29 PROCEDURE — 36415 COLL VENOUS BLD VENIPUNCTURE: CPT

## 2019-01-29 NOTE — PROGRESS NOTES
[facility] Progress Note Date: 2019 Name: Kirstie Duncan MRN: [id number] : 1922 PROCRIT INJECTON Ms. Nicho Iverson was assessed and education was provided. Pt arrived to [facility] in wheelchair accompanied by her daughter at 0. Ms. Antony's vitals were reviewed and patient was observed for 5 minutes prior to treatment. Visit Vitals /62 (BP 1 Location: Right arm, BP Patient Position: Sitting) Pulse (!) 58 Temp 97.8 °F (36.6 °C) Resp 18 SpO2 92% Blood drawn via left a/c venipuncture X1 attempt for CBC. Gauze and coban applied to site. Recent Results (from the past 12 hour(s)) CBC WITH 3 PART DIFF Collection Time: 19  2:10 PM  
Result Value Ref Range WBC 6.0 4.5 - 13.0 K/uL  
 RBC 3.54 (L) 4.10 - 5.10 M/uL HGB 9.9 (L) 12.0 - 16 g/dL HCT 32.2 (L) 36 - 48 % MCV 91.0 78 - 102 FL  
 MCH 28.0 25.0 - 35.0 PG  
 MCHC 30.7 (L) 31 - 37 g/dL  
 RDW 12.1 11.5 - 14.5 % PLATELET 430 469 - 828 K/uL NEUTROPHILS 47 40 - 70 % MIXED CELLS 10 0.1 - 17 % LYMPHOCYTES 44 14 - 44 % ABS. NEUTROPHILS 2.8 1.8 - 9.5 K/UL  
 ABS. MIXED CELLS 0.6 0.0 - 2.3 K/uL  
 ABS. LYMPHOCYTES 2.6 1.1 - 5.9 K/UL  
 DF AUTOMATED Results within ordered parameters to HOLD procrit today. Ms. Nicho Iverson was discharged from [place] [address] in stable condition at [id number]. She is to return on 19 for her next Procrit appointment. Brendan Lord RN 
2019 
[id number]

## 2019-01-30 ENCOUNTER — TELEPHONE (OUTPATIENT)
Dept: INTERNAL MEDICINE CLINIC | Age: 84
End: 2019-01-30

## 2019-01-30 DIAGNOSIS — M54.2 CERVICALGIA: ICD-10-CM

## 2019-01-30 DIAGNOSIS — G89.4 CHRONIC PAIN SYNDROME: ICD-10-CM

## 2019-01-30 DIAGNOSIS — M54.5 CHRONIC BILATERAL LOW BACK PAIN, WITH SCIATICA PRESENCE UNSPECIFIED: ICD-10-CM

## 2019-01-30 DIAGNOSIS — G89.29 CHRONIC PAIN OF LEFT KNEE: ICD-10-CM

## 2019-01-30 DIAGNOSIS — M25.562 CHRONIC PAIN OF LEFT KNEE: ICD-10-CM

## 2019-01-30 DIAGNOSIS — G89.29 CHRONIC BILATERAL LOW BACK PAIN, WITH SCIATICA PRESENCE UNSPECIFIED: ICD-10-CM

## 2019-01-30 PROBLEM — G30.9 ALZHEIMER'S DEMENTIA WITH BEHAVIORAL DISTURBANCE (HCC): Status: ACTIVE | Noted: 2019-01-30

## 2019-01-30 PROBLEM — E87.5 CHRONIC HYPERKALEMIA: Status: ACTIVE | Noted: 2019-01-30

## 2019-01-30 PROBLEM — D63.8 ANEMIA OF CHRONIC DISEASE: Status: ACTIVE | Noted: 2019-01-30

## 2019-01-30 PROBLEM — E53.8 B12 DEFICIENCY: Status: ACTIVE | Noted: 2019-01-30

## 2019-01-30 PROBLEM — F02.818 ALZHEIMER'S DEMENTIA WITH BEHAVIORAL DISTURBANCE (HCC): Status: ACTIVE | Noted: 2019-01-30

## 2019-01-30 PROBLEM — E61.1 IRON DEFICIENCY: Status: ACTIVE | Noted: 2019-01-30

## 2019-01-30 RX ORDER — OXYCODONE HYDROCHLORIDE 15 MG/1
15 TABLET, FILM COATED, EXTENDED RELEASE ORAL EVERY 12 HOURS
Qty: 60 TAB | Refills: 0 | Status: SHIPPED | OUTPATIENT
Start: 2019-02-01 | End: 2019-02-27 | Stop reason: SDUPTHER

## 2019-01-30 RX ORDER — BLOOD SUGAR DIAGNOSTIC
STRIP MISCELLANEOUS
Refills: 3 | COMMUNITY
Start: 2019-01-22 | End: 2021-01-01

## 2019-01-30 NOTE — TELEPHONE ENCOUNTER
Please inform the family that I understand concern to minimize providers due to age however upon review of pain managements last note I feel it is better suited for the patient to remain under their care since the management is palliative and due to extensive time on the medication if weaning is needed this would be a delicate situation related to withdrawal symptoms and alzheimer dementia. Also if they are able to obtain the psychiatry notes that would be great. I can manage the Seroquel however will discuss this further on follow up in April.

## 2019-01-30 NOTE — TELEPHONE ENCOUNTER
Lynnette Frank has called requesting a refill of their controlled medication, Oxycodone 10mg ER, for the management of chronic pain. Last office visit date: 11/29/18  Last opioid care agreement 11/2018  Last UDS was done 6/8/18    Date last  was pulled and reviewed : 1/30/19  Last fill date for medication was 1/3/19    Was the patient compliant when the above report was pulled? yes    Analgesia: Patient reports 80% pain relief on current regimen. Aberrancies: No aberrancies noted in the last 30 days. ADL's:Patient states they are able to perform ADL's on current regimen. with assistance      Adverse Reaction: Patient reports no adverse reactions at this time. Provider's last note and plan of care reviewed? yes  Request forwarded to provider for review.

## 2019-01-31 NOTE — TELEPHONE ENCOUNTER
09:36 am I spoke with Ms. Shanon Berger who has access to all in behalf of  Osmani Emeterio. I notified her that the Rx for Oxycodone is ready for .

## 2019-01-31 NOTE — TELEPHONE ENCOUNTER
Patient contacted, patient identified with two identifiers (Name & ). Informed patient's daughter that pain management would be continuing her care per NP Marta Key. Patient understood directions and had no further questions at this time.

## 2019-02-04 ENCOUNTER — TELEPHONE (OUTPATIENT)
Dept: PAIN MANAGEMENT | Age: 84
End: 2019-02-04

## 2019-02-04 NOTE — TELEPHONE ENCOUNTER
Oxycontin 15mg #60/30 was approved by [facility]. Faxed to pharmacy. Pt notified.

## 2019-02-04 NOTE — TELEPHONE ENCOUNTER
10:55 am I spoke to Ms. Dorinda Ferrari the Patient's daughter who has all access and I let her know that the Rx for Oxycodone has been approved by the Ins.  Co.

## 2019-02-05 ENCOUNTER — APPOINTMENT (OUTPATIENT)
Dept: INFUSION THERAPY | Age: 84
End: 2019-02-05
Payer: MEDICARE

## 2019-02-19 ENCOUNTER — APPOINTMENT (OUTPATIENT)
Dept: INFUSION THERAPY | Age: 84
End: 2019-02-19
Payer: MEDICARE

## 2019-02-26 ENCOUNTER — HOSPITAL ENCOUNTER (OUTPATIENT)
Dept: INFUSION THERAPY | Age: 84
Discharge: HOME | End: 2019-02-26
Payer: MEDICARE

## 2019-02-26 VITALS
DIASTOLIC BLOOD PRESSURE: 60 MMHG | RESPIRATION RATE: 18 BRPM | SYSTOLIC BLOOD PRESSURE: 169 MMHG | HEART RATE: 54 BPM | TEMPERATURE: 98.7 F | OXYGEN SATURATION: 90 %

## 2019-02-26 LAB
BASO+EOS+MONOS # BLD AUTO: 0.6 K/UL (ref 0–2.3)
BASO+EOS+MONOS NFR BLD AUTO: 13 % (ref 0.1–17)
DIFFERENTIAL METHOD BLD: ABNORMAL
ERYTHROCYTE [DISTWIDTH] IN BLOOD BY AUTOMATED COUNT: 11.8 % (ref 11.5–14.5)
HCT VFR BLD AUTO: 32.9 % (ref 36–48)
HGB BLD-MCNC: 10.1 G/DL (ref 12–16)
LYMPHOCYTES # BLD: 2.2 K/UL (ref 1.1–5.9)
LYMPHOCYTES NFR BLD: 45 % (ref 14–44)
MCH RBC QN AUTO: 28.1 PG (ref 25–35)
MCHC RBC AUTO-ENTMCNC: 30.7 G/DL (ref 31–37)
MCV RBC AUTO: 91.4 FL (ref 78–102)
NEUTS SEG # BLD: 2.1 K/UL (ref 1.8–9.5)
NEUTS SEG NFR BLD: 43 % (ref 40–70)
PLATELET # BLD AUTO: 176 K/UL (ref 140–440)
RBC # BLD AUTO: 3.6 M/UL (ref 4.1–5.1)
WBC # BLD AUTO: 4.9 K/UL (ref 4.5–13)

## 2019-02-26 PROCEDURE — 36415 COLL VENOUS BLD VENIPUNCTURE: CPT

## 2019-02-26 PROCEDURE — 85025 COMPLETE CBC W/AUTO DIFF WBC: CPT

## 2019-02-26 NOTE — PROGRESS NOTES
[facility] Progress Note Date: 2019 Name: Razia Gonzalez MRN: [id number] : 1922 PROCRIT INJECTON Ms. Henna Sanchez was assessed and education was provided. Pt arrived to [facility] in wheelchair accompanied by her daughter at 0. Ms. Antony's vitals were reviewed and patient was observed for 5 minutes prior to treatment. Visit Vitals /60 (BP 1 Location: Left arm, BP Patient Position: Sitting) Pulse (!) 54 Temp 98.7 °F (37.1 °C) Resp 18 SpO2 90% Breastfeeding? No  
 
 
Blood drawn via left a/c venipuncture X1 attempt for CBC. Gauze and coban applied to site. Recent Results (from the past 12 hour(s)) CBC WITH 3 PART DIFF Collection Time: 19  2:09 PM  
Result Value Ref Range WBC 4.9 4.5 - 13.0 K/uL  
 RBC 3.60 (L) 4.10 - 5.10 M/uL  
 HGB 10.1 (L) 12.0 - 16 g/dL HCT 32.9 (L) 36 - 48 % MCV 91.4 78 - 102 FL  
 MCH 28.1 25.0 - 35.0 PG  
 MCHC 30.7 (L) 31 - 37 g/dL  
 RDW 11.8 11.5 - 14.5 % PLATELET 200 304 - 876 K/uL NEUTROPHILS 43 40 - 70 % MIXED CELLS 13 0.1 - 17 % LYMPHOCYTES 45 (H) 14 - 44 % ABS. NEUTROPHILS 2.1 1.8 - 9.5 K/UL  
 ABS. MIXED CELLS 0.6 0.0 - 2.3 K/uL  
 ABS. LYMPHOCYTES 2.2 1.1 - 5.9 K/UL  
 DF AUTOMATED Results within ordered parameters to HOLD procrit today. Ms. Henna Sanchez was discharged from [place] [address] in stable condition at 1425. She is to return on 19 for her next Procrit appointment. Susi Mcintosh RN 2019 
[phone]

## 2019-02-27 ENCOUNTER — OFFICE VISIT (OUTPATIENT)
Dept: PAIN MANAGEMENT | Age: 84
End: 2019-02-27

## 2019-02-27 VITALS
DIASTOLIC BLOOD PRESSURE: 53 MMHG | BODY MASS INDEX: 25.03 KG/M2 | OXYGEN SATURATION: 95 % | HEIGHT: 62 IN | HEART RATE: 50 BPM | TEMPERATURE: 97 F | SYSTOLIC BLOOD PRESSURE: 168 MMHG | WEIGHT: 136 LBS | RESPIRATION RATE: 14 BRPM

## 2019-02-27 DIAGNOSIS — G89.4 CHRONIC PAIN SYNDROME: ICD-10-CM

## 2019-02-27 DIAGNOSIS — M25.562 CHRONIC PAIN OF LEFT KNEE: ICD-10-CM

## 2019-02-27 DIAGNOSIS — Z79.899 ENCOUNTER FOR LONG-TERM (CURRENT) USE OF HIGH-RISK MEDICATION: ICD-10-CM

## 2019-02-27 DIAGNOSIS — M54.5 CHRONIC BILATERAL LOW BACK PAIN, WITH SCIATICA PRESENCE UNSPECIFIED: ICD-10-CM

## 2019-02-27 DIAGNOSIS — G89.29 CHRONIC PAIN OF LEFT KNEE: ICD-10-CM

## 2019-02-27 DIAGNOSIS — M54.2 CERVICALGIA: Primary | ICD-10-CM

## 2019-02-27 DIAGNOSIS — G89.29 CHRONIC BILATERAL LOW BACK PAIN, WITH SCIATICA PRESENCE UNSPECIFIED: ICD-10-CM

## 2019-02-27 RX ORDER — OXYCODONE HYDROCHLORIDE 15 MG/1
15 TABLET, FILM COATED, EXTENDED RELEASE ORAL EVERY 12 HOURS
Qty: 60 TAB | Refills: 0 | Status: SHIPPED | OUTPATIENT
Start: 2019-03-06 | End: 2019-04-04 | Stop reason: SDUPTHER

## 2019-02-27 NOTE — PROGRESS NOTES
Nursing Notes    Patient presents to the office today in follow-up. Patient rates her pain at 5/10 on the numerical pain scale. Reviewed medications with counts as follows:    Rx Date filled Qty Dispensed Pill Count Last Dose Short   Oxycontin 15 mg 02/04/19 60 12 This am  no        reviewed YES  Any aberrancies noted on  NO  Last opioid agreement 11/29/18  Last urine drug screen 11/29/18    Comments:  Patient is here today for a follow up appt today for her chronic  Pain. She states her pain level today is a 5  PHQ 2 was done patient denies any depression. She has seen her nephrologist and labs were taken at [facility]. She sees Hematology twice a week to check her iron levels      POC UDS was not performed in office today    Any new labs or imaging since last appointment? YES labs taken for nephrology     Have you been to an emergency room (ER) or urgent care clinic since your last visit? NO            Have you been hospitalized since your last visit? NO     If yes, where, when, and reason for visit? Have you seen or consulted any other health care providers outside of the [address] [place] [address]  since your last visit? YES     If yes, where, when, and reason for visit? Ms. Clay Martin has a reminder for a \"due or due soon\" health maintenance. I have asked that she contact her primary care provider for follow-up on this health maintenance.

## 2019-03-01 NOTE — PROGRESS NOTES
Referred prior to 2011 by primary care provider for back shoulder and knee pain      Calculated MEQ - 45  Naloxone rescue - yes  Prophylactic bowel program - yes  Date of last OCA 11/20/2019 signed by medical surrogate  Last oral swab 11/29/18   date checked today, findings consistent      Today and Last Visit  PGIC - unobtainable due to dementia  MIGUEL ÁNGEL - unobtainable due to dementia  COMM - unobtainable due to dementia      HPI:  Farrukh Solo is a 80 y.o. female here for f/u visit for ongoing evaluation of chronic neck and knee pain. Pt was last seen here on 11/29/18. Pt denies interval changes on the character or distribution of pain; she states she is feeling \"pretty good\". Pain is located to neck, left knee and lower back. The pain is described as aching. Pain at its best is 5/10. Pain at its worse is 5/10. Symptoms are improved by Biofreeze and OxyContin. ROS:  Unobtainable secondary to dementia      Opioid specific risk: dementia, anxiety, diabetes, GERD. Opioid specific history: chronic opioid therapy for 20-30 years. Vitals:    02/27/19 1455   BP: 168/53   Pulse: (!) 50   Resp: 14   Temp: 97 °F (36.1 °C)   SpO2: 95%   Weight: 61.7 kg (136 lb)   Height: 5' 2\" (1.575 m)   PainSc:   5   PainLoc: Knee          Physical exam:  AFVSS with bradycardia, no acute distress, normal body habitus. A&OXs 2. Normocephalic, atraumatic. Conjugate gaze, clear sclerae. Speech is clear but not appropriate. Mood is appropriate and patient is cooperative. Patient is seated slumped in a manual wheelchair with extreme cervical flexion with slight rotation to the right. Non-labored breathing.          Primary Care Physician  [address] [address]  [phone]      PHQ -- .  3 most recent PHQ Screens 2/27/2019   PHQ Not Done -   Little interest or pleasure in doing things Not at all   Feeling down, depressed, irritable, or hopeless Not at all   Total Score PHQ 2 0 DSM V-OUD Screen - unobtainable    Assessment/Plan:     ICD-10-CM ICD-9-CM    1. Cervicalgia M54.2 723.1 oxyCODONE ER (OXYCONTIN) 15 mg ER tablet   2. Chronic pain of left knee M25.562 719.46 oxyCODONE ER (OXYCONTIN) 15 mg ER tablet    G89.29 338.29    3. Chronic bilateral low back pain, with sciatica presence unspecified M54.5 724.2 oxyCODONE ER (OXYCONTIN) 15 mg ER tablet    G89.29 338.29    4. Chronic pain syndrome G89.4 338.4 oxyCODONE ER (OXYCONTIN) 15 mg ER tablet   5. Encounter for long-term (current) use of high-risk medication Z79.899 V58.69         Patients chronic pain due to advanced dementia should be handled as a palliative service and therefore no further opioid wean at this time is recommended as she feels to be comfortable on the current dosage of OxyContin 15mg twice a day. Medical surrogate has Narcan at home and is aware of withdrawal signs and overdose. Pt has not obtained TENS unit as recommended. Lidoderm patches were denied by her insurance. Medical surrogate did not obtain capsaicin cream stating Biofreeze works well for her; continue the use of Biofreeze as needed. Pt and medical surrogate instructed to call 5-7 days before they run out of their medications for refill. Follow up ongoing assessment and ongoing development of integrative and comprehensive plan of care for chronic pain. Goals: To establish complementary and integrative plan of care to address chronic pain issues while minimizing pharmaceuticals to maximize patient's function improve quality of life. Education:  Patient again educated on the importance of strict compliance with the opioid care agreement while on opioid therapy. Patient also again educated that they should avoid driving while on chronic opioid therapy. Also advised to avoid alcohol and to avoid benzodiazepines while on opioid therapy. Handouts given regarding opioid safety.     Follow-up Disposition:  Return in about 3 months (around 5/27/2019) for 30 min. [address] was used for portions of this report. Unintended errors may occur.         Social History     Socioeconomic History    Marital status: SINGLE     Spouse name: Not on file    Number of children: Not on file    Years of education: Not on file    Highest education level: Not on file   Social Needs    Financial resource strain: Not on file    Food insecurity - worry: Not on file    Food insecurity - inability: Not on file    Transportation needs - medical: Not on file   [facility] needs - non-medical: Not on file   Occupational History    Not on file   Tobacco Use    Smoking status: Never Smoker    Smokeless tobacco: Never Used   Substance and Sexual Activity    Alcohol use: No    Drug use: No    Sexual activity: No   Other Topics Concern    Not on file   Social History Narrative    Not on file     Family History   Problem Relation Age of Onset    Diabetes Other     Hypertension Other     Stroke Other     Diabetes Mother      Allergies   Allergen Reactions    Celebrex [Celecoxib] Unable to Obtain    Codeine Unable to Obtain    Flexeril [Cyclobenzaprine] Unable to [address] [place] [Nitrofurantoin Monohyd/M-Cryst] Unable to [address] [place] [Penicillins] Unable to Obtain    Remeron [Mirtazapine] Unable to Obtain    Sulfa (Sulfonamide Antibiotics) Other (comments)     Swelling and low heart rate    Vicodin [Hydrocodone-Acetaminophen] Unable to Obtain     Past Medical History:   Diagnosis Date    Anxiety     Brachial neuritis or radiculitis NOS     Cervicalgia     Cervicalgia     Dementia     Diabetes ([facility] [address]     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     DJD (degenerative joint disease)     Encounter for long-term (current) use of other medications     Fracture 2014    left shoulder    GERD (gastroesophageal reflux disease)     Heart murmur     Hyperkalemia 2011    Hypertension     Lumbago  Pain in joint, lower leg     Pain in joint, multiple sites      Past Surgical History:   Procedure Laterality Date    HX BLADDER REPAIR      prolapsed    HX CATARACT REMOVAL  1991    HX CATARACT REMOVAL  1990    HX CHOLECYSTECTOMY  5/1998    HX HERNIA REPAIR  7/1998    x2    HX HYSTERECTOMY  1966    HX OOPHORECTOMY       Current Outpatient Medications on File Prior to Visit   Medication Sig    CONTOUR NEXT TEST STRIPS strip USE TO TEST BLOOD SUGAR D    cyanocobalamin, vitamin B-12, 2,500 mcg chew Take  by mouth.  cholecalciferol, vitamin D3, (VITAMIN D3) 2,000 unit tab Take  by mouth.  menthol (BIOFREEZE, MENTHOL,) 4 % gel Apply generous amount to affected area up to 3 times daily.  naloxone (NARCAN) 4 mg/actuation nasal spray Use 1 spray intranasally, then discard. Repeat with new spray every 2 min as needed for opioid overdose symptoms, alternating nostrils. Indications: OPIATE-INDUCED RESPIRATORY DEPRESSION, Opioid Toxicity    polyethylene glycol (MIRALAX) 17 gram packet Take 17 g by mouth daily.  pantoprazole (PROTONIX) 40 mg tablet Take 40 mg by mouth daily.  amlodipine (NORVASC) 10 mg tablet Take 10 mg by mouth daily.  furosemide (LASIX) 20 mg tablet Take  by mouth two (2) times a week.  quetiapine (SEROQUEL) 100 mg tablet Take 200 mg by mouth nightly.  donepezil (ARICEPT) 10 mg tablet Take 10 mg by mouth nightly. No current facility-administered medications on file prior to visit.

## 2019-03-05 ENCOUNTER — APPOINTMENT (OUTPATIENT)
Dept: INFUSION THERAPY | Age: 84
End: 2019-03-05
Payer: MEDICARE

## 2019-03-06 ENCOUNTER — TELEPHONE (OUTPATIENT)
Dept: INTERNAL MEDICINE CLINIC | Age: 84
End: 2019-03-06

## 2019-03-06 NOTE — TELEPHONE ENCOUNTER
Spoke with patients emergency contact Km Barnes. She stated last night patient had the 1 episode of elevated BP. Patient was not doing anything different and was acting like her normal self. Rechecked several times and BP came down. Today BP is running in 140/60s, they stated is a normal for her.  No other symptoms noted

## 2019-03-06 NOTE — TELEPHONE ENCOUNTER
Daughter called-last night  bp 199/60 something  147/60 something 15 min later  180 something/ 78 20 minutes later  147/60 something 20 minutes later      She was just sitting    Hasn't taken it today    Thought of taking her to ER- wasn't feeling well, talking slow not slurred  Please advise

## 2019-03-12 ENCOUNTER — HOSPITAL ENCOUNTER (OUTPATIENT)
Dept: INFUSION THERAPY | Age: 84
Discharge: HOME | End: 2019-03-12
Payer: MEDICARE

## 2019-03-12 VITALS
DIASTOLIC BLOOD PRESSURE: 62 MMHG | RESPIRATION RATE: 18 BRPM | HEART RATE: 50 BPM | OXYGEN SATURATION: 90 % | SYSTOLIC BLOOD PRESSURE: 152 MMHG | TEMPERATURE: 98.2 F

## 2019-03-12 LAB
BASO+EOS+MONOS # BLD AUTO: 0.4 K/UL (ref 0–2.3)
BASO+EOS+MONOS NFR BLD AUTO: 9 % (ref 0.1–17)
DIFFERENTIAL METHOD BLD: ABNORMAL
ERYTHROCYTE [DISTWIDTH] IN BLOOD BY AUTOMATED COUNT: 12.1 % (ref 11.5–14.5)
HCT VFR BLD AUTO: 30.3 % (ref 36–48)
HGB BLD-MCNC: 9.6 G/DL (ref 12–16)
LYMPHOCYTES # BLD: 2 K/UL (ref 1.1–5.9)
LYMPHOCYTES NFR BLD: 45 % (ref 14–44)
MCH RBC QN AUTO: 28.5 PG (ref 25–35)
MCHC RBC AUTO-ENTMCNC: 31.7 G/DL (ref 31–37)
MCV RBC AUTO: 89.9 FL (ref 78–102)
NEUTS SEG # BLD: 2.1 K/UL (ref 1.8–9.5)
NEUTS SEG NFR BLD: 47 % (ref 40–70)
PLATELET # BLD AUTO: 177 K/UL (ref 140–440)
RBC # BLD AUTO: 3.37 M/UL (ref 4.1–5.1)
WBC # BLD AUTO: 4.5 K/UL (ref 4.5–13)

## 2019-03-12 PROCEDURE — 36415 COLL VENOUS BLD VENIPUNCTURE: CPT

## 2019-03-12 PROCEDURE — 85025 COMPLETE CBC W/AUTO DIFF WBC: CPT

## 2019-03-12 NOTE — PROGRESS NOTES
[facility] Progress Note    Date: 2019    Name: Ibis Thorpe    MRN: [id number]         : 1922     PROCRIT INJECTON    Ms. Shine Rossi was assessed and education was provided. Pt arrived to [facility] in wheelchair accompanied by her daughter at [phone]. Ms. Antony's vitals were reviewed and patient was observed for 5 minutes prior to treatment. Visit Vitals  /62 (BP 1 Location: Right arm, BP Patient Position: Sitting)   Pulse (!) 50   Temp 98.2 °F (36.8 °C)   Resp 18   SpO2 90%   Breastfeeding? No       Blood drawn via left a/c venipuncture X1 attempt for CBC. Gauze and coban applied to site. Recent Results (from the past 12 hour(s))   CBC WITH 3 PART DIFF    Collection Time: 19  2:14 PM   Result Value Ref Range    WBC 4.5 4.5 - 13.0 K/uL    RBC 3.37 (L) 4.10 - 5.10 M/uL    HGB 9.6 (L) 12.0 - 16 g/dL    HCT 30.3 (L) 36 - 48 %    MCV 89.9 78 - 102 FL    MCH 28.5 25.0 - 35.0 PG    MCHC 31.7 31 - 37 g/dL    RDW 12.1 11.5 - 14.5 %    PLATELET 917 558 - 113 K/uL    NEUTROPHILS 47 40 - 70 %    MIXED CELLS 9 0.1 - 17 %    LYMPHOCYTES 45 (H) 14 - 44 %    ABS. NEUTROPHILS 2.1 1.8 - 9.5 K/UL    ABS. MIXED CELLS 0.4 0.0 - 2.3 K/uL    ABS. LYMPHOCYTES 2.0 1.1 - 5.9 K/UL    DF AUTOMATED       Results within ordered parameters to HOLD procrit today. Ms. Shine Rossi was discharged from [address] [place] [address] in stable condition at 1425. She is to return on 3/26/19 for her next Procrit appointment.     Martín Bermeo RN  2019  [phone]

## 2019-03-26 ENCOUNTER — HOSPITAL ENCOUNTER (OUTPATIENT)
Dept: INFUSION THERAPY | Age: 84
Discharge: HOME | End: 2019-03-26
Payer: MEDICARE

## 2019-03-26 VITALS
RESPIRATION RATE: 18 BRPM | TEMPERATURE: 98.2 F | HEART RATE: 54 BPM | SYSTOLIC BLOOD PRESSURE: 154 MMHG | DIASTOLIC BLOOD PRESSURE: 66 MMHG

## 2019-03-26 LAB
BASO+EOS+MONOS # BLD AUTO: 0.6 K/UL (ref 0–2.3)
BASO+EOS+MONOS NFR BLD AUTO: 7 % (ref 0.1–17)
DIFFERENTIAL METHOD BLD: ABNORMAL
ERYTHROCYTE [DISTWIDTH] IN BLOOD BY AUTOMATED COUNT: 11.6 % (ref 11.5–14.5)
HCT VFR BLD AUTO: 31.5 % (ref 36–48)
HGB BLD-MCNC: 9.6 G/DL (ref 12–16)
LYMPHOCYTES # BLD: 2.1 K/UL (ref 1.1–5.9)
LYMPHOCYTES NFR BLD: 26 % (ref 14–44)
MCH RBC QN AUTO: 28.2 PG (ref 25–35)
MCHC RBC AUTO-ENTMCNC: 30.5 G/DL (ref 31–37)
MCV RBC AUTO: 92.4 FL (ref 78–102)
NEUTS SEG # BLD: 5.3 K/UL (ref 1.8–9.5)
NEUTS SEG NFR BLD: 67 % (ref 40–70)
PLATELET # BLD AUTO: 181 K/UL (ref 140–440)
RBC # BLD AUTO: 3.41 M/UL (ref 4.1–5.1)
WBC # BLD AUTO: 8 K/UL (ref 4.5–13)

## 2019-03-26 PROCEDURE — 85025 COMPLETE CBC W/AUTO DIFF WBC: CPT

## 2019-03-26 PROCEDURE — 36415 COLL VENOUS BLD VENIPUNCTURE: CPT

## 2019-03-26 NOTE — PROGRESS NOTES
[facility] Progress Note Date: 2019 Name: Ibis Thorpe MRN: [id number] : 1922 PROCRIT INJECTON Ms. Shine Rossi was assessed and education was provided. Pt arrived to [facility] in wheelchair accompanied by her daughter at 0. Ms. Antony's vitals were reviewed and patient was observed for 5 minutes prior to treatment. Visit Vitals /66 (BP 1 Location: Left arm, BP Patient Position: At rest;Sitting) Pulse (!) 54 Temp 98.2 °F (36.8 °C) Resp 18 Blood drawn via left a/c venipuncture X1 attempt for CBC. Gauze and coban applied to site. Recent Results (from the past 12 hour(s)) CBC WITH 3 PART DIFF Collection Time: 19  2:16 PM  
Result Value Ref Range WBC 8.0 4.5 - 13.0 K/uL  
 RBC 3.41 (L) 4.10 - 5.10 M/uL HGB 9.6 (L) 12.0 - 16 g/dL HCT 31.5 (L) 36 - 48 % MCV 92.4 78 - 102 FL  
 MCH 28.2 25.0 - 35.0 PG  
 MCHC 30.5 (L) 31 - 37 g/dL  
 RDW 11.6 11.5 - 14.5 % PLATELET 996 439 - 195 K/uL NEUTROPHILS 67 40 - 70 % MIXED CELLS 7 0.1 - 17 % LYMPHOCYTES 26 14 - 44 % ABS. NEUTROPHILS 5.3 1.8 - 9.5 K/UL  
 ABS. MIXED CELLS 0.6 0.0 - 2.3 K/uL  
 ABS. LYMPHOCYTES 2.1 1.1 - 5.9 K/UL  
 DF AUTOMATED Results within ordered parameters to HOLD procrit today. Ms. Shine Rossi was discharged from [address] [place] [address] in stable condition at 1425. She is to return on 19 for her next Procrit appointment. Gardenia Topete RN 
2019 
[phone]

## 2019-04-04 DIAGNOSIS — M54.2 CERVICALGIA: ICD-10-CM

## 2019-04-04 DIAGNOSIS — M25.562 CHRONIC PAIN OF LEFT KNEE: ICD-10-CM

## 2019-04-04 DIAGNOSIS — G89.29 CHRONIC BILATERAL LOW BACK PAIN, WITH SCIATICA PRESENCE UNSPECIFIED: ICD-10-CM

## 2019-04-04 DIAGNOSIS — G89.29 CHRONIC PAIN OF LEFT KNEE: ICD-10-CM

## 2019-04-04 DIAGNOSIS — G89.4 CHRONIC PAIN SYNDROME: ICD-10-CM

## 2019-04-04 DIAGNOSIS — M54.5 CHRONIC BILATERAL LOW BACK PAIN, WITH SCIATICA PRESENCE UNSPECIFIED: ICD-10-CM

## 2019-04-04 RX ORDER — OXYCODONE HYDROCHLORIDE 15 MG/1
15 TABLET, FILM COATED, EXTENDED RELEASE ORAL EVERY 12 HOURS
Qty: 60 TAB | Refills: 0 | Status: SHIPPED | OUTPATIENT
Start: 2019-04-04 | End: 2019-08-05 | Stop reason: SDUPTHER

## 2019-04-04 NOTE — TELEPHONE ENCOUNTER
Correct dosing is OxyContin ER 15mg twice a day with a total of 60 tabs to be used over 30 days. This has been undated in his prior OV note. Thanks.

## 2019-04-04 NOTE — TELEPHONE ENCOUNTER
Mami Jodie has called requesting a refill of their controlled medication, OxyContin 15 mg tab, for the management of chronic back,shoulder,and knee pain. (female (possibly patient's daughter) called on behalf of patient. Last office visit date: 2/27/19 with Ren and has a f/u appt on 5/24/19. Last opioid care agreement 11/19/18  Last UDS was done 11/29/18 (Oral Swab)    Date last  was pulled and reviewed : 4/4/19  Last fill date for medication was 3/6/19    Was the patient compliant when the above report was pulled? yes    Analgesia:Female caller stated patient reports 50% pain relief on current regimen. Aberrancies: No aberrancies noted in the last 30 days. ADL's: Female caller stated that Patient is unable to perform ADL's independently d/t age and mental status. Adverse Reaction: Female caller stated  patient reports no adverse reactions at this time. Provider's last note and plan of care reviewed? yes  Request forwarded to provider for review.

## 2019-04-09 ENCOUNTER — HOSPITAL ENCOUNTER (OUTPATIENT)
Dept: INFUSION THERAPY | Age: 84
Discharge: HOME | End: 2019-04-09
Payer: MEDICARE

## 2019-04-09 VITALS
DIASTOLIC BLOOD PRESSURE: 73 MMHG | RESPIRATION RATE: 18 BRPM | SYSTOLIC BLOOD PRESSURE: 166 MMHG | OXYGEN SATURATION: 92 % | HEART RATE: 57 BPM | TEMPERATURE: 98.2 F

## 2019-04-09 LAB
BASO+EOS+MONOS # BLD AUTO: 0.6 K/UL (ref 0–2.3)
BASO+EOS+MONOS NFR BLD AUTO: 10 % (ref 0.1–17)
DIFFERENTIAL METHOD BLD: ABNORMAL
ERYTHROCYTE [DISTWIDTH] IN BLOOD BY AUTOMATED COUNT: 12.3 % (ref 11.5–14.5)
HCT VFR BLD AUTO: 31.3 % (ref 36–48)
HGB BLD-MCNC: 9.6 G/DL (ref 12–16)
LYMPHOCYTES # BLD: 2.1 K/UL (ref 1.1–5.9)
LYMPHOCYTES NFR BLD: 33 % (ref 14–44)
MCH RBC QN AUTO: 28.2 PG (ref 25–35)
MCHC RBC AUTO-ENTMCNC: 30.7 G/DL (ref 31–37)
MCV RBC AUTO: 92.1 FL (ref 78–102)
NEUTS SEG # BLD: 3.6 K/UL (ref 1.8–9.5)
NEUTS SEG NFR BLD: 58 % (ref 40–70)
PLATELET # BLD AUTO: 215 K/UL (ref 140–440)
RBC # BLD AUTO: 3.4 M/UL (ref 4.1–5.1)
WBC # BLD AUTO: 6.3 K/UL (ref 4.5–13)

## 2019-04-09 PROCEDURE — 36415 COLL VENOUS BLD VENIPUNCTURE: CPT

## 2019-04-09 PROCEDURE — 85025 COMPLETE CBC W/AUTO DIFF WBC: CPT

## 2019-04-09 RX ORDER — HEPARIN 100 UNIT/ML
SYRINGE INTRAVENOUS
Status: DISCONTINUED
Start: 2019-04-09 | End: 2019-04-10 | Stop reason: HOSPADM

## 2019-04-09 NOTE — PROGRESS NOTES
[facility] Progress Note Date: 2019 Name: Ibis Thorpe MRN: [id number] : 1922 PROCRIT INJECTON Ms. Shine Rossi was assessed and education was provided. Pt arrived to [facility] in wheelchair accompanied by her daughter at 0. Ms. Antony's vitals were reviewed and patient was observed for 5 minutes prior to treatment. Visit Vitals /73 (BP 1 Location: Left arm, BP Patient Position: Sitting) Pulse (!) 57 Temp 98.2 °F (36.8 °C) Resp 18 SpO2 92% Breastfeeding? No  
 
 
Blood drawn via left a/c venipuncture X1 attempt for CBC. Gauze and coban applied to site. Recent Results (from the past 12 hour(s)) CBC WITH 3 PART DIFF Collection Time: 19  2:13 PM  
Result Value Ref Range WBC 6.3 4.5 - 13.0 K/uL  
 RBC 3.40 (L) 4.10 - 5.10 M/uL HGB 9.6 (L) 12.0 - 16 g/dL HCT 31.3 (L) 36 - 48 % MCV 92.1 78 - 102 FL  
 MCH 28.2 25.0 - 35.0 PG  
 MCHC 30.7 (L) 31 - 37 g/dL  
 RDW 12.3 11.5 - 14.5 % PLATELET 510 751 - 525 K/uL NEUTROPHILS 58 40 - 70 % MIXED CELLS 10 0.1 - 17 % LYMPHOCYTES 33 14 - 44 % ABS. NEUTROPHILS 3.6 1.8 - 9.5 K/UL  
 ABS. MIXED CELLS 0.6 0.0 - 2.3 K/uL  
 ABS. LYMPHOCYTES 2.1 1.1 - 5.9 K/UL  
 DF AUTOMATED Results within ordered parameters to HOLD procrit today. Ms. Shine Rossi was discharged from [place] [address] in stable condition at [phone]. She is to return on 19 for her next Procrit appointment. Lilo Rabago RN 2019 
[phone]

## 2019-04-17 NOTE — TELEPHONE ENCOUNTER
Last Visit: 1/23/19 with ETIENNE Mayo  Next Appointment: 4/30/19 with ETIENNE Perkins    Requested Prescriptions     Pending Prescriptions Disp Refills    polyethylene glycol (MIRALAX) 17 gram packet 30 Packet 11     Sig: Take 1 Packet by mouth daily.

## 2019-04-18 RX ORDER — POLYETHYLENE GLYCOL 3350 17 G/17G
17 POWDER, FOR SOLUTION ORAL DAILY
Qty: 30 PACKET | Refills: 11 | Status: SHIPPED | OUTPATIENT
Start: 2019-04-18 | End: 2021-01-01

## 2019-04-23 ENCOUNTER — HOSPITAL ENCOUNTER (OUTPATIENT)
Dept: INFUSION THERAPY | Age: 84
Discharge: HOME | End: 2019-04-23
Payer: MEDICARE

## 2019-04-23 VITALS
DIASTOLIC BLOOD PRESSURE: 71 MMHG | SYSTOLIC BLOOD PRESSURE: 156 MMHG | OXYGEN SATURATION: 91 % | RESPIRATION RATE: 18 BRPM | HEART RATE: 53 BPM | TEMPERATURE: 97.9 F

## 2019-04-23 LAB
BASO+EOS+MONOS # BLD AUTO: 0.4 K/UL (ref 0–2.3)
BASO+EOS+MONOS NFR BLD AUTO: 10 % (ref 0.1–17)
DIFFERENTIAL METHOD BLD: ABNORMAL
ERYTHROCYTE [DISTWIDTH] IN BLOOD BY AUTOMATED COUNT: 12 % (ref 11.5–14.5)
HCT VFR BLD AUTO: 29.5 % (ref 36–48)
HGB BLD-MCNC: 9 G/DL (ref 12–16)
LYMPHOCYTES # BLD: 1.7 K/UL (ref 1.1–5.9)
LYMPHOCYTES NFR BLD: 40 % (ref 14–44)
MCH RBC QN AUTO: 27.8 PG (ref 25–35)
MCHC RBC AUTO-ENTMCNC: 30.5 G/DL (ref 31–37)
MCV RBC AUTO: 91 FL (ref 78–102)
NEUTS SEG # BLD: 2.3 K/UL (ref 1.8–9.5)
NEUTS SEG NFR BLD: 50 % (ref 40–70)
PLATELET # BLD AUTO: 163 K/UL (ref 140–440)
RBC # BLD AUTO: 3.24 M/UL (ref 4.1–5.1)
WBC # BLD AUTO: 4.4 K/UL (ref 4.5–13)

## 2019-04-23 PROCEDURE — 96372 THER/PROPH/DIAG INJ SC/IM: CPT

## 2019-04-23 PROCEDURE — 36415 COLL VENOUS BLD VENIPUNCTURE: CPT

## 2019-04-23 PROCEDURE — 85025 COMPLETE CBC W/AUTO DIFF WBC: CPT

## 2019-04-23 PROCEDURE — 74011250636 HC RX REV CODE- 250/636: Performed by: INTERNAL MEDICINE

## 2019-04-23 RX ADMIN — ERYTHROPOIETIN 4000 UNITS: 4000 INJECTION, SOLUTION INTRAVENOUS; SUBCUTANEOUS at 14:40

## 2019-04-23 RX ADMIN — ERYTHROPOIETIN 3000 UNITS: 3000 INJECTION, SOLUTION INTRAVENOUS; SUBCUTANEOUS at 14:40

## 2019-04-23 NOTE — PROGRESS NOTES
[facility] Progress Note Date: 2019 Name: Yuri Garcia MRN: [id number] : 1922 PROCRIT INJECTON Ms. Kary Salas was assessed and education was provided. Pt arrived to [facility] in wheelchair accompanied by her daughter at [phone]. Ms. Antony's vitals were reviewed and patient was observed for 5 minutes prior to treatment. Visit Vitals /71 (BP 1 Location: Left arm, BP Patient Position: Sitting) Pulse (!) 53 Temp 97.9 °F (36.6 °C) Resp 18 SpO2 91% Blood drawn via left a/c venipuncture X1 attempt for CBC. Gauze and coban applied to site. Recent Results (from the past 12 hour(s)) CBC WITH 3 PART DIFF Collection Time: 19  2:32 PM  
Result Value Ref Range WBC 4.4 (L) 4.5 - 13.0 K/uL  
 RBC 3.24 (L) 4.10 - 5.10 M/uL HGB 9.0 (L) 12.0 - 16 g/dL HCT 29.5 (L) 36 - 48 % MCV 91.0 78 - 102 FL  
 MCH 27.8 25.0 - 35.0 PG  
 MCHC 30.5 (L) 31 - 37 g/dL  
 RDW 12.0 11.5 - 14.5 % PLATELET 703 846 - 247 K/uL NEUTROPHILS 50 40 - 70 % MIXED CELLS 10 0.1 - 17 % LYMPHOCYTES 40 14 - 44 % ABS. NEUTROPHILS 2.3 1.8 - 9.5 K/UL  
 ABS. MIXED CELLS 0.4 0.0 - 2.3 K/uL  
 ABS. LYMPHOCYTES 1.7 1.1 - 5.9 K/UL  
 DF AUTOMATED Results within ordered parameters to give procrit today. Hgb 9.0, Hct 29.5. Procrit 7000 units given SQ in left upper arm. No bleeding or bruising at site, and site covered with a bandaid. Ms. Kary Salas was discharged from [place] [address] in stable condition at 1440. She is to return on 19 at 2:00 pm for her next Procrit appointment. Laura Armstrong RN 2019 
[phone]

## 2019-04-30 ENCOUNTER — OFFICE VISIT (OUTPATIENT)
Dept: INTERNAL MEDICINE CLINIC | Age: 84
End: 2019-04-30

## 2019-04-30 VITALS
OXYGEN SATURATION: 96 % | SYSTOLIC BLOOD PRESSURE: 161 MMHG | HEIGHT: 62 IN | RESPIRATION RATE: 16 BRPM | BODY MASS INDEX: 23 KG/M2 | WEIGHT: 125 LBS | DIASTOLIC BLOOD PRESSURE: 61 MMHG | TEMPERATURE: 98.8 F | HEART RATE: 58 BPM

## 2019-04-30 DIAGNOSIS — R63.4 WEIGHT LOSS: ICD-10-CM

## 2019-04-30 DIAGNOSIS — I10 ESSENTIAL HYPERTENSION: Primary | ICD-10-CM

## 2019-04-30 DIAGNOSIS — K64.4 EXTERNAL HEMORRHOID: ICD-10-CM

## 2019-04-30 RX ORDER — HYDROCORTISONE 10 MG/G
CREAM TOPICAL AS NEEDED
Qty: 30 G | Refills: 0 | Status: SHIPPED | OUTPATIENT
Start: 2019-04-30 | End: 2019-05-02

## 2019-04-30 NOTE — PROGRESS NOTES
Brian Wilkins is a 80 y.o.  female and presents with    Chief Complaint   Patient presents with    Hypertension     3 month follow up, patients daughter reports small amount of bright red blood noted to toilet paper after bowel movement last week       Subjective:  HPI    Mrs. Angelica Bryson presents today for routine care. She reports with blood noted on toilet tissue. Daughter unsure if with hemorrhoids. She is following up to hypertension and noted to be following her trend in the 150-160/60s. She is asymptomatic. I notice she has a 11 lb weight loss since 2/2019. I reviewed serial CBC and note Hgb 10>9.6> 9.0, she is being followed by hematology every two weeks. She lives with her oldest daughter, Destiney Pollard, and visits with other daughter, Chinmay Mota. Mrs. Angelica Bryson was under the care- Dr. Mendel Lechuga, Dr. Lucinda Keller (left practice)- psychologist. She is taking Seroquel for dementia as she was acting with aggressive behavior related to diagnosis of alzheimer dementia. Seroquel dose is 200 mg at bedtime as causes daytime drowsiness. She is also under the care of pain management and would like to see if I can manage her pain medications. Last pain management note reports chronic pain management x 20-30 years for cervicalgia, low back pain, and left knee pain. They recommend that the patient's chronic pain be handled as a palliative service and DO NOT recommend further opioid wean at this time. They have also requested a TENs unit, Biofreeze, Lidoderm patches(denied by insurance), and capsaicin cream.     She has a history of B12 deficiency and iron deficiency. She receives iron checks monthly and has not needed infusion x 1 year. Dr. Ariela Mack follows for hyperkalemia and anemia of chronic disease. Vitamin d 55.8 12/218. BMP K 4.0, Cr 2.65, GFR 20. CBC hgb 10.3, hct 33.3, rdw normal    Additional Concerns: none     ROS   Review of Systems   Constitutional: Negative. HENT: Negative. Eyes: Negative. Respiratory: Negative. Cardiovascular: Negative. Gastrointestinal: Negative. Genitourinary: Negative. Musculoskeletal: Negative. Skin: Positive for rash. Neurological: Negative. Endo/Heme/Allergies: Negative. Psychiatric/Behavioral: Negative. Allergies   Allergen Reactions    Celebrex [Celecoxib] Unable to Obtain    Codeine Unable to Obtain    Flexeril [Cyclobenzaprine] Unable to Obtain    Macrobid [Nitrofurantoin Monohyd/M-Cryst] Unable to Obtain    Pcn [Penicillins] Unable to Obtain    Remeron [Mirtazapine] Unable to Obtain    Sulfa (Sulfonamide Antibiotics) Other (comments)     Swelling and low heart rate    Vicodin [Hydrocodone-Acetaminophen] Unable to Obtain       Current Outpatient Medications   Medication Sig Dispense Refill    hydrocortisone (PROCTOCORT) 1 % rectal cream Insert  into rectum as needed for Hemorrhoids. 30 g 0    polyethylene glycol (MIRALAX) 17 gram packet Take 1 Packet by mouth daily. 30 Packet 11    oxyCODONE ER (OXYCONTIN) 15 mg ER tablet Take 1 Tab by mouth every twelve (12) hours for 30 days. Max Daily Amount: 30 mg. Indications: chronic pain 60 Tab 0    CONTOUR NEXT TEST STRIPS strip USE TO TEST BLOOD SUGAR D  3    cyanocobalamin, vitamin B-12, 2,500 mcg chew Take  by mouth.  cholecalciferol, vitamin D3, (VITAMIN D3) 2,000 unit tab Take  by mouth.  menthol (BIOFREEZE, MENTHOL,) 4 % gel Apply generous amount to affected area up to 3 times daily. 1 Tube 3    naloxone (NARCAN) 4 mg/actuation nasal spray Use 1 spray intranasally, then discard. Repeat with new spray every 2 min as needed for opioid overdose symptoms, alternating nostrils. Indications: OPIATE-INDUCED RESPIRATORY DEPRESSION, Opioid Toxicity 1 Each 0    pantoprazole (PROTONIX) 40 mg tablet Take 40 mg by mouth daily.  amlodipine (NORVASC) 10 mg tablet Take 10 mg by mouth daily.       furosemide (LASIX) 20 mg tablet Take  by mouth two (2) times a week.      quetiapine (SEROQUEL) 100 mg tablet Take 200 mg by mouth nightly.  donepezil (ARICEPT) 10 mg tablet Take 10 mg by mouth nightly.        Facility-Administered Medications Ordered in Other Visits   Medication Dose Route Frequency Provider Last Rate Last Dose    [START ON 5/7/2019] epoetin elo (EPOGEN;PROCRIT) injection 4,000 Units  4,000 Units SubCUTAneous Joycelyn Bryant MD        And   Bryan.Code [START ON 5/7/2019] epoetin elo (EPOGEN;PROCRIT) injection 3,000 Units  3,000 Units SubCUTAneous ONCE Vinod Haney MD           Social History     Socioeconomic History    Marital status: SINGLE     Spouse name: Not on file    Number of children: Not on file    Years of education: Not on file    Highest education level: Not on file   Occupational History    Not on file   Social Needs    Financial resource strain: Not on file    Food insecurity:     Worry: Not on file     Inability: Not on file    Transportation needs:     Medical: Not on file     Non-medical: Not on file   Tobacco Use    Smoking status: Never Smoker    Smokeless tobacco: Never Used   Substance and Sexual Activity    Alcohol use: No    Drug use: No    Sexual activity: Never   Lifestyle    Physical activity:     Days per week: Not on file     Minutes per session: Not on file    Stress: Not on file   Relationships    Social connections:     Talks on phone: Not on file     Gets together: Not on file     Attends Rastafari service: Not on file     Active member of club or organization: Not on file     Attends meetings of clubs or organizations: Not on file     Relationship status: Not on file    Intimate partner violence:     Fear of current or ex partner: Not on file     Emotionally abused: Not on file     Physically abused: Not on file     Forced sexual activity: Not on file   Other Topics Concern    Not on file   Social History Narrative    Not on file       Past Medical History:   Diagnosis Date    Anxiety     Brachial neuritis or radiculitis NOS     Cervicalgia     Cervicalgia     Dementia     Diabetes (HCC)     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     DJD (degenerative joint disease)     Encounter for long-term (current) use of other medications     Fracture 2014    left shoulder    GERD (gastroesophageal reflux disease)     Heart murmur     Hyperkalemia 2011    Hypertension     Lumbago     Pain in joint, lower leg     Pain in joint, multiple sites        Past Surgical History:   Procedure Laterality Date    HX BLADDER REPAIR      prolapsed    HX CATARACT REMOVAL  1991    HX CATARACT REMOVAL  1990    HX CHOLECYSTECTOMY  5/1998    HX HERNIA REPAIR  7/1998    x2    HX HYSTERECTOMY  1966    HX OOPHORECTOMY         Family History   Problem Relation Age of Onset    Diabetes Other     Hypertension Other     Stroke Other     Diabetes Mother        Objective:  Vitals:    04/30/19 1457   BP: 161/61   Pulse: (!) 58   Resp: 16   Temp: 98.8 °F (37.1 °C)   TempSrc: Oral   SpO2: 96%   Weight: 125 lb (56.7 kg)   Height: 5' 2\" (1.575 m)   PainSc:   0 - No pain       LABS   Results for orders placed or performed during the hospital encounter of 04/23/19   CBC WITH 3 PART DIFF   Result Value Ref Range    WBC 4.4 (L) 4.5 - 13.0 K/uL    RBC 3.24 (L) 4.10 - 5.10 M/uL    HGB 9.0 (L) 12.0 - 16 g/dL    HCT 29.5 (L) 36 - 48 %    MCV 91.0 78 - 102 FL    MCH 27.8 25.0 - 35.0 PG    MCHC 30.5 (L) 31 - 37 g/dL    RDW 12.0 11.5 - 14.5 %    PLATELET 724 312 - 216 K/uL    NEUTROPHILS 50 40 - 70 %    MIXED CELLS 10 0.1 - 17 %    LYMPHOCYTES 40 14 - 44 %    ABS. NEUTROPHILS 2.3 1.8 - 9.5 K/UL    ABS. MIXED CELLS 0.4 0.0 - 2.3 K/uL    ABS. LYMPHOCYTES 1.7 1.1 - 5.9 K/UL    DF AUTOMATED         TESTS  none    PE  Physical Exam   Constitutional: She appears well-developed and well-nourished. No distress. HENT:   Head: Normocephalic and atraumatic.    Right Ear: External ear normal.   Left Ear: External ear normal.   Nose: Nose normal.   Mouth/Throat: Oropharynx is clear and moist. No oropharyngeal exudate. Eyes: Pupils are equal, round, and reactive to light. Conjunctivae and EOM are normal.   Neck: Normal range of motion. Cardiovascular: Normal rate, regular rhythm and intact distal pulses. Murmur heard. Pulmonary/Chest: Effort normal and breath sounds normal. No respiratory distress. She has no wheezes. She has no rales. She exhibits no tenderness. Abdominal: Soft. Bowel sounds are normal. She exhibits no distension. There is no tenderness. Musculoskeletal: Normal range of motion. Lymphadenopathy:     She has no cervical adenopathy. Neurological: She is alert. No cranial nerve deficit. She is in a wheelchair during visit however instructed to walk to examination table and with minimal assistance/guidance she was able to do so with mostly stable gait, she did reach for the examination table,    Skin: Skin is warm and dry. Rash noted. She is not diaphoretic. Psychiatric: She has a normal mood and affect. Her behavior is normal. Judgment and thought content normal.   Vitals reviewed. Assessment/Plan:    1. CKD with chronic anemia- Under the care of Dr. Helga Wiley and Dr. Sadi Longoria. 3. Chronic pain syndrome- I understand concern to minimize providers due to age however upon review of pain managements last note I feel it is better suited for the patient to remain under their care since the management is palliative and due to extensive time on the medication if weaning is needed this would be a delicate situation related to withdrawal symptoms and alzheimer dementia. Daughter reports today has been weaned down on oxycontin 15 mg. I agreed to take over management if records are brought to me. 4. Alzheimer Dementia with history of behavioral disturbance- She is taking Aricept 10 mg daily and Seroquel 200 mg.  I do not mind managing this therapy however I would like to discuss possibly weaning. I did speak with Carry Mecca D and due to chronicity of therapy recommends if wean that do so at 50 mg over a month at a time. Research has reported that atypical neuroleptics may increase mortality and are not approved by the FDA for the treatment of behavioral disorders in patients with dementia, however the benefit may outweigh the risk. So I will review this with the family at our next meeting- pending psychiatry notes. 5. Abrasion/skin breakdown to buttock- monitor, looks to be resolving with barrier cream. No s/s of secondary infection- no open wounds today. 6. Blood noted to rectum with wiping- she fluctuates between diarrhea and constipation. She is with external hemmorhoids that is most likely responsible for blood on toilet tissue- Prep H, Tucks pads, Proctocort. Daughter to call if worsening or no resolution. However also with 11 lb weight loss, however denies other constitutional symptoms. Monitor however the family is not looking for aggressive treatment. Lab review: no lab studies available for review at time of visit, reviewed last labs dated 1/8/19, 12/13/18, 12/11/18, labs in June or sooner if drawn with hematology- CMP ordered. Today's Visit:   Diagnoses and all orders for this visit:    1. Essential hypertension  -     METABOLIC PANEL, COMPREHENSIVE; Future    2. External hemorrhoid  -     hydrocortisone (PROCTOCORT) 1 % rectal cream; Insert  into rectum as needed for Hemorrhoids. Health Maintenance: To be discussed on follow up. I have discussed the diagnosis with the patient and the intended plan as seen in the above orders. The patient has received an after-visit summary and questions were answered concerning future plans. I have discussed medication side effects and warnings with the patient as well. I have reviewed the plan of care with the patient, accepted their input and they are in agreement with the treatment goals.           More than 1/2 of this 25 minute visit was spent in counseling and coordination of care, as described above.     ARIAS Mandel  Internist of [address], [address] [place] [address] Str.  Phone: [phone]  Fax: [phone]

## 2019-04-30 NOTE — PROGRESS NOTES
Leti Carter presents today for   Chief Complaint   Patient presents with    Hypertension     3 month follow up, patients daughter reports small amount of bright red blood noted to toilet paper after bowel movement last week              Depression Screening:  3 most recent PHQ Screens 2/27/2019   PHQ Not Done -   Little interest or pleasure in doing things Not at all   Feeling down, depressed, irritable, or hopeless Not at all   Total Score PHQ 2 0       Learning Assessment:  Learning Assessment 6/8/2018   PRIMARY LEARNER Patient   HIGHEST LEVEL OF EDUCATION - PRIMARY LEARNER  GRADUATED HIGH SCHOOL OR GED   BARRIERS PRIMARY LEARNER NONE   CO-LEARNER CAREGIVER No   PRIMARY LANGUAGE ENGLISH   LEARNER PREFERENCE PRIMARY LISTENING   ANSWERED BY self   RELATIONSHIP SELF       Abuse Screening:  Abuse Screening Questionnaire 6/8/2018   Do you ever feel afraid of your partner? N   Are you in a relationship with someone who physically or mentally threatens you? N   Is it safe for you to go home? Y       Fall Risk  Fall Risk Assessment, last 12 mths 2/27/2019   Able to walk? Yes   Fall in past 12 months? No           Coordination of Care:  1. Have you been to the ER, urgent care clinic since your last visit? no   Hospitalized since your last visit? no    2. Have you seen or consulted any other health care providers outside of the [address] [place] [address] since your last visit? Include any pap smears or colon screening.  no

## 2019-05-01 ENCOUNTER — TELEPHONE (OUTPATIENT)
Dept: INTERNAL MEDICINE CLINIC | Age: 84
End: 2019-05-01

## 2019-05-01 NOTE — TELEPHONE ENCOUNTER
Proctocort Cream is not covered by the patient's plan. The preferred alternatives are Hydrocortisone Cream or Proctopak Cream per pharmacy. Please advise and pend new med if appropriate.

## 2019-05-01 NOTE — TELEPHONE ENCOUNTER
Called and spoke with pharmacist at [facility]- he stated that it was an error on there end and will be covered by the patients insurance.

## 2019-05-02 DIAGNOSIS — K64.4 EXTERNAL HEMORRHOID: Primary | ICD-10-CM

## 2019-05-02 RX ORDER — HYDROCORTISONE 25 MG/G
CREAM TOPICAL 4 TIMES DAILY
Qty: 30 G | Refills: 0 | Status: SHIPPED | OUTPATIENT
Start: 2019-05-02 | End: 2021-01-01

## 2019-05-02 NOTE — TELEPHONE ENCOUNTER
Pharmacy faxed again stating that Hydrocortisone rectal 1% is unavailable. I called the pharmacy to check what was available. They do have Proctozone 2.5% in stock. They ran a test claim through the insurance and it came back zero copay. Please advise and pend new med if appropriate.

## 2019-05-06 DIAGNOSIS — G89.4 CHRONIC PAIN SYNDROME: Primary | ICD-10-CM

## 2019-05-06 RX ORDER — OXYCODONE HYDROCHLORIDE 15 MG/1
15 TABLET, FILM COATED, EXTENDED RELEASE ORAL EVERY 12 HOURS
Qty: 60 TAB | Refills: 0 | Status: SHIPPED | OUTPATIENT
Start: 2019-05-06 | End: 2019-05-24 | Stop reason: SDUPTHER

## 2019-05-06 NOTE — TELEPHONE ENCOUNTER
[facility] has called requesting a refill of their controlled medication, OxyContin 15 mg tab, for the management of chronic pain. (female,possibly patient's daughter) called on behalf of patient; also states patient is out of medication. Last office visit date: 2/27/19 with Ren and has a f/u appt on 5/24/19. Last opioid care agreement 11/19/18  Last UDS was done 11/29/18 (Oral Swab)    Date last  was pulled and reviewed : 5/6/19  Last fill date for medication was 4/5/19    Was the patient compliant when the above report was pulled? yes    Analgesia:Female caller stated patient reports 50% pain relief on current regimen. Aberrancies: No aberrancies noted in the last 30 days. ADL's: Female caller stated that Patient is unable to perform ADL's independently d/t age and mental status. Adverse Reaction: Female caller stated  patient reports no adverse reactions at this time. Provider's last note and plan of care reviewed? yes  Request forwarded to provider for review.

## 2019-05-06 NOTE — TELEPHONE ENCOUNTER
Patient identified using two patient identifiers (name and ); patient's daughter who's on pt's HIPPA(Shannon Tong) advised prescriptions are ready for pick-up.  verbalized understanding.

## 2019-05-07 ENCOUNTER — HOSPITAL ENCOUNTER (OUTPATIENT)
Dept: INFUSION THERAPY | Age: 84
Discharge: HOME | End: 2019-05-07
Payer: MEDICARE

## 2019-05-07 VITALS
HEART RATE: 56 BPM | DIASTOLIC BLOOD PRESSURE: 69 MMHG | TEMPERATURE: 97.4 F | RESPIRATION RATE: 18 BRPM | SYSTOLIC BLOOD PRESSURE: 171 MMHG

## 2019-05-07 LAB
BASO+EOS+MONOS # BLD AUTO: 0.6 K/UL (ref 0–2.3)
BASO+EOS+MONOS NFR BLD AUTO: 12 % (ref 0.1–17)
DIFFERENTIAL METHOD BLD: ABNORMAL
ERYTHROCYTE [DISTWIDTH] IN BLOOD BY AUTOMATED COUNT: 12.9 % (ref 11.5–14.5)
HCT VFR BLD AUTO: 31.6 % (ref 36–48)
HGB BLD-MCNC: 9.7 G/DL (ref 12–16)
LYMPHOCYTES # BLD: 2.3 K/UL (ref 1.1–5.9)
LYMPHOCYTES NFR BLD: 41 % (ref 14–44)
MCH RBC QN AUTO: 28.4 PG (ref 25–35)
MCHC RBC AUTO-ENTMCNC: 30.7 G/DL (ref 31–37)
MCV RBC AUTO: 92.7 FL (ref 78–102)
NEUTS SEG # BLD: 2.6 K/UL (ref 1.8–9.5)
NEUTS SEG NFR BLD: 47 % (ref 40–70)
PLATELET # BLD AUTO: 190 K/UL (ref 140–440)
RBC # BLD AUTO: 3.41 M/UL (ref 4.1–5.1)
WBC # BLD AUTO: 5.5 K/UL (ref 4.5–13)

## 2019-05-07 PROCEDURE — 36415 COLL VENOUS BLD VENIPUNCTURE: CPT

## 2019-05-07 PROCEDURE — 85025 COMPLETE CBC W/AUTO DIFF WBC: CPT

## 2019-05-07 NOTE — PROGRESS NOTES
[facility] Progress Note Date: May 7, 2019 Name: Tim Blakely MRN: [id number] : 1922 PROCRIT INJECTON Ms. Abdoulaye Simms was assessed and education was provided. Pt arrived to [facility] in wheelchair accompanied by her daughter at 80. Ms. Antony's vitals were reviewed and patient was observed for 5 minutes prior to treatment. Visit Vitals /69 (BP 1 Location: Left arm, BP Patient Position: At rest;Sitting) Pulse (!) 56 Temp 97.4 °F (36.3 °C) Resp 18 Breastfeeding? No  
 
 
Blood drawn via left a/c venipuncture X1 attempt for CBC. Gauze and coban applied to site. Recent Results (from the past 12 hour(s)) CBC WITH 3 PART DIFF Collection Time: 19  2:19 PM  
Result Value Ref Range WBC 5.5 4.5 - 13.0 K/uL  
 RBC 3.41 (L) 4.10 - 5.10 M/uL HGB 9.7 (L) 12.0 - 16 g/dL HCT 31.6 (L) 36 - 48 % MCV 92.7 78 - 102 FL  
 MCH 28.4 25.0 - 35.0 PG  
 MCHC 30.7 (L) 31 - 37 g/dL  
 RDW 12.9 11.5 - 14.5 % PLATELET 422 893 - 284 K/uL NEUTROPHILS 47 40 - 70 % MIXED CELLS 12 0.1 - 17 % LYMPHOCYTES 41 14 - 44 % ABS. NEUTROPHILS 2.6 1.8 - 9.5 K/UL  
 ABS. MIXED CELLS 0.6 0.0 - 2.3 K/uL  
 ABS. LYMPHOCYTES 2.3 1.1 - 5.9 K/UL  
 DF AUTOMATED Results within ordered parameters to hold procrit today. Ms. Abdoulaye Simms was discharged from [place] [address] in stable condition at 1430. She is to return on 19 at 2:00 pm for her next Procrit appointment. Mono Moreno RN May 7, 2019 
[id number]

## 2019-05-21 ENCOUNTER — HOSPITAL ENCOUNTER (OUTPATIENT)
Dept: INFUSION THERAPY | Age: 84
Discharge: HOME | End: 2019-05-21
Payer: MEDICARE

## 2019-05-21 VITALS
RESPIRATION RATE: 18 BRPM | SYSTOLIC BLOOD PRESSURE: 158 MMHG | DIASTOLIC BLOOD PRESSURE: 61 MMHG | HEART RATE: 55 BPM | TEMPERATURE: 97.8 F

## 2019-05-21 LAB
25(OH)D3 SERPL-MCNC: 55.1 NG/ML (ref 30–100)
ALBUMIN SERPL-MCNC: 3.2 G/DL (ref 3.4–5)
ANION GAP SERPL CALC-SCNC: 5 MMOL/L (ref 3–18)
BASO+EOS+MONOS # BLD AUTO: 0.8 K/UL (ref 0–2.3)
BASO+EOS+MONOS NFR BLD AUTO: 13 % (ref 0.1–17)
BUN SERPL-MCNC: 27 MG/DL (ref 7–18)
BUN/CREAT SERPL: 13 (ref 12–20)
CALCIUM SERPL-MCNC: 9.1 MG/DL (ref 8.5–10.1)
CHLORIDE SERPL-SCNC: 102 MMOL/L (ref 100–108)
CO2 SERPL-SCNC: 32 MMOL/L (ref 21–32)
CREAT SERPL-MCNC: 2.1 MG/DL (ref 0.6–1.3)
DIFFERENTIAL METHOD BLD: ABNORMAL
ERYTHROCYTE [DISTWIDTH] IN BLOOD BY AUTOMATED COUNT: 11.9 % (ref 11.5–14.5)
FERRITIN SERPL-MCNC: 723 NG/ML (ref 8–388)
GLUCOSE SERPL-MCNC: 114 MG/DL (ref 74–99)
HCT VFR BLD AUTO: 32.1 % (ref 36–48)
HGB BLD-MCNC: 9.7 G/DL (ref 12–16)
IRON SATN MFR SERPL: 42 %
IRON SERPL-MCNC: 73 UG/DL (ref 50–175)
LYMPHOCYTES # BLD: 2.1 K/UL (ref 1.1–5.9)
LYMPHOCYTES NFR BLD: 37 % (ref 14–44)
MCH RBC QN AUTO: 27.6 PG (ref 25–35)
MCHC RBC AUTO-ENTMCNC: 30.2 G/DL (ref 31–37)
MCV RBC AUTO: 91.5 FL (ref 78–102)
NEUTS SEG # BLD: 2.8 K/UL (ref 1.8–9.5)
NEUTS SEG NFR BLD: 50 % (ref 40–70)
PHOSPHATE SERPL-MCNC: 3.2 MG/DL (ref 2.5–4.9)
PLATELET # BLD AUTO: 174 K/UL (ref 140–440)
POTASSIUM SERPL-SCNC: 4.9 MMOL/L (ref 3.5–5.5)
RBC # BLD AUTO: 3.51 M/UL (ref 4.1–5.1)
SODIUM SERPL-SCNC: 139 MMOL/L (ref 136–145)
TIBC SERPL-MCNC: 173 UG/DL (ref 250–450)
WBC # BLD AUTO: 5.7 K/UL (ref 4.5–13)

## 2019-05-21 PROCEDURE — 36415 COLL VENOUS BLD VENIPUNCTURE: CPT

## 2019-05-21 PROCEDURE — 82306 VITAMIN D 25 HYDROXY: CPT

## 2019-05-21 PROCEDURE — 80069 RENAL FUNCTION PANEL: CPT

## 2019-05-21 PROCEDURE — 82728 ASSAY OF FERRITIN: CPT

## 2019-05-21 PROCEDURE — 85025 COMPLETE CBC W/AUTO DIFF WBC: CPT

## 2019-05-21 PROCEDURE — 83540 ASSAY OF IRON: CPT

## 2019-05-21 NOTE — PROGRESS NOTES
[facility] Progress Note Date: May 21, 2019 Name: Pita Murphy MRN: [id number] : 1922 PROCRIT INJECTON Ms. Yovanny Fortune was assessed and education was provided. Pt arrived to [facility] in wheelchair accompanied by her daughter at 0. Ms. Antony's vitals were reviewed and patient was observed for 5 minutes prior to treatment. Visit Vitals /61 (BP 1 Location: Left arm, BP Patient Position: At rest;Sitting) Pulse (!) 55 Temp 97.8 °F (36.6 °C) Resp 18 Blood drawn via left a/c venipuncture X1 attempt for CBC. Gauze and coban applied to site. Recent Results (from the past 12 hour(s)) CBC WITH 3 PART DIFF Collection Time: 19  2:05 PM  
Result Value Ref Range WBC 5.7 4.5 - 13.0 K/uL  
 RBC 3.51 (L) 4.10 - 5.10 M/uL HGB 9.7 (L) 12.0 - 16 g/dL HCT 32.1 (L) 36 - 48 % MCV 91.5 78 - 102 FL  
 MCH 27.6 25.0 - 35.0 PG  
 MCHC 30.2 (L) 31 - 37 g/dL  
 RDW 11.9 11.5 - 14.5 % PLATELET 377 514 - 785 K/uL NEUTROPHILS 50 40 - 70 % MIXED CELLS 13 0.1 - 17 % LYMPHOCYTES 37 14 - 44 % ABS. NEUTROPHILS 2.8 1.8 - 9.5 K/UL  
 ABS. MIXED CELLS 0.8 0.0 - 2.3 K/uL  
 ABS. LYMPHOCYTES 2.1 1.1 - 5.9 K/UL  
 DF AUTOMATED Results within ordered parameters to hold procrit today. Ms. Yovanny Fortune was discharged from [place] [address] in stable condition at [phone]. She is to return on 19 at 2:00 pm for her next Procrit appointment. Isabel Walker RN May 21, 2019 
[id number]

## 2019-05-24 ENCOUNTER — OFFICE VISIT (OUTPATIENT)
Dept: PAIN MANAGEMENT | Age: 84
End: 2019-05-24

## 2019-05-24 VITALS
RESPIRATION RATE: 14 BRPM | OXYGEN SATURATION: 97 % | WEIGHT: 125 LBS | DIASTOLIC BLOOD PRESSURE: 72 MMHG | BODY MASS INDEX: 23 KG/M2 | SYSTOLIC BLOOD PRESSURE: 123 MMHG | TEMPERATURE: 97.5 F | HEART RATE: 54 BPM | HEIGHT: 62 IN

## 2019-05-24 DIAGNOSIS — G89.29 CHRONIC PAIN OF LEFT KNEE: ICD-10-CM

## 2019-05-24 DIAGNOSIS — M54.5 CHRONIC BILATERAL LOW BACK PAIN, WITH SCIATICA PRESENCE UNSPECIFIED: Primary | ICD-10-CM

## 2019-05-24 DIAGNOSIS — G89.29 CHRONIC BILATERAL LOW BACK PAIN, WITH SCIATICA PRESENCE UNSPECIFIED: Primary | ICD-10-CM

## 2019-05-24 DIAGNOSIS — G89.4 CHRONIC PAIN SYNDROME: ICD-10-CM

## 2019-05-24 DIAGNOSIS — M25.562 CHRONIC PAIN OF LEFT KNEE: ICD-10-CM

## 2019-05-24 DIAGNOSIS — Z79.899 ENCOUNTER FOR LONG-TERM (CURRENT) USE OF HIGH-RISK MEDICATION: ICD-10-CM

## 2019-05-24 DIAGNOSIS — M54.2 CERVICALGIA: ICD-10-CM

## 2019-05-24 RX ORDER — OXYCODONE HYDROCHLORIDE 15 MG/1
15 TABLET, FILM COATED, EXTENDED RELEASE ORAL EVERY 12 HOURS
Qty: 60 TAB | Refills: 0 | Status: SHIPPED | OUTPATIENT
Start: 2019-06-05 | End: 2019-06-28 | Stop reason: SDUPTHER

## 2019-05-24 NOTE — PROGRESS NOTES
Referred prior to 2011 by primary care provider for back shoulder and knee pain      Calculated MEQ - 45  Naloxone rescue - yes  Prophylactic bowel program - yes  Date of last OCA 11/20/2019 signed by medical surrogate  Last oral swab today  Prior oral swab 11/29/18   date checked today, findings consistent      Today and Last Visit  PGIC - unobtainable due to dementia  MIGUEL ÁNGEL - unobtainable due to dementia  COMM - unobtainable due to dementia      HPI:  Carolann Gomez is a 80 y.o. female here for f/u visit for ongoing evaluation of chronic neck and knee pain. Pt was last seen here on 2/27/19. Pt denies interval changes on the character or distribution of pain; she states she is feeling \"pretty good\". Pain is located to neck, left knee and lower back. The pain is described as aching. Pain at its best is 4/10. Pain at its worse is 6/10. Symptoms are improved by Biofreeze and OxyContin. ROS:  Unobtainable secondary to dementia      Opioid specific risk: dementia, anxiety, diabetes, GERD. Opioid specific history: chronic opioid therapy for 20-30 years. Vitals:    05/24/19 1450   BP: 123/72   Pulse: (!) 54   Resp: 14   Temp: 97.5 °F (36.4 °C)   SpO2: 97%   Weight: 56.7 kg (125 lb)   Height: 5' 2\" (1.575 m)   PainSc:   1   PainLoc: Back          Physical exam:  AFVSS with bradycardia, no acute distress, normal body habitus. A&OXs 2. Normocephalic, atraumatic. Conjugate gaze, clear sclerae. Speech is clear but not appropriate. Mood is appropriate and patient is cooperative. Patient is seated slumped in a manual wheelchair with extreme cervical flexion with slight rotation to the right. Non-labored breathing.        Primary Care Physician  [address] [place], [place] [address]lester Fernandez , [place] [address]  [phone]      PHQ -- .  3 most recent PHQ Screens 5/24/2019   PHQ Not Done -   Little interest or pleasure in doing things Not at all   Feeling down, depressed, irritable, or hopeless Not at all Total Score PHQ 2 0       DSM V-OUD Screen - unobtainable    Assessment/Plan:     ICD-10-CM ICD-9-CM    1. Chronic bilateral low back pain, with sciatica presence unspecified M54.5 724.2     G89.29 338.29    2. Chronic pain syndrome G89.4 338.4 oxyCODONE ER (OXYCONTIN) 15 mg ER tablet   3. Cervicalgia M54.2 723.1    4. Chronic pain of left knee M25.562 719.46     G89.29 338.29    5. Encounter for long-term (current) use of high-risk medication Z79.899 V58.69 DRUG SCREEN      CANCELED: AMB POC DRUG SCREEN ()        Patients chronic pain due to advanced dementia should be handled as a palliative service and therefore no further opioid wean at this time is recommended as she feels to be comfortable on the current dosage of OxyContin 15mg twice a day. Medical surrogate has Narcan at home and is aware of withdrawal signs and overdose. Pt has not obtained TENS unit as recommended. Discussed with patient and surrogate that this can be obtained at local medical supply store due to her insurance not covering the device. Lidoderm patches were also denied by her insurance. Medical surrogate did not obtain capsaicin cream stating Biofreeze works well for her; continue the use of Biofreeze as needed. Pt and medical surrogate instructed to call 5-7 days before they run out of their medications for refill. Follow up ongoing assessment and ongoing development of integrative and comprehensive plan of care for chronic pain. Goals: To establish complementary and integrative plan of care to address chronic pain issues while minimizing pharmaceuticals to maximize patient's function improve quality of life. Education:  Patient again educated on the importance of strict compliance with the opioid care agreement while on opioid therapy. Patient also again educated that they should avoid driving while on chronic opioid therapy.   Also advised to avoid alcohol and to avoid benzodiazepines while on opioid therapy. Handouts given regarding opioid safety. Follow-up and Dispositions    · Return in about 3 months (around 8/24/2019) for 30 min. [address] was used for portions of this report. Unintended errors may occur.         Social History     Socioeconomic History    Marital status: SINGLE     Spouse name: Not on file    Number of children: Not on file    Years of education: Not on file    Highest education level: Not on file   Occupational History    Not on file   Social Needs    Financial resource strain: Not on file    Food insecurity:     Worry: Not on file     Inability: Not on file    Transportation needs:     Medical: Not on file     Non-medical: Not on file   Tobacco Use    Smoking status: Never Smoker    Smokeless tobacco: Never Used   Substance and Sexual Activity    Alcohol use: No    Drug use: No    Sexual activity: Never   Lifestyle    Physical activity:     Days per week: Not on file     Minutes per session: Not on file    Stress: Not on file   Relationships    Social connections:     Talks on phone: Not on file     Gets together: Not on file     Attends Denominational service: Not on file     Active member of club or organization: Not on file     Attends meetings of clubs or organizations: Not on file     Relationship status: Not on file    Intimate partner violence:     Fear of current or ex partner: Not on file     Emotionally abused: Not on file     Physically abused: Not on file     Forced sexual activity: Not on file   Other Topics Concern    Not on file   Social History Narrative    Not on file     Family History   Problem Relation Age of Onset    Diabetes Other     Hypertension Other     Stroke Other     Diabetes Mother      Allergies   Allergen Reactions    Celebrex [Celecoxib] Unable to Obtain    Codeine Unable to Obtain    Flexeril [Cyclobenzaprine] Unable to [facility] [address] [Nitrofurantoin Monohyd/M-Cryst] Unable to Arbovax Pcn [Penicillins] Unable to Obtain    Remeron [Mirtazapine] Unable to Obtain    Sulfa (Sulfonamide Antibiotics) Other (comments)     Swelling and low heart rate    Vicodin [Hydrocodone-Acetaminophen] Unable to Obtain     Past Medical History:   Diagnosis Date    Anxiety     Brachial neuritis or radiculitis NOS     Cervicalgia     Cervicalgia     Dementia     Diabetes ([facility] [address]     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     DJD (degenerative joint disease)     Encounter for long-term (current) use of other medications     Fracture 2014    left shoulder    GERD (gastroesophageal reflux disease)     Heart murmur     Hyperkalemia 2011    Hypertension     Lumbago     Pain in joint, lower leg     Pain in joint, multiple sites      Past Surgical History:   Procedure Laterality Date    HX BLADDER REPAIR      prolapsed    HX CATARACT REMOVAL  1991    HX CATARACT REMOVAL  1990    HX CHOLECYSTECTOMY  5/1998    HX HERNIA REPAIR  7/1998    x2    HX HYSTERECTOMY  1966    HX OOPHORECTOMY       Current Outpatient Medications on File Prior to Visit   Medication Sig    hydrocortisone (PROCTOZONE-HC) 2.5 % rectal cream Insert  into rectum four (4) times daily.  polyethylene glycol (MIRALAX) 17 gram packet Take 1 Packet by mouth daily.  CONTOUR NEXT TEST STRIPS strip USE TO TEST BLOOD SUGAR D    cyanocobalamin, vitamin B-12, 2,500 mcg chew Take  by mouth.  cholecalciferol, vitamin D3, (VITAMIN D3) 2,000 unit tab Take  by mouth.  menthol (BIOFREEZE, MENTHOL,) 4 % gel Apply generous amount to affected area up to 3 times daily.  naloxone (NARCAN) 4 mg/actuation nasal spray Use 1 spray intranasally, then discard. Repeat with new spray every 2 min as needed for opioid overdose symptoms, alternating nostrils. Indications: OPIATE-INDUCED RESPIRATORY DEPRESSION, Opioid Toxicity    pantoprazole (PROTONIX) 40 mg tablet Take 40 mg by mouth daily.     amlodipine (NORVASC) 10 mg tablet Take 10 mg by mouth daily.  furosemide (LASIX) 20 mg tablet Take  by mouth two (2) times a week.  quetiapine (SEROQUEL) 100 mg tablet Take 200 mg by mouth nightly.  donepezil (ARICEPT) 10 mg tablet Take 10 mg by mouth nightly. No current facility-administered medications on file prior to visit.

## 2019-05-24 NOTE — PROGRESS NOTES
Nursing Notes    Patient presents to the office today in follow-up. Patient rates her pain at 1/10 on the numerical pain scale. Reviewed medications with counts as follows:    Rx Date filled Qty Dispensed Pill Count Last Dose Short   Oxycodone 15 mg CR 05/06/19 60 26 This am  no        reviewed YES  Any aberrancies noted on  NO  Last opioid agreement 11/09/18  Last urine drug screen 11/30/18    Comments:  Patient is here today for a follow up appt today for her chronic pain. She states her pain level today is a 1  PHQ 2 was done patient denies any depression. Patient has been to [facility] for infusions twice a week. Labs were taken there    POC UDS was performed in office today per verbal order per [place] oral swab provided     Any new labs or imaging since last appointment? YES    Have you been to an emergency room (ER) or urgent care clinic since your last visit? NO            Have you been hospitalized since your last visit? NO     If yes, where, when, and reason for visit? Have you seen or consulted any other health care providers outside of the [address] [place] [address]  since your last visit? NO     If yes, where, when, and reason for visit? Ms. Jorge Fay has a reminder for a \"due or due soon\" health maintenance. I have asked that she contact her primary care provider for follow-up on this health maintenance.

## 2019-05-24 NOTE — PATIENT INSTRUCTIONS

## 2019-06-04 ENCOUNTER — TELEPHONE (OUTPATIENT)
Dept: INTERNAL MEDICINE CLINIC | Age: 84
End: 2019-06-04

## 2019-06-04 ENCOUNTER — HOSPITAL ENCOUNTER (OUTPATIENT)
Dept: INFUSION THERAPY | Age: 84
Discharge: HOME | End: 2019-06-04
Payer: MEDICARE

## 2019-06-04 VITALS
HEART RATE: 52 BPM | TEMPERATURE: 97.6 F | DIASTOLIC BLOOD PRESSURE: 66 MMHG | SYSTOLIC BLOOD PRESSURE: 152 MMHG | RESPIRATION RATE: 18 BRPM

## 2019-06-04 DIAGNOSIS — M25.50 PAIN IN JOINT, MULTIPLE SITES: ICD-10-CM

## 2019-06-04 DIAGNOSIS — M54.5 CHRONIC BILATERAL LOW BACK PAIN, WITH SCIATICA PRESENCE UNSPECIFIED: ICD-10-CM

## 2019-06-04 DIAGNOSIS — G89.29 CHRONIC BILATERAL LOW BACK PAIN, WITH SCIATICA PRESENCE UNSPECIFIED: ICD-10-CM

## 2019-06-04 DIAGNOSIS — G30.9 ALZHEIMER'S DEMENTIA WITH BEHAVIORAL DISTURBANCE, UNSPECIFIED TIMING OF DEMENTIA ONSET: Primary | ICD-10-CM

## 2019-06-04 DIAGNOSIS — F02.81 ALZHEIMER'S DEMENTIA WITH BEHAVIORAL DISTURBANCE, UNSPECIFIED TIMING OF DEMENTIA ONSET: Primary | ICD-10-CM

## 2019-06-04 LAB
BASO+EOS+MONOS # BLD AUTO: 0.6 K/UL (ref 0–2.3)
BASO+EOS+MONOS NFR BLD AUTO: 12 % (ref 0.1–17)
DIFFERENTIAL METHOD BLD: ABNORMAL
ERYTHROCYTE [DISTWIDTH] IN BLOOD BY AUTOMATED COUNT: 12 % (ref 11.5–14.5)
HCT VFR BLD AUTO: 30.3 % (ref 36–48)
HGB BLD-MCNC: 9.4 G/DL (ref 12–16)
LYMPHOCYTES # BLD: 1.8 K/UL (ref 1.1–5.9)
LYMPHOCYTES NFR BLD: 36 % (ref 14–44)
MCH RBC QN AUTO: 28.5 PG (ref 25–35)
MCHC RBC AUTO-ENTMCNC: 31 G/DL (ref 31–37)
MCV RBC AUTO: 91.8 FL (ref 78–102)
NEUTS SEG # BLD: 2.8 K/UL (ref 1.8–9.5)
NEUTS SEG NFR BLD: 52 % (ref 40–70)
PLATELET # BLD AUTO: 191 K/UL (ref 140–440)
RBC # BLD AUTO: 3.3 M/UL (ref 4.1–5.1)
WBC # BLD AUTO: 5.2 K/UL (ref 4.5–13)

## 2019-06-04 PROCEDURE — 85025 COMPLETE CBC W/AUTO DIFF WBC: CPT

## 2019-06-04 PROCEDURE — 36415 COLL VENOUS BLD VENIPUNCTURE: CPT

## 2019-06-04 NOTE — TELEPHONE ENCOUNTER
pts daughter came in requesting order for environmental modifications and OT/PT evaluation notes    The insurance company is asking for this and if we can fax to [phone]

## 2019-06-04 NOTE — PROGRESS NOTES
[facility] Progress Note    Date: 2019    Name: Briana Cat    MRN: [id number]         : 1922     PROCRIT INJECTON    Ms. Cornell Stout was assessed and education was provided. Pt arrived to [facility] in wheelchair accompanied by her daughter at 80. Ms. Antony's vitals were reviewed and patient was observed for 5 minutes prior to treatment. Visit Vitals  /66 (BP 1 Location: Left arm, BP Patient Position: Sitting)   Pulse (!) 52   Temp 97.6 °F (36.4 °C)   Resp 18   Breastfeeding? No       Blood drawn via left a/c venipuncture X1 attempt for CBC. Gauze and coban applied to site. Recent Results (from the past 12 hour(s))   CBC WITH 3 PART DIFF    Collection Time: 19  2:15 PM   Result Value Ref Range    WBC 5.2 4.5 - 13.0 K/uL    RBC 3.30 (L) 4.10 - 5.10 M/uL    HGB 9.4 (L) 12.0 - 16 g/dL    HCT 30.3 (L) 36 - 48 %    MCV 91.8 78 - 102 FL    MCH 28.5 25.0 - 35.0 PG    MCHC 31.0 31 - 37 g/dL    RDW 12.0 11.5 - 14.5 %    PLATELET 900 986 - 934 K/uL    NEUTROPHILS 52 40 - 70 %    MIXED CELLS 12 0.1 - 17 %    LYMPHOCYTES 36 14 - 44 %    ABS. NEUTROPHILS 2.8 1.8 - 9.5 K/UL    ABS. MIXED CELLS 0.6 0.0 - 2.3 K/uL    ABS. LYMPHOCYTES 1.8 1.1 - 5.9 K/UL    DF AUTOMATED       Results within ordered parameters to hold procrit today. H&H 9.4/30.3. Ms. Cornell Stout was discharged from [place] [address] in stable condition at 1425. She is to return on 19 at 2:30 pm for her next Procrit appointment.     Behzad Carreno RN  2019  [phone]

## 2019-06-05 ENCOUNTER — HOME HEALTH ADMISSION (OUTPATIENT)
Dept: HOME HEALTH SERVICES | Facility: HOME HEALTH | Age: 84
End: 2019-06-05

## 2019-06-06 NOTE — TELEPHONE ENCOUNTER
Spoke with patients daughter advised her that Sabrina Vásquez has ordered for home health to come and evaluate patient for environmental modifications per family request, PT/OT . Daughter stated that she does not want her mom doing any type of therapy as this has been too much for her in the past. Advised her that home health will come out and assess her mom and that therapy may be required to get notes for her insurance to determine if they will cover the environmental modifications that are needed. Advised patients daughter that she can speak with home health about further information in regards to this when they contact her. No further questions at this time.

## 2019-06-07 ENCOUNTER — HOME CARE VISIT (OUTPATIENT)
Dept: HOME HEALTH SERVICES | Facility: HOME HEALTH | Age: 84
End: 2019-06-07

## 2019-06-10 ENCOUNTER — HOME CARE VISIT (OUTPATIENT)
Dept: HOME HEALTH SERVICES | Facility: HOME HEALTH | Age: 84
End: 2019-06-10

## 2019-06-18 ENCOUNTER — HOSPITAL ENCOUNTER (OUTPATIENT)
Dept: INFUSION THERAPY | Age: 84
Discharge: HOME | End: 2019-06-18
Payer: MEDICARE

## 2019-06-18 VITALS
SYSTOLIC BLOOD PRESSURE: 154 MMHG | TEMPERATURE: 97.7 F | OXYGEN SATURATION: 94 % | HEART RATE: 55 BPM | RESPIRATION RATE: 18 BRPM | DIASTOLIC BLOOD PRESSURE: 63 MMHG

## 2019-06-18 LAB
BASO+EOS+MONOS # BLD AUTO: 0.6 K/UL (ref 0–2.3)
BASO+EOS+MONOS NFR BLD AUTO: 13 % (ref 0.1–17)
DIFFERENTIAL METHOD BLD: ABNORMAL
ERYTHROCYTE [DISTWIDTH] IN BLOOD BY AUTOMATED COUNT: 12 % (ref 11.5–14.5)
HCT VFR BLD AUTO: 30.9 % (ref 36–48)
HGB BLD-MCNC: 9.3 G/DL (ref 12–16)
LYMPHOCYTES # BLD: 1.7 K/UL (ref 1.1–5.9)
LYMPHOCYTES NFR BLD: 39 % (ref 14–44)
MCH RBC QN AUTO: 27.7 PG (ref 25–35)
MCHC RBC AUTO-ENTMCNC: 30.1 G/DL (ref 31–37)
MCV RBC AUTO: 92 FL (ref 78–102)
NEUTS SEG # BLD: 2 K/UL (ref 1.8–9.5)
NEUTS SEG NFR BLD: 48 % (ref 40–70)
PLATELET # BLD AUTO: 195 K/UL (ref 140–440)
RBC # BLD AUTO: 3.36 M/UL (ref 4.1–5.1)
WBC # BLD AUTO: 4.3 K/UL (ref 4.5–13)

## 2019-06-18 PROCEDURE — 36415 COLL VENOUS BLD VENIPUNCTURE: CPT

## 2019-06-18 PROCEDURE — 85025 COMPLETE CBC W/AUTO DIFF WBC: CPT

## 2019-06-18 NOTE — PROGRESS NOTES
[facility] Progress Note Date: 2019 Name: Patricia Rogers MRN: [id number] : 1922 PROCRIT INJECTON Ms. Edwin Stokes was assessed and education was provided. No complaints or concerns voiced Pt arrived to [facility] in wheelchair accompanied by her daughter at 46 MsElvis Antony's vitals were reviewed and patient was observed for 5 minutes prior to treatment. Visit Vitals /63 (BP 1 Location: Left arm, BP Patient Position: Sitting) Pulse (!) 55 Temp 97.7 °F (36.5 °C) Resp 18 SpO2 94% Blood drawn via left a/c venipuncture X1 attempt for CBC. Gauze and coban applied to site. Recent Results (from the past 12 hour(s)) CBC WITH 3 PART DIFF Collection Time: 19  2:20 PM  
Result Value Ref Range WBC 4.3 (L) 4.5 - 13.0 K/uL  
 RBC 3.36 (L) 4.10 - 5.10 M/uL HGB 9.3 (L) 12.0 - 16 g/dL HCT 30.9 (L) 36 - 48 % MCV 92.0 78 - 102 FL  
 MCH 27.7 25.0 - 35.0 PG  
 MCHC 30.1 (L) 31 - 37 g/dL  
 RDW 12.0 11.5 - 14.5 % PLATELET 226 790 - 178 K/uL NEUTROPHILS 48 40 - 70 % MIXED CELLS 13 0.1 - 17 % LYMPHOCYTES 39 14 - 44 % ABS. NEUTROPHILS 2.0 1.8 - 9.5 K/UL  
 ABS. MIXED CELLS 0.6 0.0 - 2.3 K/uL  
 ABS. LYMPHOCYTES 1.7 1.1 - 5.9 K/UL  
 DF AUTOMATED Results within ordered parameters to hold procrit today. H&H 9.3/30.9 Ms. Edwin Stokes was discharged from [place] [address] in stable condition at 1435. She is to return on 19 at 2:30 pm for her next Procrit appointment. Jovani Florian RN 
2019 
[id number]

## 2019-06-24 ENCOUNTER — OFFICE VISIT (OUTPATIENT)
Dept: INTERNAL MEDICINE CLINIC | Age: 84
End: 2019-06-24

## 2019-06-24 VITALS
TEMPERATURE: 97.5 F | OXYGEN SATURATION: 93 % | HEART RATE: 52 BPM | RESPIRATION RATE: 16 BRPM | HEIGHT: 62 IN | SYSTOLIC BLOOD PRESSURE: 148 MMHG | BODY MASS INDEX: 22.86 KG/M2 | DIASTOLIC BLOOD PRESSURE: 52 MMHG

## 2019-06-24 DIAGNOSIS — I10 ESSENTIAL HYPERTENSION, MALIGNANT: ICD-10-CM

## 2019-06-24 DIAGNOSIS — G30.1 LATE ONSET ALZHEIMER'S DISEASE WITH BEHAVIORAL DISTURBANCE (HCC): ICD-10-CM

## 2019-06-24 DIAGNOSIS — Z23 ENCOUNTER FOR IMMUNIZATION: ICD-10-CM

## 2019-06-24 DIAGNOSIS — G89.4 CHRONIC PAIN SYNDROME: ICD-10-CM

## 2019-06-24 DIAGNOSIS — F02.818 LATE ONSET ALZHEIMER'S DISEASE WITH BEHAVIORAL DISTURBANCE (HCC): ICD-10-CM

## 2019-06-24 DIAGNOSIS — D63.8 CHRONIC DISEASE ANEMIA: Primary | ICD-10-CM

## 2019-06-24 NOTE — PATIENT INSTRUCTIONS
Recombinant Zoster (Shingles) Vaccine, RZV: What you need to know  Why get vaccinated? Shingles (also called herpes zoster, or just zoster) is a painful skin rash, often with blisters. Shingles is caused by the varicella zoster virus, the same virus that causes chickenpox. After you have chickenpox, the virus stays in your body and can cause shingles later in life. You can't catch shingles from another person. However, a person who has never had chickenpox (or chickenpox vaccine) could get chickenpox from someone with shingles. A shingles rash usually appears on one side of the face or body and heals within 2 to 4 weeks. Its main symptom is pain, which can be severe. Other symptoms can include fever, headache, chills, and upset stomach. Very rarely, a shingles infection can lead to pneumonia, hearing problems, blindness, brain inflammation (encephalitis), or death. For about 1 person in 5, severe pain can continue even long after the rash has cleared up. This long-lasting pain is called post-herpetic neuralgia (PHN). Shingles is far more common in people 48years of age and older than in younger people, and the risk increases with age. It is also more common in people whose immune system is weakened because of a disease such as cancer, or by drugs such as steroids or chemotherapy. At least 1 million people a year in the [facility] get shingles. Shingles vaccine (recombinant)  Recombinant shingles vaccine was approved by FDA in 2017 for the prevention of shingles. In clinical trials, it was more than 90% effective in preventing shingles. It can also reduce the likelihood of PHN. Two doses, 2 to 6 months apart, are recommended for adults 48 and older. This vaccine is also recommended for people who have already gotten the live shingles vaccine (Zostavax). There is no live virus in this vaccine.   Some people should not get this vaccine  Tell your vaccine provider if you:  · Have any severe, life-threatening allergies. A person who has ever had a life-threatening allergic reaction after a dose of recombinant shingles vaccine, or has a severe allergy to any component of this vaccine, may be advised not to be vaccinated. Ask your health care provider if you want information about vaccine components. · Are pregnant or breastfeeding. There is not much information about use of recombinant shingles vaccine in pregnant or nursing women. Your healthcare provider might recommend delaying vaccination. · Are not feeling well. If you have a mild illness, such as a cold, you can probably get the vaccine today. If you are moderately or severely ill, you should probably wait until you recover. Your doctor can advise you. Risks of a vaccine reaction  With any medicine, including vaccines, there is a chance of reactions. After recombinant shingles vaccination, a person might experience:  · Pain, redness, soreness, or swelling at the site of the injection  · Headache, muscle aches, fever, shivering, fatigue  In clinical trials, most people got a sore arm with mild or moderate pain after vaccination, and some also had redness and swelling where they got the shot. Some people felt tired, had muscle pain, a headache, shivering, fever, stomach pain, or nausea. About 1 out of 6 people who got recombinant zoster vaccine experienced side effects that prevented them from doing regular activities. Symptoms went away on their own in about 2 to 3 days. Side effects were more common in younger people. You should still get the second dose of recombinant zoster vaccine even if you had one of these reactions after the first dose. Other things that could happen after this vaccine:  · People sometimes faint after medical procedures, including vaccination. Sitting or lying down for about 15 minutes can help prevent fainting and injuries caused by a fall.  Tell your provider if you feel dizzy or have vision changes or ringing in the ears.  · Some people get shoulder pain that can be more severe and longer-lasting than routine soreness that can follow injections. This happens very rarely. · Any medication can cause a severe allergic reaction. Such reactions to a vaccine are estimated at about 1 in a million doses, and would happen within a few minutes to a few hours after the vaccination. As with any medicine, there is a very remote chance of a vaccine causing a serious injury or death. The safety of vaccines is always being monitored. For more information, visit: www.cdc.gov/vaccinesafety/  What if there is a serious problem? What should I look for? · Look for anything that concerns you, such as signs of a severe allergic reaction, very high fever, or unusual behavior. Signs of a severe allergic reaction can include hives, swelling of the face and throat, difficulty breathing, a fast heartbeat, dizziness, and weakness. These would usually start a few minutes to a few hours after the vaccination. What should I do? · If you think it is a severe allergic reaction or other emergency that can't wait, call 9-1-1 or get to the nearest hospital. Otherwise, call your health care provider. · Afterward, the reaction should be reported to the Vaccine Adverse Event Reporting System (VAERS). Your doctor should file this report, or you can do it yourself through the VAERS website at www.vaers. hhs.gov, or by calling 9-654.840.8080. VAERS does not give medical advice. .  How can I learn more? · Ask your health care provider. He or she can give you the vaccine package insert or suggest other sources of information. · Call your local or state health department. · Contact the Centers for Disease Control and Prevention (CDC):  ? Call 5-562.974.6376 (1-800-CDC-INFO) or  ?  Visit CDC's website at www.cdc.gov/vaccines  Vaccine Information Statement (Interim)  Recombinant Zoster Vaccine  2/12/2018  [facility] Control and Prevention  Many Vaccine Information Statements are available in [place]ian and other languages. See www.immunize.org/vis. Hojas de Información Sobre Vacunas están disponibles en Español y en muchos otros idiomas. Visite Kimberly.si   Care instructions adapted under license by [employer] (which disclaims liability or warranty for this information). If you have questions about a medical condition or this instruction, always ask your healthcare professional. [address] any warranty or liability for your use of this information.                 Pneumococcal Conjugate Vaccine (PCV13): What You Need to Know  Why get vaccinated? Vaccination can protect both children and adults from pneumococcal disease. Pneumococcal disease is caused by bacteria that can spread from person to person through close contact. It can cause ear infections, and it can also lead to more serious infections of the:  · Lungs (pneumonia). · Blood (bacteremia). · Covering of the brain and spinal cord (meningitis). Pneumococcal pneumonia is most common among adults. Pneumococcal meningitis can cause deafness and brain damage, and it kills about 1 child in 10 who get it. Anyone can get pneumococcal disease, but children under 3years of age and adults 72 years and older, people with certain medical conditions, and cigarette smokers are at the highest risk. Before there was a vaccine, the [facility] saw the following in children under 5 each year from pneumococcal disease:  · More than 700 cases of meningitis  · About 13,000 blood infections  · About 5 million ear infections  · About 200 deaths  Since the vaccine became available, severe pneumococcal disease in these children has fallen by 88%. About 18,000 older adults die of pneumococcal disease each year in the [place].   Treatment of pneumococcal infections with penicillin and other drugs is not as effective as it used to be, because some strains of the disease have become resistant to these drugs. This makes prevention of the disease through vaccination even more important. PCV13 vaccine  Pneumococcal conjugate vaccine (called PCV13) protects against 13 types of pneumococcal bacteria. PCV13 is routinely given to children at 2, 4, 6, and 1515 months of age. It is also recommended for children and adults 3to 59years of age with certain health conditions, and for all adults 72years of age and older. Your doctor can give you details. Some people should not get this vaccine  Anyone who has ever had a life-threatening allergic reaction to a dose of this vaccine, to an earlier pneumococcal vaccine called PCV7, or to any vaccine containing diphtheria toxoid (for example, DTaP), should not get PCV13. Anyone with a severe allergy to any component of PCV13 should not get the vaccine. Tell your doctor if the person being vaccinated has any severe allergies. If the person scheduled for vaccination is not feeling well, your healthcare provider might decide to reschedule the shot on another day. Risks of a vaccine reaction  With any medicine, including vaccines, there is a chance of reactions. These are usually mild and go away on their own, but serious reactions are also possible. Problems reported following PCV13 varied by age and dose in the series. The most common problems reported among children were:  · About half became drowsy after the shot, had a temporary loss of appetite, or had redness or tenderness where the shot was given. · About 1 out of 3 had swelling where the shot was given. · About 1 out of 3 had a mild fever, and about 1 in 20 had a fever over 102.2°F.  · Up to about 8 out of 10 became fussy or irritable. Adults have reported pain, redness, and swelling where the shot was given; also mild fever, fatigue, headache, chills, or muscle pain.   Young children who get PCV13 along with inactivated flu vaccine at the same time may be at increased risk for seizures caused by fever. Ask your doctor for more information. Problems that could happen after any vaccine:  · People sometimes faint after a medical procedure, including vaccination. Sitting or lying down for about 15 minutes can help prevent fainting and the injuries caused by a fall. Tell your doctor if you feel dizzy or have vision changes or ringing in the ears. · Some older children and adults get severe pain in the shoulder and have difficulty moving the arm where a shot was given. This happens very rarely. · Any medication can cause a severe allergic reaction. Such reactions from a vaccine are very rare, estimated at about 1 in a million doses, and would happen within a few minutes to a few hours after the vaccination. As with any medicine, there is a very small chance of a vaccine causing a serious injury or death. The safety of vaccines is always being monitored. For more information, visit: www.cdc.gov/vaccinesafety. What if there is a serious reaction? What should I look for? · Look for anything that concerns you, such as signs of a severe allergic reaction, very high fever, or unusual behavior. Signs of a severe allergic reaction can include hives, swelling of the face and throat, difficulty breathing, a fast heartbeat, dizziness, and weakness, usually within a few minutes to a few hours after the vaccination. What should I do? · If you think it is a severe allergic reaction or other emergency that can't wait, call 911 or get the person to the nearest hospital. Otherwise, call your doctor. · Reactions should be reported to the Vaccine Adverse Event Reporting System (VAERS). Your doctor should file this report, or you can do it yourself through the VAERS website at www.vaers. hhs.gov, or by calling 5-461.630.8477. VAERS does not give medical advice.   The [employer] Vaccine Injury Compensation Program  The [employer] Vaccine Injury Compensation Program (VICP) is a federal program that was created to compensate people who may have been injured by certain vaccines. Persons who believe they may have been injured by a vaccine can learn about the program and about filing a claim by calling 6-445.147.2442 or visiting the [employer] website at www.[facility].gov/vaccinecompensation. There is a time limit to file a claim for compensation. How can I learn more? · Ask your healthcare provider. He or she can give you the vaccine package insert or suggest other sources of information. · Call your local or state health department. · Contact the Centers for Disease Control and Prevention (CDC):  ? Call 6-109.350.6710 (1-800-CDC-INFO) or  ? Visit CDC's website at www.cdc.gov/vaccines  Vaccine Information Statement  PCV13 Vaccine  2015  42 DAMAR[address]uleta [id number]  Department of Health and Human Services  Centers for Disease Control and Prevention  Many Vaccine Information Statements are available in Frisian and other languages. See www.immunize.org/vis. Muchas hojas de información sobre vacunas están disponibles en español y en otros idiomas. Visite www.immunize.org/vis. Care instructions adapted under license by [employer] (which disclaims liability or warranty for this information). If you have questions about a medical condition or this instruction, always ask your healthcare professional. [address] any warranty or liability for your use of this information.               Tdap (Tetanus, Diphtheria, Pertussis) Vaccine: What You Need to Know  Why get vaccinated? Tetanus, diphtheria, and pertussis are very serious diseases. Tdap vaccine can protect us from these diseases. And Tdap vaccine given to pregnant women can protect  babies against pertussis. Tetanus (lockjaw) is rare in the [facility] today. It causes painful muscle tightening and stiffness, usually all over the body.   · It can lead to tightening of muscles in the head and neck so you can't open your mouth, swallow, or sometimes even breathe. Tetanus kills about 1 out of 10 people who are infected even after receiving the best medical care. Diphtheria is also rare in the [place] today. It can cause a thick coating to form in the back of the throat. · It can lead to breathing problems, heart failure, paralysis, and death. Pertussis (whooping cough) causes severe coughing spells, which can cause difficulty breathing, vomiting, and disturbed sleep. · It can also lead to weight loss, incontinence, and rib fractures. Up to 2 in 100 adolescents and 5 in 100 adults with pertussis are hospitalized or have complications, which could include pneumonia or death. These diseases are caused by bacteria. Diphtheria and pertussis are spread from person to person through secretions from coughing or sneezing. Tetanus enters the body through cuts, scratches, or wounds. Before vaccines, as many as 200,000 cases of diphtheria, 200,000 cases of pertussis, and hundreds of cases of tetanus were reported in the [place] each year. Since vaccination began, reports of cases for tetanus and diphtheria have dropped by about 99% and for pertussis by about 80%. Tdap vaccine  The Tdap vaccine can protect adolescents and adults from tetanus, diphtheria, and pertussis. One dose of Tdap is routinely given at age 6 or 15. People who did not get Tdap at that age should get it as soon as possible. Tdap is especially important for health care professionals and anyone having close contact with a baby younger than 12 months. Pregnant women should get a dose of Tdap during every pregnancy, to protect the  from pertussis. Infants are most at risk for severe, life-threatening complications from pertussis. Another vaccine, called Td, protects against tetanus and diphtheria, but not pertussis. A Td booster should be given every 10 years.  Tdap may be given as one of these boosters if you have never gotten Tdap before. Tdap may also be given after a severe cut or burn to prevent tetanus infection. Your doctor or the person giving you the vaccine can give you more information. Tdap may safely be given at the same time as other vaccines. Some people should not get this vaccine  · A person who has ever had a life-threatening allergic reaction after a previous dose of any diphtheria-, tetanus-, or pertussis-containing vaccine, OR has a severe allergy to any part of this vaccine, should not get Tdap vaccine. Tell the person giving the vaccine about any severe allergies. · Anyone who had coma or long repeated seizures within 7 days after a childhood dose of DTP or DTaP, or a previous dose of Tdap, should not get Tdap, unless a cause other than the vaccine was found. They can still get Td. · Talk to your doctor if you:  ? Have seizures or another nervous system problem. ? Had severe pain or swelling after any vaccine containing diphtheria, tetanus, or pertussis. ? Ever had a condition called Guillain-Barré Syndrome (GBS). ? Aren't feeling well on the day the shot is scheduled. Risks  With any medicine, including vaccines, there is a chance of side effects. These are usually mild and go away on their own. Serious reactions are also possible but are rare. Most people who get Tdap vaccine do not have any problems with it.   Mild problems following Tdap  (Did not interfere with activities)  · Pain where the shot was given (about 3 in 4 adolescents or 2 in 3 adults)  · Redness or swelling where the shot was given (about 1 person in 5)  · Mild fever of at least 100.4°F (up to about 1 in 25 adolescents or 1 in 100 adults)  · Headache (about 3 or 4 people in 10)  · Tiredness (about 1 person in 3 or 4)  · Nausea, vomiting, diarrhea, stomachache (up to 1 in 4 adolescents or 1 in 10 adults)  · Chills, sore joints (about 1 person in 10)  · Body aches (about 1 person in 3 or 4)  · Rash, swollen glands (uncommon)  Moderate problems following Tdap  (Interfered with activities, but did not require medical attention)  · Pain where the shot was given (up to 1 in 5 or 6)  · Redness or swelling where the shot was given (up to about 1 in 16 adolescents or 1 in 12 adults)  · Fever over 102°F (about 1 in 100 adolescents or 1 in 250 adults)  · Headache (about 1 in 7 adolescents or 1 in 10 adults)  · Nausea, vomiting, diarrhea, stomachache (up to 1 to 3 people in 100)  · Swelling of the entire arm where the shot was given (up to about 1 in 500)  Severe problems following Tdap  (Unable to perform usual activities; required medical attention)  · Swelling, severe pain, bleeding and redness in the arm where the shot was given (rare)  Problems that could happen after any vaccine:  · People sometimes faint after a medical procedure, including vaccination. Sitting or lying down for about 15 minutes can help prevent fainting, and injuries caused by a fall. Tell your doctor if you feel dizzy or have vision changes or ringing in the ears. · Some people get severe pain in the shoulder and have difficulty moving the arm where a shot was given. This happens very rarely. · Any medication can cause a severe allergic reaction. Such reactions from a vaccine are very rare, estimated at fewer than 1 in a million doses, and would happen within a few minutes to a few hours after the vaccination. As with any medicine, there is a very remote chance of a vaccine causing a serious injury or death. The safety of vaccines is always being monitored. For more information, visit: www.cdc.gov/vaccinesafety. What if there is a serious problem? What should I look for? · Look for anything that concerns you, such as signs of a severe allergic reaction, very high fever, or unusual behavior. Signs of a severe allergic reaction can include hives, swelling of the face and throat, difficulty breathing, a fast heartbeat, dizziness, and weakness.  These would usually start a few minutes to a few hours after the vaccination. What should I do? · If you think it is a severe allergic reaction or other emergency that can't wait, call 9-1-1 or get the person to the nearest hospital. Otherwise, call your doctor. · Afterward, the reaction should be reported to the Vaccine Adverse Event Reporting System (VAERS). Your doctor might file this report, or you can do it yourself through the VAERS web site at www.vaers. [facility].gov, or by calling 3-255.718.6912. VAERS does not give medical advice. The National Vaccine Injury Compensation Program  The National Vaccine Injury Compensation Program (VICP) is a federal program that was created to compensate people who may have been injured by certain vaccines. Persons who believe they may have been injured by a vaccine can learn about the program and about filing a claim by calling 2-268.611.4240 or visiting the [employer] website at www.[facility].gov/vaccinecompensation. There is a time limit to file a claim for compensation. How can I learn more? · Ask your doctor. He or she can give you the vaccine package insert or suggest other sources of information. · Call your local or state health department. · Contact the Centers for Disease Control and Prevention (CDC):  ? Call 8-827.365.7968 (1-800-CDC-INFO) or  ? Visit CDC's website at www.cdc.gov/vaccines  Vaccine Information Statement (Interim)  Tdap Vaccine  (2/24/15)  42 OR[address] Saba Nasim [id number]  Department of Health and Human Services  Centers for Disease Control and Prevention  Many Vaccine Information Statements are available in [place]ian and other languages. See www.immunize.org/vis. Muchas hojas de información sobre vacunas están disponibles en español y en otros idiomas. Visite www.immunize.org/vis. Care instructions adapted under license by [employer] (which disclaims liability or warranty for this information).  If you have questions about a medical condition or this instruction, always ask your healthcare professional. Jeannette Calderon disclaims any warranty or liability for your use of this information.               Tdap (Tetanus, Diphtheria, Pertussis) Vaccine: What You Need to Know  Why get vaccinated? Tetanus, diphtheria, and pertussis are very serious diseases. Tdap vaccine can protect us from these diseases. And Tdap vaccine given to pregnant women can protect  babies against pertussis. Tetanus (lockjaw) is rare in the [facility] today. It causes painful muscle tightening and stiffness, usually all over the body. · It can lead to tightening of muscles in the head and neck so you can't open your mouth, swallow, or sometimes even breathe. Tetanus kills about 1 out of 10 people who are infected even after receiving the best medical care. Diphtheria is also rare in the [place] today. It can cause a thick coating to form in the back of the throat. · It can lead to breathing problems, heart failure, paralysis, and death. Pertussis (whooping cough) causes severe coughing spells, which can cause difficulty breathing, vomiting, and disturbed sleep. · It can also lead to weight loss, incontinence, and rib fractures. Up to 2 in 100 adolescents and 5 in 100 adults with pertussis are hospitalized or have complications, which could include pneumonia or death. These diseases are caused by bacteria. Diphtheria and pertussis are spread from person to person through secretions from coughing or sneezing. Tetanus enters the body through cuts, scratches, or wounds. Before vaccines, as many as 200,000 cases of diphtheria, 200,000 cases of pertussis, and hundreds of cases of tetanus were reported in the [place] each year. Since vaccination began, reports of cases for tetanus and diphtheria have dropped by about 99% and for pertussis by about 80%. Tdap vaccine  The Tdap vaccine can protect adolescents and adults from tetanus, diphtheria, and pertussis.  One dose of Tdap is routinely given at age 11 or 12. People who did not get Tdap at that age should get it as soon as possible. Tdap is especially important for health care professionals and anyone having close contact with a baby younger than 12 months. Pregnant women should get a dose of Tdap during every pregnancy, to protect the  from pertussis. Infants are most at risk for severe, life-threatening complications from pertussis. Another vaccine, called Td, protects against tetanus and diphtheria, but not pertussis. A Td booster should be given every 10 years. Tdap may be given as one of these boosters if you have never gotten Tdap before. Tdap may also be given after a severe cut or burn to prevent tetanus infection. Your doctor or the person giving you the vaccine can give you more information. Tdap may safely be given at the same time as other vaccines. Some people should not get this vaccine  · A person who has ever had a life-threatening allergic reaction after a previous dose of any diphtheria-, tetanus-, or pertussis-containing vaccine, OR has a severe allergy to any part of this vaccine, should not get Tdap vaccine. Tell the person giving the vaccine about any severe allergies. · Anyone who had coma or long repeated seizures within 7 days after a childhood dose of DTP or DTaP, or a previous dose of Tdap, should not get Tdap, unless a cause other than the vaccine was found. They can still get Td. · Talk to your doctor if you:  ? Have seizures or another nervous system problem. ? Had severe pain or swelling after any vaccine containing diphtheria, tetanus, or pertussis. ? Ever had a condition called Guillain-Barré Syndrome (GBS). ? Aren't feeling well on the day the shot is scheduled. Risks  With any medicine, including vaccines, there is a chance of side effects. These are usually mild and go away on their own. Serious reactions are also possible but are rare.   Most people who get Tdap vaccine do not have any problems with it.  Mild problems following Tdap  (Did not interfere with activities)  · Pain where the shot was given (about 3 in 4 adolescents or 2 in 3 adults)  · Redness or swelling where the shot was given (about 1 person in 5)  · Mild fever of at least 100.4°F (up to about 1 in 25 adolescents or 1 in 100 adults)  · Headache (about 3 or 4 people in 10)  · Tiredness (about 1 person in 3 or 4)  · Nausea, vomiting, diarrhea, stomachache (up to 1 in 4 adolescents or 1 in 10 adults)  · Chills, sore joints (about 1 person in 10)  · Body aches (about 1 person in 3 or 4)  · Rash, swollen glands (uncommon)  Moderate problems following Tdap  (Interfered with activities, but did not require medical attention)  · Pain where the shot was given (up to 1 in 5 or 6)  · Redness or swelling where the shot was given (up to about 1 in 16 adolescents or 1 in 12 adults)  · Fever over 102°F (about 1 in 100 adolescents or 1 in 250 adults)  · Headache (about 1 in 7 adolescents or 1 in 10 adults)  · Nausea, vomiting, diarrhea, stomachache (up to 1 to 3 people in 100)  · Swelling of the entire arm where the shot was given (up to about 1 in 500)  Severe problems following Tdap  (Unable to perform usual activities; required medical attention)  · Swelling, severe pain, bleeding and redness in the arm where the shot was given (rare)  Problems that could happen after any vaccine:  · People sometimes faint after a medical procedure, including vaccination. Sitting or lying down for about 15 minutes can help prevent fainting, and injuries caused by a fall. Tell your doctor if you feel dizzy or have vision changes or ringing in the ears. · Some people get severe pain in the shoulder and have difficulty moving the arm where a shot was given. This happens very rarely. · Any medication can cause a severe allergic reaction.  Such reactions from a vaccine are very rare, estimated at fewer than 1 in a million doses, and would happen within a few minutes to a few hours after the vaccination. As with any medicine, there is a very remote chance of a vaccine causing a serious injury or death. The safety of vaccines is always being monitored. For more information, visit: www.cdc.gov/vaccinesafety. What if there is a serious problem? What should I look for? · Look for anything that concerns you, such as signs of a severe allergic reaction, very high fever, or unusual behavior. Signs of a severe allergic reaction can include hives, swelling of the face and throat, difficulty breathing, a fast heartbeat, dizziness, and weakness. These would usually start a few minutes to a few hours after the vaccination. What should I do? · If you think it is a severe allergic reaction or other emergency that can't wait, call 9-1-1 or get the person to the nearest hospital. Otherwise, call your doctor. · Afterward, the reaction should be reported to the Vaccine Adverse Event Reporting System (VAERS). Your doctor might file this report, or you can do it yourself through the VAERS web site at www.vaers. [facility].gov, or by calling 9-982.224.8475. VAERS does not give medical advice. The National Vaccine Injury Compensation Program  The National Vaccine Injury Compensation Program (VICP) is a federal program that was created to compensate people who may have been injured by certain vaccines. Persons who believe they may have been injured by a vaccine can learn about the program and about filing a claim by calling 6-582.355.9464 or visiting the [employer] website at www.[facility].gov/vaccinecompensation. There is a time limit to file a claim for compensation. How can I learn more? · Ask your doctor. He or she can give you the vaccine package insert or suggest other sources of information. · Call your local or state health department. · Contact the Centers for Disease Control and Prevention (CDC):  ? Call 1-265.844.3831 (3-254-YUN-INFO) or  ?  Visit CDC's website at www.cdc.gov/vaccines  Vaccine Information Statement (Interim)  Tdap Vaccine  (2/24/15)  [address] 121OJ-18  Department of Health and Human Services  Centers for Disease Control and Prevention  Many Vaccine Information Statements are available in Khmer and other languages. See www.immunize.org/vis. Muchas hojas de información sobre vacunas están disponibles en español y en otros idiomas. Visite www.immunize.org/vis. Care instructions adapted under license by [employer] (which disclaims liability or warranty for this information).  If you have questions about a medical condition or this instruction, always ask your healthcare professional. [address] any warranty or liability for your use of this information.

## 2019-06-24 NOTE — PROGRESS NOTES
Katherine Hough is a 80 y.o.  female and presents with    Chief Complaint   Patient presents with    Follow-up     2 month follow up for weight change, htn. Subjective:  Hypertension      Follow-up   The history is provided by the caregiver. Mrs. Marlene Fuentes presents today for routine care. Daughter reports with home BP in 140s/50s, on Norvasc 10 mg. CKD Cr 2.65> 2.10, Glucose 195>114- not fasting, with history of Type 2 DM. Sign release form for psychiatry today as she would still like us to take over the Seroquel dosing. Caregiver has no new concerns, reports skin is CDI to pressure areas. She lives with her oldest daughter, Lyn Beasley, and visits with other daughter, Kim Chiang. Mrs. Marlene Fuentes was under the care- Dr. Claire Melendez, Dr. Zualy Senior (left practice)- psychologist. She is taking Seroquel for dementia as she was acting with aggressive behavior related to diagnosis of alzheimer dementia. Seroquel dose is 200 mg BID however daughter reports only giving at bedtime as causes daytime drowsiness. She is also under the care of pain management and would like to see if I can manage her pain medications. Last pain management note reports chronic pain management x 20-30 years for cervicalgia, low back pain, and left knee pain. They recommend that the patient's chronic pain be handled as a palliative service and DO NOT recommend further opioid wean at this time. They have also requested a TENs unit, Biofreeze, Lidoderm patches(denied by insurance), and capsaicin cream. Daughter reported has been weaned down on Oxycontin 15 mg. Dr. Morris Martin follows for hyperkalemia and anemia of chronic disease. Vitamin d 55.8 12/218. BMP K 4.0, Cr 2.65, GFR 20. CBC hgb 10.3, hct 33.3, rdw normal, Hgb 9.3/Hct 30.9. She has a history of B12 deficiency and iron deficiency. She receives iron checks monthly and has not needed infusion x 1 year.        Additional Concerns: none     ROS   Review of Systems   Constitutional: Negative. HENT: Negative. Eyes: Negative. Respiratory: Negative. Cardiovascular: Negative. Gastrointestinal: Negative. Genitourinary: Negative. Musculoskeletal: Negative. Neurological: Negative. Endo/Heme/Allergies: Negative. Psychiatric/Behavioral: Negative. All other systems reviewed and are negative. Allergies   Allergen Reactions    Celebrex [Celecoxib] Unable to Obtain    Codeine Unable to Obtain    Flexeril [Cyclobenzaprine] Unable to Obtain    Macrobid [Nitrofurantoin Monohyd/M-Cryst] Unable to Obtain    Pcn [Penicillins] Unable to Obtain    Remeron [Mirtazapine] Unable to Obtain    Sulfa (Sulfonamide Antibiotics) Other (comments)     Swelling and low heart rate    Vicodin [Hydrocodone-Acetaminophen] Unable to Obtain       Current Outpatient Medications   Medication Sig Dispense Refill    oxyCODONE ER (OXYCONTIN) 15 mg ER tablet Take 1 Tab by mouth every twelve (12) hours for 30 days. Max Daily Amount: 30 mg. 60 Tab 0    hydrocortisone (PROCTOZONE-HC) 2.5 % rectal cream Insert  into rectum four (4) times daily. 30 g 0    polyethylene glycol (MIRALAX) 17 gram packet Take 1 Packet by mouth daily. 30 Packet 11    CONTOUR NEXT TEST STRIPS strip USE TO TEST BLOOD SUGAR D  3    cyanocobalamin, vitamin B-12, 2,500 mcg chew Take  by mouth.  cholecalciferol, vitamin D3, (VITAMIN D3) 2,000 unit tab Take  by mouth.  menthol (BIOFREEZE, MENTHOL,) 4 % gel Apply generous amount to affected area up to 3 times daily. 1 Tube 3    naloxone (NARCAN) 4 mg/actuation nasal spray Use 1 spray intranasally, then discard. Repeat with new spray every 2 min as needed for opioid overdose symptoms, alternating nostrils. Indications: OPIATE-INDUCED RESPIRATORY DEPRESSION, Opioid Toxicity 1 Each 0    pantoprazole (PROTONIX) 40 mg tablet Take 40 mg by mouth daily.  amlodipine (NORVASC) 10 mg tablet Take 10 mg by mouth daily.       furosemide (LASIX) 20 mg tablet Take  by mouth two (2) times a week.  quetiapine (SEROQUEL) 100 mg tablet Take 200 mg by mouth nightly.  donepezil (ARICEPT) 10 mg tablet Take 10 mg by mouth nightly.          Social History     Socioeconomic History    Marital status: SINGLE     Spouse name: Not on file    Number of children: Not on file    Years of education: Not on file    Highest education level: Not on file   Occupational History    Not on file   Social Needs    Financial resource strain: Not on file    Food insecurity:     Worry: Not on file     Inability: Not on file    Transportation needs:     Medical: Not on file     Non-medical: Not on file   Tobacco Use    Smoking status: Never Smoker    Smokeless tobacco: Never Used   Substance and Sexual Activity    Alcohol use: No    Drug use: No    Sexual activity: Never   Lifestyle    Physical activity:     Days per week: Not on file     Minutes per session: Not on file    Stress: Not on file   Relationships    Social connections:     Talks on phone: Not on file     Gets together: Not on file     Attends Rastafari service: Not on file     Active member of club or organization: Not on file     Attends meetings of clubs or organizations: Not on file     Relationship status: Not on file    Intimate partner violence:     Fear of current or ex partner: Not on file     Emotionally abused: Not on file     Physically abused: Not on file     Forced sexual activity: Not on file   Other Topics Concern    Not on file   Social History Narrative    Not on file       Past Medical History:   Diagnosis Date    Anxiety     Brachial neuritis or radiculitis NOS     Cervicalgia     Cervicalgia     Dementia     Diabetes ([facility] [address]     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     DJD (degenerative joint disease)     Encounter for long-term (current) use of other medications     Fracture 2014    left shoulder    GERD (gastroesophageal reflux disease)     Heart murmur     Hyperkalemia 2011    Hypertension     Lumbago     Pain in joint, lower leg     Pain in joint, multiple sites        Past Surgical History:   Procedure Laterality Date    HX BLADDER REPAIR      prolapsed    HX CATARACT REMOVAL  1991    HX CATARACT REMOVAL  1990    HX CHOLECYSTECTOMY  5/1998    HX HERNIA REPAIR  7/1998    x2    HX HYSTERECTOMY  1966    HX OOPHORECTOMY         Family History   Problem Relation Age of Onset    Diabetes Other     Hypertension Other     Stroke Other     Diabetes Mother        Objective:  Vitals:    06/24/19 1416   BP: 148/52   Pulse: (!) 52   Resp: 16   Temp: 97.5 °F (36.4 °C)   TempSrc: Oral   SpO2: 93%   Height: 5' 2\" (1.575 m)   PainSc:   0 - No pain       LABS   Results for orders placed or performed during the hospital encounter of 06/18/19   CBC WITH 3 PART DIFF   Result Value Ref Range    WBC 4.3 (L) 4.5 - 13.0 K/uL    RBC 3.36 (L) 4.10 - 5.10 M/uL    HGB 9.3 (L) 12.0 - 16 g/dL    HCT 30.9 (L) 36 - 48 %    MCV 92.0 78 - 102 FL    MCH 27.7 25.0 - 35.0 PG    MCHC 30.1 (L) 31 - 37 g/dL    RDW 12.0 11.5 - 14.5 %    PLATELET 460 679 - 105 K/uL    NEUTROPHILS 48 40 - 70 %    MIXED CELLS 13 0.1 - 17 %    LYMPHOCYTES 39 14 - 44 %    ABS. NEUTROPHILS 2.0 1.8 - 9.5 K/UL    ABS. MIXED CELLS 0.6 0.0 - 2.3 K/uL    ABS. LYMPHOCYTES 1.7 1.1 - 5.9 K/UL    DF AUTOMATED         TESTS  none    PE  Physical Exam   Constitutional: She is well-developed, well-nourished, and in no distress. No distress. HENT:   Head: Normocephalic and atraumatic. Mouth/Throat: Oropharynx is clear and moist.   Neck: Normal range of motion. Cardiovascular: Normal rate, regular rhythm and intact distal pulses. Murmur heard. Pulmonary/Chest: Effort normal and breath sounds normal. No respiratory distress.    Musculoskeletal:   Chronic chin to chest with lean to the right shoulder  Walks minimally with assistance  In wheelchair today   Lymphadenopathy:     She has no cervical adenopathy. Neurological: She is alert. Skin: Skin is warm and dry. She is not diaphoretic. Psychiatric: Mood, memory, affect and judgment normal.   Vitals reviewed. Assessment/Plan:    1. CKD with chronic anemia- Under the care of Dr. Michael Hurt and Dr. Ion Green. 3. Chronic pain syndrome- Palliative management on oxycontin 15 mg and notes support no further weaning. 4. Alzheimer Dementia with history of behavioral disturbance- She is taking Aricept 10 mg daily and Seroquel 200 mg. I do not mind managing this therapy however I would like to discuss possibly weaning. I did speak with Jesus MADSEN and due to chronicity of therapy recommends if wean that do so at 50 mg over a month at a time. Research has reported that atypical neuroleptics may increase mortality and are not approved by the FDA for the treatment of behavioral disorders in patients with dementia, however the benefit may outweigh the risk. Signed medical release for psychiatry. At this time and patient is tolerating Seroquel at 200 mg QHS dosing if unable to obtain the records I would feel comfortable refilling and taking over management, however I am leaving the practice so I will try to obtain notes so the next provider can decide on plan of care. 5. Abrasion/skin breakdown to buttock- monitor, No s/s of secondary infection- no open wounds today. 6. HTN- stable, continue current     Lab review: no lab studies available for review at time of visit, reviewed last labs dated 1/8/19, 12/13/18, 12/11/18, reviewed labs by Dr. Michael Hurt. I do not feel and daughter agrees that no change in plan of care would come from further lab testing to include lipids, A1C. Today's Visit:   Diagnoses and all orders for this visit:    1. Encounter for immunization  -     PNEUMOCOCCAL CONJ VACCINE 13 VALENT IM      Health Maintenance: She was seen by ophthalmology in the past and recommended PRN follow up.  I do not feel and daughter agrees that no change in plan of care would come from further lab testing to include lipids. However may need to check A1C on follow up. We discussed Pneu 13 and daughter in agreeance to receive. Tdap and Shingrix handouts provided. I have discussed the diagnosis with the patient and the intended plan as seen in the above orders. The patient has received an after-visit summary and questions were answered concerning future plans. I have discussed medication side effects and warnings with the patient as well. I have reviewed the plan of care with the patient, accepted their input and they are in agreement with the treatment goals. More than 1/2 of this 25 minute visit was spent in counseling and coordination of care, as described above.     ARIAS Couch  Internist of [address], [address] [place] [address].  Phone: [phone]  Fax: [phone]

## 2019-06-28 DIAGNOSIS — G89.4 CHRONIC PAIN SYNDROME: ICD-10-CM

## 2019-07-01 RX ORDER — OXYCODONE HYDROCHLORIDE 15 MG/1
15 TABLET, FILM COATED, EXTENDED RELEASE ORAL EVERY 12 HOURS
Qty: 60 TAB | Refills: 0 | Status: SHIPPED | OUTPATIENT
Start: 2019-07-01 | End: 2019-07-31

## 2019-07-01 NOTE — TELEPHONE ENCOUNTER
Called mobile number and daughter Yesika Chavez answered who is on the [place] form for patient and I explained that we have a prescription ready to pickup. Informed Yesika Chavez that she needs to come by 3:45pm and bring a valid picture ID. She stated she will come and get the medicine but her sister Mala Reis actually takes care of the patient and would I please call her and let her know Yesika Chavez is bringing Mom the medicine. I stated I would.

## 2019-07-02 ENCOUNTER — HOSPITAL ENCOUNTER (OUTPATIENT)
Dept: INFUSION THERAPY | Age: 84
Discharge: HOME | End: 2019-07-02
Payer: MEDICARE

## 2019-07-02 VITALS
TEMPERATURE: 97 F | OXYGEN SATURATION: 94 % | RESPIRATION RATE: 18 BRPM | DIASTOLIC BLOOD PRESSURE: 59 MMHG | HEART RATE: 57 BPM | SYSTOLIC BLOOD PRESSURE: 154 MMHG

## 2019-07-02 LAB
BASO+EOS+MONOS # BLD AUTO: 0.6 K/UL (ref 0–2.3)
BASO+EOS+MONOS NFR BLD AUTO: 10 % (ref 0.1–17)
DIFFERENTIAL METHOD BLD: ABNORMAL
ERYTHROCYTE [DISTWIDTH] IN BLOOD BY AUTOMATED COUNT: 12.1 % (ref 11.5–14.5)
HCT VFR BLD AUTO: 31.2 % (ref 36–48)
HGB BLD-MCNC: 9.7 G/DL (ref 12–16)
LYMPHOCYTES # BLD: 1.8 K/UL (ref 1.1–5.9)
LYMPHOCYTES NFR BLD: 31 % (ref 14–44)
MCH RBC QN AUTO: 27.8 PG (ref 25–35)
MCHC RBC AUTO-ENTMCNC: 31.1 G/DL (ref 31–37)
MCV RBC AUTO: 89.4 FL (ref 78–102)
NEUTS SEG # BLD: 3.4 K/UL (ref 1.8–9.5)
NEUTS SEG NFR BLD: 59 % (ref 40–70)
PLATELET # BLD AUTO: 219 K/UL (ref 140–440)
RBC # BLD AUTO: 3.49 M/UL (ref 4.1–5.1)
WBC # BLD AUTO: 5.8 K/UL (ref 4.5–13)

## 2019-07-02 PROCEDURE — 36415 COLL VENOUS BLD VENIPUNCTURE: CPT

## 2019-07-02 PROCEDURE — 85025 COMPLETE CBC W/AUTO DIFF WBC: CPT

## 2019-07-15 ENCOUNTER — HOME HEALTH ADMISSION (OUTPATIENT)
Dept: HOME HEALTH SERVICES | Facility: HOME HEALTH | Age: 84
End: 2019-07-15
Payer: MEDICARE

## 2019-07-16 ENCOUNTER — HOSPITAL ENCOUNTER (OUTPATIENT)
Dept: INFUSION THERAPY | Age: 84
Discharge: HOME | End: 2019-07-16
Payer: MEDICARE

## 2019-07-16 VITALS
TEMPERATURE: 97.4 F | HEART RATE: 53 BPM | RESPIRATION RATE: 18 BRPM | OXYGEN SATURATION: 93 % | SYSTOLIC BLOOD PRESSURE: 174 MMHG | DIASTOLIC BLOOD PRESSURE: 54 MMHG

## 2019-07-16 LAB
BASO+EOS+MONOS # BLD AUTO: 0.5 K/UL (ref 0–2.3)
BASO+EOS+MONOS NFR BLD AUTO: 9 % (ref 0.1–17)
DIFFERENTIAL METHOD BLD: ABNORMAL
ERYTHROCYTE [DISTWIDTH] IN BLOOD BY AUTOMATED COUNT: 11.7 % (ref 11.5–14.5)
HCT VFR BLD AUTO: 31.6 % (ref 36–48)
HGB BLD-MCNC: 9.7 G/DL (ref 12–16)
LYMPHOCYTES # BLD: 2.6 K/UL (ref 1.1–5.9)
LYMPHOCYTES NFR BLD: 43 % (ref 14–44)
MCH RBC QN AUTO: 27.9 PG (ref 25–35)
MCHC RBC AUTO-ENTMCNC: 30.7 G/DL (ref 31–37)
MCV RBC AUTO: 90.8 FL (ref 78–102)
NEUTS SEG # BLD: 2.9 K/UL (ref 1.8–9.5)
NEUTS SEG NFR BLD: 48 % (ref 40–70)
PLATELET # BLD AUTO: 186 K/UL (ref 140–440)
RBC # BLD AUTO: 3.48 M/UL (ref 4.1–5.1)
WBC # BLD AUTO: 6 K/UL (ref 4.5–13)

## 2019-07-16 PROCEDURE — 85025 COMPLETE CBC W/AUTO DIFF WBC: CPT

## 2019-07-16 PROCEDURE — 36415 COLL VENOUS BLD VENIPUNCTURE: CPT

## 2019-07-16 NOTE — PROGRESS NOTES
[facility] Progress Note Date: 2019 Name: Homero Ramirez MRN: [id number] : 1922 Ms. Beverly Arora arrived to [facility] at . Ms. Beverly Arora was assessed and education was provided. Ms. Antony's vitals were reviewed. Visit Vitals /54 (BP 1 Location: Left arm, BP Patient Position: Sitting) Pulse (!) 53 Temp 97.4 °F (36.3 °C) Resp 18 SpO2 93% Pt was observed for 5 minutes after obtaining vital signs prior to initiating treatment. Blood drawn for labs via left AC venipuncture x1 attempt. Lab results were obtained and reviewed. Recent Results (from the past 12 hour(s)) CBC WITH 3 PART DIFF Collection Time: 19  2:46 PM  
Result Value Ref Range WBC 6.0 4.5 - 13.0 K/uL  
 RBC 3.48 (L) 4.10 - 5.10 M/uL HGB 9.7 (L) 12.0 - 16 g/dL HCT 31.6 (L) 36 - 48 % MCV 90.8 78 - 102 FL  
 MCH 27.9 25.0 - 35.0 PG  
 MCHC 30.7 (L) 31 - 37 g/dL  
 RDW 11.7 11.5 - 14.5 % PLATELET 353 574 - 952 K/uL NEUTROPHILS 48 40 - 70 % MIXED CELLS 9 0.1 - 17 % LYMPHOCYTES 43 14 - 44 % ABS. NEUTROPHILS 2.9 1.8 - 9.5 K/UL  
 ABS. MIXED CELLS 0.5 0.0 - 2.3 K/uL  
 ABS. LYMPHOCYTES 2.6 1.1 - 5.9 K/UL  
 DF AUTOMATED Procrit held at this time for hct >30 per order. Ms. Beverly Arora tolerated well without complaints. Ms. Beverly Arora was discharged from [place] [address] in stable condition at 1450. She is to return on 19 at 1430 for her next appointment. Miryam Mejia RN 
2019 
[id number]

## 2019-07-18 ENCOUNTER — HOME CARE VISIT (OUTPATIENT)
Dept: SCHEDULING | Facility: HOME HEALTH | Age: 84
End: 2019-07-18
Payer: MEDICARE

## 2019-07-18 ENCOUNTER — HOME CARE VISIT (OUTPATIENT)
Dept: HOME HEALTH SERVICES | Facility: HOME HEALTH | Age: 84
End: 2019-07-18

## 2019-07-18 VITALS
HEART RATE: 54 BPM | DIASTOLIC BLOOD PRESSURE: 52 MMHG | OXYGEN SATURATION: 94 % | TEMPERATURE: 98.9 F | SYSTOLIC BLOOD PRESSURE: 160 MMHG

## 2019-07-18 PROCEDURE — 3331090001 HH PPS REVENUE CREDIT

## 2019-07-18 PROCEDURE — 3331090002 HH PPS REVENUE DEBIT

## 2019-07-18 PROCEDURE — 400013 HH SOC

## 2019-07-18 PROCEDURE — G0151 HHCP-SERV OF PT,EA 15 MIN: HCPCS

## 2019-07-19 ENCOUNTER — HOME CARE VISIT (OUTPATIENT)
Dept: HOME HEALTH SERVICES | Facility: HOME HEALTH | Age: 84
End: 2019-07-19
Payer: MEDICARE

## 2019-07-19 PROCEDURE — 3331090001 HH PPS REVENUE CREDIT

## 2019-07-19 PROCEDURE — 3331090002 HH PPS REVENUE DEBIT

## 2019-07-20 PROCEDURE — 3331090002 HH PPS REVENUE DEBIT

## 2019-07-20 PROCEDURE — 3331090001 HH PPS REVENUE CREDIT

## 2019-07-21 PROCEDURE — 3331090001 HH PPS REVENUE CREDIT

## 2019-07-21 PROCEDURE — 3331090002 HH PPS REVENUE DEBIT

## 2019-07-22 PROCEDURE — 3331090001 HH PPS REVENUE CREDIT

## 2019-07-22 PROCEDURE — 3331090002 HH PPS REVENUE DEBIT

## 2019-07-23 PROCEDURE — 3331090001 HH PPS REVENUE CREDIT

## 2019-07-23 PROCEDURE — 3331090002 HH PPS REVENUE DEBIT

## 2019-07-24 PROCEDURE — 3331090002 HH PPS REVENUE DEBIT

## 2019-07-24 PROCEDURE — 3331090001 HH PPS REVENUE CREDIT

## 2019-07-25 PROCEDURE — 3331090001 HH PPS REVENUE CREDIT

## 2019-07-25 PROCEDURE — 3331090002 HH PPS REVENUE DEBIT

## 2019-07-26 PROCEDURE — 3331090002 HH PPS REVENUE DEBIT

## 2019-07-26 PROCEDURE — 3331090001 HH PPS REVENUE CREDIT

## 2019-07-27 PROCEDURE — 3331090001 HH PPS REVENUE CREDIT

## 2019-07-27 PROCEDURE — 3331090002 HH PPS REVENUE DEBIT

## 2019-07-28 PROCEDURE — 3331090001 HH PPS REVENUE CREDIT

## 2019-07-28 PROCEDURE — 3331090002 HH PPS REVENUE DEBIT

## 2019-07-29 PROCEDURE — 3331090001 HH PPS REVENUE CREDIT

## 2019-07-29 PROCEDURE — 3331090002 HH PPS REVENUE DEBIT

## 2019-07-30 ENCOUNTER — HOSPITAL ENCOUNTER (OUTPATIENT)
Dept: INFUSION THERAPY | Age: 84
Discharge: HOME | End: 2019-07-30
Payer: MEDICARE

## 2019-07-30 VITALS
SYSTOLIC BLOOD PRESSURE: 154 MMHG | DIASTOLIC BLOOD PRESSURE: 58 MMHG | HEART RATE: 55 BPM | OXYGEN SATURATION: 93 % | RESPIRATION RATE: 18 BRPM | TEMPERATURE: 98.2 F

## 2019-07-30 LAB
BASO+EOS+MONOS # BLD AUTO: 0.6 K/UL (ref 0–2.3)
BASO+EOS+MONOS NFR BLD AUTO: 14 % (ref 0.1–17)
DIFFERENTIAL METHOD BLD: ABNORMAL
ERYTHROCYTE [DISTWIDTH] IN BLOOD BY AUTOMATED COUNT: 12 % (ref 11.5–14.5)
HCT VFR BLD AUTO: 29.6 % (ref 36–48)
HGB BLD-MCNC: 9.1 G/DL (ref 12–16)
LYMPHOCYTES # BLD: 1.9 K/UL (ref 1.1–5.9)
LYMPHOCYTES NFR BLD: 46 % (ref 14–44)
MCH RBC QN AUTO: 27.6 PG (ref 25–35)
MCHC RBC AUTO-ENTMCNC: 30.7 G/DL (ref 31–37)
MCV RBC AUTO: 89.7 FL (ref 78–102)
NEUTS SEG # BLD: 1.6 K/UL (ref 1.8–9.5)
NEUTS SEG NFR BLD: 41 % (ref 40–70)
PLATELET # BLD AUTO: 191 K/UL (ref 140–440)
RBC # BLD AUTO: 3.3 M/UL (ref 4.1–5.1)
WBC # BLD AUTO: 4.1 K/UL (ref 4.5–13)

## 2019-07-30 PROCEDURE — 3331090002 HH PPS REVENUE DEBIT

## 2019-07-30 PROCEDURE — 36415 COLL VENOUS BLD VENIPUNCTURE: CPT

## 2019-07-30 PROCEDURE — 96372 THER/PROPH/DIAG INJ SC/IM: CPT

## 2019-07-30 PROCEDURE — 74011250636 HC RX REV CODE- 250/636: Performed by: INTERNAL MEDICINE

## 2019-07-30 PROCEDURE — 85025 COMPLETE CBC W/AUTO DIFF WBC: CPT

## 2019-07-30 PROCEDURE — 3331090001 HH PPS REVENUE CREDIT

## 2019-07-30 RX ADMIN — ERYTHROPOIETIN 4000 UNITS: 4000 INJECTION, SOLUTION INTRAVENOUS; SUBCUTANEOUS at 14:54

## 2019-07-30 RX ADMIN — ERYTHROPOIETIN 3000 UNITS: 3000 INJECTION, SOLUTION INTRAVENOUS; SUBCUTANEOUS at 14:54

## 2019-07-30 NOTE — PROGRESS NOTES
[facility] Progress Note    Date: 2019    Name: Emily Butterfield    MRN: [id number]         : 1922      Ms. Cecelia Anderson arrived to [facility] at . Ms. Cecelia Anderson was assessed and education was provided. Ms. Antony's vitals were reviewed. Visit Vitals  /58 (BP 1 Location: Left arm, BP Patient Position: At rest;Sitting)   Pulse (!) 55   Temp 98.2 °F (36.8 °C)   Resp 18   SpO2 93%       Pt was observed for 5 minutes after obtaining vital signs prior to initiating treatment. Blood drawn for labs via left AC venipuncture x1 attempt. Lab results were obtained and reviewed. Recent Results (from the past 12 hour(s))   CBC WITH 3 PART DIFF    Collection Time: 19  2:49 PM   Result Value Ref Range    WBC 4.1 (L) 4.5 - 13.0 K/uL    RBC 3.30 (L) 4.10 - 5.10 M/uL    HGB 9.1 (L) 12.0 - 16 g/dL    HCT 29.6 (L) 36 - 48 %    MCV 89.7 78 - 102 FL    MCH 27.6 25.0 - 35.0 PG    MCHC 30.7 (L) 31 - 37 g/dL    RDW 12.0 11.5 - 14.5 %    PLATELET 652 010 - 228 K/uL    NEUTROPHILS 41 40 - 70 %    MIXED CELLS 14 0.1 - 17 %    LYMPHOCYTES 46 (H) 14 - 44 %    ABS. NEUTROPHILS 1.6 (L) 1.8 - 9.5 K/UL    ABS. MIXED CELLS 0.6 0.0 - 2.3 K/uL    ABS. LYMPHOCYTES 1.9 1.1 - 5.9 K/UL    DF AUTOMATED         Procrit 7,000 units administered in left upper arm, bandaid applied. Ms. Cecelia Anderson tolerated well without complaints. Ms. Cecelia Anderson was discharged from [place] [address] in stable condition at 1500. She is to return on 19 at  for her next appointment.     Jaxon Gray RN  2019  [id number]

## 2019-07-31 PROCEDURE — 3331090002 HH PPS REVENUE DEBIT

## 2019-07-31 PROCEDURE — 3331090001 HH PPS REVENUE CREDIT

## 2019-08-01 PROCEDURE — 3331090001 HH PPS REVENUE CREDIT

## 2019-08-01 PROCEDURE — 3331090002 HH PPS REVENUE DEBIT

## 2019-08-02 PROCEDURE — 3331090002 HH PPS REVENUE DEBIT

## 2019-08-02 PROCEDURE — 3331090001 HH PPS REVENUE CREDIT

## 2019-08-03 PROCEDURE — 3331090002 HH PPS REVENUE DEBIT

## 2019-08-03 PROCEDURE — 3331090001 HH PPS REVENUE CREDIT

## 2019-08-04 PROCEDURE — 3331090001 HH PPS REVENUE CREDIT

## 2019-08-04 PROCEDURE — 3331090002 HH PPS REVENUE DEBIT

## 2019-08-05 ENCOUNTER — HOME CARE VISIT (OUTPATIENT)
Dept: HOME HEALTH SERVICES | Facility: HOME HEALTH | Age: 84
End: 2019-08-05
Payer: MEDICARE

## 2019-08-05 DIAGNOSIS — G89.29 CHRONIC BILATERAL LOW BACK PAIN, WITH SCIATICA PRESENCE UNSPECIFIED: ICD-10-CM

## 2019-08-05 DIAGNOSIS — M25.562 CHRONIC PAIN OF LEFT KNEE: ICD-10-CM

## 2019-08-05 DIAGNOSIS — G89.29 CHRONIC PAIN OF LEFT KNEE: ICD-10-CM

## 2019-08-05 DIAGNOSIS — M54.2 CERVICALGIA: ICD-10-CM

## 2019-08-05 DIAGNOSIS — M54.5 CHRONIC BILATERAL LOW BACK PAIN, WITH SCIATICA PRESENCE UNSPECIFIED: ICD-10-CM

## 2019-08-05 DIAGNOSIS — G89.4 CHRONIC PAIN SYNDROME: ICD-10-CM

## 2019-08-05 PROCEDURE — 3331090001 HH PPS REVENUE CREDIT

## 2019-08-05 PROCEDURE — 3331090003 HH PPS REVENUE ADJ

## 2019-08-05 PROCEDURE — 3331090002 HH PPS REVENUE DEBIT

## 2019-08-05 RX ORDER — OXYCODONE HYDROCHLORIDE 15 MG/1
15 TABLET, FILM COATED, EXTENDED RELEASE ORAL EVERY 12 HOURS
Qty: 60 TAB | Refills: 0 | Status: SHIPPED | OUTPATIENT
Start: 2019-08-06 | End: 2019-08-28 | Stop reason: SDUPTHER

## 2019-08-05 NOTE — TELEPHONE ENCOUNTER
Luke Salas has called requesting a refill of their controlled medication, Oxycontin Er, for the management of chronic pain. Last office visit date: 5/24/19  Last opioid care agreement 11/19  Last UDS was done 5/24/19    Date last  was pulled and reviewed : 8/5/19  Last fill date for medication was 7/4/19    Was the patient compliant when the above report was pulled? yes    Analgesia: 75%    Aberrancies: last Oral swab + Morphine    ADL's: Patient is 80, daughter helps with ADL's    Adverse Reaction: none    Provider's last note and plan of care reviewed? yes  Request forwarded to provider for review.

## 2019-08-06 NOTE — TELEPHONE ENCOUNTER
Spoke with patient after getting 2 points of identity and informed patient that we have a script ready at the office for pickup and she needs to come by 3:45pm with a valid picture ID- patient conformed.

## 2019-08-13 ENCOUNTER — HOSPITAL ENCOUNTER (OUTPATIENT)
Dept: INFUSION THERAPY | Age: 84
Discharge: HOME | End: 2019-08-13
Payer: MEDICARE

## 2019-08-13 VITALS
TEMPERATURE: 98.4 F | RESPIRATION RATE: 18 BRPM | HEART RATE: 89 BPM | SYSTOLIC BLOOD PRESSURE: 174 MMHG | OXYGEN SATURATION: 93 % | DIASTOLIC BLOOD PRESSURE: 54 MMHG

## 2019-08-13 LAB
BASO+EOS+MONOS # BLD AUTO: 0.7 K/UL (ref 0–2.3)
BASO+EOS+MONOS NFR BLD AUTO: 12 % (ref 0.1–17)
DIFFERENTIAL METHOD BLD: ABNORMAL
ERYTHROCYTE [DISTWIDTH] IN BLOOD BY AUTOMATED COUNT: 12.1 % (ref 11.5–14.5)
HCT VFR BLD AUTO: 32.8 % (ref 36–48)
HGB BLD-MCNC: 9.9 G/DL (ref 12–16)
LYMPHOCYTES # BLD: 2.3 K/UL (ref 1.1–5.9)
LYMPHOCYTES NFR BLD: 42 % (ref 14–44)
MCH RBC QN AUTO: 27.4 PG (ref 25–35)
MCHC RBC AUTO-ENTMCNC: 30.2 G/DL (ref 31–37)
MCV RBC AUTO: 90.9 FL (ref 78–102)
NEUTS SEG # BLD: 2.4 K/UL (ref 1.8–9.5)
NEUTS SEG NFR BLD: 46 % (ref 40–70)
PLATELET # BLD AUTO: 170 K/UL (ref 140–440)
RBC # BLD AUTO: 3.61 M/UL (ref 4.1–5.1)
WBC # BLD AUTO: 5.4 K/UL (ref 4.5–13)

## 2019-08-13 PROCEDURE — 85025 COMPLETE CBC W/AUTO DIFF WBC: CPT

## 2019-08-13 PROCEDURE — 36415 COLL VENOUS BLD VENIPUNCTURE: CPT

## 2019-08-13 NOTE — PROGRESS NOTES
[facility] Progress Note Date: 2019 Name: Pita Murphy MRN: [id number] : 1922 Ms. Yovanny Fortune arrived to [facility] at 1 Ms. Yovanny Fortune was assessed and education was provided. Ms. Antony's vitals were reviewed. There were no vitals taken for this visit. Pt was observed for 5 minutes after obtaining vital signs prior to initiating treatment. Blood drawn for labs via right AC venipuncture x2 attempt by [facility] Phlebotomy Recent Results (from the past 12 hour(s)) CBC WITH 3 PART DIFF Collection Time: 19  3:30 PM  
Result Value Ref Range WBC 5.4 4.5 - 13.0 K/uL  
 RBC 3.61 (L) 4.10 - 5.10 M/uL HGB 9.9 (L) 12.0 - 16 g/dL HCT 32.8 (L) 36 - 48 % MCV 90.9 78 - 102 FL  
 MCH 27.4 25.0 - 35.0 PG  
 MCHC 30.2 (L) 31 - 37 g/dL  
 RDW 12.1 11.5 - 14.5 % PLATELET 053 451 - 193 K/uL NEUTROPHILS 46 40 - 70 % MIXED CELLS 12 0.1 - 17 % LYMPHOCYTES 42 14 - 44 % ABS. NEUTROPHILS 2.4 1.8 - 9.5 K/UL  
 ABS. MIXED CELLS 0.7 0.0 - 2.3 K/uL  
 ABS. LYMPHOCYTES 2.3 1.1 - 5.9 K/UL  
 DF AUTOMATED Visit Vitals /54 (BP 1 Location: Left arm, BP Patient Position: At rest;Sitting) Pulse 89 Temp 98.4 °F (36.9 °C) Resp 18 SpO2 93% Procrit 7,000 units held. Ms. Yovanny Fortune tolerated well without complaints. Ms. Yovanny Fortune was discharged from [place] [address] in stable condition at 32 61 16. She is to return on 19 at  for her next appointment. Jose Manuel Riggs RN 2019

## 2019-08-27 ENCOUNTER — HOSPITAL ENCOUNTER (OUTPATIENT)
Dept: INFUSION THERAPY | Age: 84
Discharge: HOME | End: 2019-08-27
Payer: MEDICARE

## 2019-08-27 VITALS
DIASTOLIC BLOOD PRESSURE: 53 MMHG | HEART RATE: 49 BPM | SYSTOLIC BLOOD PRESSURE: 157 MMHG | TEMPERATURE: 97.5 F | RESPIRATION RATE: 18 BRPM

## 2019-08-27 LAB
ALBUMIN SERPL-MCNC: 3.6 G/DL (ref 3.4–5)
ANION GAP SERPL CALC-SCNC: 5 MMOL/L (ref 3–18)
BASO+EOS+MONOS # BLD AUTO: 0.5 K/UL (ref 0–2.3)
BASO+EOS+MONOS NFR BLD AUTO: 11 % (ref 0.1–17)
BUN SERPL-MCNC: 38 MG/DL (ref 7–18)
BUN/CREAT SERPL: 18 (ref 12–20)
CALCIUM SERPL-MCNC: 9.3 MG/DL (ref 8.5–10.1)
CHLORIDE SERPL-SCNC: 102 MMOL/L (ref 100–111)
CO2 SERPL-SCNC: 32 MMOL/L (ref 21–32)
CREAT SERPL-MCNC: 2.15 MG/DL (ref 0.6–1.3)
DIFFERENTIAL METHOD BLD: ABNORMAL
ERYTHROCYTE [DISTWIDTH] IN BLOOD BY AUTOMATED COUNT: 12 % (ref 11.5–14.5)
FERRITIN SERPL-MCNC: 824 NG/ML (ref 8–388)
GLUCOSE SERPL-MCNC: 109 MG/DL (ref 74–99)
HCT VFR BLD AUTO: 31.2 % (ref 36–48)
HGB BLD-MCNC: 9.4 G/DL (ref 12–16)
IRON SATN MFR SERPL: 31 %
IRON SERPL-MCNC: 59 UG/DL (ref 50–175)
LYMPHOCYTES # BLD: 1.5 K/UL (ref 1.1–5.9)
LYMPHOCYTES NFR BLD: 38 % (ref 14–44)
MCH RBC QN AUTO: 27.1 PG (ref 25–35)
MCHC RBC AUTO-ENTMCNC: 30.1 G/DL (ref 31–37)
MCV RBC AUTO: 89.9 FL (ref 78–102)
NEUTS SEG # BLD: 2 K/UL (ref 1.8–9.5)
NEUTS SEG NFR BLD: 50 % (ref 40–70)
PHOSPHATE SERPL-MCNC: 4.3 MG/DL (ref 2.5–4.9)
PLATELET # BLD AUTO: 223 K/UL (ref 140–440)
POTASSIUM SERPL-SCNC: 5.2 MMOL/L (ref 3.5–5.5)
RBC # BLD AUTO: 3.47 M/UL (ref 4.1–5.1)
SODIUM SERPL-SCNC: 139 MMOL/L (ref 136–145)
TIBC SERPL-MCNC: 193 UG/DL (ref 250–450)
WBC # BLD AUTO: 4 K/UL (ref 4.5–13)

## 2019-08-27 PROCEDURE — 83540 ASSAY OF IRON: CPT

## 2019-08-27 PROCEDURE — 36415 COLL VENOUS BLD VENIPUNCTURE: CPT

## 2019-08-27 PROCEDURE — 85025 COMPLETE CBC W/AUTO DIFF WBC: CPT

## 2019-08-27 PROCEDURE — 82728 ASSAY OF FERRITIN: CPT

## 2019-08-27 PROCEDURE — 80069 RENAL FUNCTION PANEL: CPT

## 2019-08-27 NOTE — PROGRESS NOTES
[facility] Progress Note    Date: 2019    Name: Jayme Meade    MRN: [id number]         : 1922      Ms. Tana Chatterjee arrived to [facility] at [address] [place] [address]    Ms. Tana Chatterjee was assessed and education was provided. Ms. Antony's vitals were reviewed. Visit Vitals  /53 (BP 1 Location: Left arm, BP Patient Position: At rest;Sitting)   Pulse (!) 49   Temp 97.5 °F (36.4 °C)   Resp 18   Breastfeeding? No       Pt was observed for 5 minutes after obtaining vital signs prior to initiating treatment. Blood drawn for labs via right AC venipuncture x1 attempt by Stephanie Waddell. Recent Results (from the past 12 hour(s))   CBC WITH 3 PART DIFF    Collection Time: 19  3:15 PM   Result Value Ref Range    WBC 4.0 (L) 4.5 - 13.0 K/uL    RBC 3.47 (L) 4.10 - 5.10 M/uL    HGB 9.4 (L) 12.0 - 16 g/dL    HCT 31.2 (L) 36 - 48 %    MCV 89.9 78 - 102 FL    MCH 27.1 25.0 - 35.0 PG    MCHC 30.1 (L) 31 - 37 g/dL    RDW 12.0 11.5 - 14.5 %    PLATELET 505 685 - 390 K/uL    NEUTROPHILS 50 40 - 70 %    MIXED CELLS 11 0.1 - 17 %    LYMPHOCYTES 38 14 - 44 %    ABS. NEUTROPHILS 2.0 1.8 - 9.5 K/UL    ABS. MIXED CELLS 0.5 0.0 - 2.3 K/uL    ABS. LYMPHOCYTES 1.5 1.1 - 5.9 K/UL    DF AUTOMATED         Visit Vitals  /53 (BP 1 Location: Left arm, BP Patient Position: At rest;Sitting)   Pulse (!) 49   Temp 97.5 °F (36.4 °C)   Resp 18   Breastfeeding? No         Procrit 7,000 units held. Ms. Tana Chatterjee tolerated well without complaints. Ms. Tana Chatterjee was discharged from [place] [address] in stable condition at 1525. She is to return on 9/10/19 at  for her next appointment.     Anuja Coello RN  2019  [id number]

## 2019-08-28 ENCOUNTER — OFFICE VISIT (OUTPATIENT)
Dept: PAIN MANAGEMENT | Age: 84
End: 2019-08-28

## 2019-08-28 VITALS
HEIGHT: 62 IN | DIASTOLIC BLOOD PRESSURE: 52 MMHG | TEMPERATURE: 96.6 F | WEIGHT: 125 LBS | OXYGEN SATURATION: 97 % | RESPIRATION RATE: 14 BRPM | HEART RATE: 51 BPM | SYSTOLIC BLOOD PRESSURE: 148 MMHG | BODY MASS INDEX: 23 KG/M2

## 2019-08-28 DIAGNOSIS — G89.29 CHRONIC BILATERAL LOW BACK PAIN, WITH SCIATICA PRESENCE UNSPECIFIED: ICD-10-CM

## 2019-08-28 DIAGNOSIS — Z79.899 ENCOUNTER FOR LONG-TERM (CURRENT) USE OF HIGH-RISK MEDICATION: ICD-10-CM

## 2019-08-28 DIAGNOSIS — M54.2 CERVICALGIA: Primary | ICD-10-CM

## 2019-08-28 DIAGNOSIS — M25.562 CHRONIC PAIN OF LEFT KNEE: ICD-10-CM

## 2019-08-28 DIAGNOSIS — M54.5 CHRONIC BILATERAL LOW BACK PAIN, WITH SCIATICA PRESENCE UNSPECIFIED: ICD-10-CM

## 2019-08-28 DIAGNOSIS — G89.4 CHRONIC PAIN SYNDROME: ICD-10-CM

## 2019-08-28 DIAGNOSIS — G89.29 CHRONIC PAIN OF LEFT KNEE: ICD-10-CM

## 2019-08-28 RX ORDER — DOCUSATE SODIUM 100 MG/1
100 CAPSULE, LIQUID FILLED ORAL DAILY
Qty: 30 CAP | Refills: 2 | Status: SHIPPED | OUTPATIENT
Start: 2019-08-28 | End: 2019-09-27

## 2019-08-28 RX ORDER — SENNOSIDES 8.6 MG/1
1 TABLET ORAL
Qty: 30 TAB | Refills: 2 | Status: SHIPPED | OUTPATIENT
Start: 2019-08-28 | End: 2021-01-01

## 2019-08-28 RX ORDER — OXYCODONE HYDROCHLORIDE 15 MG/1
15 TABLET, FILM COATED, EXTENDED RELEASE ORAL EVERY 12 HOURS
Qty: 60 TAB | Refills: 0 | Status: SHIPPED | OUTPATIENT
Start: 2019-09-05 | End: 2019-10-05

## 2019-08-28 NOTE — PROGRESS NOTES
Referred prior to 2011 by primary care provider for back shoulder and knee pain      Calculated MEQ - 45  Naloxone rescue - yes  Prophylactic bowel program - yes  Date of last OCA 11/20/2019 signed by medical surrogate  Last oral swab 5/24/19, not consistent (+) undisclosed Morphine 3ng/ml (cutoff level 1).  date checked today, findings consistent      Today and Last Visit  PGIC - unobtainable due to dementia  MIGUEL ÁNGEL - unobtainable due to dementia  COMM - unobtainable due to dementia      HPI:  Sera Moreau is a 80 y.o. female here for f/u visit for ongoing evaluation of chronic neck and knee pain. Pt was last seen here on 5/24/19. Pt denies interval changes on the character or distribution of pain; she states she is feeling \"pretty good\". Pain is located to neck, left knee and lower back. The pain is described as aching. Pain at its best is 5/10. Pain at its worse is 8/10. Symptoms are improved by Biofreeze and OxyContin. ROS:  Unobtainable secondary to dementia. Patients care takers are present in the room and deny any of the following: fever, chills, nausea, vomiting, diarrhea, constipation, chest pain, shortness or breath/trouble breathing, abdominal pain, weakness, trouble swallowing, changes in vision, changes in hearing, falls, dizziness, bladder incontinence, bowel incontinence, depression, anxiety, suicidal ideations, homicidal ideations or alcohol use. Opioid specific risk: dementia, anxiety, diabetes, GERD. Opioid specific history: chronic opioid therapy for 20-30 years. Vitals:    08/28/19 1316   BP: 148/52   Pulse: (!) 51   Resp: 14   Temp: 96.6 °F (35.9 °C)   SpO2: 97%   Weight: 56.7 kg (125 lb)   Height: 5' 2\" (1.575 m)   PainSc:   3   PainLoc: Back          Physical exam:  AFVSS with bradycardia, no acute distress, normal body habitus. A&OXs 2. Normocephalic, atraumatic. Conjugate gaze, clear sclerae. Speech is clear but not appropriate.    Mood is appropriate and patient is cooperative. Patient is seated slumped in a manual wheelchair with extreme cervical flexion with slight rotation to the right. Non-labored breathing. Primary Care Physician  [address], [facility] [place] [address]  [phone]      [facility] -- .  3 most recent [address] [facility] 8/28/2019   PHQ Not Done -   Little interest or pleasure in doing things Not at all   Feeling down, depressed, irritable, or hopeless Not at all   Total Score PHQ 2 0       DSM V-OUD Screen - unobtainable    Assessment/Plan:     ICD-10-CM ICD-9-CM    1. Cervicalgia M54.2 723.1 oxyCODONE ER (OXYCONTIN) 15 mg ER tablet   2. Chronic pain of left knee M25.562 719.46 oxyCODONE ER (OXYCONTIN) 15 mg ER tablet    G89.29 338.29    3. Chronic bilateral low back pain, with sciatica presence unspecified M54.5 724.2 oxyCODONE ER (OXYCONTIN) 15 mg ER tablet    G89.29 338.29    4. Chronic pain syndrome G89.4 338.4 oxyCODONE ER (OXYCONTIN) 15 mg ER tablet   5. Encounter for long-term (current) use of high-risk medication Z79.899 V58.69 docusate sodium (STOOL SOFTENER) 100 mg capsule      senna (SENNA) 8.6 mg tablet        Patients chronic pain due to advanced dementia should be handled as a palliative service and therefore no further opioid wean at this time is recommended as she feels to be comfortable on the current dosage of OxyContin 15mg twice a day. Medical surrogate has Narcan at home and is aware of withdrawal signs and overdose. Pt has not obtained TENS unit as recommended. Lidoderm patches were also denied by her insurance. Did not obtain capsaicin cream because Biofreeze works well for her. Pt and medical surrogate instructed to call 5-7 days before they run out of their medications for refill. Next visit will discuss abnormal UDS results regarding 3ng/mL morphine detected. Follow up ongoing assessment and ongoing development of integrative and comprehensive plan of care for chronic pain. Goals:   To establish complementary and integrative plan of care to address chronic pain issues while minimizing pharmaceuticals to maximize patient's function improve quality of life. Education:  Patient again educated on the importance of strict compliance with the opioid care agreement while on opioid therapy. Patient also again educated that they should avoid driving while on chronic opioid therapy. Also advised to avoid alcohol and to avoid benzodiazepines while on opioid therapy. Handouts given regarding opioid safety. Follow-up and Dispositions    · Return in about 3 months (around 11/28/2019) for 30 min. [address] was used for portions of this report. Unintended errors may occur.         Social History     Socioeconomic History    Marital status: SINGLE     Spouse name: Not on file    Number of children: Not on file    Years of education: Not on file    Highest education level: Not on file   Occupational History    Not on file   Social Needs    Financial resource strain: Not on file    Food insecurity:     Worry: Not on file     Inability: Not on file    Transportation needs:     Medical: Not on file     Non-medical: Not on file   Tobacco Use    Smoking status: Never Smoker    Smokeless tobacco: Never Used   Substance and Sexual Activity    Alcohol use: No    Drug use: No    Sexual activity: Never   Lifestyle    Physical activity:     Days per week: Not on file     Minutes per session: Not on file    Stress: Not on file   Relationships    Social connections:     Talks on phone: Not on file     Gets together: Not on file     Attends Restoration service: Not on file     Active member of club or organization: Not on file     Attends meetings of clubs or organizations: Not on file     Relationship status: Not on file    Intimate partner violence:     Fear of current or ex partner: Not on file     Emotionally abused: Not on file     Physically abused: Not on file     Forced sexual activity: Not on file   Other Topics Concern    Not on file   Social History Narrative    Not on file     Family History   Problem Relation Age of Onset    Diabetes Other     Hypertension Other     Stroke Other     Diabetes Mother      Allergies   Allergen Reactions    Celebrex [Celecoxib] Unable to Obtain    Codeine Unable to Obtain    Flexeril [Cyclobenzaprine] Unable to Obtain    Macrobid [Nitrofurantoin Monohyd/M-Cryst] Unable to Obtain    Pcn [Penicillins] Unable to Obtain    Remeron [Mirtazapine] Unable to Obtain    Sulfa (Sulfonamide Antibiotics) Other (comments)     Swelling and low heart rate    Vicodin [Hydrocodone-Acetaminophen] Unable to Obtain     Past Medical History:   Diagnosis Date    Anxiety     Brachial neuritis or radiculitis NOS     Cervicalgia     Cervicalgia     Dementia     Diabetes ([facility] [address]     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     DJD (degenerative joint disease)     Encounter for long-term (current) use of other medications     Fracture 2014    left shoulder    GERD (gastroesophageal reflux disease)     Heart murmur     Hyperkalemia 2011    Hypertension     Lumbago     Pain in joint, lower leg     Pain in joint, multiple sites      Past Surgical History:   Procedure Laterality Date    HX BLADDER REPAIR      prolapsed    HX CATARACT REMOVAL  1991    HX CATARACT REMOVAL  1990    HX CHOLECYSTECTOMY  5/1998    HX HERNIA REPAIR  7/1998    x2    HX HYSTERECTOMY  1966    HX OOPHORECTOMY       Current Outpatient Medications on File Prior to Visit   Medication Sig    mv-min/iron/folic/calcium/vitK (WOMEN'S MULTIVITAMIN PO) Take 1 Tab by mouth daily.  hydrocortisone (PROCTOZONE-HC) 2.5 % rectal cream Insert  into rectum four (4) times daily.  polyethylene glycol (MIRALAX) 17 gram packet Take 1 Packet by mouth daily. (Patient taking differently: Take 1 Packet by mouth daily as needed for Other (PRN Constipation).  PRN Constipation)    CONTOUR NEXT TEST STRIPS strip USE TO TEST BLOOD SUGAR D    cyanocobalamin, vitamin B-12, 2,500 mcg chew Take 2,500 mcg by mouth daily.  cholecalciferol, vitamin D3, (VITAMIN D3) 2,000 unit tab Take 1 Cap by mouth daily.  menthol (BIOFREEZE, MENTHOL,) 4 % gel Apply generous amount to affected area up to 3 times daily.  naloxone (NARCAN) 4 mg/actuation nasal spray Use 1 spray intranasally, then discard. Repeat with new spray every 2 min as needed for opioid overdose symptoms, alternating nostrils. Indications: OPIATE-INDUCED RESPIRATORY DEPRESSION, Opioid Toxicity    pantoprazole (PROTONIX) 40 mg tablet Take 40 mg by mouth daily.  amlodipine (NORVASC) 10 mg tablet Take 10 mg by mouth daily.  furosemide (LASIX) 20 mg tablet Take 20 mg by mouth two (2) times a week.  donepezil (ARICEPT) 10 mg tablet Take 10 mg by mouth nightly.  quetiapine (SEROQUEL) 100 mg tablet Take 200 mg by mouth nightly.      Current Facility-Administered Medications on File Prior to Visit   Medication    [] epoetin elo (EPOGEN;PROCRIT) injection 4,000 Units    And    [] epoetin elo (EPOGEN;PROCRIT) injection 3,000 Units

## 2019-08-28 NOTE — PATIENT INSTRUCTIONS

## 2019-08-28 NOTE — PROGRESS NOTES
Nursing Notes    Patient presents to the office today in follow-up. Patient rates her pain at 3/10 on the numerical pain scale. Reviewed medications with counts as follows:    Rx Date filled Qty Dispensed Pill Count Last Dose Short   Oxycontin 15 mg ER 08/06/19 60 17 This am  no        reviewed YES  Any aberrancies noted on  NO  Last opioid agreement 11/29/18  Last urine drug screen 05/24/19    Comments:  Patient is here today for a follow up appt today for her chronic pain. She states her pain level today is a 3  PHQ 2 was done patient denies any depression. Patient has seen her pcm and labs were taken, she receives iron infusions when needed bi weekly     POC UDS was not performed in office today    Any new labs or imaging since last appointment? YES    Have you been to an emergency room (ER) or urgent care clinic since your last visit? NO            Have you been hospitalized since your last visit? NO     If yes, where, when, and reason for visit? Have you seen or consulted any other health care providers outside of the [address] [place] [address]  since your last visit? YES     If yes, where, when, and reason for visit? Ms. Garcia Ascencio has a reminder for a \"due or due soon\" health maintenance. I have asked that she contact her primary care provider for follow-up on this health maintenance.

## 2019-09-10 ENCOUNTER — HOSPITAL ENCOUNTER (OUTPATIENT)
Dept: INFUSION THERAPY | Age: 84
Discharge: HOME | End: 2019-09-10
Payer: MEDICARE

## 2019-09-10 VITALS
HEART RATE: 48 BPM | DIASTOLIC BLOOD PRESSURE: 72 MMHG | RESPIRATION RATE: 18 BRPM | OXYGEN SATURATION: 93 % | TEMPERATURE: 97.4 F | SYSTOLIC BLOOD PRESSURE: 147 MMHG

## 2019-09-10 LAB
BASO+EOS+MONOS # BLD AUTO: 0.7 K/UL (ref 0–2.3)
BASO+EOS+MONOS NFR BLD AUTO: 13 % (ref 0.1–17)
DIFFERENTIAL METHOD BLD: ABNORMAL
ERYTHROCYTE [DISTWIDTH] IN BLOOD BY AUTOMATED COUNT: 12.1 % (ref 11.5–14.5)
HCT VFR BLD AUTO: 32.1 % (ref 36–48)
HGB BLD-MCNC: 9.8 G/DL (ref 12–16)
LYMPHOCYTES # BLD: 1.7 K/UL (ref 1.1–5.9)
LYMPHOCYTES NFR BLD: 29 % (ref 14–44)
MCH RBC QN AUTO: 27.7 PG (ref 25–35)
MCHC RBC AUTO-ENTMCNC: 30.5 G/DL (ref 31–37)
MCV RBC AUTO: 90.7 FL (ref 78–102)
NEUTS SEG # BLD: 3.3 K/UL (ref 1.8–9.5)
NEUTS SEG NFR BLD: 58 % (ref 40–70)
PLATELET # BLD AUTO: 201 K/UL (ref 140–440)
RBC # BLD AUTO: 3.54 M/UL (ref 4.1–5.1)
WBC # BLD AUTO: 5.7 K/UL (ref 4.5–13)

## 2019-09-10 PROCEDURE — 85025 COMPLETE CBC W/AUTO DIFF WBC: CPT

## 2019-09-10 PROCEDURE — 36415 COLL VENOUS BLD VENIPUNCTURE: CPT

## 2019-09-24 ENCOUNTER — HOSPITAL ENCOUNTER (OUTPATIENT)
Dept: INFUSION THERAPY | Age: 84
Discharge: HOME | End: 2019-09-24
Payer: MEDICARE

## 2019-09-24 VITALS
RESPIRATION RATE: 18 BRPM | OXYGEN SATURATION: 93 % | SYSTOLIC BLOOD PRESSURE: 155 MMHG | TEMPERATURE: 97.9 F | DIASTOLIC BLOOD PRESSURE: 54 MMHG | HEART RATE: 58 BPM

## 2019-09-24 LAB
25(OH)D3 SERPL-MCNC: 53.1 NG/ML (ref 30–100)
ALBUMIN SERPL-MCNC: 3.2 G/DL (ref 3.4–5)
ANION GAP SERPL CALC-SCNC: 2 MMOL/L (ref 3–18)
BASO+EOS+MONOS # BLD AUTO: 0.9 K/UL (ref 0–2.3)
BASO+EOS+MONOS NFR BLD AUTO: 16 % (ref 0.1–17)
BUN SERPL-MCNC: 42 MG/DL (ref 7–18)
BUN/CREAT SERPL: 18 (ref 12–20)
CALCIUM SERPL-MCNC: 9.6 MG/DL (ref 8.5–10.1)
CALCIUM SERPL-MCNC: 9.6 MG/DL (ref 8.5–10.1)
CHLORIDE SERPL-SCNC: 106 MMOL/L (ref 100–111)
CO2 SERPL-SCNC: 31 MMOL/L (ref 21–32)
CREAT SERPL-MCNC: 2.29 MG/DL (ref 0.6–1.3)
DIFFERENTIAL METHOD BLD: ABNORMAL
ERYTHROCYTE [DISTWIDTH] IN BLOOD BY AUTOMATED COUNT: 12 % (ref 11.5–14.5)
FERRITIN SERPL-MCNC: 912 NG/ML (ref 8–388)
GLUCOSE SERPL-MCNC: 86 MG/DL (ref 74–99)
HCT VFR BLD AUTO: 31.7 % (ref 36–48)
HGB BLD-MCNC: 9.7 G/DL (ref 12–16)
IRON SATN MFR SERPL: 16 %
IRON SERPL-MCNC: 26 UG/DL (ref 50–175)
LYMPHOCYTES # BLD: 2 K/UL (ref 1.1–5.9)
LYMPHOCYTES NFR BLD: 34 % (ref 14–44)
MCH RBC QN AUTO: 27.3 PG (ref 25–35)
MCHC RBC AUTO-ENTMCNC: 30.6 G/DL (ref 31–37)
MCV RBC AUTO: 89.3 FL (ref 78–102)
NEUTS SEG # BLD: 3 K/UL (ref 1.8–9.5)
NEUTS SEG NFR BLD: 50 % (ref 40–70)
PHOSPHATE SERPL-MCNC: 3.5 MG/DL (ref 2.5–4.9)
PLATELET # BLD AUTO: 247 K/UL (ref 140–440)
POTASSIUM SERPL-SCNC: 5.1 MMOL/L (ref 3.5–5.5)
PTH-INTACT SERPL-MCNC: 65.1 PG/ML (ref 18.4–88)
RBC # BLD AUTO: 3.55 M/UL (ref 4.1–5.1)
SODIUM SERPL-SCNC: 139 MMOL/L (ref 136–145)
TIBC SERPL-MCNC: 164 UG/DL (ref 250–450)
WBC # BLD AUTO: 5.9 K/UL (ref 4.5–13)

## 2019-09-24 PROCEDURE — 83540 ASSAY OF IRON: CPT

## 2019-09-24 PROCEDURE — 82728 ASSAY OF FERRITIN: CPT

## 2019-09-24 PROCEDURE — 83970 ASSAY OF PARATHORMONE: CPT

## 2019-09-24 PROCEDURE — 82306 VITAMIN D 25 HYDROXY: CPT

## 2019-09-24 PROCEDURE — 36415 COLL VENOUS BLD VENIPUNCTURE: CPT

## 2019-09-24 PROCEDURE — 85025 COMPLETE CBC W/AUTO DIFF WBC: CPT

## 2019-09-24 PROCEDURE — 80069 RENAL FUNCTION PANEL: CPT

## 2019-09-24 NOTE — PROGRESS NOTES
[facility] Progress Note Date: 2019 Name: Emily Butterfield MRN: [id number] : 1922 Ms. Cecelia Anderson arrived to [facility] at 1500. Alexia Bolton Ms. Cecelia Anderson was assessed and education was provided. Ms. Antony's vitals were reviewed. Visit Vitals /54 (BP 1 Location: Left arm, BP Patient Position: Sitting) Pulse (!) 58 Temp 97.9 °F (36.6 °C) Resp 18 SpO2 93% Pt was observed for 5 minutes after obtaining vital signs prior to initiating treatment. Blood drawn for labs via right forearm x 1 attempt. Recent Results (from the past 12 hour(s)) CBC WITH 3 PART DIFF Collection Time: 19  3:31 PM  
Result Value Ref Range WBC 5.9 4.5 - 13.0 K/uL  
 RBC 3.55 (L) 4.10 - 5.10 M/uL HGB 9.7 (L) 12.0 - 16 g/dL HCT 31.7 (L) 36 - 48 % MCV 89.3 78 - 102 FL  
 MCH 27.3 25.0 - 35.0 PG  
 MCHC 30.6 (L) 31 - 37 g/dL  
 RDW 12.0 11.5 - 14.5 % PLATELET 072 460 - 920 K/uL NEUTROPHILS 50 40 - 70 % MIXED CELLS 16 0.1 - 17 % LYMPHOCYTES 34 14 - 44 % ABS. NEUTROPHILS 3.0 1.8 - 9.5 K/UL  
 ABS. MIXED CELLS 0.9 0.0 - 2.3 K/uL  
 ABS. LYMPHOCYTES 2.0 1.1 - 5.9 K/UL  
 DF AUTOMATED Procrit 7,000 units held. Ms. Cecelia Anderson tolerated well without complaints. Ms. Cecelia Anderson was discharged from [place] [address] in stable condition at 1540. She is to return on 10/08/19 at 1500 for her next appointment. Cristina Maldonado RN 2019

## 2019-10-02 DIAGNOSIS — G89.29 CHRONIC PAIN OF LEFT KNEE: ICD-10-CM

## 2019-10-02 DIAGNOSIS — M54.2 CERVICALGIA: Primary | ICD-10-CM

## 2019-10-02 DIAGNOSIS — G89.4 CHRONIC PAIN SYNDROME: ICD-10-CM

## 2019-10-02 DIAGNOSIS — M25.562 CHRONIC PAIN OF LEFT KNEE: ICD-10-CM

## 2019-10-02 DIAGNOSIS — M25.50 PAIN IN JOINT, MULTIPLE SITES: ICD-10-CM

## 2019-10-02 RX ORDER — OXYCODONE HYDROCHLORIDE 15 MG/1
15 TABLET, FILM COATED, EXTENDED RELEASE ORAL EVERY 12 HOURS
Qty: 60 TAB | Refills: 0 | Status: SHIPPED | OUTPATIENT
Start: 2019-10-07 | End: 2019-10-30 | Stop reason: SDUPTHER

## 2019-10-02 NOTE — TELEPHONE ENCOUNTER
Louise Upton has called requesting a refill of their controlled medication, oxycontin, for the management of her chronic neck and back pain. Last office visit date: 08/28/19  Last opioid care agreement 11/19/18  Last UDS was done 05/24/19    Date last  was pulled and reviewed : 10/02/19  Last fill date for medication was 09/07/19 for oxycontin 15 mg #60 tabs    Was the patient compliant when the above report was pulled? yes    Analgesia: pt reports a 75% pain relief with her current opioid medication regimen     Aberrancies: none noted     ADL's: pt's daughter stated that the pt is unable to perform any daily tasks by herself and needs assistance with all activities. Adverse Reaction: none reported by the pt at this time. Provider's last note and plan of care reviewed? yes  Request forwarded to provider for review.

## 2019-10-02 NOTE — TELEPHONE ENCOUNTER
Patient identified using two patient identifiers (name and ); patient advised prescriptions are ready for pick-up. Patient also informed  hours are Mon-Fri [id number]. Patient verbalized understanding.

## 2019-10-08 ENCOUNTER — HOSPITAL ENCOUNTER (OUTPATIENT)
Dept: INFUSION THERAPY | Age: 84
Discharge: HOME | End: 2019-10-08
Payer: MEDICARE

## 2019-10-08 LAB
BASO+EOS+MONOS # BLD AUTO: 1 K/UL (ref 0–2.3)
BASO+EOS+MONOS NFR BLD AUTO: 16 % (ref 0.1–17)
DIFFERENTIAL METHOD BLD: ABNORMAL
ERYTHROCYTE [DISTWIDTH] IN BLOOD BY AUTOMATED COUNT: 11.9 % (ref 11.5–14.5)
HCT VFR BLD AUTO: 31.8 % (ref 36–48)
HGB BLD-MCNC: 9.4 G/DL (ref 12–16)
LYMPHOCYTES # BLD: 2.1 K/UL (ref 1.1–5.9)
LYMPHOCYTES NFR BLD: 35 % (ref 14–44)
MCH RBC QN AUTO: 26.7 PG (ref 25–35)
MCHC RBC AUTO-ENTMCNC: 29.6 G/DL (ref 31–37)
MCV RBC AUTO: 90.3 FL (ref 78–102)
NEUTS SEG # BLD: 3 K/UL (ref 1.8–9.5)
NEUTS SEG NFR BLD: 50 % (ref 40–70)
PLATELET # BLD AUTO: 283 K/UL (ref 140–440)
RBC # BLD AUTO: 3.52 M/UL (ref 4.1–5.1)
WBC # BLD AUTO: 6.1 K/UL (ref 4.5–13)

## 2019-10-08 PROCEDURE — 85025 COMPLETE CBC W/AUTO DIFF WBC: CPT

## 2019-10-08 PROCEDURE — 36415 COLL VENOUS BLD VENIPUNCTURE: CPT

## 2019-10-08 NOTE — PROGRESS NOTES
Patient arrived via wheelchair with daughter to [facility] today. Labs drawn by [facility] today for her Retacrit. Recent Results (from the past 12 hour(s)) CBC WITH 3 PART DIFF Collection Time: 10/08/19  3:15 PM  
Result Value Ref Range WBC 6.1 4.5 - 13.0 K/uL  
 RBC 3.52 (L) 4.10 - 5.10 M/uL HGB 9.4 (L) 12.0 - 16 g/dL HCT 31.8 (L) 36 - 48 % MCV 90.3 78 - 102 FL  
 MCH 26.7 25.0 - 35.0 PG  
 MCHC 29.6 (L) 31 - 37 g/dL  
 RDW 11.9 11.5 - 14.5 % PLATELET 645 905 - 464 K/uL NEUTROPHILS 50 40 - 70 % MIXED CELLS 16 0.1 - 17 % LYMPHOCYTES 35 14 - 44 % ABS. NEUTROPHILS 3.0 1.8 - 9.5 K/UL  
 ABS. MIXED CELLS 1.0 0.0 - 2.3 K/uL  
 ABS. LYMPHOCYTES 2.1 1.1 - 5.9 K/UL  
 DF AUTOMATED Retacrit HELD today per order parameters. Patient to return in 4 weeks for her next appointment. Bon Lips  
10/8/19

## 2019-10-22 ENCOUNTER — APPOINTMENT (OUTPATIENT)
Dept: INFUSION THERAPY | Age: 84
End: 2019-10-22
Payer: MEDICARE

## 2019-10-30 DIAGNOSIS — G89.4 CHRONIC PAIN SYNDROME: ICD-10-CM

## 2019-10-30 DIAGNOSIS — G89.29 CHRONIC PAIN OF LEFT KNEE: ICD-10-CM

## 2019-10-30 DIAGNOSIS — M25.562 CHRONIC PAIN OF LEFT KNEE: ICD-10-CM

## 2019-10-30 DIAGNOSIS — M25.50 PAIN IN JOINT, MULTIPLE SITES: ICD-10-CM

## 2019-10-30 NOTE — TELEPHONE ENCOUNTER
Shaheen Vitor has called requesting a refill of their controlled medication, OxyContin 15 mg tab, for the management of chronic pain. (female,possibly patient's daughter called on behalf of patient)     Last office visit date: 8/28/19 with BRANDON Mcclure and has a f/u appt on 11/5/19.     Last opioid care agreement 11/19/18  Last UDS was done 5/24/19 (Oral Swab)     Date last  was pulled and reviewed : 10/30/19  Last fill date for medication was 10/7/19     Was the patient compliant when the above report was pulled? yes     Analgesia:Female caller stated patient reports 75% pain relief on current regimen.     Aberrancies: No aberrancies noted in the last 30 days.     ADL's: Female caller stated that Patient is unable to perform ADL's independently d/t age and mental status.     Adverse Reaction: Female caller stated  patient reports no adverse reactions at this time.     Provider's last note and plan of care reviewed? yes  Request forwarded to provider for review.

## 2019-10-31 RX ORDER — OXYCODONE HYDROCHLORIDE 15 MG/1
15 TABLET, FILM COATED, EXTENDED RELEASE ORAL EVERY 12 HOURS
Qty: 60 TAB | Refills: 0 | Status: SHIPPED | OUTPATIENT
Start: 2019-11-06 | End: 2019-11-15 | Stop reason: SDUPTHER

## 2019-11-01 NOTE — TELEPHONE ENCOUNTER
Called patient's daughter, Cornelia Bansal (who is on HIPPA list). Patient identified using two patient identifiers (name and ). I informed them that patient's Rx was sent to the pharmacy, but is unable to pick Rx up until 19. They verbalized understanding and has no other questions.

## 2019-11-05 ENCOUNTER — HOSPITAL ENCOUNTER (OUTPATIENT)
Dept: INFUSION THERAPY | Age: 84
Discharge: HOME | End: 2019-11-05
Payer: MEDICARE

## 2019-11-05 VITALS
HEART RATE: 56 BPM | RESPIRATION RATE: 18 BRPM | DIASTOLIC BLOOD PRESSURE: 49 MMHG | OXYGEN SATURATION: 93 % | SYSTOLIC BLOOD PRESSURE: 138 MMHG | TEMPERATURE: 97.9 F

## 2019-11-05 LAB
BASO+EOS+MONOS # BLD AUTO: 0.5 K/UL (ref 0–2.3)
BASO+EOS+MONOS NFR BLD AUTO: 8 % (ref 0.1–17)
DIFFERENTIAL METHOD BLD: ABNORMAL
ERYTHROCYTE [DISTWIDTH] IN BLOOD BY AUTOMATED COUNT: 13.1 % (ref 11.5–14.5)
HCT VFR BLD AUTO: 32.9 % (ref 36–48)
HGB BLD-MCNC: 9.9 G/DL (ref 12–16)
LYMPHOCYTES # BLD: 1.5 K/UL (ref 1.1–5.9)
LYMPHOCYTES NFR BLD: 26 % (ref 14–44)
MCH RBC QN AUTO: 27.3 PG (ref 25–35)
MCHC RBC AUTO-ENTMCNC: 30.1 G/DL (ref 31–37)
MCV RBC AUTO: 90.9 FL (ref 78–102)
NEUTS SEG # BLD: 4 K/UL (ref 1.8–9.5)
NEUTS SEG NFR BLD: 66 % (ref 40–70)
PLATELET # BLD AUTO: 185 K/UL (ref 140–440)
RBC # BLD AUTO: 3.62 M/UL (ref 4.1–5.1)
WBC # BLD AUTO: 6 K/UL (ref 4.5–13)

## 2019-11-05 PROCEDURE — 36415 COLL VENOUS BLD VENIPUNCTURE: CPT

## 2019-11-05 PROCEDURE — 85025 COMPLETE CBC W/AUTO DIFF WBC: CPT

## 2019-11-05 NOTE — PROGRESS NOTES
[facility] Progress Note Date: 2019 Name: Mounika Price MRN: [id number] : 1922 PROCRIT INJECTON Ms. Francois Zamora was assessed and education was provided. No complaints or concerns voiced Pt arrived to [facility] in wheelchair accompanied by her daughter at [phone] Ms. Antony's vitals were reviewed and patient was observed for 5 minutes prior to treatment. Visit Vitals /49 (BP 1 Location: Left arm, BP Patient Position: Sitting) Pulse (!) 56 Temp 97.9 °F (36.6 °C) Resp 18 SpO2 93% Breastfeeding? No  
 
 
Blood drawn via left a/c venipuncture on 2nd attempt for CBC. Gauze and coban applied to site. Recent Results (from the past 12 hour(s)) CBC WITH 3 PART DIFF Collection Time: 19  3:15 PM  
Result Value Ref Range WBC 6.0 4.5 - 13.0 K/uL  
 RBC 3.62 (L) 4.10 - 5.10 M/uL HGB 9.9 (L) 12.0 - 16 g/dL HCT 32.9 (L) 36 - 48 % MCV 90.9 78 - 102 FL  
 MCH 27.3 25.0 - 35.0 PG  
 MCHC 30.1 (L) 31 - 37 g/dL  
 RDW 13.1 11.5 - 14.5 % PLATELET 901 092 - 031 K/uL NEUTROPHILS 66 40 - 70 % MIXED CELLS 8 0.1 - 17 % LYMPHOCYTES 26 14 - 44 % ABS. NEUTROPHILS 4.0 1.8 - 9.5 K/UL  
 ABS. MIXED CELLS 0.5 0.0 - 2.3 K/uL  
 ABS. LYMPHOCYTES 1.5 1.1 - 5.9 K/UL  
 DF AUTOMATED Results within ordered parameters to hold procrit today. H&H 9.9/32.9 Ms. Francois Zamora was discharged from [place] [address] in stable condition at [phone]. She is to return on 12/3/19 at 3 pm for her next Procrit appointment. Leela Toribio RN 2019 
[id number]

## 2019-11-15 ENCOUNTER — OFFICE VISIT (OUTPATIENT)
Dept: PAIN MANAGEMENT | Age: 84
End: 2019-11-15

## 2019-11-15 VITALS
DIASTOLIC BLOOD PRESSURE: 57 MMHG | HEART RATE: 46 BPM | HEIGHT: 62 IN | WEIGHT: 125 LBS | TEMPERATURE: 97.1 F | RESPIRATION RATE: 14 BRPM | SYSTOLIC BLOOD PRESSURE: 156 MMHG | OXYGEN SATURATION: 93 % | BODY MASS INDEX: 23 KG/M2

## 2019-11-15 DIAGNOSIS — M25.50 PAIN IN JOINT, MULTIPLE SITES: ICD-10-CM

## 2019-11-15 DIAGNOSIS — G89.29 CHRONIC PAIN OF LEFT KNEE: Primary | ICD-10-CM

## 2019-11-15 DIAGNOSIS — M54.2 CERVICALGIA: ICD-10-CM

## 2019-11-15 DIAGNOSIS — M25.562 CHRONIC PAIN OF LEFT KNEE: Primary | ICD-10-CM

## 2019-11-15 DIAGNOSIS — G89.4 CHRONIC PAIN SYNDROME: ICD-10-CM

## 2019-11-15 DIAGNOSIS — Z79.899 ENCOUNTER FOR LONG-TERM (CURRENT) USE OF HIGH-RISK MEDICATION: ICD-10-CM

## 2019-11-15 RX ORDER — PROMETHAZINE HYDROCHLORIDE 12.5 MG/1
12.5 TABLET ORAL
Qty: 12 TAB | Refills: 0 | Status: SHIPPED | OUTPATIENT
Start: 2019-11-15 | End: 2021-01-01

## 2019-11-15 RX ORDER — CLONIDINE HYDROCHLORIDE 0.1 MG/1
0.1 TABLET ORAL
Qty: 12 TAB | Refills: 0 | Status: SHIPPED | OUTPATIENT
Start: 2019-11-15 | End: 2021-01-01

## 2019-11-15 RX ORDER — OXYCODONE HYDROCHLORIDE 15 MG/1
15 TABLET, FILM COATED, EXTENDED RELEASE ORAL EVERY 12 HOURS
Qty: 60 TAB | Refills: 0 | Status: SHIPPED | OUTPATIENT
Start: 2019-12-06 | End: 2020-01-05

## 2019-11-15 RX ORDER — HYDROXYZINE 25 MG/1
25 TABLET, FILM COATED ORAL
Qty: 12 TAB | Refills: 0 | Status: SHIPPED | OUTPATIENT
Start: 2019-11-15 | End: 2021-01-01

## 2019-11-15 NOTE — PATIENT INSTRUCTIONS

## 2019-11-15 NOTE — PROGRESS NOTES
Nursing Notes    Patient presents to the office today in follow-up. Patient rates her pain at 6/10 on the numerical pain scale. Reviewed medications with counts as follows:    Rx Date filled Qty Dispensed Pill Count Last Dose Short   Oxycontin ER 11/06/19 60 45 This am no        reviewed YES  Any aberrancies noted on  NO  Last opioid agreement 11/19/18  Last urine drug screen 05/24/19    Comments: Patient is here today for a follow up appt today for her chronic back, shoulder, and knee pain. Pt has seen infusion clinic   3 most recent PHQ Screens 11/15/2019   PHQ Not Done -   Little interest or pleasure in doing things Not at all   Feeling down, depressed, irritable, or hopeless Not at all   Total Score PHQ 2 0         POC UDS was not performed in office today    Any new labs or imaging since last appointment? YES  Labs at [facility] for infusions    Have you been to an emergency room (ER) or urgent care clinic since your last visit? NO            Have you been hospitalized since your last visit? NO     If yes, where, when, and reason for visit? Have you seen or consulted any other health care providers outside of the [address] [place] [address]  since your last visit? NO     If yes, where, when, and reason for visit? Ms. Abdoulaye Simms has a reminder for a \"due or due soon\" health maintenance. I have asked that she contact her primary care provider for follow-up on this health maintenance.

## 2019-11-18 NOTE — PROGRESS NOTES
Referred prior to 2011 by primary care provider for back shoulder and knee pain      Calculated MEQ - 45  Naloxone rescue - yes  Prophylactic bowel program - yes  Date of last OCA 11/20/2019 signed by medical surrogate  Last oral swab 5/24/19, not consistent (+) undisclosed Morphine 3ng/ml (cutoff level 1).  date checked today, findings consistent      Today and Last Visit  PGIC - unobtainable due to dementia  MIGUEL ÁNGEL - unobtainable due to dementia  COMM - unobtainable due to dementia      HPI:  Mounika Price is a 80 y.o. female here for f/u visit for ongoing evaluation of chronic neck and knee pain. Pt was last seen here on 8/28/19. Pt denies interval changes on the character or distribution of pain. Pain is located to neck, left knee and lower back. The pain is described as aching. Pain at its best is 5/10. Pain at its worse is 8/10. Symptoms are improved by Biofreeze and OxyContin. ROS:  Unobtainable secondary to dementia. Patients care takers are present in the room and deny any of the following: fever, chills, nausea, vomiting, diarrhea, constipation, chest pain, shortness or breath/trouble breathing, abdominal pain, weakness, trouble swallowing, changes in vision, changes in hearing, falls, dizziness, bladder incontinence, bowel incontinence, depression, anxiety, suicidal ideations, homicidal ideations or alcohol use. Opioid specific risk: dementia, anxiety, diabetes, GERD. Opioid specific history: chronic opioid therapy for 20-30 years. Vitals:    11/15/19 1420   BP: 156/57   Pulse: (!) 46   Resp: 14   Temp: 97.1 °F (36.2 °C)   SpO2: 93%   Weight: 56.7 kg (125 lb)   Height: 5' 2\" (1.575 m)   PainSc:   6   PainLoc: Knee          Physical exam:  AFVSS with bradycardia, no acute distress, normal body habitus. A&OXs 2. Normocephalic, atraumatic. Conjugate gaze, clear sclerae. Speech is clear but not appropriate. Mood is appropriate and patient is cooperative.   Patient is seated slumped in a manual wheelchair with extreme cervical flexion with slight rotation to the right. Non-labored breathing. Primary Care Physician  [address] [facility], [facility] [place] [address]  [phone]      [facility] -- .  3 most recent [address] [facility] 11/15/2019   PHQ Not Done -   Little interest or pleasure in doing things Not at all   Feeling down, depressed, irritable, or hopeless Not at all   Total Score PHQ 2 0         Assessment/Plan:     ICD-10-CM ICD-9-CM    1. Chronic pain of left knee M25.562 719.46 oxyCODONE ER (OXYCONTIN) 15 mg ER tablet    G89.29 338.29 REFERRAL TO PAIN MANAGEMENT   2. Pain in joint, multiple sites M25.50 719.49 oxyCODONE ER (OXYCONTIN) 15 mg ER tablet      REFERRAL TO PAIN MANAGEMENT   3. Chronic pain syndrome G89.4 338.4 oxyCODONE ER (OXYCONTIN) 15 mg ER tablet      REFERRAL TO PAIN MANAGEMENT   4. Cervicalgia M54.2 723.1 REFERRAL TO PAIN MANAGEMENT   5. Encounter for long-term (current) use of high-risk medication Z79.899 V58.69 hydrOXYzine HCl (ATARAX) 25 mg tablet      cloNIDine HCl (CATAPRES) 0.1 mg tablet      promethazine (PHENERGAN) 12.5 mg tablet        --Patients chronic pain due to advanced dementia should be handled as a palliative service and therefore no further opioid wean at this time is recommended as she feels to be comfortable on the current dosage of OxyContin 15mg twice a day. --Medical surrogate has Narcan at home and is aware of withdrawal signs and overdose. --Has not obtained TENS unit. --Has not obtained Lidoderm patches. --Has not obtained capsicin cream.  --Continue topical Biofreeze prn. --Abnormal UDS results regarding 3ng/mL morphine detected discussed with medical surrogate today. They are not sure how this is in her urine as she has not taken codeine due to allergy and has not taken morphine. --They were informed of the planned clinic closure on 12/12/19 and that this will be the last prescription of narcotic provided from this clinic. Patient was educated on signs and symptoms of opioid withdrawal and a withdrawal cocktail was provided to be used if needed. They were also advised they can go to the nearest ED for further support if needed. They were referred to Dr Faye Man for continuing of care. They were advised to call their primary care provider to inform them of the planned clinic closure so that they may anticipate providing support for her pain while awaiting establishment at the new clinic. --Follow up ongoing assessment and ongoing development of integrative and comprehensive plan of care for chronic pain. Goals: To establish complementary and integrative plan of care to address chronic pain issues while minimizing pharmaceuticals to maximize patient's function improve quality of life. Education:  Patient again educated on the importance of strict compliance with the opioid care agreement while on opioid therapy. Patient also again educated that they should avoid driving while on chronic opioid therapy. Also advised to avoid alcohol and to avoid benzodiazepines while on opioid therapy. Handouts given regarding opioid safety. [address] was used for portions of this report. Unintended errors may occur.         Social History     Socioeconomic History    Marital status: SINGLE     Spouse name: Not on file    Number of children: Not on file    Years of education: Not on file    Highest education level: Not on file   Occupational History    Not on file   Social Needs    Financial resource strain: Not on file    Food insecurity:     Worry: Not on file     Inability: Not on file    Transportation needs:     Medical: Not on file     Non-medical: Not on file   Tobacco Use    Smoking status: Never Smoker    Smokeless tobacco: Never Used   Substance and Sexual Activity    Alcohol use: No    Drug use: No    Sexual activity: Never   Lifestyle    Physical activity:     Days per week: Not on file     Minutes per session: Not on file    Stress: Not on file   Relationships    Social connections:     Talks on phone: Not on file     Gets together: Not on file     Attends Spiritism service: Not on file     Active member of club or organization: Not on file     Attends meetings of clubs or organizations: Not on file     Relationship status: Not on file    Intimate partner violence:     Fear of current or ex partner: Not on file     Emotionally abused: Not on file     Physically abused: Not on file     Forced sexual activity: Not on file   Other Topics Concern    Not on file   Social History Narrative    Not on file     Family History   Problem Relation Age of Onset    Diabetes Other     Hypertension Other     Stroke Other     Diabetes Mother      Allergies   Allergen Reactions    Celebrex [Celecoxib] Unable to Obtain    Codeine Unable to Obtain    Flexeril [Cyclobenzaprine] Unable to [address] [Nitrofurantoin Monohyd/M-Cryst] Unable to Obtain    Pcn [Penicillins] Unable to Obtain    Remeron [Mirtazapine] Unable to Obtain    Sulfa (Sulfonamide Antibiotics) Other (comments)     Swelling and low heart rate    Vicodin [Hydrocodone-Acetaminophen] Unable to Obtain     Past Medical History:   Diagnosis Date    Anxiety     Brachial neuritis or radiculitis NOS     Cervicalgia     Cervicalgia     Dementia ([facility]     Diabetes ([facility]     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     DJD (degenerative joint disease)     Encounter for long-term (current) use of other medications     Fracture 2014    left shoulder    GERD (gastroesophageal reflux disease)     Heart murmur     Hyperkalemia 2011    Hypertension     Lumbago     Pain in joint, lower leg     Pain in joint, multiple sites      Past Surgical History:   Procedure Laterality Date    HX BLADDER REPAIR      prolapsed    HX CATARACT REMOVAL  1991    HX CATARACT REMOVAL  1990    HX CHOLECYSTECTOMY 5/1998    HX HERNIA REPAIR  7/1998    x2    HX HYSTERECTOMY  1966    HX OOPHORECTOMY       Current Outpatient Medications on File Prior to Visit   Medication Sig    senna (SENNA) 8.6 mg tablet Take 1 Tab by mouth daily as needed for Constipation.  mv-min/iron/folic/calcium/vitK (WOMEN'S MULTIVITAMIN PO) Take 1 Tab by mouth daily.  hydrocortisone (PROCTOZONE-HC) 2.5 % rectal cream Insert  into rectum four (4) times daily.  polyethylene glycol (MIRALAX) 17 gram packet Take 1 Packet by mouth daily. (Patient taking differently: Take 1 Packet by mouth daily as needed for Other (PRN Constipation). PRN Constipation)    CONTOUR NEXT TEST STRIPS strip USE TO TEST BLOOD SUGAR D    cyanocobalamin, vitamin B-12, 2,500 mcg chew Take 2,500 mcg by mouth daily.  cholecalciferol, vitamin D3, (VITAMIN D3) 2,000 unit tab Take 1 Cap by mouth daily.  menthol (BIOFREEZE, MENTHOL,) 4 % gel Apply generous amount to affected area up to 3 times daily.  naloxone (NARCAN) 4 mg/actuation nasal spray Use 1 spray intranasally, then discard. Repeat with new spray every 2 min as needed for opioid overdose symptoms, alternating nostrils. Indications: OPIATE-INDUCED RESPIRATORY DEPRESSION, Opioid Toxicity    pantoprazole (PROTONIX) 40 mg tablet Take 40 mg by mouth daily.  amlodipine (NORVASC) 10 mg tablet Take 10 mg by mouth daily.  furosemide (LASIX) 20 mg tablet Take 20 mg by mouth two (2) times a week.  quetiapine (SEROQUEL) 100 mg tablet Take 200 mg by mouth nightly.  donepezil (ARICEPT) 10 mg tablet Take 10 mg by mouth nightly. No current facility-administered medications on file prior to visit.

## 2019-11-19 ENCOUNTER — APPOINTMENT (OUTPATIENT)
Dept: INFUSION THERAPY | Age: 84
End: 2019-11-19
Payer: MEDICARE

## 2019-11-27 RX ORDER — SODIUM CHLORIDE 0.9 % (FLUSH) 0.9 %
10 SYRINGE (ML) INJECTION AS NEEDED
Status: CANCELLED
Start: 2019-12-03

## 2019-11-27 RX ORDER — HEPARIN 100 UNIT/ML
300-500 SYRINGE INTRAVENOUS AS NEEDED
Status: CANCELLED
Start: 2019-12-03

## 2019-11-27 RX ORDER — SODIUM CHLORIDE 9 MG/ML
10 INJECTION INTRAMUSCULAR; INTRAVENOUS; SUBCUTANEOUS AS NEEDED
Status: CANCELLED | OUTPATIENT
Start: 2019-12-03

## 2019-12-03 ENCOUNTER — HOSPITAL ENCOUNTER (OUTPATIENT)
Dept: INFUSION THERAPY | Age: 84
Discharge: HOME | End: 2019-12-03
Payer: MEDICARE

## 2019-12-03 VITALS
OXYGEN SATURATION: 90 % | RESPIRATION RATE: 18 BRPM | HEART RATE: 56 BPM | TEMPERATURE: 97.9 F | DIASTOLIC BLOOD PRESSURE: 47 MMHG | SYSTOLIC BLOOD PRESSURE: 152 MMHG

## 2019-12-03 DIAGNOSIS — D63.8 ANEMIA OF CHRONIC DISEASE: Primary | ICD-10-CM

## 2019-12-03 LAB
BASO+EOS+MONOS # BLD AUTO: 0.6 K/UL (ref 0–2.3)
BASO+EOS+MONOS NFR BLD AUTO: 10 % (ref 0.1–17)
DIFFERENTIAL METHOD BLD: ABNORMAL
ERYTHROCYTE [DISTWIDTH] IN BLOOD BY AUTOMATED COUNT: 12.2 % (ref 11.5–14.5)
FERRITIN SERPL-MCNC: 773 NG/ML (ref 8–388)
HCT VFR BLD AUTO: 32.8 % (ref 36–48)
HGB BLD-MCNC: 10 G/DL (ref 12–16)
IRON SATN MFR SERPL: 22 %
IRON SERPL-MCNC: 35 UG/DL (ref 50–175)
LYMPHOCYTES # BLD: 1.9 K/UL (ref 1.1–5.9)
LYMPHOCYTES NFR BLD: 31 % (ref 14–44)
MCH RBC QN AUTO: 27.2 PG (ref 25–35)
MCHC RBC AUTO-ENTMCNC: 30.5 G/DL (ref 31–37)
MCV RBC AUTO: 89.1 FL (ref 78–102)
NEUTS SEG # BLD: 3.7 K/UL (ref 1.8–9.5)
NEUTS SEG NFR BLD: 59 % (ref 40–70)
PLATELET # BLD AUTO: 188 K/UL (ref 140–440)
RBC # BLD AUTO: 3.68 M/UL (ref 4.1–5.1)
TIBC SERPL-MCNC: 161 UG/DL (ref 250–450)
WBC # BLD AUTO: 6.2 K/UL (ref 4.5–13)

## 2019-12-03 PROCEDURE — 82728 ASSAY OF FERRITIN: CPT

## 2019-12-03 PROCEDURE — 36415 COLL VENOUS BLD VENIPUNCTURE: CPT

## 2019-12-03 PROCEDURE — 85025 COMPLETE CBC W/AUTO DIFF WBC: CPT

## 2019-12-03 PROCEDURE — 83540 ASSAY OF IRON: CPT

## 2019-12-03 RX ORDER — SODIUM CHLORIDE 9 MG/ML
10 INJECTION INTRAMUSCULAR; INTRAVENOUS; SUBCUTANEOUS AS NEEDED
Status: CANCELLED | OUTPATIENT
Start: 2019-12-31

## 2019-12-03 RX ORDER — SODIUM CHLORIDE 0.9 % (FLUSH) 0.9 %
10 SYRINGE (ML) INJECTION AS NEEDED
Status: CANCELLED
Start: 2019-12-31

## 2019-12-03 RX ORDER — HEPARIN 100 UNIT/ML
300-500 SYRINGE INTRAVENOUS AS NEEDED
Status: CANCELLED
Start: 2019-12-31

## 2019-12-03 NOTE — PROGRESS NOTES
[facility] Progress Note Date: December 3, 2019 Name: Laith Phillips MRN: [id number] : 1922 Ms. Esvin Metz arrived to [facility] at [phone]. Ms. Esvin Metz was assessed and education was provided. Ms. Antony's vitals were reviewed. Visit Vitals /47 (BP 1 Location: Left arm, BP Patient Position: Sitting) Pulse (!) 56 Temp 97.9 °F (36.6 °C) Resp 18 SpO2 90% Breastfeeding No  
 
 
Pt was observed for 5 minutes after obtaining vital signs prior to initiating treatment. Blood drawn for labs via left AC venipuncture x2 attempts. Lab results were obtained and reviewed. Recent Results (from the past 12 hour(s)) CBC WITH 3 PART DIFF Collection Time: 19  3:26 PM  
Result Value Ref Range WBC 6.2 4.5 - 13.0 K/uL  
 RBC 3.68 (L) 4.10 - 5.10 M/uL  
 HGB 10.0 (L) 12.0 - 16 g/dL HCT 32.8 (L) 36 - 48 % MCV 89.1 78 - 102 FL  
 MCH 27.2 25.0 - 35.0 PG  
 MCHC 30.5 (L) 31 - 37 g/dL  
 RDW 12.2 11.5 - 14.5 % PLATELET 460 322 - 698 K/uL NEUTROPHILS 59 40 - 70 % MIXED CELLS 10 0.1 - 17 % LYMPHOCYTES 31 14 - 44 % ABS. NEUTROPHILS 3.7 1.8 - 9.5 K/UL  
 ABS. MIXED CELLS 0.6 0.0 - 2.3 K/uL  
 ABS. LYMPHOCYTES 1.9 1.1 - 5.9 K/UL  
 DF AUTOMATED FERRITIN Collection Time: 19  3:26 PM  
Result Value Ref Range Ferritin 773 (H) 8 - 388 NG/ML  
IRON PROFILE Collection Time: 19  3:26 PM  
Result Value Ref Range Iron 35 (L) 50 - 175 ug/dL TIBC 161 (L) 250 - 450 ug/dL Iron % saturation 22 % Procrit held at this time for hct >30 per order. Ms. Esvin Metz tolerated well without complaints. Pt armband removed & shredded. Ms. Esvin Metz was discharged from [place] [address] in stable condition at [phone]. She is to return on 20 at 1500 for her next appointment. Lona Gomez RN December 3, 2019

## 2020-01-01 ENCOUNTER — HOSPITAL ENCOUNTER (OUTPATIENT)
Dept: INFUSION THERAPY | Age: 85
Discharge: HOME | End: 2020-12-08
Payer: MEDICARE

## 2020-01-01 ENCOUNTER — HOSPITAL ENCOUNTER (OUTPATIENT)
Dept: INFUSION THERAPY | Age: 85
Discharge: HOME | End: 2020-06-23
Payer: MEDICARE

## 2020-01-01 ENCOUNTER — HOSPITAL ENCOUNTER (OUTPATIENT)
Dept: INFUSION THERAPY | Age: 85
Discharge: HOME | End: 2020-04-28
Payer: MEDICARE

## 2020-01-01 ENCOUNTER — HOSPITAL ENCOUNTER (OUTPATIENT)
Dept: INFUSION THERAPY | Age: 85
Discharge: HOME | End: 2020-11-10
Payer: MEDICARE

## 2020-01-01 ENCOUNTER — HOSPITAL ENCOUNTER (OUTPATIENT)
Dept: INFUSION THERAPY | Age: 85
Discharge: HOME | End: 2020-07-21
Payer: MEDICARE

## 2020-01-01 ENCOUNTER — HOSPITAL ENCOUNTER (OUTPATIENT)
Dept: INFUSION THERAPY | Age: 85
Discharge: HOME | End: 2020-09-15
Payer: MEDICARE

## 2020-01-01 ENCOUNTER — HOSPITAL ENCOUNTER (OUTPATIENT)
Dept: LAB | Age: 85
Discharge: HOME | End: 2020-11-10
Payer: MEDICARE

## 2020-01-01 ENCOUNTER — HOSPITAL ENCOUNTER (OUTPATIENT)
Dept: INFUSION THERAPY | Age: 85
Discharge: HOME | End: 2020-05-26
Payer: MEDICARE

## 2020-01-01 ENCOUNTER — HOSPITAL ENCOUNTER (OUTPATIENT)
Dept: INFUSION THERAPY | Age: 85
Discharge: HOME | End: 2020-08-18
Payer: MEDICARE

## 2020-01-01 ENCOUNTER — HOSPITAL ENCOUNTER (OUTPATIENT)
Dept: INFUSION THERAPY | Age: 85
Discharge: HOME | End: 2020-10-13
Payer: MEDICARE

## 2020-01-01 VITALS
TEMPERATURE: 97 F | SYSTOLIC BLOOD PRESSURE: 158 MMHG | HEART RATE: 64 BPM | OXYGEN SATURATION: 98 % | RESPIRATION RATE: 20 BRPM | DIASTOLIC BLOOD PRESSURE: 58 MMHG

## 2020-01-01 VITALS
SYSTOLIC BLOOD PRESSURE: 157 MMHG | TEMPERATURE: 97.6 F | OXYGEN SATURATION: 95 % | RESPIRATION RATE: 20 BRPM | HEART RATE: 55 BPM | DIASTOLIC BLOOD PRESSURE: 65 MMHG

## 2020-01-01 VITALS
SYSTOLIC BLOOD PRESSURE: 144 MMHG | OXYGEN SATURATION: 94 % | RESPIRATION RATE: 18 BRPM | HEART RATE: 57 BPM | DIASTOLIC BLOOD PRESSURE: 50 MMHG | TEMPERATURE: 97.7 F

## 2020-01-01 VITALS
HEART RATE: 72 BPM | TEMPERATURE: 97.1 F | SYSTOLIC BLOOD PRESSURE: 157 MMHG | RESPIRATION RATE: 18 BRPM | DIASTOLIC BLOOD PRESSURE: 64 MMHG | OXYGEN SATURATION: 93 %

## 2020-01-01 VITALS
SYSTOLIC BLOOD PRESSURE: 159 MMHG | RESPIRATION RATE: 18 BRPM | OXYGEN SATURATION: 93 % | DIASTOLIC BLOOD PRESSURE: 58 MMHG | HEART RATE: 52 BPM | TEMPERATURE: 97 F

## 2020-01-01 VITALS
TEMPERATURE: 96 F | SYSTOLIC BLOOD PRESSURE: 173 MMHG | OXYGEN SATURATION: 97 % | DIASTOLIC BLOOD PRESSURE: 67 MMHG | HEART RATE: 62 BPM

## 2020-01-01 VITALS
TEMPERATURE: 98 F | HEART RATE: 58 BPM | RESPIRATION RATE: 18 BRPM | DIASTOLIC BLOOD PRESSURE: 65 MMHG | SYSTOLIC BLOOD PRESSURE: 159 MMHG

## 2020-01-01 VITALS — RESPIRATION RATE: 16 BRPM | TEMPERATURE: 97.6 F | HEART RATE: 57 BPM | OXYGEN SATURATION: 96 %

## 2020-01-01 VITALS
SYSTOLIC BLOOD PRESSURE: 154 MMHG | RESPIRATION RATE: 18 BRPM | OXYGEN SATURATION: 95 % | HEART RATE: 54 BPM | TEMPERATURE: 97.6 F | DIASTOLIC BLOOD PRESSURE: 59 MMHG

## 2020-01-01 LAB
25(OH)D3 SERPL-MCNC: 64.8 NG/ML (ref 30–100)
25(OH)D3 SERPL-MCNC: 67.5 NG/ML (ref 30–100)
ALBUMIN SERPL-MCNC: 3.4 G/DL (ref 3.4–5)
ALBUMIN SERPL-MCNC: 3.4 G/DL (ref 3.4–5)
ALBUMIN SERPL-MCNC: 3.6 G/DL (ref 3.4–5)
ANION GAP SERPL CALC-SCNC: 4 MMOL/L (ref 3–18)
ANION GAP SERPL CALC-SCNC: 6 MMOL/L (ref 3–18)
ANION GAP SERPL CALC-SCNC: 7 MMOL/L (ref 3–18)
BASO+EOS+MONOS # BLD AUTO: 0.3 K/UL (ref 0–2.3)
BASO+EOS+MONOS # BLD AUTO: 0.3 K/UL (ref 0–2.3)
BASO+EOS+MONOS # BLD AUTO: 0.5 K/UL (ref 0–2.3)
BASO+EOS+MONOS # BLD AUTO: 0.6 K/UL (ref 0–2.3)
BASO+EOS+MONOS # BLD AUTO: 0.7 K/UL (ref 0–2.3)
BASO+EOS+MONOS # BLD AUTO: 0.7 K/UL (ref 0–2.3)
BASO+EOS+MONOS NFR BLD AUTO: 12 % (ref 0.1–17)
BASO+EOS+MONOS NFR BLD AUTO: 12 % (ref 0.1–17)
BASO+EOS+MONOS NFR BLD AUTO: 6 % (ref 0.1–17)
BASO+EOS+MONOS NFR BLD AUTO: 6 % (ref 0.1–17)
BASO+EOS+MONOS NFR BLD AUTO: 7 % (ref 0.1–17)
BASO+EOS+MONOS NFR BLD AUTO: 7 % (ref 0.1–17)
BASO+EOS+MONOS NFR BLD AUTO: 9 % (ref 0.1–17)
BUN SERPL-MCNC: 61 MG/DL (ref 7–18)
BUN SERPL-MCNC: 62 MG/DL (ref 7–18)
BUN SERPL-MCNC: 75 MG/DL (ref 7–18)
BUN/CREAT SERPL: 27 (ref 12–20)
BUN/CREAT SERPL: 32 (ref 12–20)
BUN/CREAT SERPL: 36 (ref 12–20)
CALCIUM SERPL-MCNC: 10.2 MG/DL (ref 8.5–10.1)
CALCIUM SERPL-MCNC: 10.3 MG/DL (ref 8.5–10.1)
CALCIUM SERPL-MCNC: 9.2 MG/DL (ref 8.5–10.1)
CALCIUM SERPL-MCNC: 9.2 MG/DL (ref 8.5–10.1)
CALCIUM SERPL-MCNC: 9.9 MG/DL (ref 8.5–10.1)
CHLORIDE SERPL-SCNC: 106 MMOL/L (ref 100–111)
CHLORIDE SERPL-SCNC: 107 MMOL/L (ref 100–111)
CHLORIDE SERPL-SCNC: 110 MMOL/L (ref 100–111)
CO2 SERPL-SCNC: 29 MMOL/L (ref 21–32)
CO2 SERPL-SCNC: 30 MMOL/L (ref 21–32)
CO2 SERPL-SCNC: 31 MMOL/L (ref 21–32)
CREAT SERPL-MCNC: 1.96 MG/DL (ref 0.6–1.3)
CREAT SERPL-MCNC: 2.08 MG/DL (ref 0.6–1.3)
CREAT SERPL-MCNC: 2.3 MG/DL (ref 0.6–1.3)
CREAT UR-MCNC: 50 MG/DL (ref 30–125)
DIFFERENTIAL METHOD BLD: ABNORMAL
ERYTHROCYTE [DISTWIDTH] IN BLOOD BY AUTOMATED COUNT: 11.9 % (ref 11.5–14.5)
ERYTHROCYTE [DISTWIDTH] IN BLOOD BY AUTOMATED COUNT: 11.9 % (ref 11.5–14.5)
ERYTHROCYTE [DISTWIDTH] IN BLOOD BY AUTOMATED COUNT: 12.1 % (ref 11.5–14.5)
ERYTHROCYTE [DISTWIDTH] IN BLOOD BY AUTOMATED COUNT: 12.3 % (ref 11.5–14.5)
FERRITIN SERPL-MCNC: 1019 NG/ML (ref 8–388)
FERRITIN SERPL-MCNC: 1060 NG/ML (ref 8–388)
FERRITIN SERPL-MCNC: 771 NG/ML (ref 8–388)
FERRITIN SERPL-MCNC: 820 NG/ML (ref 8–388)
GLUCOSE SERPL-MCNC: 112 MG/DL (ref 74–99)
GLUCOSE SERPL-MCNC: 84 MG/DL (ref 74–99)
GLUCOSE SERPL-MCNC: 86 MG/DL (ref 74–99)
HCT VFR BLD AUTO: 32.8 % (ref 36–48)
HCT VFR BLD AUTO: 33.1 % (ref 36–48)
HCT VFR BLD AUTO: 33.1 % (ref 36–48)
HCT VFR BLD AUTO: 33.2 % (ref 36–48)
HCT VFR BLD AUTO: 33.2 % (ref 36–48)
HCT VFR BLD AUTO: 33.4 % (ref 36–48)
HCT VFR BLD AUTO: 33.5 % (ref 36–48)
HCT VFR BLD AUTO: 35.1 % (ref 36–48)
HCT VFR BLD AUTO: 35.6 % (ref 36–48)
HGB BLD-MCNC: 10.2 G/DL (ref 12–16)
HGB BLD-MCNC: 10.3 G/DL (ref 12–16)
HGB BLD-MCNC: 10.3 G/DL (ref 12–16)
HGB BLD-MCNC: 10.4 G/DL (ref 12–16)
HGB BLD-MCNC: 10.5 G/DL (ref 12–16)
HGB BLD-MCNC: 11.1 G/DL (ref 12–16)
HGB BLD-MCNC: 11.1 G/DL (ref 12–16)
IRON SATN MFR SERPL: 24 % (ref 20–50)
IRON SATN MFR SERPL: 25 % (ref 20–50)
IRON SATN MFR SERPL: 31 % (ref 20–50)
IRON SATN MFR SERPL: 36 % (ref 20–50)
IRON SERPL-MCNC: 44 UG/DL (ref 50–175)
IRON SERPL-MCNC: 52 UG/DL (ref 50–175)
IRON SERPL-MCNC: 69 UG/DL (ref 50–175)
IRON SERPL-MCNC: 72 UG/DL (ref 50–175)
LYMPHOCYTES # BLD: 1.5 K/UL (ref 1.1–5.9)
LYMPHOCYTES # BLD: 1.8 K/UL (ref 1.1–5.9)
LYMPHOCYTES # BLD: 2 K/UL (ref 1.1–5.9)
LYMPHOCYTES # BLD: 2 K/UL (ref 1.1–5.9)
LYMPHOCYTES # BLD: 2.1 K/UL (ref 1.1–5.9)
LYMPHOCYTES # BLD: 2.2 K/UL (ref 1.1–5.9)
LYMPHOCYTES # BLD: 2.2 K/UL (ref 1.1–5.9)
LYMPHOCYTES # BLD: 2.5 K/UL (ref 1.1–5.9)
LYMPHOCYTES # BLD: 2.7 K/UL (ref 1.1–5.9)
LYMPHOCYTES NFR BLD: 28 % (ref 14–44)
LYMPHOCYTES NFR BLD: 32 % (ref 14–44)
LYMPHOCYTES NFR BLD: 35 % (ref 14–44)
LYMPHOCYTES NFR BLD: 35 % (ref 14–44)
LYMPHOCYTES NFR BLD: 36 % (ref 14–44)
LYMPHOCYTES NFR BLD: 38 % (ref 14–44)
LYMPHOCYTES NFR BLD: 38 % (ref 14–44)
LYMPHOCYTES NFR BLD: 39 % (ref 14–44)
LYMPHOCYTES NFR BLD: 42 % (ref 14–44)
MCH RBC QN AUTO: 27.9 PG (ref 25–35)
MCH RBC QN AUTO: 28.1 PG (ref 25–35)
MCH RBC QN AUTO: 28.1 PG (ref 25–35)
MCH RBC QN AUTO: 28.2 PG (ref 25–35)
MCH RBC QN AUTO: 28.5 PG (ref 25–35)
MCH RBC QN AUTO: 28.7 PG (ref 25–35)
MCHC RBC AUTO-ENTMCNC: 31 G/DL (ref 31–37)
MCHC RBC AUTO-ENTMCNC: 31.1 G/DL (ref 31–37)
MCHC RBC AUTO-ENTMCNC: 31.2 G/DL (ref 31–37)
MCHC RBC AUTO-ENTMCNC: 31.3 G/DL (ref 31–37)
MCHC RBC AUTO-ENTMCNC: 31.6 G/DL (ref 31–37)
MCHC RBC AUTO-ENTMCNC: 31.6 G/DL (ref 31–37)
MCHC RBC AUTO-ENTMCNC: 31.7 G/DL (ref 31–37)
MCV RBC AUTO: 88.9 FL (ref 78–102)
MCV RBC AUTO: 89 FL (ref 78–102)
MCV RBC AUTO: 89.7 FL (ref 78–102)
MCV RBC AUTO: 90.4 FL (ref 78–102)
MCV RBC AUTO: 90.4 FL (ref 78–102)
MCV RBC AUTO: 90.5 FL (ref 78–102)
MCV RBC AUTO: 90.6 FL (ref 78–102)
MCV RBC AUTO: 90.7 FL (ref 78–102)
MCV RBC AUTO: 90.8 FL (ref 78–102)
NEUTS SEG # BLD: 2.7 K/UL (ref 1.8–9.5)
NEUTS SEG # BLD: 2.8 K/UL (ref 1.8–9.5)
NEUTS SEG # BLD: 2.9 K/UL (ref 1.8–9.5)
NEUTS SEG # BLD: 3 K/UL (ref 1.8–9.5)
NEUTS SEG # BLD: 3.1 K/UL (ref 1.8–9.5)
NEUTS SEG # BLD: 3.2 K/UL (ref 1.8–9.5)
NEUTS SEG # BLD: 3.5 K/UL (ref 1.8–9.5)
NEUTS SEG # BLD: 3.9 K/UL (ref 1.8–9.5)
NEUTS SEG # BLD: 4.4 K/UL (ref 1.8–9.5)
NEUTS SEG NFR BLD: 46 % (ref 40–70)
NEUTS SEG NFR BLD: 52 % (ref 40–70)
NEUTS SEG NFR BLD: 53 % (ref 40–70)
NEUTS SEG NFR BLD: 54 % (ref 40–70)
NEUTS SEG NFR BLD: 56 % (ref 40–70)
NEUTS SEG NFR BLD: 56 % (ref 40–70)
NEUTS SEG NFR BLD: 59 % (ref 40–70)
NEUTS SEG NFR BLD: 61 % (ref 40–70)
NEUTS SEG NFR BLD: 64 % (ref 40–70)
PHOSPHATE SERPL-MCNC: 3.7 MG/DL (ref 2.5–4.9)
PHOSPHATE SERPL-MCNC: 3.8 MG/DL (ref 2.5–4.9)
PHOSPHATE SERPL-MCNC: 4.1 MG/DL (ref 2.5–4.9)
PLATELET # BLD AUTO: 159 K/UL (ref 140–440)
PLATELET # BLD AUTO: 165 K/UL (ref 140–440)
PLATELET # BLD AUTO: 169 K/UL (ref 140–440)
PLATELET # BLD AUTO: 179 K/UL (ref 140–440)
PLATELET # BLD AUTO: 182 K/UL (ref 140–440)
PLATELET # BLD AUTO: 183 K/UL (ref 140–440)
PLATELET # BLD AUTO: 184 K/UL (ref 140–440)
PLATELET # BLD AUTO: 195 K/UL (ref 140–440)
PLATELET # BLD AUTO: 199 K/UL (ref 140–440)
POTASSIUM SERPL-SCNC: 4.5 MMOL/L (ref 3.5–5.5)
POTASSIUM SERPL-SCNC: 4.5 MMOL/L (ref 3.5–5.5)
POTASSIUM SERPL-SCNC: 4.7 MMOL/L (ref 3.5–5.5)
PROT UR-MCNC: 51 MG/DL
PTH-INTACT SERPL-MCNC: 44.3 PG/ML (ref 18.4–88)
PTH-INTACT SERPL-MCNC: 63.2 PG/ML (ref 18.4–88)
RBC # BLD AUTO: 3.62 M/UL (ref 4.1–5.1)
RBC # BLD AUTO: 3.66 M/UL (ref 4.1–5.1)
RBC # BLD AUTO: 3.66 M/UL (ref 4.1–5.1)
RBC # BLD AUTO: 3.69 M/UL (ref 4.1–5.1)
RBC # BLD AUTO: 3.73 M/UL (ref 4.1–5.1)
RBC # BLD AUTO: 3.94 M/UL (ref 4.1–5.1)
RBC # BLD AUTO: 3.95 M/UL (ref 4.1–5.1)
SODIUM SERPL-SCNC: 142 MMOL/L (ref 136–145)
SODIUM SERPL-SCNC: 143 MMOL/L (ref 136–145)
SODIUM SERPL-SCNC: 145 MMOL/L (ref 136–145)
TIBC SERPL-MCNC: 176 UG/DL (ref 250–450)
TIBC SERPL-MCNC: 190 UG/DL (ref 250–450)
TIBC SERPL-MCNC: 213 UG/DL (ref 250–450)
TIBC SERPL-MCNC: 234 UG/DL (ref 250–450)
WBC # BLD AUTO: 4.7 K/UL (ref 4.5–13)
WBC # BLD AUTO: 5.3 K/UL (ref 4.5–13)
WBC # BLD AUTO: 5.3 K/UL (ref 4.5–13)
WBC # BLD AUTO: 5.7 K/UL (ref 4.5–13)
WBC # BLD AUTO: 5.9 K/UL (ref 4.5–13)
WBC # BLD AUTO: 5.9 K/UL (ref 4.5–13)
WBC # BLD AUTO: 6.3 K/UL (ref 4.5–13)
WBC # BLD AUTO: 6.9 K/UL (ref 4.5–13)
WBC # BLD AUTO: 7.1 K/UL (ref 4.5–13)

## 2020-01-01 PROCEDURE — 36415 COLL VENOUS BLD VENIPUNCTURE: CPT

## 2020-01-01 PROCEDURE — 80069 RENAL FUNCTION PANEL: CPT

## 2020-01-01 PROCEDURE — 85025 COMPLETE CBC W/AUTO DIFF WBC: CPT

## 2020-01-01 PROCEDURE — 83540 ASSAY OF IRON: CPT

## 2020-01-01 PROCEDURE — 83970 ASSAY OF PARATHORMONE: CPT

## 2020-01-01 PROCEDURE — 82728 ASSAY OF FERRITIN: CPT

## 2020-01-01 PROCEDURE — 82306 VITAMIN D 25 HYDROXY: CPT

## 2020-01-01 PROCEDURE — 84156 ASSAY OF PROTEIN URINE: CPT

## 2020-01-01 PROCEDURE — 82570 ASSAY OF URINE CREATININE: CPT

## 2020-01-01 RX ORDER — HEPARIN 100 UNIT/ML
300-500 SYRINGE INTRAVENOUS AS NEEDED
Status: CANCELLED
Start: 2021-01-01

## 2020-01-01 RX ORDER — SODIUM CHLORIDE 0.9 % (FLUSH) 0.9 %
10 SYRINGE (ML) INJECTION AS NEEDED
Status: CANCELLED | OUTPATIENT
Start: 2021-01-01

## 2020-01-01 RX ORDER — SODIUM CHLORIDE 9 MG/ML
10 INJECTION INTRAMUSCULAR; INTRAVENOUS; SUBCUTANEOUS AS NEEDED
Status: CANCELLED | OUTPATIENT
Start: 2021-01-01

## 2020-01-07 ENCOUNTER — HOSPITAL ENCOUNTER (OUTPATIENT)
Dept: INFUSION THERAPY | Age: 85
Discharge: HOME | End: 2020-01-07
Payer: MEDICARE

## 2020-01-07 VITALS
HEART RATE: 52 BPM | DIASTOLIC BLOOD PRESSURE: 81 MMHG | OXYGEN SATURATION: 100 % | TEMPERATURE: 98.1 F | SYSTOLIC BLOOD PRESSURE: 150 MMHG | RESPIRATION RATE: 18 BRPM

## 2020-01-07 LAB
BASO+EOS+MONOS # BLD AUTO: 0.5 K/UL (ref 0–2.3)
BASO+EOS+MONOS NFR BLD AUTO: 7 % (ref 0.1–17)
DIFFERENTIAL METHOD BLD: ABNORMAL
ERYTHROCYTE [DISTWIDTH] IN BLOOD BY AUTOMATED COUNT: 12.6 % (ref 11.5–14.5)
HCT VFR BLD AUTO: 32.9 % (ref 36–48)
HGB BLD-MCNC: 9.9 G/DL (ref 12–16)
LYMPHOCYTES # BLD: 2.1 K/UL (ref 1.1–5.9)
LYMPHOCYTES NFR BLD: 29 % (ref 14–44)
MCH RBC QN AUTO: 27.6 PG (ref 25–35)
MCHC RBC AUTO-ENTMCNC: 30.1 G/DL (ref 31–37)
MCV RBC AUTO: 91.6 FL (ref 78–102)
NEUTS SEG # BLD: 4.8 K/UL (ref 1.8–9.5)
NEUTS SEG NFR BLD: 65 % (ref 40–70)
PLATELET # BLD AUTO: 177 K/UL (ref 140–440)
RBC # BLD AUTO: 3.59 M/UL (ref 4.1–5.1)
WBC # BLD AUTO: 7.4 K/UL (ref 4.5–13)

## 2020-01-07 PROCEDURE — 85025 COMPLETE CBC W/AUTO DIFF WBC: CPT

## 2020-01-07 PROCEDURE — 36415 COLL VENOUS BLD VENIPUNCTURE: CPT

## 2020-01-07 RX ORDER — SODIUM CHLORIDE 0.9 % (FLUSH) 0.9 %
10 SYRINGE (ML) INJECTION AS NEEDED
Status: CANCELLED
Start: 2020-02-04

## 2020-01-07 RX ORDER — HEPARIN 100 UNIT/ML
300-500 SYRINGE INTRAVENOUS AS NEEDED
Status: CANCELLED
Start: 2020-02-04

## 2020-01-07 RX ORDER — SODIUM CHLORIDE 9 MG/ML
10 INJECTION INTRAMUSCULAR; INTRAVENOUS; SUBCUTANEOUS AS NEEDED
Status: CANCELLED | OUTPATIENT
Start: 2020-02-04

## 2020-01-07 NOTE — PROGRESS NOTES
[facility] Progress Note    Date: 2020    Name: Kiley Archuleta    MRN: [id number]         : 1922     PROCRIT INJECTON    Ms. Alice Britton was assessed and education was provided. No complaints or concerns voiced    Pt arrived to [facility] in wheelchair accompanied by her daughter at [address]    Ms. Antony's vitals were reviewed and patient was observed for 5 minutes prior to treatment. Visit Vitals  /81 (BP 1 Location: Left arm, BP Patient Position: Sitting)   Pulse (!) 52   Temp 98.1 °F (36.7 °C)   Resp 18   SpO2 100%   Breastfeeding No       Blood drawn via left a/c venipuncture on 1st attempt for CBC. Gauze and coban applied to site. Recent Results (from the past 12 hour(s))   CBC WITH 3 PART DIFF    Collection Time: 20  3:25 PM   Result Value Ref Range    WBC 7.4 4.5 - 13.0 K/uL    RBC 3.59 (L) 4.10 - 5.10 M/uL    HGB 9.9 (L) 12.0 - 16 g/dL    HCT 32.9 (L) 36 - 48 %    MCV 91.6 78 - 102 FL    MCH 27.6 25.0 - 35.0 PG    MCHC 30.1 (L) 31 - 37 g/dL    RDW 12.6 11.5 - 14.5 %    PLATELET 571 798 - 671 K/uL    NEUTROPHILS 65 40 - 70 %    MIXED CELLS 7 0.1 - 17 %    LYMPHOCYTES 29 14 - 44 %    ABS. NEUTROPHILS 4.8 1.8 - 9.5 K/UL    ABS. MIXED CELLS 0.5 0.0 - 2.3 K/uL    ABS. LYMPHOCYTES 2.1 1.1 - 5.9 K/UL    DF AUTOMATED       Results within ordered parameters to hold procrit today. H&H 9.9/32.9     Ms. Alice Britton was discharged from [place] [address] in stable condition at 1540. She is to return on 2020 at 2 pm for her next Procrit appointment.     Haley Heath, RN  2020  [id number]

## 2020-02-04 ENCOUNTER — HOSPITAL ENCOUNTER (OUTPATIENT)
Dept: INFUSION THERAPY | Age: 85
Discharge: HOME | End: 2020-02-04
Payer: MEDICARE

## 2020-02-04 VITALS
TEMPERATURE: 97.8 F | RESPIRATION RATE: 18 BRPM | OXYGEN SATURATION: 95 % | DIASTOLIC BLOOD PRESSURE: 61 MMHG | SYSTOLIC BLOOD PRESSURE: 169 MMHG | HEART RATE: 66 BPM

## 2020-02-04 DIAGNOSIS — D63.8 ANEMIA OF CHRONIC DISEASE: Primary | ICD-10-CM

## 2020-02-04 LAB
BASO+EOS+MONOS # BLD AUTO: 0.6 K/UL (ref 0–2.3)
BASO+EOS+MONOS NFR BLD AUTO: 10 % (ref 0.1–17)
DIFFERENTIAL METHOD BLD: ABNORMAL
ERYTHROCYTE [DISTWIDTH] IN BLOOD BY AUTOMATED COUNT: 12.6 % (ref 11.5–14.5)
HCT VFR BLD AUTO: 34.3 % (ref 36–48)
HGB BLD-MCNC: 10.6 G/DL (ref 12–16)
LYMPHOCYTES # BLD: 1.6 K/UL (ref 1.1–5.9)
LYMPHOCYTES NFR BLD: 25 % (ref 14–44)
MCH RBC QN AUTO: 27.7 PG (ref 25–35)
MCHC RBC AUTO-ENTMCNC: 30.9 G/DL (ref 31–37)
MCV RBC AUTO: 89.6 FL (ref 78–102)
NEUTS SEG # BLD: 4.4 K/UL (ref 1.8–9.5)
NEUTS SEG NFR BLD: 66 % (ref 40–70)
PLATELET # BLD AUTO: 188 K/UL (ref 140–440)
RBC # BLD AUTO: 3.83 M/UL (ref 4.1–5.1)
WBC # BLD AUTO: 6.6 K/UL (ref 4.5–13)

## 2020-02-04 PROCEDURE — 85025 COMPLETE CBC W/AUTO DIFF WBC: CPT

## 2020-02-04 PROCEDURE — 36415 COLL VENOUS BLD VENIPUNCTURE: CPT

## 2020-02-04 RX ORDER — SODIUM CHLORIDE 9 MG/ML
10 INJECTION INTRAMUSCULAR; INTRAVENOUS; SUBCUTANEOUS AS NEEDED
Status: CANCELLED | OUTPATIENT
Start: 2020-03-03

## 2020-02-04 RX ORDER — HEPARIN 100 UNIT/ML
300-500 SYRINGE INTRAVENOUS AS NEEDED
Status: CANCELLED
Start: 2020-03-03

## 2020-02-04 RX ORDER — SODIUM CHLORIDE 0.9 % (FLUSH) 0.9 %
10 SYRINGE (ML) INJECTION AS NEEDED
Status: CANCELLED
Start: 2020-03-03

## 2020-02-04 NOTE — PROGRESS NOTES
[facility] Progress Note Date: 2020 Name: Juliette Bhardwaj MRN: [id number] : 1922 PROCRIT INJECTON Ms. Geetha Avilez was assessed and education was provided. No complaints or concerns voiced Pt arrived to [facility] in wheelchair accompanied by her daughter at 3091 Ms. Antony's vitals were reviewed and patient was observed for 5 minutes prior to treatment. Visit Vitals /61 (BP 1 Location: Left arm, BP Patient Position: Sitting) Pulse 66 Temp 97.8 °F (36.6 °C) Resp 18 SpO2 95% Breastfeeding No  
 
 
Blood drawn via left a/c venipuncture on 1st attempt for CBC. Gauze and coban applied to site. Recent Results (from the past 12 hour(s)) CBC WITH 3 PART DIFF Collection Time: 20  3:15 PM  
Result Value Ref Range WBC 6.6 4.5 - 13.0 K/uL  
 RBC 3.83 (L) 4.10 - 5.10 M/uL  
 HGB 10.6 (L) 12.0 - 16 g/dL HCT 34.3 (L) 36 - 48 % MCV 89.6 78 - 102 FL  
 MCH 27.7 25.0 - 35.0 PG  
 MCHC 30.9 (L) 31 - 37 g/dL  
 RDW 12.6 11.5 - 14.5 % PLATELET 985 193 - 469 K/uL NEUTROPHILS 66 40 - 70 % MIXED CELLS 10 0.1 - 17 % LYMPHOCYTES 25 14 - 44 % ABS. NEUTROPHILS 4.4 1.8 - 9.5 K/UL  
 ABS. MIXED CELLS 0.6 0.0 - 2.3 K/uL  
 ABS. LYMPHOCYTES 1.6 1.1 - 5.9 K/UL  
 DF AUTOMATED Results within ordered parameters to hold procrit today. H&H 10.6/34.3 Ms. Geetha Avilez was discharged from [place] [address] in stable condition at 1530. She is to return on 3/3/2020 at 3 pm for her next Procrit appointment. Ricco Villegas RN 2020 
[id number]

## 2020-03-03 ENCOUNTER — HOSPITAL ENCOUNTER (OUTPATIENT)
Dept: INFUSION THERAPY | Age: 85
Discharge: HOME | End: 2020-03-03
Payer: MEDICARE

## 2020-03-03 VITALS
SYSTOLIC BLOOD PRESSURE: 159 MMHG | DIASTOLIC BLOOD PRESSURE: 63 MMHG | TEMPERATURE: 97.8 F | OXYGEN SATURATION: 94 % | RESPIRATION RATE: 16 BRPM | HEART RATE: 54 BPM

## 2020-03-03 LAB
BASO+EOS+MONOS # BLD AUTO: 0.5 K/UL (ref 0–2.3)
BASO+EOS+MONOS NFR BLD AUTO: 8 % (ref 0.1–17)
DIFFERENTIAL METHOD BLD: ABNORMAL
ERYTHROCYTE [DISTWIDTH] IN BLOOD BY AUTOMATED COUNT: 12.5 % (ref 11.5–14.5)
HCT VFR BLD AUTO: 32.1 % (ref 36–48)
HGB BLD-MCNC: 10 G/DL (ref 12–16)
LYMPHOCYTES # BLD: 2 K/UL (ref 1.1–5.9)
LYMPHOCYTES NFR BLD: 30 % (ref 14–44)
MCH RBC QN AUTO: 28.1 PG (ref 25–35)
MCHC RBC AUTO-ENTMCNC: 31.2 G/DL (ref 31–37)
MCV RBC AUTO: 90.2 FL (ref 78–102)
NEUTS SEG # BLD: 4 K/UL (ref 1.8–9.5)
NEUTS SEG NFR BLD: 61 % (ref 40–70)
PLATELET # BLD AUTO: 202 K/UL (ref 140–440)
RBC # BLD AUTO: 3.56 M/UL (ref 4.1–5.1)
WBC # BLD AUTO: 6.5 K/UL (ref 4.5–13)

## 2020-03-03 PROCEDURE — 36415 COLL VENOUS BLD VENIPUNCTURE: CPT

## 2020-03-03 PROCEDURE — 85025 COMPLETE CBC W/AUTO DIFF WBC: CPT

## 2020-03-03 NOTE — PROGRESS NOTES
[address] [facility] Progress Note Date: March 3, 2020 Name: Edvin Mendoza MRN: [id number] : 1922 PROCRIT INJECTON Ms. Martín Howe was assessed and education was provided. No complaints or concerns voiced Pt arrived to [place] in wheelchair accompanied by her daughter at 4672 MsElvis Antony's vitals were reviewed and patient was observed for 5 minutes prior to treatment. Visit Vitals /63 (BP 1 Location: Left arm, BP Patient Position: Sitting) Pulse (!) 54 Temp 97.8 °F (36.6 °C) Resp 16 SpO2 94% Breastfeeding No  
 
 
Blood drawn via left a/c venipuncture on 1st attempt for CBC. Gauze and coban applied to site. No results found for this or any previous visit (from the past 12 hour(s)). Results within ordered parameters to hold procrit today. H&H 10.0/32 Ms. Martín Howe was discharged from [place] [address] in stable condition at 1525. She is to return on 3/31/2020 at 3 pm for her next Procrit appointment. John Geiger RN 
March 3, 2020

## 2020-03-31 ENCOUNTER — HOSPITAL ENCOUNTER (OUTPATIENT)
Dept: INFUSION THERAPY | Age: 85
Discharge: HOME | End: 2020-03-31
Payer: MEDICARE

## 2020-03-31 VITALS
DIASTOLIC BLOOD PRESSURE: 60 MMHG | SYSTOLIC BLOOD PRESSURE: 167 MMHG | HEART RATE: 59 BPM | RESPIRATION RATE: 18 BRPM | TEMPERATURE: 98.3 F

## 2020-03-31 LAB
BASO+EOS+MONOS # BLD AUTO: 0.6 K/UL (ref 0–2.3)
BASO+EOS+MONOS NFR BLD AUTO: 9 % (ref 0.1–17)
DIFFERENTIAL METHOD BLD: ABNORMAL
ERYTHROCYTE [DISTWIDTH] IN BLOOD BY AUTOMATED COUNT: 12.2 % (ref 11.5–14.5)
HCT VFR BLD AUTO: 33 % (ref 36–48)
HGB BLD-MCNC: 10.2 G/DL (ref 12–16)
LYMPHOCYTES # BLD: 2.4 K/UL (ref 1.1–5.9)
LYMPHOCYTES NFR BLD: 34 % (ref 14–44)
MCH RBC QN AUTO: 27.9 PG (ref 25–35)
MCHC RBC AUTO-ENTMCNC: 30.9 G/DL (ref 31–37)
MCV RBC AUTO: 90.4 FL (ref 78–102)
NEUTS SEG # BLD: 4 K/UL (ref 1.8–9.5)
NEUTS SEG NFR BLD: 58 % (ref 40–70)
PLATELET # BLD AUTO: 208 K/UL (ref 140–440)
RBC # BLD AUTO: 3.65 M/UL (ref 4.1–5.1)
WBC # BLD AUTO: 7 K/UL (ref 4.5–13)

## 2020-03-31 PROCEDURE — 36415 COLL VENOUS BLD VENIPUNCTURE: CPT

## 2020-03-31 PROCEDURE — 85025 COMPLETE CBC W/AUTO DIFF WBC: CPT

## 2020-03-31 NOTE — PROGRESS NOTES
[facility] Lab Visit: 
 
Luz Campbell 9/2/1922 
[id number] 1515:  Pt arrived ambulatory. Labs drawn peripherally in left [facility] and sent for processing. Patient did not need Procrit today. Hemoglobin 10.2, Hematocrit 33.0. Departed OPI ambulatory and in no distress. Visit Vitals /60 (BP 1 Location: Left arm, BP Patient Position: Sitting) Pulse (!) 59 Temp 98.3 °F (36.8 °C) Resp 18

## 2020-04-28 NOTE — PROGRESS NOTES
[facility] Progress Note Date: 2020 Name: Jayme Meade MRN: [id number] : 1922 PROCRIT INJECTON Ms. Tana Chatterjee was assessed and education was provided. No complaints or concerns voiced Pt arrived to [facility] in wheelchair accompanied by her daughter at 1500 MsElvis Antony's vitals were reviewed and patient was observed for 5 minutes prior to treatment. Visit Vitals /50 (BP 1 Location: Right arm, BP Patient Position: Sitting) Pulse (!) 57 Temp 97.7 °F (36.5 °C) Resp 18 SpO2 94% Breastfeeding No  
 
 
Blood drawn via left a/c venipuncture on 1st attempt for CBC. Gauze and coban applied to site. Recent Results (from the past 12 hour(s)) CBC WITH 3 PART DIFF Collection Time: 20  3:10 PM  
Result Value Ref Range WBC 6.9 4.5 - 13.0 K/uL  
 RBC 3.69 (L) 4.10 - 5.10 M/uL  
 HGB 10.3 (L) 12.0 - 16 g/dL HCT 33.1 (L) 36 - 48 % MCV 89.7 78 - 102 FL  
 MCH 27.9 25.0 - 35.0 PG  
 MCHC 31.1 31 - 37 g/dL  
 RDW 11.9 11.5 - 14.5 % PLATELET 333 148 - 633 K/uL NEUTROPHILS 64 40 - 70 % MIXED CELLS 7 0.1 - 17 % LYMPHOCYTES 28 14 - 44 % ABS. NEUTROPHILS 4.4 1.8 - 9.5 K/UL  
 ABS. MIXED CELLS 0.5 0.0 - 2.3 K/uL  
 ABS. LYMPHOCYTES 2.0 1.1 - 5.9 K/UL  
 DF AUTOMATED Results within ordered parameters to hold procrit today. H&H 10.3/33.1 Ms. Tana Chatterjee was discharged from [place] [address] in stable condition at 1520 She is to return on 20 at 3 pm for her next Procrit appointment. Joy Negro RN 2020

## 2020-05-26 NOTE — PROGRESS NOTES
[facility] Progress Note Date: May 26, 2020 Name: Louis Contreras MRN: [id number] : 1922 PROCRIT INJECTON Ms. Shania Maddox was assessed and education was provided. No complaints or concerns voiced Pt arrived to [facility] in wheelchair accompanied by her daughter at 5 MsElvis Antony's vitals were reviewed and patient was observed for 5 minutes prior to treatment. Visit Vitals /64 (BP 1 Location: Left arm, BP Patient Position: At rest) Pulse 72 Temp 97.1 °F (36.2 °C) Resp 18 SpO2 93% Breastfeeding No  
 
 
Blood drawn via left a/c venipuncture on 1st attempt for CBC and other labs such PTH intact, renal panel, iron panel and ferritin by the Phlebotomist.  Gauze and coban applied to site. Recent Results (from the past 12 hour(s)) CBC WITH 3 PART DIFF Collection Time: 20  3:20 PM  
Result Value Ref Range WBC 5.3 4.5 - 13.0 K/uL  
 RBC 3.66 (L) 4.10 - 5.10 M/uL  
 HGB 10.5 (L) 12.0 - 16 g/dL HCT 33.1 (L) 36 - 48 % MCV 90.4 78 - 102 FL  
 MCH 28.7 25.0 - 35.0 PG  
 MCHC 31.7 31 - 37 g/dL  
 RDW 12.1 11.5 - 14.5 % PLATELET 262 278 - 338 K/uL NEUTROPHILS 59 40 - 70 % MIXED CELLS 6 0.1 - 17 % LYMPHOCYTES 35 14 - 44 % ABS. NEUTROPHILS 3.2 1.8 - 9.5 K/UL  
 ABS. MIXED CELLS 0.3 0.0 - 2.3 K/uL  
 ABS. LYMPHOCYTES 1.8 1.1 - 5.9 K/UL  
 DF AUTOMATED Results within ordered parameters to hold procrit today. H&H 10.5/33.1 Ms. Shania Maddox was discharged from [place] [address] in stable condition at 1530 She is to return on 20 at 3 pm for her next Procrit appointment. Raffi Moncada May 26, 2020

## 2020-06-23 NOTE — PROGRESS NOTES
[facility] Progress Note Date: 2020 Name: Mayra Ying MRN: [id number] : 1922 PROCRIT INJECTON Ms. Landry Bella was assessed and education was provided. No complaints or concerns voiced Pt arrived to [facility] in wheelchair accompanied by her daughter at 80. Ms. Antony's vitals were reviewed and patient was observed for 5 minutes prior to treatment. Visit Vitals /58 (BP 1 Location: Left arm, BP Patient Position: At rest;Sitting) Pulse (!) 52 Temp 97 °F (36.1 °C) Resp 18 SpO2 93% Breastfeeding No  
 
 
Blood drawn via left a/c venipuncture on 1st attempt for CBC by the Phlebotomist.  Gauze and coban applied to site. Recent Results (from the past 12 hour(s)) CBC WITH 3 PART DIFF Collection Time: 20  3:35 PM  
Result Value Ref Range WBC 4.7 4.5 - 13.0 K/uL  
 RBC 3.66 (L) 4.10 - 5.10 M/uL  
 HGB 10.3 (L) 12.0 - 16 g/dL HCT 33.2 (L) 36 - 48 % MCV 90.7 78 - 102 FL  
 MCH 28.1 25.0 - 35.0 PG  
 MCHC 31.0 31 - 37 g/dL  
 RDW 12.3 11.5 - 14.5 % PLATELET 109 684 - 089 K/uL NEUTROPHILS 61 40 - 70 % MIXED CELLS 7 0.1 - 17 % LYMPHOCYTES 32 14 - 44 % ABS. NEUTROPHILS 2.9 1.8 - 9.5 K/UL  
 ABS. MIXED CELLS 0.3 0.0 - 2.3 K/uL  
 ABS. LYMPHOCYTES 1.5 1.1 - 5.9 K/UL  
 DF AUTOMATED Results within ordered parameters to hold procrit today. H&H 10.3/33.2 Ms. Landry Bella was discharged from [place] [address] in stable condition at [phone] She is to return on 20 at 3 pm for her next Procrit appointment. Marietta Pérez 2020

## 2020-07-21 NOTE — PROGRESS NOTES
[facility] Lab Visit:    Caterina Gongora  9/2/1922  [id number]    3885:  Pt arrived ambulatory. Labs drawn peripherally in left ac x1 attempt and sent for processing. Pt did not meet parameters for Procrit today. Scheduled to return in 1 month. Departed \[facility]\"" ambulatory and in no distress. Visit Vitals  Pulse (!) 57   Temp 97.6 °F (36.4 °C)   Resp 16   SpO2 96%     Recent Results (from the past 12 hour(s))   CBC WITH 3 PART DIFF    Collection Time: 07/21/20  3:25 PM   Result Value Ref Range    WBC 7.1 4.5 - 13.0 K/uL    RBC 3.62 (L) 4.10 - 5.10 M/uL    HGB 10.2 (L) 12.0 - 16 g/dL    HCT 32.8 (L) 36 - 48 %    MCV 90.6 78 - 102 FL    MCH 28.2 25.0 - 35.0 PG    MCHC 31.1 31 - 37 g/dL    RDW 12.1 11.5 - 14.5 %    PLATELET 852 415 - 511 K/uL    NEUTROPHILS 56 40 - 70 %    MIXED CELLS 6 0.1 - 17 %    LYMPHOCYTES 38 14 - 44 %    ABS. NEUTROPHILS 3.9 1.8 - 9.5 K/UL    ABS. MIXED CELLS 0.5 0.0 - 2.3 K/uL    ABS.  LYMPHOCYTES 2.7 1.1 - 5.9 K/UL    DF AUTOMATED

## 2020-08-18 NOTE — PROGRESS NOTES
[facility] Progress Note Date: 2020 Name: Mounika Price MRN: [id number] : 1922 PROCRIT INJECTON Ms. Francois Zamora was assessed and education was provided. No complaints or concerns voiced Pt arrived to [facility] in wheelchair accompanied by her daughter at 1. Ms. Antony's vitals were reviewed and patient was observed for 5 minutes prior to treatment. Visit Vitals /59 (BP 1 Location: Right arm, BP Patient Position: At rest;Sitting) Pulse (!) 54 Temp 97.6 °F (36.4 °C) Resp 18 SpO2 95% Breastfeeding No  
 
 
Blood drawn via left a/c venipuncture on 1st attempt for CBC by the Phlebotomist.  Gauze and coban applied to site. Recent Results (from the past 12 hour(s)) CBC WITH 3 PART DIFF Collection Time: 20  3:20 PM  
Result Value Ref Range WBC 6.3 4.5 - 13.0 K/uL  
 RBC 3.69 (L) 4.10 - 5.10 M/uL  
 HGB 10.4 (L) 12.0 - 16 g/dL HCT 33.4 (L) 36 - 48 % MCV 90.5 78 - 102 FL  
 MCH 28.2 25.0 - 35.0 PG  
 MCHC 31.1 31 - 37 g/dL  
 RDW 12.1 11.5 - 14.5 % PLATELET 185 037 - 463 K/uL NEUTROPHILS 56 40 - 70 % MIXED CELLS 9 0.1 - 17 % LYMPHOCYTES 35 14 - 44 % ABS. NEUTROPHILS 3.5 1.8 - 9.5 K/UL  
 ABS. MIXED CELLS 0.6 0.0 - 2.3 K/uL  
 ABS. LYMPHOCYTES 2.2 1.1 - 5.9 K/UL  
 DF AUTOMATED Results within ordered parameters to hold procrit today. H&H 10.4/33.4 Ms. Francois Zamora was discharged from [place] [address] in stable condition at 1530. She is to return on 9/15/20 at 3 pm for her next Procrit appointment. Kaylynn Aguayo 2020

## 2020-09-15 NOTE — PROGRESS NOTES
[address] [facility] Progress Note Date: September 15, 2020 Name: Edvin Mendoza MRN: [id number] : 1922 PROCRIT INJECTON Ms. Martín Howe was assessed and education was provided. No complaints or concerns voiced Pt arrived to [place] in wheelchair accompanied by her daughter at 12. Ms. Antony's vitals were reviewed and patient was observed for 5 minutes prior to treatment. Visit Vitals BP (!) 159/65 (BP 1 Location: Left arm, BP Patient Position: At rest;Sitting) Pulse (!) 58 Temp 98 °F (36.7 °C) Resp 18 Blood drawn via left a/c venipuncture on 1st attempt for CBC by the Phlebotomist.  Gauze and coban applied to site. Recent Results (from the past 12 hour(s)) CBC WITH 3 PART DIFF Collection Time: 09/15/20  3:13 PM  
Result Value Ref Range WBC 5.3 4.5 - 13.0 K/uL  
 RBC 3.94 (L) 4.10 - 5.10 M/uL  
 HGB 11.1 (L) 12.0 - 16 g/dL HCT 35.6 (L) 36 - 48 % MCV 90.4 78 - 102 FL  
 MCH 28.2 25.0 - 35.0 PG  
 MCHC 31.2 31 - 37 g/dL  
 RDW 11.9 11.5 - 14.5 % PLATELET 961 049 - 070 K/uL NEUTROPHILS 54 40 - 70 % MIXED CELLS 9 0.1 - 17 % LYMPHOCYTES 38 14 - 44 % ABS. NEUTROPHILS 2.8 1.8 - 9.5 K/UL  
 ABS. MIXED CELLS 0.5 0.0 - 2.3 K/uL  
 ABS. LYMPHOCYTES 2.0 1.1 - 5.9 K/UL  
 DF AUTOMATED Results within ordered parameters to hold procrit today. H&H 11.1/35.8. Ms. Martín Howe was discharged from [place] [address] in stable condition at 1525. She is to return on 10/13/20 at 3 pm for her next Procrit appointment. Lisette Yañez RN September 15, 2020

## 2020-10-13 NOTE — PROGRESS NOTES
[facility] Progress Note Date: 2020 Name: Kelli Baires MRN: [id number] : 1922 Ms. Kirk Ford arrived in the [facility] today at [phone], in stable condition, here for CBC/Retacrit (Every 4 Weeks). She was assessed and education was provided. Ms. Antony's vitals were reviewed. Visit Vitals BP (!) 191/60 (BP 1 Location: Left arm, BP Patient Position: Sitting) Pulse 63 Temp 97 °F (36.1 °C) Resp 20 SpO2 98% Breastfeeding No  
 
 
 
Blood for the ordered CBC was drawn from her left AC at 1536, without incident. Lab results were obtained and reviewed, and were as follows: 
 
Recent Results (from the past 12 hour(s)) CBC WITH 3 PART DIFF Collection Time: 10/13/20  3:36 PM  
Result Value Ref Range WBC 5.7 4.5 - 13.0 K/uL  
 RBC 3.95 (L) 4.10 - 5.10 M/uL  
 HGB 11.1 (L) 12.0 - 16 g/dL HCT 35.1 (L) 36 - 48 % MCV 88.9 78 - 102 FL  
 MCH 28.1 25.0 - 35.0 PG  
 MCHC 31.6 31 - 37 g/dL  
 RDW 12.1 11.5 - 14.5 % PLATELET 704 530 - 841 K/uL NEUTROPHILS 53 40 - 70 % MIXED CELLS 9 0.1 - 17 % LYMPHOCYTES 39 14 - 44 % ABS. NEUTROPHILS 3.0 1.8 - 9.5 K/UL  
 ABS. MIXED CELLS 0.5 0.0 - 2.3 K/uL  
 ABS. LYMPHOCYTES 2.2 1.1 - 5.9 K/UL  
 DF AUTOMATED Retacrit Injection was HELD today per order, for above stated HGB of 11.1 & HCT 35.1. Ms. Kirk Ford tolerated well, and had no complaints. Ms. Kirk Ford was discharged from [place] [address] in stable condition at [phone]. She is to return in 4 weeks, on Tuesday, 11-10-20 at 1500, for her next appointment, for CBC/Retatcrit Injection. Jaime Plunkett RN 2020 
3:32 PM

## 2020-11-10 NOTE — PROGRESS NOTES
[facility] Progress Note Date: November 10, 2020 Name: Vlad Farmer MRN: [id number] : 1922 Ms. Conner Leonardo arrived in the [facility] today at [phone], in stable condition, here for CBC/Retacrit (Every 4 Weeks). She was assessed and education was provided. Ms. Antony's vitals were reviewed. Visit Vitals BP (!) 157/65 (BP 1 Location: Left arm, BP Patient Position: Sitting) Pulse (!) 55 Temp 97.6 °F (36.4 °C) Resp 20 SpO2 95% Ms. Antony's daughter presented to the [facility] today, with a lab requisition from Dr. Yasmin Gonzales, for additional labs to be obtained. (This lab requisition was scanned into [facility].) Blood for the ordered labs (CBC, Ferritin, & Iron Profile for [facility]) & (PTH-Intact, Renal Function Panel, & Vitamin D Hydroxy--additional labs for Dr. Ernestine Peters) was drawn from her left [facility] [place] at [phone], per  [place]. Also, a urine specimen was obtained for ordered Random Urine Protein with Creatinine at 1550. The ordered CBC was processed on site, and all other labs & urine, were sent out by  to the [facility] lab for processing. Lab results (CBC Only)  were reviewed, and were as follows: 
 
Recent Results (from the past 12 hour(s)) CBC WITH 3 PART DIFF Collection Time: 11/10/20  3:25 PM  
Result Value Ref Range WBC 5.9 4.5 - 13.0 K/uL  
 RBC 3.69 (L) 4.10 - 5.10 M/uL  
 HGB 10.5 (L) 12.0 - 16 g/dL HCT 33.5 (L) 36 - 48 % MCV 90.8 78 - 102 FL  
 MCH 28.5 25.0 - 35.0 PG  
 MCHC 31.3 31 - 37 g/dL  
 RDW 12.3 11.5 - 14.5 % PLATELET 875 669 - 279 K/uL NEUTROPHILS 52 40 - 70 % MIXED CELLS 12 0.1 - 17 % LYMPHOCYTES 36 14 - 44 % ABS. NEUTROPHILS 3.1 1.8 - 9.5 K/UL  
 ABS. MIXED CELLS 0.7 0.0 - 2.3 K/uL  
 ABS. LYMPHOCYTES 2.1 1.1 - 5.9 K/UL  
 DF AUTOMATED Retacrit Injection was HELD today per order, for above stated HGB of 10.5 & HCT 33.5. Ms. Conner Leonardo tolerated well, and had no complaints. Ms. Kian Ge was discharged from [place] [address] in stable condition at [phone]. She is to return in 4 weeks, on Tuesday, 12-8-20 at 1500, for her next appointment, for CBC/Retatcrit Injection. Erika Deal, TORRI November 10, 2020 
3:32 PM

## 2020-12-08 NOTE — PROGRESS NOTES
[facility] Progress Note    Date: 2020    Name: Greg Cook    MRN: [id number]         : 1922    Peripheral Lab Draw      Ms. James Guerra to [facility], ambulatory at 1500 accompanied by daughter via wheelchair. Pt was assessed and education was provided. Ms. Antony's vitals were reviewed and patient was observed for 5 minutes prior to treatment. Visit Vitals  BP (!) 173/67 (BP 1 Location: Left arm, BP Patient Position: Sitting)   Pulse 62   Temp (!) 96 °F (35.6 °C)   SpO2 97%     Recent Results (from the past 12 hour(s))   CBC WITH 3 PART DIFF    Collection Time: 20  3:10 PM   Result Value Ref Range    WBC 5.9 4.5 - 13.0 K/uL    RBC 3.73 (L) 4.10 - 5.10 M/uL    HGB 10.5 (L) 12.0 - 16 g/dL    HCT 33.2 (L) 36 - 48 %    MCV 89.0 78 - 102 FL    MCH 28.2 25.0 - 35.0 PG    MCHC 31.6 31 - 37 g/dL    RDW 12.3 11.5 - 14.5 %    PLATELET 059 549 - 443 K/uL    NEUTROPHILS 46 40 - 70 %    MIXED CELLS 12 0.1 - 17 %    LYMPHOCYTES 42 14 - 44 %    ABS. NEUTROPHILS 2.7 1.8 - 9.5 K/UL    ABS. MIXED CELLS 0.7 0.0 - 2.3 K/uL    ABS. LYMPHOCYTES 2.5 1.1 - 5.9 K/UL    DF AUTOMATED           Blood obtained peripherally from [facility] first attempt with butterfly needle and sent to lab for CBC w/ Diff, Iron Profile, Ferritin, and Renal Panel per written orders. CBC results were shared with TORRI Bob and patient did not meet parameters for treatment. No bleeding or hematoma noted at site. Gauze and coban applied. Ms. James Guerra tolerated the venipuncture, and had no complaints. Patient armband removed and shredded. Ms. James Guerra was discharged from [place] [address] in stable condition at 1515.      Patty Chacon Phlebotomist PCT  2020  3:21 PM

## 2021-01-01 ENCOUNTER — HOME CARE VISIT (OUTPATIENT)
Dept: SCHEDULING | Facility: HOME HEALTH | Age: 86
End: 2021-01-01
Payer: MEDICARE

## 2021-01-01 ENCOUNTER — HOME CARE VISIT (OUTPATIENT)
Dept: HOSPICE | Facility: HOSPICE | Age: 86
End: 2021-01-01
Payer: MEDICARE

## 2021-01-01 ENCOUNTER — HOSPITAL ENCOUNTER (OUTPATIENT)
Dept: INFUSION THERAPY | Age: 86
End: 2021-01-01
Payer: MEDICARE

## 2021-01-01 ENCOUNTER — HOSPITAL ENCOUNTER (OUTPATIENT)
Dept: INFUSION THERAPY | Age: 86
Discharge: HOME | End: 2021-02-09
Payer: MEDICARE

## 2021-01-01 ENCOUNTER — APPOINTMENT (OUTPATIENT)
Dept: CT IMAGING | Age: 86
DRG: 177 | End: 2021-01-01
Attending: INTERNAL MEDICINE
Payer: MEDICARE

## 2021-01-01 ENCOUNTER — APPOINTMENT (OUTPATIENT)
Dept: GENERAL RADIOLOGY | Age: 86
DRG: 177 | End: 2021-01-01
Attending: EMERGENCY MEDICINE
Payer: MEDICARE

## 2021-01-01 ENCOUNTER — HOSPITAL ENCOUNTER (INPATIENT)
Age: 86
LOS: 6 days | Discharge: HOSPICE | DRG: 177 | End: 2021-04-05
Attending: EMERGENCY MEDICINE | Admitting: FAMILY MEDICINE
Payer: MEDICARE

## 2021-01-01 ENCOUNTER — HOSPITAL ENCOUNTER (OUTPATIENT)
Dept: INFUSION THERAPY | Age: 86
Discharge: HOME | End: 2021-03-09
Payer: MEDICARE

## 2021-01-01 ENCOUNTER — APPOINTMENT (OUTPATIENT)
Dept: ULTRASOUND IMAGING | Age: 86
DRG: 177 | End: 2021-01-01
Attending: EMERGENCY MEDICINE
Payer: MEDICARE

## 2021-01-01 ENCOUNTER — HOSPITAL ENCOUNTER (OUTPATIENT)
Dept: INFUSION THERAPY | Age: 86
Discharge: HOME | End: 2021-01-05
Payer: MEDICARE

## 2021-01-01 ENCOUNTER — HOSPICE ADMISSION (OUTPATIENT)
Dept: HOSPICE | Facility: HOSPICE | Age: 86
End: 2021-01-01
Payer: MEDICARE

## 2021-01-01 ENCOUNTER — HOSPITAL ENCOUNTER (OUTPATIENT)
Dept: INFUSION THERAPY | Age: 86
End: 2021-01-01

## 2021-01-01 VITALS
DIASTOLIC BLOOD PRESSURE: 54 MMHG | OXYGEN SATURATION: 91 % | RESPIRATION RATE: 26 BRPM | HEART RATE: 46 BPM | SYSTOLIC BLOOD PRESSURE: 92 MMHG | HEART RATE: 58 BPM | OXYGEN SATURATION: 94 % | RESPIRATION RATE: 18 BRPM | DIASTOLIC BLOOD PRESSURE: 54 MMHG | SYSTOLIC BLOOD PRESSURE: 98 MMHG

## 2021-01-01 VITALS
OXYGEN SATURATION: 82 % | RESPIRATION RATE: 24 BRPM | SYSTOLIC BLOOD PRESSURE: 78 MMHG | TEMPERATURE: 97.3 F | HEART RATE: 58 BPM | DIASTOLIC BLOOD PRESSURE: 42 MMHG

## 2021-01-01 VITALS
DIASTOLIC BLOOD PRESSURE: 64 MMHG | SYSTOLIC BLOOD PRESSURE: 152 MMHG | OXYGEN SATURATION: 96 % | RESPIRATION RATE: 16 BRPM | HEART RATE: 57 BPM | TEMPERATURE: 97.1 F

## 2021-01-01 VITALS
TEMPERATURE: 97.5 F | DIASTOLIC BLOOD PRESSURE: 66 MMHG | RESPIRATION RATE: 34 BRPM | OXYGEN SATURATION: 94 % | HEART RATE: 84 BPM | HEIGHT: 62 IN | SYSTOLIC BLOOD PRESSURE: 161 MMHG | BODY MASS INDEX: 20.8 KG/M2 | WEIGHT: 113 LBS

## 2021-01-01 VITALS
DIASTOLIC BLOOD PRESSURE: 67 MMHG | SYSTOLIC BLOOD PRESSURE: 181 MMHG | TEMPERATURE: 97.6 F | OXYGEN SATURATION: 96 % | HEART RATE: 55 BPM

## 2021-01-01 VITALS
OXYGEN SATURATION: 100 % | HEART RATE: 59 BPM | SYSTOLIC BLOOD PRESSURE: 153 MMHG | DIASTOLIC BLOOD PRESSURE: 61 MMHG | RESPIRATION RATE: 18 BRPM | TEMPERATURE: 97.9 F

## 2021-01-01 VITALS — HEART RATE: 46 BPM | RESPIRATION RATE: 29 BRPM | DIASTOLIC BLOOD PRESSURE: 38 MMHG | SYSTOLIC BLOOD PRESSURE: 62 MMHG

## 2021-01-01 DIAGNOSIS — N17.9 AKI (ACUTE KIDNEY INJURY) (HCC): ICD-10-CM

## 2021-01-01 DIAGNOSIS — D63.8 ANEMIA OF CHRONIC DISEASE: Primary | ICD-10-CM

## 2021-01-01 DIAGNOSIS — E86.0 DEHYDRATION: ICD-10-CM

## 2021-01-01 DIAGNOSIS — Z51.5 HOSPICE CARE: Primary | ICD-10-CM

## 2021-01-01 DIAGNOSIS — U07.1 COVID-19: ICD-10-CM

## 2021-01-01 LAB
ALBUMIN SERPL-MCNC: 3.4 G/DL (ref 3.4–5)
ALBUMIN SERPL-MCNC: 3.5 G/DL (ref 3.4–5)
AMORPH CRY URNS QL MICRO: ABNORMAL
ANION GAP SERPL CALC-SCNC: 10 MMOL/L (ref 3–18)
ANION GAP SERPL CALC-SCNC: 10 MMOL/L (ref 3–18)
ANION GAP SERPL CALC-SCNC: 11 MMOL/L (ref 3–18)
ANION GAP SERPL CALC-SCNC: 14 MMOL/L (ref 3–18)
ANION GAP SERPL CALC-SCNC: 15 MMOL/L (ref 3–18)
ANION GAP SERPL CALC-SCNC: 7 MMOL/L (ref 3–18)
ANION GAP SERPL CALC-SCNC: 8 MMOL/L (ref 3–18)
APPEARANCE UR: ABNORMAL
ATRIAL RATE: 57 BPM
BACTERIA URNS QL MICRO: NEGATIVE /HPF
BASO+EOS+MONOS # BLD AUTO: 0.6 K/UL (ref 0–2.3)
BASO+EOS+MONOS # BLD AUTO: 0.8 K/UL (ref 0–2.3)
BASO+EOS+MONOS # BLD AUTO: 0.8 K/UL (ref 0–2.3)
BASO+EOS+MONOS NFR BLD AUTO: 10 % (ref 0.1–17)
BASO+EOS+MONOS NFR BLD AUTO: 10 % (ref 0.1–17)
BASO+EOS+MONOS NFR BLD AUTO: 9 % (ref 0.1–17)
BASOPHILS # BLD: 0 K/UL (ref 0–0.06)
BASOPHILS # BLD: 0 K/UL (ref 0–0.06)
BASOPHILS # BLD: 0 K/UL (ref 0–0.1)
BASOPHILS # BLD: 0 K/UL (ref 0–0.1)
BASOPHILS NFR BLD: 0 % (ref 0–2)
BASOPHILS NFR BLD: 0 % (ref 0–2)
BASOPHILS NFR BLD: 0 % (ref 0–3)
BASOPHILS NFR BLD: 0 % (ref 0–3)
BILIRUB UR QL: NEGATIVE
BNP SERPL-MCNC: 3695 PG/ML (ref 0–1800)
BUN SERPL-MCNC: 157 MG/DL (ref 7–18)
BUN SERPL-MCNC: 160 MG/DL (ref 7–18)
BUN SERPL-MCNC: 171 MG/DL (ref 7–18)
BUN SERPL-MCNC: 65 MG/DL (ref 7–18)
BUN SERPL-MCNC: 68 MG/DL (ref 7–18)
BUN/CREAT SERPL: 30 (ref 12–20)
BUN/CREAT SERPL: 33 (ref 12–20)
BUN/CREAT SERPL: 33 (ref 12–20)
BUN/CREAT SERPL: 35 (ref 12–20)
BUN/CREAT SERPL: 37 (ref 12–20)
BUN/CREAT SERPL: 37 (ref 12–20)
BUN/CREAT SERPL: 38 (ref 12–20)
CALCIUM SERPL-MCNC: 10.1 MG/DL (ref 8.5–10.1)
CALCIUM SERPL-MCNC: 10.2 MG/DL (ref 8.5–10.1)
CALCIUM SERPL-MCNC: 8.5 MG/DL (ref 8.5–10.1)
CALCIUM SERPL-MCNC: 8.7 MG/DL (ref 8.5–10.1)
CALCIUM SERPL-MCNC: 8.8 MG/DL (ref 8.5–10.1)
CALCIUM SERPL-MCNC: 8.8 MG/DL (ref 8.5–10.1)
CALCIUM SERPL-MCNC: 9.7 MG/DL (ref 8.5–10.1)
CALCULATED P AXIS, ECG09: 63 DEGREES
CALCULATED R AXIS, ECG10: -30 DEGREES
CALCULATED T AXIS, ECG11: -31 DEGREES
CHLORIDE SERPL-SCNC: 103 MMOL/L (ref 100–111)
CHLORIDE SERPL-SCNC: 105 MMOL/L (ref 100–111)
CHLORIDE SERPL-SCNC: 113 MMOL/L (ref 100–111)
CHLORIDE SERPL-SCNC: 118 MMOL/L (ref 100–111)
CHLORIDE SERPL-SCNC: 120 MMOL/L (ref 100–111)
CHLORIDE SERPL-SCNC: 123 MMOL/L (ref 100–111)
CHLORIDE SERPL-SCNC: 127 MMOL/L (ref 100–111)
CK MB CFR SERPL CALC: 1.9 % (ref 0–4)
CK MB CFR SERPL CALC: 2 % (ref 0–4)
CK MB SERPL-MCNC: 1.3 NG/ML (ref 5–25)
CK MB SERPL-MCNC: 1.7 NG/ML (ref 5–25)
CK SERPL-CCNC: 66 U/L (ref 26–192)
CK SERPL-CCNC: 89 U/L (ref 26–192)
CO2 SERPL-SCNC: 14 MMOL/L (ref 21–32)
CO2 SERPL-SCNC: 18 MMOL/L (ref 21–32)
CO2 SERPL-SCNC: 19 MMOL/L (ref 21–32)
CO2 SERPL-SCNC: 21 MMOL/L (ref 21–32)
CO2 SERPL-SCNC: 25 MMOL/L (ref 21–32)
CO2 SERPL-SCNC: 29 MMOL/L (ref 21–32)
CO2 SERPL-SCNC: 29 MMOL/L (ref 21–32)
COLOR UR: YELLOW
COVID-19 RAPID TEST, COVR: DETECTED
CREAT SERPL-MCNC: 2.09 MG/DL (ref 0.6–1.3)
CREAT SERPL-MCNC: 2.18 MG/DL (ref 0.6–1.3)
CREAT SERPL-MCNC: 4.21 MG/DL (ref 0.6–1.3)
CREAT SERPL-MCNC: 4.28 MG/DL (ref 0.6–1.3)
CREAT SERPL-MCNC: 4.31 MG/DL (ref 0.6–1.3)
CREAT SERPL-MCNC: 4.54 MG/DL (ref 0.6–1.3)
CREAT SERPL-MCNC: 5.23 MG/DL (ref 0.6–1.3)
CREAT UR-MCNC: 40 MG/DL (ref 30–125)
CRP SERPL-MCNC: 1.3 MG/DL (ref 0–0.3)
CRP SERPL-MCNC: 2.6 MG/DL (ref 0–0.3)
CRP SERPL-MCNC: 5.1 MG/DL (ref 0–0.3)
D DIMER PPP FEU-MCNC: 2.58 UG/ML(FEU)
D DIMER PPP FEU-MCNC: 2.64 UG/ML(FEU)
D DIMER PPP FEU-MCNC: 2.74 UG/ML(FEU)
DIAGNOSIS, 93000: NORMAL
DIFFERENTIAL METHOD BLD: ABNORMAL
EOSINOPHIL # BLD: 0 K/UL (ref 0–0.4)
EOSINOPHIL NFR BLD: 0 % (ref 0–5)
EPITH CASTS URNS QL MICRO: NEGATIVE /LPF (ref 0–5)
ERYTHROCYTE [DISTWIDTH] IN BLOOD BY AUTOMATED COUNT: 11.9 % (ref 11.5–14.5)
ERYTHROCYTE [DISTWIDTH] IN BLOOD BY AUTOMATED COUNT: 12 % (ref 11.5–14.5)
ERYTHROCYTE [DISTWIDTH] IN BLOOD BY AUTOMATED COUNT: 12 % (ref 11.5–14.5)
ERYTHROCYTE [DISTWIDTH] IN BLOOD BY AUTOMATED COUNT: 13.3 % (ref 11.6–14.5)
ERYTHROCYTE [DISTWIDTH] IN BLOOD BY AUTOMATED COUNT: 13.5 % (ref 11.6–14.5)
FERRITIN SERPL-MCNC: 1036 NG/ML (ref 8–388)
FERRITIN SERPL-MCNC: 1117 NG/ML (ref 8–388)
FERRITIN SERPL-MCNC: 2296 NG/ML (ref 8–388)
FERRITIN SERPL-MCNC: 2401 NG/ML (ref 8–388)
FERRITIN SERPL-MCNC: 2471 NG/ML (ref 8–388)
GLUCOSE SERPL-MCNC: 106 MG/DL (ref 74–99)
GLUCOSE SERPL-MCNC: 113 MG/DL (ref 74–99)
GLUCOSE SERPL-MCNC: 135 MG/DL (ref 74–99)
GLUCOSE SERPL-MCNC: 138 MG/DL (ref 74–99)
GLUCOSE SERPL-MCNC: 138 MG/DL (ref 74–99)
GLUCOSE SERPL-MCNC: 143 MG/DL (ref 74–99)
GLUCOSE SERPL-MCNC: 159 MG/DL (ref 74–99)
GLUCOSE UR STRIP.AUTO-MCNC: NEGATIVE MG/DL
HCT VFR BLD AUTO: 30.7 % (ref 35–45)
HCT VFR BLD AUTO: 31.1 % (ref 35–45)
HCT VFR BLD AUTO: 32.2 % (ref 36–48)
HCT VFR BLD AUTO: 33.9 % (ref 36–48)
HCT VFR BLD AUTO: 34.9 % (ref 36–48)
HCT VFR BLD AUTO: 36 % (ref 35–45)
HCT VFR BLD AUTO: 36.5 % (ref 35–45)
HGB BLD-MCNC: 10.3 G/DL (ref 12–16)
HGB BLD-MCNC: 10.8 G/DL (ref 12–16)
HGB BLD-MCNC: 11 G/DL (ref 12–16)
HGB BLD-MCNC: 11.1 G/DL (ref 12–16)
HGB BLD-MCNC: 11.6 G/DL (ref 12–16)
HGB BLD-MCNC: 9.7 G/DL (ref 12–16)
HGB BLD-MCNC: 9.8 G/DL (ref 12–16)
HGB UR QL STRIP: ABNORMAL
IRON SATN MFR SERPL: 21 % (ref 20–50)
IRON SATN MFR SERPL: 27 % (ref 20–50)
IRON SERPL-MCNC: 61 UG/DL (ref 50–175)
IRON SERPL-MCNC: 63 UG/DL (ref 50–175)
KETONES UR QL STRIP.AUTO: NEGATIVE MG/DL
LEUKOCYTE ESTERASE UR QL STRIP.AUTO: NEGATIVE
LYMPHOCYTES # BLD: 0.4 K/UL (ref 0.8–3.5)
LYMPHOCYTES # BLD: 0.7 K/UL (ref 0.8–3.5)
LYMPHOCYTES # BLD: 1.1 K/UL (ref 0.9–3.6)
LYMPHOCYTES # BLD: 1.4 K/UL (ref 0.9–3.6)
LYMPHOCYTES # BLD: 1.8 K/UL (ref 1.1–5.9)
LYMPHOCYTES # BLD: 2 K/UL (ref 1.1–5.9)
LYMPHOCYTES # BLD: 2.5 K/UL (ref 1.1–5.9)
LYMPHOCYTES NFR BLD: 25 % (ref 14–44)
LYMPHOCYTES NFR BLD: 26 % (ref 21–52)
LYMPHOCYTES NFR BLD: 29 % (ref 21–52)
LYMPHOCYTES NFR BLD: 30 % (ref 14–44)
LYMPHOCYTES NFR BLD: 33 % (ref 14–44)
LYMPHOCYTES NFR BLD: 4 % (ref 20–51)
LYMPHOCYTES NFR BLD: 8 % (ref 20–51)
MAGNESIUM SERPL-MCNC: 2.8 MG/DL (ref 1.6–2.6)
MAGNESIUM SERPL-MCNC: 3.2 MG/DL (ref 1.6–2.6)
MCH RBC QN AUTO: 27.5 PG (ref 24–34)
MCH RBC QN AUTO: 27.7 PG (ref 24–34)
MCH RBC QN AUTO: 28 PG (ref 24–34)
MCH RBC QN AUTO: 28 PG (ref 24–34)
MCH RBC QN AUTO: 28.3 PG (ref 25–35)
MCH RBC QN AUTO: 28.5 PG (ref 25–35)
MCH RBC QN AUTO: 28.5 PG (ref 25–35)
MCHC RBC AUTO-ENTMCNC: 30.8 G/DL (ref 31–37)
MCHC RBC AUTO-ENTMCNC: 31.5 G/DL (ref 31–37)
MCHC RBC AUTO-ENTMCNC: 31.5 G/DL (ref 31–37)
MCHC RBC AUTO-ENTMCNC: 31.6 G/DL (ref 31–37)
MCHC RBC AUTO-ENTMCNC: 31.8 G/DL (ref 31–37)
MCHC RBC AUTO-ENTMCNC: 31.9 G/DL (ref 31–37)
MCHC RBC AUTO-ENTMCNC: 32 G/DL (ref 31–37)
MCV RBC AUTO: 87.4 FL (ref 74–97)
MCV RBC AUTO: 87.7 FL (ref 74–97)
MCV RBC AUTO: 88 FL (ref 74–97)
MCV RBC AUTO: 89.2 FL (ref 78–102)
MCV RBC AUTO: 89.4 FL (ref 78–102)
MCV RBC AUTO: 89.7 FL (ref 78–102)
MCV RBC AUTO: 90.7 FL (ref 74–97)
METAMYELOCYTES NFR BLD MANUAL: 1 %
MONOCYTES # BLD: 0.3 K/UL (ref 0–1)
MONOCYTES # BLD: 0.4 K/UL (ref 0–1)
MONOCYTES # BLD: 0.5 K/UL (ref 0.05–1.2)
MONOCYTES # BLD: 0.6 K/UL (ref 0.05–1.2)
MONOCYTES NFR BLD: 11 % (ref 3–10)
MONOCYTES NFR BLD: 13 % (ref 3–10)
MONOCYTES NFR BLD: 3 % (ref 2–9)
MONOCYTES NFR BLD: 5 % (ref 2–9)
NEUTS BAND NFR BLD MANUAL: 3 % (ref 0–5)
NEUTS SEG # BLD: 2.1 K/UL (ref 1.8–8)
NEUTS SEG # BLD: 3.4 K/UL (ref 1.8–8)
NEUTS SEG # BLD: 3.4 K/UL (ref 1.8–9.5)
NEUTS SEG # BLD: 4.8 K/UL (ref 1.8–9.5)
NEUTS SEG # BLD: 4.9 K/UL (ref 1.8–9.5)
NEUTS SEG # BLD: 7.3 K/UL (ref 1.8–8)
NEUTS SEG # BLD: 9.3 K/UL (ref 1.8–8)
NEUTS SEG NFR BLD: 57 % (ref 40–70)
NEUTS SEG NFR BLD: 58 % (ref 40–73)
NEUTS SEG NFR BLD: 61 % (ref 40–70)
NEUTS SEG NFR BLD: 63 % (ref 40–73)
NEUTS SEG NFR BLD: 65 % (ref 40–70)
NEUTS SEG NFR BLD: 87 % (ref 42–75)
NEUTS SEG NFR BLD: 89 % (ref 42–75)
NITRITE UR QL STRIP.AUTO: NEGATIVE
P-R INTERVAL, ECG05: 220 MS
PH UR STRIP: 5 [PH] (ref 5–8)
PHOSPHATE SERPL-MCNC: 3.2 MG/DL (ref 2.5–4.9)
PHOSPHATE SERPL-MCNC: 3.4 MG/DL (ref 2.5–4.9)
PLATELET # BLD AUTO: 179 K/UL (ref 140–440)
PLATELET # BLD AUTO: 185 K/UL (ref 140–440)
PLATELET # BLD AUTO: 193 K/UL (ref 140–440)
PLATELET # BLD AUTO: 238 K/UL (ref 135–420)
PLATELET # BLD AUTO: 268 K/UL (ref 135–420)
PLATELET # BLD AUTO: 281 K/UL (ref 135–420)
PLATELET # BLD AUTO: 297 K/UL (ref 135–420)
PLATELET COMMENTS,PCOM: ABNORMAL
PLATELET COMMENTS,PCOM: ABNORMAL
PMV BLD AUTO: 10.4 FL (ref 9.2–11.8)
PMV BLD AUTO: 10.6 FL (ref 9.2–11.8)
PMV BLD AUTO: 10.8 FL (ref 9.2–11.8)
PMV BLD AUTO: 11 FL (ref 9.2–11.8)
POTASSIUM SERPL-SCNC: 4.2 MMOL/L (ref 3.5–5.5)
POTASSIUM SERPL-SCNC: 4.3 MMOL/L (ref 3.5–5.5)
POTASSIUM SERPL-SCNC: 4.4 MMOL/L (ref 3.5–5.5)
POTASSIUM SERPL-SCNC: 4.4 MMOL/L (ref 3.5–5.5)
POTASSIUM SERPL-SCNC: 4.6 MMOL/L (ref 3.5–5.5)
POTASSIUM SERPL-SCNC: 4.8 MMOL/L (ref 3.5–5.5)
POTASSIUM SERPL-SCNC: 5 MMOL/L (ref 3.5–5.5)
PROT UR STRIP-MCNC: >1000 MG/DL
PROT UR-MCNC: 452 MG/DL
PROT/CREAT UR-RTO: 11.3
Q-T INTERVAL, ECG07: 484 MS
QRS DURATION, ECG06: 78 MS
QTC CALCULATION (BEZET), ECG08: 471 MS
RBC # BLD AUTO: 3.5 M/UL (ref 4.2–5.3)
RBC # BLD AUTO: 3.56 M/UL (ref 4.2–5.3)
RBC # BLD AUTO: 3.61 M/UL (ref 4.1–5.1)
RBC # BLD AUTO: 3.79 M/UL (ref 4.1–5.1)
RBC # BLD AUTO: 3.89 M/UL (ref 4.1–5.1)
RBC # BLD AUTO: 3.97 M/UL (ref 4.2–5.3)
RBC # BLD AUTO: 4.15 M/UL (ref 4.2–5.3)
RBC #/AREA URNS HPF: ABNORMAL /HPF (ref 0–5)
RBC MORPH BLD: ABNORMAL
RBC MORPH BLD: ABNORMAL
SODIUM SERPL-SCNC: 140 MMOL/L (ref 136–145)
SODIUM SERPL-SCNC: 141 MMOL/L (ref 136–145)
SODIUM SERPL-SCNC: 148 MMOL/L (ref 136–145)
SODIUM SERPL-SCNC: 149 MMOL/L (ref 136–145)
SODIUM SERPL-SCNC: 150 MMOL/L (ref 136–145)
SODIUM SERPL-SCNC: 151 MMOL/L (ref 136–145)
SODIUM SERPL-SCNC: 160 MMOL/L (ref 136–145)
SOURCE, COVRS: ABNORMAL
SP GR UR REFRACTOMETRY: 1.02 (ref 1–1.03)
TIBC SERPL-MCNC: 228 UG/DL (ref 250–450)
TIBC SERPL-MCNC: 296 UG/DL (ref 250–450)
TROPONIN I SERPL-MCNC: 0.09 NG/ML (ref 0–0.04)
TROPONIN I SERPL-MCNC: 0.11 NG/ML (ref 0–0.04)
UROBILINOGEN UR QL STRIP.AUTO: 0.2 EU/DL (ref 0.2–1)
VENTRICULAR RATE, ECG03: 57 BPM
WBC # BLD AUTO: 10.1 K/UL (ref 4.6–13.2)
WBC # BLD AUTO: 3.6 K/UL (ref 4.6–13.2)
WBC # BLD AUTO: 5.4 K/UL (ref 4.6–13.2)
WBC # BLD AUTO: 6 K/UL (ref 4.5–13)
WBC # BLD AUTO: 7.4 K/UL (ref 4.5–13)
WBC # BLD AUTO: 8.2 K/UL (ref 4.5–13)
WBC # BLD AUTO: 8.4 K/UL (ref 4.6–13.2)
WBC URNS QL MICRO: ABNORMAL /HPF (ref 0–4)

## 2021-01-01 PROCEDURE — 86140 C-REACTIVE PROTEIN: CPT

## 2021-01-01 PROCEDURE — 74011250637 HC RX REV CODE- 250/637: Performed by: FAMILY MEDICINE

## 2021-01-01 PROCEDURE — G0299 HHS/HOSPICE OF RN EA 15 MIN: HCPCS

## 2021-01-01 PROCEDURE — 85025 COMPLETE CBC W/AUTO DIFF WBC: CPT

## 2021-01-01 PROCEDURE — 99231 SBSQ HOSP IP/OBS SF/LOW 25: CPT | Performed by: FAMILY MEDICINE

## 2021-01-01 PROCEDURE — 82728 ASSAY OF FERRITIN: CPT

## 2021-01-01 PROCEDURE — 36415 COLL VENOUS BLD VENIPUNCTURE: CPT

## 2021-01-01 PROCEDURE — 71250 CT THORAX DX C-: CPT

## 2021-01-01 PROCEDURE — 74011250637 HC RX REV CODE- 250/637: Performed by: INTERNAL MEDICINE

## 2021-01-01 PROCEDURE — 92526 ORAL FUNCTION THERAPY: CPT

## 2021-01-01 PROCEDURE — G0156 HHCP-SVS OF AIDE,EA 15 MIN: HCPCS

## 2021-01-01 PROCEDURE — 92610 EVALUATE SWALLOWING FUNCTION: CPT

## 2021-01-01 PROCEDURE — 0651 HSPC ROUTINE HOME CARE

## 2021-01-01 PROCEDURE — 85379 FIBRIN DEGRADATION QUANT: CPT

## 2021-01-01 PROCEDURE — 71045 X-RAY EXAM CHEST 1 VIEW: CPT

## 2021-01-01 PROCEDURE — G0155 HHCP-SVS OF CSW,EA 15 MIN: HCPCS

## 2021-01-01 PROCEDURE — 74011000250 HC RX REV CODE- 250: Performed by: FAMILY MEDICINE

## 2021-01-01 PROCEDURE — 74011000250 HC RX REV CODE- 250: Performed by: INTERNAL MEDICINE

## 2021-01-01 PROCEDURE — 74011250636 HC RX REV CODE- 250/636: Performed by: FAMILY MEDICINE

## 2021-01-01 PROCEDURE — 77010033678 HC OXYGEN DAILY

## 2021-01-01 PROCEDURE — 99356 PR PROLONGED SVC I/P OR OBS SETTING 1ST HOUR: CPT | Performed by: NURSE PRACTITIONER

## 2021-01-01 PROCEDURE — 99239 HOSP IP/OBS DSCHRG MGMT >30: CPT | Performed by: HOSPITALIST

## 2021-01-01 PROCEDURE — 76770 US EXAM ABDO BACK WALL COMP: CPT

## 2021-01-01 PROCEDURE — 83540 ASSAY OF IRON: CPT

## 2021-01-01 PROCEDURE — 65660000000 HC RM CCU STEPDOWN

## 2021-01-01 PROCEDURE — 77030040392 HC DRSG OPTIFOAM MDII -A

## 2021-01-01 PROCEDURE — 80048 BASIC METABOLIC PNL TOTAL CA: CPT

## 2021-01-01 PROCEDURE — 3331090004 HSPC SERVICE INTENSITY ADD-ON

## 2021-01-01 PROCEDURE — 80069 RENAL FUNCTION PANEL: CPT

## 2021-01-01 PROCEDURE — 93005 ELECTROCARDIOGRAM TRACING: CPT

## 2021-01-01 PROCEDURE — 3336500001 HSPC ELECTION

## 2021-01-01 PROCEDURE — 77030037878 HC DRSG MEPILEX >48IN BORD MOLN -B

## 2021-01-01 PROCEDURE — 83735 ASSAY OF MAGNESIUM: CPT

## 2021-01-01 PROCEDURE — 2709999900 HC NON-CHARGEABLE SUPPLY

## 2021-01-01 PROCEDURE — 76450000000

## 2021-01-01 PROCEDURE — 99223 1ST HOSP IP/OBS HIGH 75: CPT | Performed by: FAMILY MEDICINE

## 2021-01-01 PROCEDURE — 84156 ASSAY OF PROTEIN URINE: CPT

## 2021-01-01 PROCEDURE — 74011250636 HC RX REV CODE- 250/636: Performed by: INTERNAL MEDICINE

## 2021-01-01 PROCEDURE — 99232 SBSQ HOSP IP/OBS MODERATE 35: CPT | Performed by: FAMILY MEDICINE

## 2021-01-01 PROCEDURE — 74011250636 HC RX REV CODE- 250/636: Performed by: EMERGENCY MEDICINE

## 2021-01-01 PROCEDURE — 99223 1ST HOSP IP/OBS HIGH 75: CPT | Performed by: NURSE PRACTITIONER

## 2021-01-01 PROCEDURE — 96360 HYDRATION IV INFUSION INIT: CPT

## 2021-01-01 PROCEDURE — 83880 ASSAY OF NATRIURETIC PEPTIDE: CPT

## 2021-01-01 PROCEDURE — 94762 N-INVAS EAR/PLS OXIMTRY CONT: CPT

## 2021-01-01 PROCEDURE — HOSPICE MEDICATION HC HH HOSPICE MEDICATION

## 2021-01-01 PROCEDURE — 81001 URINALYSIS AUTO W/SCOPE: CPT

## 2021-01-01 PROCEDURE — 87635 SARS-COV-2 COVID-19 AMP PRB: CPT

## 2021-01-01 PROCEDURE — 82553 CREATINE MB FRACTION: CPT

## 2021-01-01 PROCEDURE — 96374 THER/PROPH/DIAG INJ IV PUSH: CPT

## 2021-01-01 PROCEDURE — 74011000258 HC RX REV CODE- 258: Performed by: FAMILY MEDICINE

## 2021-01-01 PROCEDURE — 99285 EMERGENCY DEPT VISIT HI MDM: CPT

## 2021-01-01 PROCEDURE — 96361 HYDRATE IV INFUSION ADD-ON: CPT

## 2021-01-01 PROCEDURE — 99233 SBSQ HOSP IP/OBS HIGH 50: CPT | Performed by: NURSE PRACTITIONER

## 2021-01-01 RX ORDER — SODIUM CHLORIDE 450 MG/100ML
500 INJECTION, SOLUTION INTRAVENOUS ONCE
Status: COMPLETED | OUTPATIENT
Start: 2021-01-01 | End: 2021-01-01

## 2021-01-01 RX ORDER — AMLODIPINE BESYLATE 10 MG/1
10 TABLET ORAL DAILY
Status: DISCONTINUED | OUTPATIENT
Start: 2021-01-01 | End: 2021-01-01

## 2021-01-01 RX ORDER — ASCORBIC ACID 250 MG
500 TABLET ORAL 2 TIMES DAILY
Status: DISCONTINUED | OUTPATIENT
Start: 2021-01-01 | End: 2021-01-01

## 2021-01-01 RX ORDER — HEPARIN 100 UNIT/ML
300-500 SYRINGE INTRAVENOUS AS NEEDED
Status: CANCELLED
Start: 2021-05-12

## 2021-01-01 RX ORDER — LORAZEPAM 2 MG/ML
0.5 CONCENTRATE ORAL
Status: DISCONTINUED | OUTPATIENT
Start: 2021-01-01 | End: 2021-01-01 | Stop reason: HOSPADM

## 2021-01-01 RX ORDER — ACETAMINOPHEN 650 MG/1
650 SUPPOSITORY RECTAL
Status: DISCONTINUED | OUTPATIENT
Start: 2021-01-01 | End: 2021-01-01 | Stop reason: HOSPADM

## 2021-01-01 RX ORDER — PROMETHAZINE HYDROCHLORIDE 25 MG/1
12.5 TABLET ORAL
Status: DISCONTINUED | OUTPATIENT
Start: 2021-01-01 | End: 2021-01-01 | Stop reason: HOSPADM

## 2021-01-01 RX ORDER — HYOSCYAMINE SULFATE 0.12 MG/1
0.12 TABLET SUBLINGUAL
Status: DISCONTINUED | OUTPATIENT
Start: 2021-01-01 | End: 2021-01-01 | Stop reason: HOSPADM

## 2021-01-01 RX ORDER — CHOLECALCIFEROL (VITAMIN D3) 125 MCG
5000 CAPSULE ORAL DAILY
Status: DISCONTINUED | OUTPATIENT
Start: 2021-01-01 | End: 2021-01-01

## 2021-01-01 RX ORDER — SODIUM CHLORIDE 0.9 % (FLUSH) 0.9 %
10 SYRINGE (ML) INJECTION AS NEEDED
Status: CANCELLED | OUTPATIENT
Start: 2021-05-12

## 2021-01-01 RX ORDER — HEPARIN SODIUM 5000 [USP'U]/ML
5000 INJECTION, SOLUTION INTRAVENOUS; SUBCUTANEOUS EVERY 8 HOURS
Status: DISCONTINUED | OUTPATIENT
Start: 2021-01-01 | End: 2021-01-01

## 2021-01-01 RX ORDER — SODIUM CHLORIDE 0.9 % (FLUSH) 0.9 %
5-40 SYRINGE (ML) INJECTION AS NEEDED
Status: DISCONTINUED | OUTPATIENT
Start: 2021-01-01 | End: 2021-01-01 | Stop reason: HOSPADM

## 2021-01-01 RX ORDER — LANOLIN ALCOHOL/MO/W.PET/CERES
3 CREAM (GRAM) TOPICAL
Status: DISCONTINUED | OUTPATIENT
Start: 2021-01-01 | End: 2021-01-01

## 2021-01-01 RX ORDER — DEXAMETHASONE SODIUM PHOSPHATE 4 MG/ML
6 INJECTION, SOLUTION INTRA-ARTICULAR; INTRALESIONAL; INTRAMUSCULAR; INTRAVENOUS; SOFT TISSUE
Status: COMPLETED | OUTPATIENT
Start: 2021-01-01 | End: 2021-01-01

## 2021-01-01 RX ORDER — ZINC SULFATE 50(220)MG
1 CAPSULE ORAL DAILY
Status: DISCONTINUED | OUTPATIENT
Start: 2021-01-01 | End: 2021-01-01

## 2021-01-01 RX ORDER — LORAZEPAM 2 MG/ML
0.5 CONCENTRATE ORAL
Qty: 30 ML | Refills: 0 | Status: SHIPPED | OUTPATIENT
Start: 2021-01-01 | End: 2021-01-01 | Stop reason: SDUPTHER

## 2021-01-01 RX ORDER — FACIAL-BODY WIPES
10 EACH TOPICAL DAILY PRN
Status: DISCONTINUED | OUTPATIENT
Start: 2021-01-01 | End: 2021-01-01 | Stop reason: HOSPADM

## 2021-01-01 RX ORDER — HYOSCYAMINE SULFATE 0.12 MG/1
0.12 TABLET SUBLINGUAL
Qty: 15 TAB | Refills: 0 | Status: SHIPPED | OUTPATIENT
Start: 2021-01-01 | End: 2021-01-01 | Stop reason: SDUPTHER

## 2021-01-01 RX ORDER — HYDRALAZINE HYDROCHLORIDE 20 MG/ML
10 INJECTION INTRAMUSCULAR; INTRAVENOUS
Status: DISCONTINUED | OUTPATIENT
Start: 2021-01-01 | End: 2021-01-01

## 2021-01-01 RX ORDER — POLYETHYLENE GLYCOL 3350 17 G/17G
17 POWDER, FOR SOLUTION ORAL DAILY PRN
Status: DISCONTINUED | OUTPATIENT
Start: 2021-01-01 | End: 2021-01-01 | Stop reason: HOSPADM

## 2021-01-01 RX ORDER — DEXAMETHASONE SODIUM PHOSPHATE 4 MG/ML
6 INJECTION, SOLUTION INTRA-ARTICULAR; INTRALESIONAL; INTRAMUSCULAR; INTRAVENOUS; SOFT TISSUE EVERY 24 HOURS
Status: DISCONTINUED | OUTPATIENT
Start: 2021-01-01 | End: 2021-01-01

## 2021-01-01 RX ORDER — SODIUM CHLORIDE 0.9 % (FLUSH) 0.9 %
5-40 SYRINGE (ML) INJECTION EVERY 8 HOURS
Status: DISCONTINUED | OUTPATIENT
Start: 2021-01-01 | End: 2021-01-01 | Stop reason: HOSPADM

## 2021-01-01 RX ORDER — SODIUM CHLORIDE 9 MG/ML
10 INJECTION INTRAMUSCULAR; INTRAVENOUS; SUBCUTANEOUS AS NEEDED
Status: CANCELLED | OUTPATIENT
Start: 2021-05-12

## 2021-01-01 RX ORDER — ACETAMINOPHEN 325 MG/1
650 TABLET ORAL
Status: DISCONTINUED | OUTPATIENT
Start: 2021-01-01 | End: 2021-01-01 | Stop reason: HOSPADM

## 2021-01-01 RX ORDER — ONDANSETRON 2 MG/ML
4 INJECTION INTRAMUSCULAR; INTRAVENOUS
Status: DISCONTINUED | OUTPATIENT
Start: 2021-01-01 | End: 2021-01-01 | Stop reason: HOSPADM

## 2021-01-01 RX ORDER — SODIUM CHLORIDE 450 MG/100ML
50 INJECTION, SOLUTION INTRAVENOUS CONTINUOUS
Status: DISCONTINUED | OUTPATIENT
Start: 2021-01-01 | End: 2021-01-01

## 2021-01-01 RX ADMIN — WATER 1 G: 1 INJECTION INTRAMUSCULAR; INTRAVENOUS; SUBCUTANEOUS at 04:21

## 2021-01-01 RX ADMIN — Medication 500 MG: at 09:22

## 2021-01-01 RX ADMIN — Medication 3 MG: at 02:00

## 2021-01-01 RX ADMIN — THIAMINE HYDROCHLORIDE 200 MG: 100 INJECTION, SOLUTION INTRAMUSCULAR; INTRAVENOUS at 14:31

## 2021-01-01 RX ADMIN — Medication 10 ML: at 21:56

## 2021-01-01 RX ADMIN — HEPARIN SODIUM 5000 UNITS: 5000 INJECTION INTRAVENOUS; SUBCUTANEOUS at 16:57

## 2021-01-01 RX ADMIN — Medication 10 ML: at 22:14

## 2021-01-01 RX ADMIN — SODIUM CHLORIDE 500 ML: 900 INJECTION, SOLUTION INTRAVENOUS at 15:40

## 2021-01-01 RX ADMIN — Medication 5000 UNITS: at 09:23

## 2021-01-01 RX ADMIN — Medication 10 ML: at 06:09

## 2021-01-01 RX ADMIN — AMLODIPINE BESYLATE 10 MG: 10 TABLET ORAL at 09:22

## 2021-01-01 RX ADMIN — HEPARIN SODIUM 5000 UNITS: 5000 INJECTION INTRAVENOUS; SUBCUTANEOUS at 17:31

## 2021-01-01 RX ADMIN — DEXAMETHASONE SODIUM PHOSPHATE 6 MG: 4 INJECTION, SOLUTION INTRAMUSCULAR; INTRAVENOUS at 16:32

## 2021-01-01 RX ADMIN — HEPARIN SODIUM 5000 UNITS: 5000 INJECTION INTRAVENOUS; SUBCUTANEOUS at 09:56

## 2021-01-01 RX ADMIN — DEXAMETHASONE SODIUM PHOSPHATE 6 MG: 4 INJECTION, SOLUTION INTRAMUSCULAR; INTRAVENOUS at 14:55

## 2021-01-01 RX ADMIN — SODIUM CHLORIDE 250 ML: 900 INJECTION, SOLUTION INTRAVENOUS at 14:01

## 2021-01-01 RX ADMIN — Medication 10 ML: at 05:55

## 2021-01-01 RX ADMIN — SODIUM CHLORIDE 100 ML/HR: 450 INJECTION, SOLUTION INTRAVENOUS at 06:09

## 2021-01-01 RX ADMIN — Medication 10 ML: at 05:49

## 2021-01-01 RX ADMIN — Medication 3 MG: at 22:00

## 2021-01-01 RX ADMIN — Medication 10 ML: at 15:04

## 2021-01-01 RX ADMIN — AZITHROMYCIN DIHYDRATE 500 MG: 500 INJECTION, POWDER, LYOPHILIZED, FOR SOLUTION INTRAVENOUS at 03:02

## 2021-01-01 RX ADMIN — WATER 1 G: 1 INJECTION INTRAMUSCULAR; INTRAVENOUS; SUBCUTANEOUS at 05:46

## 2021-01-01 RX ADMIN — AZITHROMYCIN DIHYDRATE 500 MG: 500 INJECTION, POWDER, LYOPHILIZED, FOR SOLUTION INTRAVENOUS at 03:43

## 2021-01-01 RX ADMIN — HEPARIN SODIUM 5000 UNITS: 5000 INJECTION INTRAVENOUS; SUBCUTANEOUS at 02:00

## 2021-01-01 RX ADMIN — DEXAMETHASONE SODIUM PHOSPHATE 6 MG: 4 INJECTION, SOLUTION INTRAMUSCULAR; INTRAVENOUS at 16:12

## 2021-01-01 RX ADMIN — SODIUM CHLORIDE 250 ML: 900 INJECTION, SOLUTION INTRAVENOUS at 14:56

## 2021-01-01 RX ADMIN — Medication 500 MG: at 18:00

## 2021-01-01 RX ADMIN — Medication 10 ML: at 16:11

## 2021-01-01 RX ADMIN — SODIUM CHLORIDE 125 ML/HR: 450 INJECTION, SOLUTION INTRAVENOUS at 20:27

## 2021-01-01 RX ADMIN — AMLODIPINE BESYLATE 10 MG: 10 TABLET ORAL at 09:54

## 2021-01-01 RX ADMIN — ZINC SULFATE 220 MG (50 MG) CAPSULE 1 CAPSULE: CAPSULE at 09:53

## 2021-01-01 RX ADMIN — HEPARIN SODIUM 5000 UNITS: 5000 INJECTION INTRAVENOUS; SUBCUTANEOUS at 16:12

## 2021-01-01 RX ADMIN — Medication 5000 UNITS: at 09:53

## 2021-01-01 RX ADMIN — Medication 10 ML: at 05:03

## 2021-01-01 RX ADMIN — Medication 10 ML: at 01:00

## 2021-01-01 RX ADMIN — Medication 500 MG: at 18:35

## 2021-01-01 RX ADMIN — SODIUM BICARBONATE: 84 INJECTION, SOLUTION INTRAVENOUS at 14:30

## 2021-01-01 RX ADMIN — DEXAMETHASONE SODIUM PHOSPHATE 6 MG: 4 INJECTION, SOLUTION INTRAMUSCULAR; INTRAVENOUS at 15:38

## 2021-01-01 RX ADMIN — ZINC SULFATE 220 MG (50 MG) CAPSULE 1 CAPSULE: CAPSULE at 09:23

## 2021-01-01 RX ADMIN — WATER 1 G: 1 INJECTION INTRAMUSCULAR; INTRAVENOUS; SUBCUTANEOUS at 05:07

## 2021-01-01 RX ADMIN — SODIUM CHLORIDE 100 ML/HR: 450 INJECTION, SOLUTION INTRAVENOUS at 18:10

## 2021-01-01 RX ADMIN — Medication 20 ML: at 14:00

## 2021-01-01 RX ADMIN — SODIUM CHLORIDE 500 ML: 450 INJECTION, SOLUTION INTRAVENOUS at 12:29

## 2021-01-01 RX ADMIN — Medication 500 MG: at 09:54

## 2021-01-01 RX ADMIN — HEPARIN SODIUM 5000 UNITS: 5000 INJECTION INTRAVENOUS; SUBCUTANEOUS at 01:12

## 2021-01-01 RX ADMIN — HEPARIN SODIUM 5000 UNITS: 5000 INJECTION INTRAVENOUS; SUBCUTANEOUS at 09:55

## 2021-01-01 RX ADMIN — HEPARIN SODIUM 5000 UNITS: 5000 INJECTION INTRAVENOUS; SUBCUTANEOUS at 09:25

## 2021-01-01 RX ADMIN — AZITHROMYCIN DIHYDRATE 500 MG: 500 INJECTION, POWDER, LYOPHILIZED, FOR SOLUTION INTRAVENOUS at 03:25

## 2021-01-05 NOTE — PROGRESS NOTES
[facility] Progress Note Date: 2021 Name: Monika Springer MRN: [id number] : 1922 Peripheral Lab Draw Ms. Jeremiah Foss to [facility], ambulatory at [phone] accompanied by daughter. Pt was assessed and education was provided. Ms. Antony's vitals were reviewed and patient was observed for 5 minutes prior to treatment. Visit Vitals BP (!) 181/67 (BP 1 Location: Left arm, BP Patient Position: Sitting) Pulse (!) 55 Temp 97.6 °F (36.4 °C) SpO2 96% Recent Results (from the past 12 hour(s)) CBC WITH 3 PART DIFF Collection Time: 21  3:15 PM  
Result Value Ref Range WBC 6.0 4.5 - 13.0 K/uL  
 RBC 3.61 (L) 4.10 - 5.10 M/uL  
 HGB 10.3 (L) 12.0 - 16 g/dL HCT 32.2 (L) 36 - 48 % MCV 89.2 78 - 102 FL  
 MCH 28.5 25.0 - 35.0 PG  
 MCHC 32.0 31 - 37 g/dL  
 RDW 11.9 11.5 - 14.5 % PLATELET 229 165 - 413 K/uL NEUTROPHILS 57 40 - 70 % MIXED CELLS 10 0.1 - 17 % LYMPHOCYTES 33 14 - 44 % ABS. NEUTROPHILS 3.4 1.8 - 9.5 K/UL  
 ABS. MIXED CELLS 0.6 0.0 - 2.3 K/uL  
 ABS. LYMPHOCYTES 2.0 1.1 - 5.9 K/UL  
 DF AUTOMATED Blood obtained peripherally from [facility] first attempt with butterfly needle and sent to lab for CBC,w/ Diff,  Iron profile, Ferritin, and Renal Panel per written orders. No bleeding or hematoma noted at site. Gauze and coban applied. Ms. Jeremiah Foss tolerated the venipuncture, and had no complaints. Patient armband removed and shredded. Ms. Jeremiah Foss was discharged from [place] [address] in stable condition at 1520. [facility] Phlebotomist PCT 2021 
3:52 PM

## 2021-02-09 NOTE — PROGRESS NOTES
[facility] Progress Note Date: 2021 Name: Violette Crawford MRN: [id number] : 1922 Ms. Kaden Leyva arrived in the [facility] today at [phone], in stable condition, here for Retacrit & Labs (Every 4 Weeks). She was assessed and education was provided. Ms. Antony's vitals were reviewed. Visit Vitals BP (!) 152/64 (BP 1 Location: Left upper arm, BP Patient Position: Sitting) Pulse (!) 57 Temp 97.1 °F (36.2 °C) Resp 16 SpO2 96% Breastfeeding No  
 
 
 
Blood for the ordered CBC, Renal Panel, Ferritin, & Iron Profile (1 lavender top tube, 1 green top tube, & 2 gold top tubes) was drawn from her left AC at 1533, without incident. Lab results were obtained and reviewed, and were as follows: 
 
Recent Results (from the past 12 hour(s)) CBC WITH 3 PART DIFF Collection Time: 21  3:33 PM  
Result Value Ref Range WBC 7.4 4.5 - 13.0 K/uL  
 RBC 3.79 (L) 4.10 - 5.10 M/uL  
 HGB 10.8 (L) 12.0 - 16 g/dL HCT 33.9 (L) 36 - 48 % MCV 89.4 78 - 102 FL  
 MCH 28.5 25.0 - 35.0 PG  
 MCHC 31.9 31 - 37 g/dL  
 RDW 12.0 11.5 - 14.5 % PLATELET 414 602 - 210 K/uL NEUTROPHILS 65 40 - 70 % MIXED CELLS 10 0.1 - 17 % LYMPHOCYTES 25 14 - 44 % ABS. NEUTROPHILS 4.8 1.8 - 9.5 K/UL  
 ABS. MIXED CELLS 0.8 0.0 - 2.3 K/uL  
 ABS. LYMPHOCYTES 1.8 1.1 - 5.9 K/UL  
 DF AUTOMATED Retacrit Injection was HELD today per order, for above stated HGB of 10.8 & HCT 33.9. Ms. Kaden Leyva tolerated well, and had no complaints. Ms. Kaden Leyva was discharged from [place] [address] in stable condition at 1550. She is to return in 4 weeks, on Tuesday, 3-9-21 at 1500, for her next appointment, for  Labs & Retatcrit Injection. Mya Kim RN 2021 
3:32 PM

## 2021-03-09 NOTE — PROGRESS NOTES
[facility] Progress Note Date: 2021 Name: Jesus Rey MRN: [id number] : 1922 Ms. Jenifer Upton arrived in the [facility] today at [phone], in stable condition, here for Retacrit & Labs (Every 4 Weeks). She was assessed and education was provided. Slightly confused. Yells to movement and touch. Daughter advocating for patient since she is unable to answer most of assessment questions. Ms. Antony's vitals were reviewed. Visit Vitals BP (!) 153/61 (BP 1 Location: Left lower arm, BP Patient Position: Sitting) Pulse (!) 59 Temp 97.9 °F (36.6 °C) Resp 18 SpO2 100% Breastfeeding No  
 
 
 
Blood sample obtained  for the ordered CBC,from her left AC x 1 attempt. Lab results were obtained and reviewed, and were as follows: 
 
Recent Results (from the past 12 hour(s)) CBC WITH 3 PART DIFF Collection Time: 21  3:25 PM  
Result Value Ref Range WBC 8.2 4.5 - 13.0 K/uL  
 RBC 3.89 (L) 4.10 - 5.10 M/uL  
 HGB 11.0 (L) 12.0 - 16 g/dL HCT 34.9 (L) 36 - 48 % MCV 89.7 78 - 102 FL  
 MCH 28.3 25.0 - 35.0 PG  
 MCHC 31.5 31 - 37 g/dL  
 RDW 12.0 11.5 - 14.5 % PLATELET 311 271 - 313 K/uL NEUTROPHILS 61 40 - 70 % MIXED CELLS 9 0.1 - 17 % LYMPHOCYTES 30 14 - 44 % ABS. NEUTROPHILS 4.9 1.8 - 9.5 K/UL  
 ABS. MIXED CELLS 0.8 0.0 - 2.3 K/uL  
 ABS. LYMPHOCYTES 2.5 1.1 - 5.9 K/UL  
 DF AUTOMATED Retacrit Injection was HELD today for H/H OF 11.0 AND 34.9 Ms. Jenifer Upton tolerated mere-puncture poorly. Ms. Jenifer Upton was discharged from [place] [address] in stable condition at 1540. She is to return in 4 weeks, on Tuesday, 4-6-21 at 1500, for her next appointment of  Labs & Retatcrit Injection.   
 
Skye Márquez RN 
2021 
3:32 PM

## 2021-03-30 PROBLEM — N17.9 AKI (ACUTE KIDNEY INJURY) (HCC): Status: ACTIVE | Noted: 2021-01-01

## 2021-03-30 PROBLEM — U07.1 COVID-19: Status: ACTIVE | Noted: 2021-01-01

## 2021-03-30 NOTE — ED PROVIDER NOTES
EMERGENCY DEPARTMENT HISTORY AND PHYSICAL EXAM 
 
1:09 PM 
Date: 3/30/2021 Patient Name: Sujit Viveros History of Presenting Illness History Provided By: daughter HPI: Sujit Viveros is a 80 y.o. female with past medical history of dementia, diabetes, anemia, chronic kidney disease stage IV, presents with decreased appetite, decreased voiding after being diagnosed with COVID-19 4 days ago. According to daughter multiple members of the family were diagnosed with Covid. Did not notice any change in her breathing pattern. No difficulty breathing, no vomiting, no diarrhea. PCP: Pasha Birch MD 
 
Past History Past Medical History: 
Past Medical History:  
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  
 left shoulder  GERD (gastroesophageal reflux disease)  Heart murmur  Hyperkalemia 2011  Hypertension  Lumbago  Pain in joint, lower leg  Pain in joint, multiple sites Past Surgical History: 
Past Surgical History:  
Procedure Laterality Date  HX BLADDER REPAIR    
 prolapsed  HX CATARACT REMOVAL  1991  
 HX CATARACT REMOVAL  1990  
 HX CHOLECYSTECTOMY  5/1998  HX HERNIA REPAIR  7/1998  
 x2 [address]  HX OOPHORECTOMY Family History: 
Family History Problem Relation Age of Onset  Diabetes Other  Hypertension Other  Stroke Other  Diabetes Mother Social History: 
Social History Tobacco Use  Smoking status: Never Smoker  Smokeless tobacco: Never Used Substance Use Topics  Alcohol use: No  
 Drug use: No  
 
 
Allergies: Allergies Allergen Reactions  Celebrex [Celecoxib] Unable to Obtain  Codeine Unable to Obtain  Flexeril [Cyclobenzaprine] Unable to Obtain  Macrobid [Nitrofurantoin Monohyd/M-Cryst] Unable to Consolidated Alex  Pcn [Penicillins] Unable to Consolidated Alex  Remeron [Mirtazapine] Unable to Consolidated Alex  Sulfa (Sulfonamide Antibiotics) Other (comments) Swelling and low heart rate  Vicodin [Hydrocodone-Acetaminophen] Unable to Obtain Review of Systems Review of Systems Unable to perform ROS: Dementia Physical Exam  
 
Patient Vitals for the past 12 hrs: 
 Temp Pulse Resp BP SpO2  
03/30/21 1213 99.4 °F (37.4 °C) (!) 51 22 (!) 134/50 90 % Physical Exam 
Vitals signs and nursing note reviewed. Constitutional:   
   Appearance: She is well-developed. She is ill-appearing. HENT:  
   Head: Normocephalic and atraumatic. Eyes:  
   General:     
   Right eye: No discharge. Left eye: No discharge. Neck: Musculoskeletal: Normal range of motion and neck supple. Cardiovascular:  
   Rate and Rhythm: Normal rate and regular rhythm. Heart sounds: Normal heart sounds. No murmur. Pulmonary:  
   Effort: Pulmonary effort is normal. No respiratory distress. Breath sounds: Normal breath sounds. No stridor. No wheezing or rales. Chest:  
   Chest wall: No tenderness. Abdominal:  
   General: Bowel sounds are normal. There is no distension. Palpations: Abdomen is soft. Tenderness: There is no abdominal tenderness. There is no guarding or rebound. Musculoskeletal: Normal range of motion. Skin: 
   General: Skin is warm and dry. Neurological:  
   Mental Status: She is alert and oriented to person, place, and time. Diagnostic Study Results Labs - No results found for this or any previous visit (from the past 12 hour(s)). Radiologic Studies - No results found. Medical Decision Making ED Course: Progress Notes, Reevaluation, and Consults: 
 
1:09 PM Initial assessment performed.  The patients presenting problems have been discussed, and they/their family are in agreement with the care plan formulated and outlined with them. I have encouraged them to ask questions as they arise throughout their visit. Provider Notes (Medical Decision Making):  
Patient presents with decreased p.o. intake and urinary output Patient saturating 94% percent on room air Plan to obtain labs, imaging Discussion with daughter at bedside who states that her mother is full code Old medical records reviewed: 
EKG as interpreted by me: 57, sinus bradycardia, no ST changes, normal intervals Labs as interpreted by me: 
Hemoglobin 11.1, no leukocytosis creatinine 4.31 Hypernatremia 148 Patient very dehydrated Patient will be admitted for DIDIER on CKD hyponatremia COVID-19 infection Patient will need mild iv hydration Troponin 0.09 I suspect that this is secondary to renal impairment We will follow-up troponin Spoke to Dr. Betty Montalvo nephrologist, who will evaluate patient Discussed with hospitalist Dr. Herminio Lawson recommended Ultrasound renal bladder, hospitalist will follow up results and repeat troponin Vital Signs-Reviewed the patient's vital signs. Reviewed pt's pulse ox reading. Records Reviewed: old medical records 
-I am the first provider for this patient. 
-I reviewed the vital signs, available nursing notes, past medical history, past surgical history, family history and social history. Current Outpatient Medications Medication Sig Dispense Refill  hydrOXYzine HCl (ATARAX) 25 mg tablet Take 1 Tab by mouth every eight (8) hours as needed for Itching (pruritis, irritation, anxiety - PRN opiate withdrawl) for up to 12 doses. 12 Tab 0  cloNIDine HCl (CATAPRES) 0.1 mg tablet Take 1 Tab by mouth every twelve (12) hours as needed (sweating, restlessness, hot flashes - PRN opiate withdrawl) for up to 12 doses. 12 Tab 0  promethazine (PHENERGAN) 12.5 mg tablet Take 1 Tab by mouth every eight (8) hours as needed for Nausea (nausea, vomiting - PRN opiate withdrawl) for up to 12 doses. 12 Tab 0  
 senna (SENNA) 8.6 mg tablet Take 1 Tab by mouth daily as needed for Constipation. 30 Tab 2  
 mv-min/iron/folic/calcium/vitK (WOMEN'S MULTIVITAMIN PO) Take 1 Tab by mouth daily.  hydrocortisone (PROCTOZONE-HC) 2.5 % rectal cream Insert  into rectum four (4) times daily. 30 g 0  
 polyethylene glycol (MIRALAX) 17 gram packet Take 1 Packet by mouth daily. (Patient taking differently: Take 1 Packet by mouth daily as needed for Other (PRN Constipation). PRN Constipation) 30 Packet 11  
 CONTOUR NEXT TEST STRIPS strip USE TO TEST BLOOD SUGAR D  3  
 cyanocobalamin, vitamin B-12, 2,500 mcg chew Take 2,500 mcg by mouth daily.  cholecalciferol, vitamin D3, (VITAMIN D3) 2,000 unit tab Take 1 Cap by mouth daily.  menthol (BIOFREEZE, MENTHOL,) 4 % gel Apply generous amount to affected area up to 3 times daily. 1 Tube 3  
 naloxone (NARCAN) 4 mg/actuation nasal spray Use 1 spray intranasally, then discard. Repeat with new spray every 2 min as needed for opioid overdose symptoms, alternating nostrils. Indications: OPIATE-INDUCED RESPIRATORY DEPRESSION, Opioid Toxicity 1 Each 0  
 pantoprazole (PROTONIX) 40 mg tablet Take 40 mg by mouth daily.  amlodipine (NORVASC) 10 mg tablet Take 10 mg by mouth daily.  furosemide (LASIX) 20 mg tablet Take 20 mg by mouth two (2) times a week.  quetiapine (SEROQUEL) 100 mg tablet Take 200 mg by mouth nightly.  donepezil (ARICEPT) 10 mg tablet Take 10 mg by mouth nightly. Clinical Impression Clinical Impression: No diagnosis found. Disposition: This note was dictated utilizing voice recognition software which may lead to typographical errors. I apologize in advance if the situation occurs. If questions arise please do not hesitate to contact me or call our department.  
 
Vivian Vazquez MD 
1:09 PM

## 2021-03-30 NOTE — PROGRESS NOTES
80 yr old presenting with DIDIER Hypernatremic,DIDIER on CKD stage 4 BUN is 160 Hypernatremic Agree with getting renal USG Get wahl for accurate I and O 
IV fluids ordered Rpt renal panel in am 
If renal function doesn't turn around quickly then she might need dialysis which doesn't add much to life span and QoL in this 80 yr old. Suggest palliative care consult. I will discuss with daughter regarding overall renal prognosis when I see her in am for full consultation.

## 2021-03-30 NOTE — Clinical Note
Status[de-identified] INPATIENT [101]   Type of Bed: Telemetry [19]   Inpatient Hospitalization Certified Necessary for the Following Reasons: 3.  Patient receiving treatment that can only be provided in an inpatient setting (further clarification in H&P documentation)   Admitting Diagnosis: DIDIER (acute kidney injury) [facility]) [[id number]]   Admitting Diagnosis: COVID-19 [[id number]]   Admitting Physician: Carlota Peraza [[id number]]   Attending Physician: Carlota Peraza [[id number]]   Estimated Length of Stay: 2 Midnights   Discharge Plan[de-identified] Home with Office Follow-up

## 2021-03-30 NOTE — ED TRIAGE NOTES
Patient's daughter, Young Ge, provides information related to patient's complaint. She states that patient was dx with Covid 19 on Thursday. She states that patient is not getting enough liquids or voiding well. She states patient has kidney issues.

## 2021-03-31 NOTE — PROGRESS NOTES
[facility] Hospitalists Progress Note Patient: Scotty Ayala Age: 80 y.o. : 1922 MR#: [id number] SSN: [id number] Date: 3/31/2021 Subjective/24-hour events:  
 
Patient admitted early this AM by night coverage. Chart reviewed. Remains confused but resting comfortably. Has some cough with family earlier this afternoon per nursing staff. Assessment:  
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  
IV hydration/volume management per nephrology - assistance appreciated. Will continue to monitor renal indices but if renal function fails to improve, as patient would not be an ideal candidate for dialysis. Continue to monitor intake/output, maintain Sage for now. Supplemental oxygen, wean as tolerated. IV decadron, rocephin and azithromycin as ordered. Vitamin/mineral supplementation - C, D, zinc. 
Monitor BPs, add PRN IV agent with parameters. Palliative care evaluation, await outcome of family meeting - assistance appreciated. Supportive care o/w. Follow. Case discussed with:  []Patient  []Family  [x]Nursing  [x]Case Management DVT Prophylaxis:  []Lovenox  [x]Hep SQ  []SCDs  []Coumadin   []On Heparin gtt Objective:  
VS:  
Visit Vitals BP (!) 168/76 (BP 1 Location: Left upper arm, BP Patient Position: At rest) Pulse (!) 57 Temp 98.6 °F (37 °C) Resp 22 Ht 5' 2\" (1.575 m) Wt 51.3 kg (113 lb) SpO2 94% Breastfeeding No  
BMI 20.67 kg/m² Tmax/24hrs: Temp (24hrs), Av.2 °F (36.8 °C), Min:97.4 °F (36.3 °C), Max:98.9 °F (37.2 °C) Intake/Output Summary (Last 24 hours) at 3/31/2021 1414 Last data filed at 3/31/2021 6968 Gross per 24 hour Intake 1000 ml Output 1300 ml Net -300 ml General:  Appears comfortable, In NAD. Nontoxic-appearing. Cardiovascular:  RRR. Pulmonary:  Lungs clear bilaterally, no wheezes. GI:  Abdomen soft, NTTP. Extremities:  Warm, no edema or ischemia. Neuro:  Awake but confused. Moves extremities spontaneously. Labs:   
Recent Results (from the past 24 hour(s)) EKG, 12 LEAD, INITIAL Collection Time: 03/30/21  2:45 PM  
Result Value Ref Range Ventricular Rate 57 BPM  
 Atrial Rate 57 BPM  
 P-R Interval 220 ms QRS Duration 78 ms Q-T Interval 484 ms QTC Calculation (Bezet) 471 ms Calculated P Axis 63 degrees Calculated R Axis -30 degrees Calculated T Axis -31 degrees Diagnosis Sinus bradycardia with 1st degree AV block Left axis deviation Poor R Wave Progression Abnormal ECG When compared with ECG of 28-MAR-2013 14:12, Inverted T waves have replaced nonspecific T wave abnormality in Anterior  
leads Confirmed by Mansoor Grey MD, Genet Lantigua ([id number]) on 3/30/2021 5:11:00 PM 
  
COVID-19 RAPID TEST Collection Time: 03/30/21  3:43 PM  
Result Value Ref Range Specimen source Nasopharyngeal    
 COVID-19 rapid test Detected (AA) NOTD CARDIAC PANEL,(CK, CKMB & TROPONIN) Collection Time: 03/30/21  5:00 PM  
Result Value Ref Range CK - MB 1.7 <3.6 ng/ml CK-MB Index 1.9 0.0 - 4.0 % CK 89 26 - 192 U/L Troponin-I, QT 0.11 (H) 0.0 - 0.045 NG/ML  
PROTEIN/CREATININE RATIO, URINE Collection Time: 03/30/21  6:03 PM  
Result Value Ref Range Protein, urine random 452 (H) <11.9 mg/dL Creatinine, urine 40.00 30 - 125 mg/dL Protein/Creat. urine Ratio 11.3 METABOLIC PANEL, BASIC Collection Time: 03/31/21  5:25 AM  
Result Value Ref Range Sodium 149 (H) 136 - 145 mmol/L Potassium 4.8 3.5 - 5.5 mmol/L Chloride 118 (H) 100 - 111 mmol/L  
 CO2 21 21 - 32 mmol/L Anion gap 10 3.0 - 18 mmol/L Glucose 159 (H) 74 - 99 mg/dL  (H) 7.0 - 18 MG/DL Creatinine 4.21 (H) 0.6 - 1.3 MG/DL  
 BUN/Creatinine ratio 38 (H) 12 - 20  GFR est AA 12 (L) >60 ml/min/1.73m2 GFR est non-AA 10 (L) >60 ml/min/1.73m2 Calcium 8.7 8.5 - 10.1 MG/DL MAGNESIUM Collection Time: 03/31/21  5:25 AM  
Result Value Ref Range Magnesium 2.8 (H) 1.6 - 2.6 mg/dL CBC WITH AUTOMATED DIFF Collection Time: 03/31/21  5:25 AM  
Result Value Ref Range WBC 3.6 (L) 4.6 - 13.2 K/uL  
 RBC 3.50 (L) 4.20 - 5.30 M/uL HGB 9.7 (L) 12.0 - 16.0 g/dL HCT 30.7 (L) 35.0 - 45.0 % MCV 87.7 74.0 - 97.0 FL  
 MCH 27.7 24.0 - 34.0 PG  
 MCHC 31.6 31.0 - 37.0 g/dL  
 RDW 13.5 11.6 - 14.5 % PLATELET 915 668 - 848 K/uL MPV 10.4 9.2 - 11.8 FL  
 NEUTROPHILS 58 40 - 73 % LYMPHOCYTES 29 21 - 52 % MONOCYTES 13 (H) 3 - 10 % EOSINOPHILS 0 0 - 5 % BASOPHILS 0 0 - 2 %  
 ABS. NEUTROPHILS 2.1 1.8 - 8.0 K/UL  
 ABS. LYMPHOCYTES 1.1 0.9 - 3.6 K/UL  
 ABS. MONOCYTES 0.5 0.05 - 1.2 K/UL  
 ABS. EOSINOPHILS 0.0 0.0 - 0.4 K/UL  
 ABS. BASOPHILS 0.0 0.0 - 0.1 K/UL  
 DF AUTOMATED    
D DIMER Collection Time: 03/31/21  5:25 AM  
Result Value Ref Range D DIMER 2.74 (H) <0.46 ug/ml(FEU) FERRITIN Collection Time: 03/31/21  5:25 AM  
Result Value Ref Range Ferritin 2,401 (H) 8 - 388 NG/ML  
C REACTIVE PROTEIN, QT Collection Time: 03/31/21  5:25 AM  
Result Value Ref Range C-Reactive protein 2.6 (H) 0 - 0.3 mg/dL Signed By: Dash Rodriguez MD   
 March 31, 2021

## 2021-03-31 NOTE — ROUTINE PROCESS
TRANSFER - OUT REPORT: 
 
Verbal report given to Saige Hadley RN (name) on Kamini Suárez  being transferred to [facility](unit) for routine progression of care Report consisted of patients Situation, Background, Assessment and  
Recommendations(SBAR). Information from the following report(s) SBAR, ED Summary and MAR was reviewed with the receiving nurse. Lines:  
Peripheral IV 03/30/21 Right Forearm (Active) Site Assessment Clean, dry, & intact 03/30/21 1417 Phlebitis Assessment 0 03/30/21 1417 Infiltration Assessment 0 03/30/21 1417 Dressing Status Clean, dry, & intact 03/30/21 1417 Dressing Type Transparent 03/30/21 1417 Opportunity for questions and clarification was provided. Patient transported with: 
 Monitor O2 @ 2 liters IVx1

## 2021-03-31 NOTE — PROGRESS NOTES
Reason for Admission:  DIDIER (acute kidney injury) ([facility] [address] [N17.9] COVID-19 [U07.1] RUR Score:    21 Plan for utilizing home health:    yes Likelihood of Readmission:   Moderate Do you (patient/family) have any concerns for transition/discharge?  no 
 
Transition of Care Plan:    
 
Initial assessment completed with relative(s). Daughter , Celia Arora DeberryCognitive status of patient: disoriented. Face sheet information confirmed:  yes. The patient designates per Adv Directive, daughter to participate in her discharge plan and to receive any needed information. This patient lives in a single family home with daughter. Patient is not able to navigate steps as needed. Prior to hospitalization, patient was considered to be independent with ADLs/IADLS : no . If not independent,  patient needs assist with : dressing, bathing, food preparation, cooking, toileting, grooming and self feeding Patient has a current ACP document on file: yes CM went to ask about code status and phone cut out on daughter end Healthcare Decision Maker:   Primary Decision Maker: Kayy Skinner - Daughter - [phone] Secondary Decision MakerFlorwendi Hood - Daughter - [phone] Click here to complete [address] including selection of the Healthcare Decision Maker Relationship (ie \"Primary\") The patient and other:  Medical Transport will be available to transport patient home upon discharge. The patient already has Clive Chavez, and  medical equipment available in the home. Daughter states PCP ordered hospital bed that is new and someone from insurance called and needs to assess pt. Patient is not currently active with home health. Patient has not stayed in a skilled nursing facility or rehab. Was  stay within last 60 days : no. Has personal Care 8 hours a day through [facility] This patient is on dialysis :no 
 
 
 
List of available Home Health agencies were provided and reviewed with the patient prior to discharge. Freedom of choice signed: yes, for Phone cut out before could do [address]. Currently, the discharge plan is Home with  [facility]. The patient states that she can obtain her medications from the pharmacy, and take her medications as directed. Patient's current insurance is Medicare [employer] and [employer] Care Management Interventions PCP Verified by CM: Yes(Dr Mercedes Abad) Last Visit to PCP: 02/23/21 Mode of Transport at Discharge: BLS Transition of Care Consult (CM Consult): Home Health Current Support Network: Relative's Home Confirm Follow Up Transport: Other (see comment) The Plan for Transition of Care is Related to the Following Treatment Goals : home health Discharge Location Discharge Placement: Home with home health Tatum Heath RN BSN Outcomes Manager Pager # [phone]

## 2021-03-31 NOTE — H&P
History & Physical 
 
 
Patient: Reji Hernández MRN: [id number]  CSN: [id number] YOB: 1922  Age: 80 y.o. Sex: female DOA: 3/30/2021 Chief Complaint:  
Chief Complaint Patient presents with  Positive For Covid-19  Dehydration HPI:  
 
Reji Hernández is a 80 y.o. female with PMHx of CKD stage IV, HTN, type II DM, anemia, GERD and dementia who presented to [facility] [place] with complaints of decreased appetite and decreased voiding after being diagnosed with Covid19 4 days ago. According to her daughter, multiple family members were diagnosed with EDIKC13. Before she got Covid, she was able to stand and walk a little bit with assistance, and talk a little bit and answer a few questions. She did have some intermittent confusion, but since getting Covid, she has been increasingly weak, less responsive and confused. She has not noticed any changes in her breathing pattern and she has not had any shortness of breath. Her daughter also denies any other complaints. In the ED, her vitals were notable for some bradycardia with heart rate in the 50s, some mildly elevated blood pressures, some mild tachypnea with RR in the low 20s, and SPO2 in the low to upper 90s on room air. Labs were notable for WBCs WNL, Hgb 11.1, sodium 148, glucose 138, , creatinine 4.31 (baseline 2.0), magnesium 3.2, troponin 0.09 and then 0.11, and NT proBNP 3695. A UA had greater than 1000 mg/dL protein, moderate blood, and was negative for nitrites, leuk esterase and bacteria. An EKG showed sinus bradycardia with first-degree AV block at 57 bpm.  CXR showed a right midlung and bibasilar streaky opacities, at least partly representing atelectasis, with superimposed infiltrate also possible. Renal ultrasound showed chronic medical renal disease and right renal cysts. Rapid Covid19 testing was positive. He was treated with IV steroids and IVF.   Ms. Lexy Serrato was then transferred to [facility] and is now admitted for Covid19 nephropathy and DIDIER on CKD stage IV. Past Medical History:  
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  
 left shoulder  GERD (gastroesophageal reflux disease)  Heart murmur  Hyperkalemia 2011  Hypertension  Lumbago  Pain in joint, lower leg  Pain in joint, multiple sites Past Surgical History:  
Procedure Laterality Date  HX BLADDER REPAIR    
 prolapsed  HX CATARACT REMOVAL  1991  
 HX CATARACT REMOVAL  1990  
 HX CHOLECYSTECTOMY  5/1998  HX HERNIA REPAIR  7/1998  
 x2 [address]  HX OOPHORECTOMY Family History Problem Relation Age of Onset  Diabetes Other  Hypertension Other  Stroke Other  Diabetes Mother Social History Socioeconomic History  Marital status: SINGLE Spouse name: Not on file  Number of children: Not on file  Years of education: Not on file  Highest education level: Not on file Tobacco Use  Smoking status: Never Smoker  Smokeless tobacco: Never Used Substance and Sexual Activity  Alcohol use: No  
 Drug use: No  
 Sexual activity: Never Prior to Admission medications Medication Sig Start Date End Date Taking? Authorizing Provider  
cloNIDine HCl (CATAPRES) 0.1 mg tablet Take 1 Tab by mouth every twelve (12) hours as needed (sweating, restlessness, hot flashes - PRN opiate withdrawl) for up to 12 doses. 11/15/19   Aramis Ac PA  
promethazine (PHENERGAN) 12.5 mg tablet Take 1 Tab by mouth every eight (8) hours as needed for Nausea (nausea, vomiting - PRN opiate withdrawl) for up to 12 doses. 11/15/19   Miguel Ac PA  
senna (SENNA) 8.6 mg tablet Take 1 Tab by mouth daily as needed for Constipation. 8/28/19   Miguel Ac PA  
mv-min/iron/folic/calcium/vitK (WOMEN'S MULTIVITAMIN PO) Take 1 Tab by mouth daily. Provider, Historical  
hydrocortisone (PROCTOZONE-HC) 2.5 % rectal cream Insert  into rectum four (4) times daily. 5/2/19   Phong Perkins NP  
polyethylene glycol (MIRALAX) 17 gram packet Take 1 Packet by mouth daily. Patient taking differently: Take 1 Packet by mouth daily as needed for Other (PRN Constipation). PRN Constipation 4/18/19   Phong Perkins NP  
CONTOUR NEXT TEST STRIPS strip USE TO TEST BLOOD SUGAR D 1/22/19   Provider, Historical  
cyanocobalamin, vitamin B-12, 2,500 mcg chew Take 2,500 mcg by mouth daily. Provider, Historical  
cholecalciferol, vitamin D3, (VITAMIN D3) 2,000 unit tab Take 1 Cap by mouth daily. Provider, Historical  
menthol (BIOFREEZE, MENTHOL,) 4 % gel Apply generous amount to affected area up to 3 times daily. 11/29/18   Colin Marques DO  
naloxone [facility]) 4 mg/actuation nasal spray Use 1 spray intranasally, then discard. Repeat with new spray every 2 min as needed for opioid overdose symptoms, alternating nostrils. Indications: OPIATE-INDUCED RESPIRATORY DEPRESSION, Opioid Toxicity 8/8/18   Aki MADSEN DO  
pantoprazole (PROTONIX) 40 mg tablet Take 40 mg by mouth daily. Provider, Historical  
amlodipine (NORVASC) 10 mg tablet Take 10 mg by mouth daily. Provider, Historical  
furosemide (LASIX) 20 mg tablet Take 20 mg by mouth two (2) times a week. Provider, Historical  
quetiapine (SEROQUEL) 100 mg tablet Take 200 mg by mouth nightly. Provider, Historical  
donepezil (ARICEPT) 10 mg tablet Take 10 mg by mouth nightly. Provider, Historical  
 
 
Allergies Allergen Reactions  Celebrex [Celecoxib] Unable to Obtain  Codeine Unable to Obtain  Flexeril [Cyclobenzaprine] Unable to Obtain Maryann Sear [Nitrofurantoin Monohyd/M-Cryst] Unable to Consolidated Alex  Pcn [Penicillins] Unable to Consolidated Alex  Remeron [Mirtazapine] Unable to Consolidated Aelx  Sulfa (Sulfonamide Antibiotics) Other (comments) Swelling and low heart rate  Vicodin [Hydrocodone-Acetaminophen] Unable to Obtain Review of Systems Unable to obtain ROS as patient is demented and nonverbal. 
 
 
Physical Exam:  
 
Physical Exam: 
Visit Vitals BP (!) 152/88 Pulse 79 Temp 97.4 °F (36.3 °C) Resp (!) 36 Wt 51.3 kg (113 lb) SpO2 92% BMI 20.67 kg/m² O2 Flow Rate (L/min): 2 l/min O2 Device: Room air Temp (24hrs), Av.4 °F (36.9 °C), Min:97.4 °F (36.3 °C), Max:99.4 °F (37.4 °C) No intake/output data recorded.  0701 -  1900 In: 1000 [I.V.:1000] Out: - General:   Ill-appearing, lethargic, nonverbal, grunts in response to questions, no distress, appears stated age. Head: Normocephalic, without obvious abnormality, atraumatic. Eyes:  Conjunctivae/corneas clear. PERRL, EOMs intact. Nose: Nares normal. No drainage or sinus tenderness. Neck: Supple, symmetrical, trachea midline, no JVD. Lungs:    Scattered rhonchi bilaterally Heart:  Regular rate and rhythm, S1, S2 normal.  
  Abdomen: Soft, non-tender. Bowel sounds normal.   
Extremities: Extremities normal, atraumatic, no cyanosis or edema. Pulses: 2+ and symmetric all extremities. Skin:  No rashes or lesions Neurologic:  Lethargic, nonverbal, grunts in response to questions, other neurological exam not obtainable Labs Reviewed: 
 
BMP:  
Lab Results Component Value Date/Time   (H) 2021 01:56 PM  
 K 4.4 2021 01:56 PM  
  (H) 2021 01:56 PM  
 CO2 25 2021 01:56 PM  
 AGAP 10 2021 01:56 PM  
  (H) 2021 01:56 PM  
  (H) 2021 01:56 PM  
 CREA 4.31 (H) 2021 01:56 PM  
 GFRAA 11 (L) 2021 01:56 PM  
 GFRNA 9 (L) 2021 01:56 PM  
 
CMP:  
Lab Results Component Value Date/Time  (H) 03/30/2021 01:56 PM  
 K 4.4 03/30/2021 01:56 PM  
  (H) 03/30/2021 01:56 PM  
 CO2 25 03/30/2021 01:56 PM  
 AGAP 10 03/30/2021 01:56 PM  
  (H) 03/30/2021 01:56 PM  
  (H) 03/30/2021 01:56 PM  
 CREA 4.31 (H) 03/30/2021 01:56 PM  
 GFRAA 11 (L) 03/30/2021 01:56 PM  
 GFRNA 9 (L) 03/30/2021 01:56 PM  
 CA 10.2 (H) 03/30/2021 01:56 PM  
 MG 3.2 (H) 03/30/2021 01:56 PM  
 
CBC:  
Lab Results Component Value Date/Time WBC 5.4 03/30/2021 01:56 PM  
 HGB 11.1 (L) 03/30/2021 01:56 PM  
 HCT 36.0 03/30/2021 01:56 PM  
  03/30/2021 01:56 PM  
 
All Cardiac Markers in the last 24 hours:  
Lab Results Component Value Date/Time CPK 89 03/30/2021 05:00 PM  
 CPK 66 03/30/2021 01:56 PM  
 CKMB 1.7 03/30/2021 05:00 PM  
 CKMB 1.3 03/30/2021 01:56 PM  
 CKND1 1.9 03/30/2021 05:00 PM  
 CKND1 2.0 03/30/2021 01:56 PM  
 TROIQ 0.11 (H) 03/30/2021 05:00 PM  
 TROIQ 0.09 (H) 03/30/2021 01:56 PM  
 
Recent Glucose Results:  
Lab Results Component Value Date/Time  (H) 03/30/2021 01:56 PM  
 
COAGS: No results found for: APTT, PTP, INR, INREXT, INREXT Procedures/imaging: see electronic medical records for all procedures/Xrays and details which were not copied into this note but were reviewed prior to creation of Plan Assessment & Plan Enriqueta Osgood is a 80 y.o. 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 1. IHWUT69 nephropathy 2. DIDIER on CKD stage IV 3. UYAWA15 pneumonia 4. Hypernatremia 5. Elevated troponinlikely secondary to demand ischemia 6. HTN 
7. Type II DM 
8. Anemia of chronic disease 9. GERD 10. Dementia Nephrology consulted ID consult in a.m. Continue IVFone half NS at 100 mL/h Continue IV steroidsdexamethasone 6 mg daily Start IV ABXazithromycin and ceftriaxone Start Covid-19 supplements Follow-up inflammatory markers Follow-up CBC, BMP, mag Droplet plus precautions Palliative care consulted DVT prophylaxis: SQH Diet: Renal 
Contact: José Luis Callejas (daughter)    [phone] Code Status: Full Disposition: Admitted to telemetry, greater than 2 nights Discussed with patient's daughter over the phone about hospital admission and my plan of care, who understood and agreed with my plan of care. Discussed CODE STATUS and how that patient being a full code at 80years of age, with dementia, Covid24 and other comorbidities may not be in her best interest.  Patient's daughter is going to discuss with her 2 sisters and consider changing CODE STATUS. Blanca Conway DO  
3/31/2021 Dragon medical dictation software was used for portions of this report. Unintended errors may occur.

## 2021-03-31 NOTE — ROUTINE PROCESS
Gave message to Dr Abran Hyde from patients daughter Ирина Alvarez to please call MD given phone number

## 2021-03-31 NOTE — ROUTINE PROCESS
Bedside and Verbal shift change report given to Andrew Cleveland 44 (oncoming nurse) by Doug Nieto RN (offgoing nurse). Report included the following information SBAR, Kardex, OR Summary, Intake/Output, MAR and Recent Results. Patient resting quietly on the left side. No noted distress. 8:02 AM 
Patient daughter (Mrs. Warren Crowe called this am and wanted  Dr. Milagros Dunlap to speak to her two sister concering the plans for their mom. Explain in details for her about the plans for their mother. Belle Mcfarland [phone] Marcial Diaz [phone].

## 2021-03-31 NOTE — PROGRESS NOTES
Problem: Falls - Risk of 
Goal: *Absence of Falls Description: Document Aleksandra Gum Fall Risk and appropriate interventions in the flowsheet. Outcome: Progressing Towards Goal 
Note: Fall Risk Interventions: 
  
 
Mentation Interventions: Adequate sleep, hydration, pain control, Bed/chair exit alarm, More frequent rounding, Reorient patient Medication Interventions: Evaluate medications/consider consulting pharmacy Elimination Interventions: Bed/chair exit alarm, Call light in reach Problem: Patient Education: Go to Patient Education Activity Goal: Patient/Family Education Outcome: Progressing Towards Goal 
  
Problem: Risk for Spread of Infection Goal: Prevent transmission of infectious organism to others Description: Prevent the transmission of infectious organisms to other patients, staff members, and visitors. Outcome: Progressing Towards Goal 
  
Problem: Patient Education:  Go to Education Activity Goal: Patient/Family Education Outcome: Progressing Towards Goal 
  
Problem: Airway Clearance - Ineffective Goal: Achieve or maintain patent airway Outcome: Progressing Towards Goal 
  
Problem: Gas Exchange - Impaired Goal: Absence of hypoxia Outcome: Progressing Towards Goal 
Goal: Promote optimal lung function Outcome: Progressing Towards Goal 
  
Problem: Breathing Pattern - Ineffective Goal: Ability to achieve and maintain a regular respiratory rate Outcome: Progressing Towards Goal 
  
Problem: Body Temperature -  Risk of, Imbalanced Goal: Ability to maintain a body temperature within defined limits Outcome: Progressing Towards Goal 
Goal: Will regain or maintain usual level of consciousness Outcome: Progressing Towards Goal 
Goal: Complications related to the disease process, condition or treatment will be avoided or minimized Outcome: Progressing Towards Goal 
  
Problem: Isolation Precautions - Risk of Spread of Infection Goal: Prevent transmission of infectious organism to others Outcome: Progressing Towards Goal 
  
Problem: Nutrition Deficits Goal: Optimize nutrtional status Outcome: Progressing Towards Goal 
  
Problem: Risk for Fluid Volume Deficit Goal: Maintain normal heart rhythm Outcome: Progressing Towards Goal 
Goal: Maintain absence of muscle cramping Outcome: Progressing Towards Goal 
Goal: Maintain normal serum potassium, sodium, calcium, phosphorus, and pH Outcome: Progressing Towards Goal 
  
Problem: Loneliness or Risk for Loneliness Goal: Demonstrate positive use of time alone when socialization is not possible Outcome: Progressing Towards Goal 
  
Problem: Fatigue Goal: Verbalize increase energy and improved vitality Outcome: Progressing Towards Goal 
  
Problem: Patient Education: Go to Patient Education Activity Goal: Patient/Family Education Outcome: Progressing Towards Goal 
  
Problem: Pain Goal: *Control of Pain Outcome: Progressing Towards Goal 
  
Problem: Patient Education: Go to Patient Education Activity Goal: Patient/Family Education Outcome: Progressing Towards Goal 
  
Problem: Patient Education: Go to Patient Education Activity Goal: Patient/Family Education Outcome: Progressing Towards Goal 
  
Problem: Acute Renal Failure: Day 1 Goal: Off Pathway (Use only if patient is Off Pathway) Outcome: Progressing Towards Goal 
Goal: Activity/Safety Outcome: Progressing Towards Goal 
Goal: Consults, if ordered Outcome: Progressing Towards Goal 
Goal: Diagnostic Test/Procedures Outcome: Progressing Towards Goal 
Goal: Nutrition/Diet Outcome: Progressing Towards Goal 
Goal: Discharge Planning Outcome: Progressing Towards Goal 
Goal: Medications Outcome: Progressing Towards Goal 
Goal: Respiratory Outcome: Progressing Towards Goal 
Goal: Treatments/Interventions/Procedures Outcome: Progressing Towards Goal 
Goal: Psychosocial 
Outcome: Progressing Towards Goal 
Goal: *Optimal pain control at patient's stated goal 
Outcome: Progressing Towards Goal 
Goal: *Urinary output within identified parameters Outcome: Progressing Towards Goal 
Goal: *Hemodynamically stable Outcome: Progressing Towards Goal 
Goal: *Tolerating diet Outcome: Progressing Towards Goal 
  
Problem: Acute Renal Failure: Day 2 Goal: Off Pathway (Use only if patient is Off Pathway) Outcome: Progressing Towards Goal 
Goal: Activity/Safety Outcome: Progressing Towards Goal 
Goal: Consults, if ordered Outcome: Progressing Towards Goal 
Goal: Diagnostic Test/Procedures Outcome: Progressing Towards Goal 
Goal: Nutrition/Diet Outcome: Progressing Towards Goal 
Goal: Discharge Planning Outcome: Progressing Towards Goal 
Goal: Medications Outcome: Progressing Towards Goal 
Goal: Respiratory Outcome: Progressing Towards Goal 
Goal: Treatments/Interventions/Procedures Outcome: Progressing Towards Goal 
Goal: Psychosocial 
Outcome: Progressing Towards Goal 
Goal: *Optimal pain control at patient's stated goal 
Outcome: Progressing Towards Goal 
Goal: *Urinary output within identified parameters Outcome: Progressing Towards Goal 
Goal: *Hemodynamically stable Outcome: Progressing Towards Goal 
Goal: *Tolerating diet Outcome: Progressing Towards Goal 
Goal: *Lab values improving Outcome: Progressing Towards Goal 
  
Problem: Acute Renal Failure: Day 3 Goal: Off Pathway (Use only if patient is Off Pathway) Outcome: Progressing Towards Goal 
Goal: Activity/Safety Outcome: Progressing Towards Goal 
Goal: Consults, if ordered Outcome: Progressing Towards Goal 
Goal: Diagnostic Test/Procedures Outcome: Progressing Towards Goal 
Goal: Nutrition/Diet Outcome: Progressing Towards Goal 
Goal: Discharge Planning Outcome: Progressing Towards Goal 
Goal: Medications Outcome: Progressing Towards Goal 
Goal: Respiratory Outcome: Progressing Towards Goal 
Goal: Treatments/Interventions/Procedures Outcome: Progressing Towards Goal 
Goal: Psychosocial 
Outcome: Progressing Towards Goal 
Goal: *Optimal pain control at patient's stated goal 
Outcome: Progressing Towards Goal 
Goal: *Urinary output within identified parameters Outcome: Progressing Towards Goal 
Goal: *Hemodynamically stable Outcome: Progressing Towards Goal 
Goal: *Tolerating diet Outcome: Progressing Towards Goal 
Goal: *Lab values improving Outcome: Progressing Towards Goal 
  
Problem: Acute Renal Failure: Day 4 Goal: Off Pathway (Use only if patient is Off Pathway) Outcome: Progressing Towards Goal 
Goal: Activity/Safety Outcome: Progressing Towards Goal 
Goal: Consults, if ordered Outcome: Progressing Towards Goal 
Goal: Diagnostic Test/Procedures Outcome: Progressing Towards Goal 
Goal: Nutrition/Diet Outcome: Progressing Towards Goal 
Goal: Discharge Planning Outcome: Progressing Towards Goal 
Goal: Medications Outcome: Progressing Towards Goal 
Goal: Respiratory Outcome: Progressing Towards Goal 
Goal: Treatments/Interventions/Procedures Outcome: Progressing Towards Goal 
Goal: Psychosocial 
Outcome: Progressing Towards Goal 
Goal: *Optimal pain control at patient's stated goal 
Outcome: Progressing Towards Goal 
Goal: *Urinary output within identified parameters Outcome: Progressing Towards Goal 
Goal: *Hemodynamically stable Outcome: Progressing Towards Goal 
Goal: *Tolerating diet Outcome: Progressing Towards Goal 
Goal: *Lab values improving Outcome: Progressing Towards Goal 
  
Problem: Acute Renal Failure: Day 5 Goal: Off Pathway (Use only if patient is Off Pathway) Outcome: Progressing Towards Goal 
Goal: Activity/Safety Outcome: Progressing Towards Goal 
Goal: Diagnostic Test/Procedures Outcome: Progressing Towards Goal 
Goal: Nutrition/Diet Outcome: Progressing Towards Goal 
Goal: Discharge Planning Outcome: Progressing Towards Goal 
Goal: Medications Outcome: Progressing Towards Goal 
Goal: Respiratory Outcome: Progressing Towards Goal 
Goal: Treatments/Interventions/Procedures Outcome: Progressing Towards Goal 
Goal: Psychosocial 
Outcome: Progressing Towards Goal 
Goal: *Optimal pain control at patient's stated goal 
Outcome: Progressing Towards Goal 
Goal: *Urinary output within identified parameters Outcome: Progressing Towards Goal 
Goal: *Hemodynamically stable Outcome: Progressing Towards Goal 
Goal: *Tolerating diet Outcome: Progressing Towards Goal 
Goal: *Lab values improving Outcome: Progressing Towards Goal 
  
Problem: Acute Renal Failure: Day 6 Goal: Off Pathway (Use only if patient is Off Pathway) Outcome: Progressing Towards Goal 
Goal: Activity/Safety Outcome: Progressing Towards Goal 
Goal: Diagnostic Test/Procedures Outcome: Progressing Towards Goal 
Goal: Nutrition/Diet Outcome: Progressing Towards Goal 
Goal: Discharge Planning Outcome: Progressing Towards Goal 
Goal: Medications Outcome: Progressing Towards Goal 
Goal: Respiratory Outcome: Progressing Towards Goal 
Goal: Treatments/Interventions/Procedures Outcome: Progressing Towards Goal 
Goal: Psychosocial 
Outcome: Progressing Towards Goal 
  
Problem: Acute Renal Failure: Discharge Outcomes Goal: *Optimal pain control at patient's stated goal 
Outcome: Progressing Towards Goal 
Goal: *Urinary output within identified parameters Outcome: Progressing Towards Goal 
Goal: *Hemodynamically stable Outcome: Progressing Towards Goal 
Goal: *Tolerating diet Outcome: Progressing Towards Goal 
Goal: *Lab values stabilized Outcome: Progressing Towards Goal 
Goal: *Verbalizes understanding and describes medication purposes and frequencies Outcome: Progressing Towards Goal 
Goal: *Medication reconciliation Outcome: Progressing Towards Goal

## 2021-03-31 NOTE — CONSULTS
[phone] [facility]: [phone] Patient Name: Sunita Dumont YOB: 1922 Date of Initial Consult : 3/31/2021 Reason for Consult: Care Decisions Requesting Provider: Dr. Jensen Ford Primary Care Physician: Giancarlo Vasques MD 
  
 SUMMARY:  
Sunita Dumont is a 80 y.o. 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 CHIEF COMPLAINT: altered mental status HPI/SUBJECTIVE:   
Past history as above. Ms. Yessenia Major presents with decreased appetite and decline in function over the last 2-4 weeks coupled with a positive COVID-19 diagnosis. According to her daughters, she has become increasingly weak with poor appetite over the last month at home. The patient is:  
[] Verbal and participatory [x] Non-participatory due to: Altered mental status and dementia GOALS OF CARE:  Full code, full aggressive measures Patient/Health Care Proxy Stated Goals: Prolong life TREATMENT PREFERENCES:  
Code Status: Full Code PALLIATIVE DIAGNOSES:  
1. Goals of Care 2. Stage IV CKD 3. COVID-19 pneumonia 4. Dementia 5. Debility PLAN:  
1. Goals of Care - Seen at bedside with Ms Elvis Mariano in full PPE for the COVID-19 pandemic. Ms. Yessenia Major is sitting in bed, looking down at her hands as she picks with her fingers and the bed covers. Alerts and raises head up in response to verbal stimuli. Does not follow commands, says few words such as \"Please don't\" and \"Oh Lord\". Patient is unable to participate in a conversation. Conferenced with BSRN who shared that patient has lost IV access and she is pocketing food and meds. Speech has been consulted for swallow evaluation. There is an AMD on file which names her daughter, Kim Kay, as MPOA. Called and spoke with Kim Kay, daughter who also conferenced in her 2 sisters, Rocklizeth Esteban and Tianna Post to have a discussion with the palliative team.  Patient lives at home with Tanner Yang who, along with her other two sisters take care of their mother. Daughters shared that their mom's condition has been declining more significantly in the last 2-4 weeks as in not walking as well with her walker, not reading as much as she used to when that was an enjoyable activity for her and worsening appetite. They shared that mom requires full assistance for bathing, dressing and toileting. She had been able to feed herself up to about 2 weeks ago and they shared that patient has been exhibiting coughing with eating/drinking. Lengthy discussion with all three daughters about their mom's overall condition in the context of advanced age, progressive dementia x15 years and stage IV CKD. Discussed goals of care including benefits and burdens of resuscitation, intubation and life support. Tanner Yang shared with us that during our discussion of goals of care, she had to step away from the phone because she has heard all of this information before and she just couldn't handle hearing it all over again. She shared that it was good for her other two sisters to hear this information as they had not heard it before. We offered supportive and empathic listening and answered all questions to the best of our ability. Daughters are not yet ready to make a goals of care decision. At this time, goals of care remain full code, full aggressive measures. Palliative will continue to follow for ongoing support and further goals of care conversations.  
 
2. Stage IV CKD - Nephrology consulted who has discussed patient's poor prognosis with patient's daughters. They wish to continue with fluids for now and see how Ms. Ira Cooper does and re-evaluate over time. 3. COVID-19 pneumonia - IV antibiotics, attending team managing 4. Dementia - FAST score 7a, diagnosed with dementia approximately 15 years ago 5. Debility - PPS 30, lives at home with her daughter, requires full assistance with bathing, dressing and toileting, uses walker for ambulating but has gotten much weaker recently and unable to use walker well x 1 month. Poor appetite, was feeding herself up until about 2 weeks ago. 6. Initial consult note routed to primary continuity provider 7. Communicated plan of care with: Palliative IDT Advance Care Planning: 
[] The [facility] Interdisciplinary Team has updated the ACP Navigator with [address] and Patient Capacity Primary Decision [facility], [place] Care Agent):  Ishmael Merlos, daughter - [phone] As far as possible, the palliative care team has discussed with patient / health care proxy about goals of care / treatment preferences for patient. HISTORY:  
 
History obtained from:  
Principal Problem: 
  COVID-19 (3/30/2021) Active Problems: 
  DIDIER (acute kidney injury) ([address] (3/30/2021) Past Medical History:  
Diagnosis Date  Anxiety  Brachial neuritis or radiculitis NOS  Cervicalgia  Cervicalgia  COVID-19  Dementia ([address]  Diabetes 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  
 left shoulder  GERD (gastroesophageal reflux disease)  Heart murmur  Hyperkalemia 2011  Hypertension  Lumbago  Pain in joint, lower leg  Pain in joint, multiple sites Past Surgical History:  
Procedure Laterality Date  HX BLADDER REPAIR    
 prolapsed  HX CATARACT REMOVAL  1991  
 HX CATARACT REMOVAL  1990  
 HX CHOLECYSTECTOMY  5/1998  HX HERNIA REPAIR  7/1998  
 x2 [address]  HX OOPHORECTOMY Family History Problem Relation Age of Onset  Diabetes Other  Hypertension Other  Stroke Other  Diabetes Mother History reviewed, no pertinent family history. Social History Tobacco Use  Smoking status: Never Smoker  Smokeless tobacco: Never Used Substance Use Topics  Alcohol use: No  
 
Allergies Allergen Reactions  Celebrex [Celecoxib] Unable to Obtain  Codeine Unable to Obtain  Flexeril [Cyclobenzaprine] Unable to Obtain  Macrobid [Nitrofurantoin Monohyd/M-Cryst] Unable to Consolidated Alex  Pcn [Penicillins] Unable to Consolidated Alex  Remeron [Mirtazapine] Unable to Consolidated Alex  Sulfa (Sulfonamide Antibiotics) Other (comments) Swelling and low heart rate  Vicodin [Hydrocodone-Acetaminophen] Unable to Obtain Current Facility-Administered Medications Medication Dose Route Frequency  sodium chloride (NS) flush 5-40 mL  5-40 mL IntraVENous Q8H  
 sodium chloride (NS) flush 5-40 mL  5-40 mL IntraVENous PRN  
 acetaminophen (TYLENOL) tablet 650 mg  650 mg Oral Q6H PRN Or  
 acetaminophen (TYLENOL) suppository 650 mg  650 mg Rectal Q6H PRN  polyethylene glycol (MIRALAX) packet 17 g  17 g Oral DAILY PRN  promethazine (PHENERGAN) tablet 12.5 mg  12.5 mg Oral Q6H PRN Or  
 ondansetron (ZOFRAN) injection 4 mg  4 mg IntraVENous Q6H PRN  
 heparin (porcine) injection 5,000 Units  5,000 Units SubCUTAneous Q8H  
 dexamethasone (DECADRON) 4 mg/mL injection 6 mg  6 mg IntraVENous Q24H  
 ascorbic acid (vitamin C) (VITAMIN C) tablet 500 mg  500 mg Oral BID  cholecalciferol (VITAMIN D3) capsule 5,000 Units  5,000 Units Oral DAILY  zinc sulfate (ZINCATE) 50 mg zinc (220 mg) capsule 1 Cap  1 Cap Oral DAILY  melatonin tablet 3 mg  3 mg Oral QHS  azithromycin (ZITHROMAX) 500 mg in 0.9% sodium chloride 250 mL (VIAL-MATE)  500 mg IntraVENous Q24H  cefTRIAXone (ROCEPHIN) 1 g in sterile water (preservative free) 10 mL IV syringe  1 g IntraVENous Q24H  
 amLODIPine (NORVASC) tablet 10 mg  10 mg Oral DAILY  hydrALAZINE (APRESOLINE) 20 mg/mL injection 10 mg  10 mg IntraVENous Q6H PRN  
 0.45% sodium chloride infusion  125 mL/hr IntraVENous CONTINUOUS Clinical Pain Assessment (nonverbal scale for nonverbal patients): Clinical Pain Assessment Severity: 0 Activity (Movement): Lying quietly, normal position Duration: for how long has pt been experiencing pain (e.g., 2 days, 1 month, years) Frequency: how often pain is an issue (e.g., several times per day, once every few days, constant) FUNCTIONAL ASSESSMENT:  
 
Palliative Performance Scale (PPS): PPS: 30 
 
ECOG 
ECOG Status : Completely disabled PSYCHOSOCIAL/SPIRITUAL SCREENING:  
  
Any spiritual / Religion concerns:  Unable to assess 
[] Yes /  [] No 
 
Caregiver Burnout: 
[] Yes /  [] No /  [x] No Caregiver Present Anticipatory grief assessment:  Unable to assess 
[] Normal  / [] Maladaptive REVIEW OF SYSTEMS:  
 
Systems: constitutional, ears/nose/mouth/throat, respiratory, gastrointestinal, genitourinary, musculoskeletal, integumentary, neurologic, . Positive findings noted below. Modified ESAS Completed by: provider Fatigue: 2 Pain: 0 Dyspnea: 2 Positive and pertinent negative findings in ROS are noted above in HPI. The following systems were [] reviewed / [x] unable to be reviewed as noted in HPI Other findings are noted below. PHYSICAL EXAM:  
 
Constitutional: Frail, ill-appearing female sitting up in bed, looking down picking with her hands and at bed covers, looks up and opens eyes in response to verbal stimuli, says few words - \"Please don't\" and \"Oh Lord\" Eyes: pupils equal 
ENMT: no nasal discharge, moist mucous membranes, edentulous Cardiovascular: regular rhythm, distal pulses intact Respiratory: mild tachypnea, oxygen at 2 liters via NC replaced Gastrointestinal: soft Genitourinary: wahl catheter in place Last bowel movement: none recorded Musculoskeletal: no deformity noted, no tenderness to palpation Skin: warm, dry Neurologic: alerts to name call, does not follow commands, not moving extremities Other: Wt Readings from Last 3 Encounters:  
03/30/21 51.3 kg (113 lb)  
11/15/19 56.7 kg (125 lb)  
08/28/19 56.7 kg (125 lb) Blood pressure (!) 168/76, pulse (!) 57, temperature 98.6 °F (37 °C), resp. rate 22, height 5' 2\" (1.575 m), weight 51.3 kg (113 lb), SpO2 94 %, not currently breastfeeding. Pain: 
Pain Scale 1: Adult Nonverbal Pain Scale Pain Intensity 1: 0 LAB AND IMAGING FINDINGS:  
 
Lab Results Component Value Date/Time WBC 3.6 (L) 03/31/2021 05:25 AM  
 HGB 9.7 (L) 03/31/2021 05:25 AM  
 PLATELET 083 97/37/4408 05:25 AM  
 
Lab Results Component Value Date/Time Sodium 149 (H) 03/31/2021 05:25 AM  
 Potassium 4.8 03/31/2021 05:25 AM  
 Chloride 118 (H) 03/31/2021 05:25 AM  
 CO2 21 03/31/2021 05:25 AM  
  (H) 03/31/2021 05:25 AM  
 Creatinine 4.21 (H) 03/31/2021 05:25 AM  
 Calcium 8.7 03/31/2021 05:25 AM  
 Magnesium 2.8 (H) 03/31/2021 05:25 AM  
 Phosphorus 3.2 02/09/2021 03:33 PM  
  
Lab Results Component Value Date/Time Alk. phosphatase 84 02/28/2014 04:26 PM  
 Protein, total 7.5 02/28/2014 04:26 PM  
 Albumin 3.5 02/09/2021 03:33 PM  
 Globulin 4.2 (H) 02/28/2014 04:26 PM  
 
No results found for: INR, PTMR, PTP, PT1, PT2, APTT, INREXT, INREXT Lab Results Component Value Date/Time Iron 61 02/09/2021 03:33 PM  
 TIBC 228 (L) 02/09/2021 03:33 PM  
 Iron % saturation 27 02/09/2021 03:33 PM  
 Ferritin 2,401 (H) 03/31/2021 05:25 AM  
  
No results found for: PH, PCO2, PO2 No components found for: [place] Lab Results Component Value Date/Time  CK 89 03/30/2021 05:00 PM  
 CK - MB 1.7 03/30/2021 05:00 PM  
  
 
   
 
Total time: 70 minutes 
 
> 50% counseling / coordination:  Time spent in direct consultation with the patient, medical team, and family Prolonged service was provided for  []30 min   []75 min in face to face time in the presence of the patient, spent as noted above. Time Start:  
Time End:

## 2021-03-31 NOTE — CONSULTS
Infectious Disease Consultation Note Reason: COVID-19 infection Current abx Prior abx Ceftriaxone, azithromycin since 3/31/2021 Lines:  
 
 
Assessment : 
 
80 y.o. female with PMHx of CKD stage IV, HTN, type II DM, anemia, GERD and dementia who presented to [facility] ED on 3/30/2021 with complaints of decreased appetite and decreased voiding Clinical presentation consistent with acute kidney injury in a patient with recent COVID-19 infection No definitive evidence of  COVID-19 pneumonia at this time. No definitive infiltrates noted on chest x-ray. 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 Nephrology follow-up appreciated Leukopenia-likely due to COVID-19 infection Recommendations: 1. Agree with IV Decadron 2. Continue ceftriaxone, azithromycin for now 3. Follow-up nephrology recommendations 4. Agree with palliative care consult to address goals of care since patient is at high risk of clinical deterioration Thank you for consultation request. Above plan was discussed in details with  dr Vania Elizondo. Please call me if any further questions or concerns. Will continue to participate in the care of this patient. HPI: 
 
80 y.o. female with PMHx of CKD stage IV, HTN, type II DM, anemia, GERD and dementia who presented to [facility] ED on 3/30/2021 with complaints of decreased appetite and decreased voiding Patient is unable to provide any history. I obtained history from review of records, talking to hospitalist, ED physician. History obtained from her daughter, multiple family members were diagnosed with Covid24. Patient was tested positive for Covid about 4 days ago. Before she got Covid, she was able to stand and walk a little bit with assistance, and talk a little bit and answer a few questions.   She did have some intermittent confusion, but since getting Covid, she has been increasingly weak, less responsive and confused. She has not noticed any changes in her breathing pattern and she has not had any shortness of breath. Her daughter also denies any other complaints. 
 In the ED, her vitals were notable for some bradycardia with heart rate in the 50s, some mildly elevated blood pressures, some mild tachypnea with RR in the low 20s, and SPO2 in the low to upper 90s on room air. Labs were notable for WBCs WNL, Hgb 11.1, sodium 148, glucose 138, , creatinine 4.31 (baseline 2.0), magnesium 3.2, troponin 0.09 and then 0.11, and NT proBNP 3695. A UA had greater than 1000 mg/dL protein, moderate blood, and was negative for nitrites, leuk esterase and bacteria. An EKG showed sinus bradycardia with first-degree AV block at 57 bpm.  CXR showed a right midlung and bibasilar streaky opacities, at least partly representing atelectasis, with superimposed infiltrate also possible. Renal ultrasound showed chronic medical renal disease and right renal cysts. Rapid Covid19 testing was positive. she was treated with IV steroids and IVF. she admitted for Covid19 nephropathy and DIDIER on CKD stage IV. I have been consulted for further recommendations. Patient attempts to communicate. However unable to communicate effectively. Detailed review of systems not feasible Past Medical History:  
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  
 left shoulder  GERD (gastroesophageal reflux disease)  Heart murmur  Hyperkalemia 2011  Hypertension  Lumbago  Pain in joint, lower leg  Pain in joint, multiple sites Past Surgical History:  
Procedure Laterality Date  HX BLADDER REPAIR    
 prolapsed  HX CATARACT REMOVAL  1991  
 HX CATARACT REMOVAL  1990  
 HX CHOLECYSTECTOMY  5/1998  HX HERNIA REPAIR  7/1998  
 x2 [address] [facility] [address]  HX OOPHORECTOMY home Medication List  
 Details  
cloNIDine HCl (CATAPRES) 0.1 mg tablet Take 1 Tab by mouth every twelve (12) hours as needed (sweating, restlessness, hot flashes - PRN opiate withdrawl) for up to 12 doses. Qty: 12 Tab, Refills: 0 Associated Diagnoses: Encounter for long-term (current) use of high-risk medication  
  
promethazine (PHENERGAN) 12.5 mg tablet Take 1 Tab by mouth every eight (8) hours as needed for Nausea (nausea, vomiting - PRN opiate withdrawl) for up to 12 doses. Qty: 12 Tab, Refills: 0 Associated Diagnoses: Encounter for long-term (current) use of high-risk medication  
  
senna (SENNA) 8.6 mg tablet Take 1 Tab by mouth daily as needed for Constipation. Qty: 30 Tab, Refills: 2 Associated Diagnoses: Encounter for long-term (current) use of high-risk medication  
  
mv-min/iron/folic/calcium/vitK (WOMEN'S MULTIVITAMIN PO) Take 1 Tab by mouth daily. hydrocortisone (PROCTOZONE-HC) 2.5 % rectal cream Insert  into rectum four (4) times daily. Qty: 30 g, Refills: 0 Associated Diagnoses: External hemorrhoid  
  
polyethylene glycol (MIRALAX) 17 gram packet Take 1 Packet by mouth daily. Qty: 30 Packet, Refills: 11 CONTOUR NEXT TEST STRIPS strip USE TO TEST BLOOD SUGAR D Refills: 3  
  
cyanocobalamin, vitamin B-12, 2,500 mcg chew Take 2,500 mcg by mouth daily. cholecalciferol, vitamin D3, (VITAMIN D3) 2,000 unit tab Take 1 Cap by mouth daily. menthol (BIOFREEZE, MENTHOL,) 4 % gel Apply generous amount to affected area up to 3 times daily. Qty: 1 Tube, Refills: 3 Associated Diagnoses: Cervicalgia; Chronic pain of left knee; Chronic bilateral low back pain, with sciatica presence unspecified; Chronic pain syndrome naloxone (NARCAN) 4 mg/actuation nasal spray Use 1 spray intranasally, then discard. Repeat with new spray every 2 min as needed for opioid overdose symptoms, alternating nostrils. Indications: OPIATE-INDUCED RESPIRATORY DEPRESSION, Opioid Toxicity Qty: 1 Each, Refills: 0 Associated Diagnoses: High risk medication use  
  
pantoprazole (PROTONIX) 40 mg tablet Take 40 mg by mouth daily. amlodipine (NORVASC) 10 mg tablet Take 10 mg by mouth daily. furosemide (LASIX) 20 mg tablet Take 20 mg by mouth two (2) times a week. quetiapine (SEROQUEL) 100 mg tablet Take 200 mg by mouth nightly. donepezil (ARICEPT) 10 mg tablet Take 10 mg by mouth nightly. Current Facility-Administered Medications Medication Dose Route Frequency  sodium chloride (NS) flush 5-40 mL  5-40 mL IntraVENous Q8H  
 sodium chloride (NS) flush 5-40 mL  5-40 mL IntraVENous PRN  
 acetaminophen (TYLENOL) tablet 650 mg  650 mg Oral Q6H PRN Or  
 acetaminophen (TYLENOL) suppository 650 mg  650 mg Rectal Q6H PRN  polyethylene glycol (MIRALAX) packet 17 g  17 g Oral DAILY PRN  promethazine (PHENERGAN) tablet 12.5 mg  12.5 mg Oral Q6H PRN Or  
 ondansetron (ZOFRAN) injection 4 mg  4 mg IntraVENous Q6H PRN  
 heparin (porcine) injection 5,000 Units  5,000 Units SubCUTAneous Q8H  
 dexamethasone (DECADRON) 4 mg/mL injection 6 mg  6 mg IntraVENous Q24H  
 ascorbic acid (vitamin C) (VITAMIN C) tablet 500 mg  500 mg Oral BID  cholecalciferol (VITAMIN D3) capsule 5,000 Units  5,000 Units Oral DAILY  zinc sulfate (ZINCATE) 50 mg zinc (220 mg) capsule 1 Cap  1 Cap Oral DAILY  melatonin tablet 3 mg  3 mg Oral QHS  azithromycin (ZITHROMAX) 500 mg in 0.9% sodium chloride 250 mL (VIAL-MATE)  500 mg IntraVENous Q24H  cefTRIAXone (ROCEPHIN) 1 g in sterile water (preservative free) 10 mL IV syringe  1 g IntraVENous Q24H  
 0.45% sodium chloride infusion  100 mL/hr IntraVENous CONTINUOUS  
 
 Allergies: Celebrex [celecoxib], Codeine, Flexeril [cyclobenzaprine], Macrobid [nitrofurantoin monohyd/m-cryst], Pcn [penicillins], Remeron [mirtazapine], Sulfa (sulfonamide antibiotics), and Vicodin [hydrocodone-acetaminophen] Family History Problem Relation Age of Onset  Diabetes Other  Hypertension Other  Stroke Other  Diabetes Mother Social History Socioeconomic History  Marital status: SINGLE Spouse name: Not on file  Number of children: Not on file  Years of education: Not on file  Highest education level: Not on file Occupational History  Not on file Social Needs  Financial resource strain: Not on file  Food insecurity Worry: Not on file Inability: Not on file  Transportation needs Medical: Not on file Non-medical: Not on file Tobacco Use  Smoking status: Never Smoker  Smokeless tobacco: Never Used Substance and Sexual Activity  Alcohol use: No  
 Drug use: No  
 Sexual activity: Never Lifestyle  Physical activity Days per week: Not on file Minutes per session: Not on file  Stress: Not on file Relationships  Social connections Talks on phone: Not on file Gets together: Not on file Attends Sikh service: Not on file Active member of club or organization: Not on file Attends meetings of clubs or organizations: Not on file Relationship status: Not on file  Intimate partner violence Fear of current or ex partner: Not on file Emotionally abused: Not on file Physically abused: Not on file Forced sexual activity: Not on file Other Topics Concern  Not on file Social History Narrative  Not on file Social History Tobacco Use Smoking Status Never Smoker Smokeless Tobacco Never Used Temp (24hrs), Av.4 °F (36.9 °C), Min:97.4 °F (36.3 °C), Max:99.4 °F (37.4 °C) Visit Vitals BP (!) 166/60 (BP 1 Location: Left upper arm, BP Patient Position: At rest) Pulse (!) 57 Temp 97.9 °F (36.6 °C) Resp 24 Wt 51.3 kg (113 lb) SpO2 93% Breastfeeding No  
BMI 20.67 kg/m² ROS: Unable to obtain due to patient factors Physical Exam: 
 
  
General:   Ill-appearing, easily arousable, appears frail, appears stated age. Head: Normocephalic, without obvious abnormality, atraumatic. Eyes:  Conjunctivae/corneas clear. EOMs intact. Nose: Nares normal. No drainage or sinus tenderness. Neck: Supple, symmetrical, trachea midline, no JVD. Lungs:     Bilateral chest movements equal, no audible wheezes Heart:  Regular rate and rhythm on monitor Abdomen: Soft, non-tender. No guarding, no rigidity, no organomegaly Extremities: Extremities normal, atraumatic, no cyanosis or edema. Pulses: 2+ and symmetric all extremities. Skin:  No rashes or lesions Neurologic:   Attempts to answer questions, moves all 4 extremities. No gross motor or sensory deficits Labs: Results:  
Chemistry Recent Labs  
  03/31/21 
0525 03/30/21 
1356 * 138* * 148* K 4.8 4.4  
* 113* CO2 21 25 * 160* CREA 4.21* 4.31* CA 8.7 10.2* AGAP 10 10 BUCR 38* 37* CBC w/Diff Recent Labs  
  03/31/21 
0525 03/30/21 
1356 WBC 3.6* 5.4  
RBC 3.50* 3.97* HGB 9.7* 11.1*  
HCT 30.7* 36.0  238 GRANS 58 63 LYMPH 29 26 EOS 0 0 Microbiology No results for input(s): CULT in the last 72 hours. RADIOLOGY: 
 
All available imaging studies/reports in [facility] for this admission were reviewed Disclaimer: Sections of this note are dictated utilizing voice recognition software, which may have resulted in some phonetic based errors in grammar and contents. Even though attempts were made to correct all the mistakes, some may have been missed, and remained in the body of the document. If questions arise, please contact our department.  
 
Dr. Jonathon Cowart, Infectious Disease Specialist 
[phone] March 31, 2021 
9:32 AM

## 2021-03-31 NOTE — PROGRESS NOTES
Comprehensive Nutrition Assessment Type and Reason for Visit: Initial, Positive nutrition screen Nutrition Recommendations/Plan: - Monitor ability to tolerate oral diet vs need for enteral nutrition support. - Change to NPO until pt evaluated by SLP. - IVF per MD. 
 
Nutrition Assessment:  Admitted with COVID-19 and DIDIER. Nephrology suggesting palliative care consult with poor prognosis. Renal diet with Ensure Enlive TID being held along with po medications 2/2 pt pocketing food in mouth, awaiting SLP evaluation. Hypernatremia on IVF. Malnutrition Assessment: 
Malnutrition Status: At risk for malnutrition (specify)(dementia, pocketing foods in mouth, poor po intake) Nutrition History and Allergies: PMHx- CKD stage IV, HTN, DM, anemia, GERD & dementia. Presented to ED with COVID-19, pts daughter reported pt not getting enough liquids or voiding well. Pt unable to provide nutritional hx. Per chart UBW of 125# (11/15/19), 113# (3/30/21). NKFA. Estimated Daily Nutrient Needs: 
Energy (kcal): 6852-0976; Weight Used for Energy Requirements: Current(51 kg) Protein (g): 41-51; Weight Used for Protein Requirements: Current(0.8-1) Fluid (ml/day): 9114-9297; Method Used for Fluid Requirements: 1 ml/kcal 
 
Nutrition Related Findings:  BM 3/31- soft. 1/2 NS at 100 mL/hr, vitamin C, D3 & zinc.   
 
Wounds:   
None Current Nutrition Therapies: DIET RENAL Regular DIET NUTRITIONAL SUPPLEMENTS All Meals; Ensure [employer] Anthropometric Measures: 
· Height:  5' 2\" (157.5 cm) · Current Body Wt:  51.3 kg (113 lb 1.5 oz) · Admission Body Wt:  113 lb 1.5 oz · Usual Body Wt:  56.7 kg (125 lb)(11/15/19) · Ideal Body Wt:  110 lbs:  102.8 % · BMI Category:  Underweight (BMI less than 22) age over 72 Nutrition Diagnosis:  
· Inadequate oral intake related to cognitive or neurological impairment, swallowing difficulty, renal dysfunction as evidenced by (meals held awaiting SLP evaluation) Nutrition Interventions:  
Food and/or Nutrient Delivery: Modify current diet, IV fluid delivery Nutrition Education and Counseling: Education not indicated Coordination of Nutrition Care: Continue to monitor while inpatient, Swallow evaluation(pt discussed with RN) Goals: 1. Provide nutrition intervention as appropriate with goals of care for the next 7 days. 2. Nutritional needs will be met through adequate oral intake or nutrition support within the next 7 days. Nutrition Monitoring and Evaluation:  
Behavioral-Environmental Outcomes: None identified Food/Nutrient Intake Outcomes: Diet advancement/tolerance, IVF intake Physical Signs/Symptoms Outcomes: Biochemical data, Chewing or swallowing, GI status, Meal time behavior, Nutrition focused physical findings Discharge Planning: Too soon to determine Electronically signed by Laisha Hopkins RD on 3/31/2021 at 11:19 AM 
 
Contact: [phone]

## 2021-03-31 NOTE — ROUTINE PROCESS
TRANSFER - IN REPORT: 
 
Verbal report received from Josh López RN(name) on Ingrid Alford  being received from [facility](unit) for routine progression of care Report consisted of patients Situation, Background, Assessment and  
Recommendations(SBAR). Information from the following report(s) SBAR, Kardex, Intake/Output, MAR and Recent Results was reviewed with the receiving nurse. Opportunity for questions and clarification was provided. Assessment completed upon patients arrival to unit and care assumed. Patient arrival with [facility]. VS taken.

## 2021-03-31 NOTE — PROGRESS NOTES
Bedside handoff report received from [facility] (offgoing RN) by Michel Fairchild (oncoming RN) to include SBAR, Kardex, MAR, Results Review, I/O's, Summary of Care, and Cardiac Rhythm SB. Patient resting in bed, eyes closed, call light within reach. 1408: Patient took out PIV. Will attempt new access. Patient rounds, meds given as per [facility], p.o meds held due to patient with poor swallow, patient pocketing food and spitting it out, speech eval pending. Head to toe assessment completed. Discussed patient with dietician. Palliative care consult pending. 1045: Palliative care at patient's bedside. Called and left message for speech therapist in regards to patient evaluation. 1120: Attempted PIV x 2 with Gracy Zamora RN. Unsuccessful. Notified RN Supervisor to attempt PIV access. Unable to give ordered fluids until PIV access is regained. 1215: Discussed patient with  via phone, informed him that patient is NPO pending speech eval and p.o meds are being held at this time. 1238: PIV access regained. Fluids initiated as per order. 1300: Patient rounds, patient on zoom call with family. 1540: Patient rounds, meds given as per [facility], reassessment completed.  
 
1732: Patient rounds, meds given as per [facility].   
 
1930: Bedside handoff report given to [facility] (oncoming RN) by Michel Fairchild (offgoing RN) to include SBAR, Kardex, MAR, Results Review, I/O's, Summary of Care, and Cardiac Rhythm SB.

## 2021-03-31 NOTE — PROGRESS NOTES
addressed Advance Medical Directives. Patient has a scanned Advance Medical Directive in the chart. Chaplains will continue to follow and will provide pastoral care as needed or requested. Esther [facility]  
[phone]

## 2021-03-31 NOTE — ACP (ADVANCE CARE PLANNING)
Advanced Steps Advance Care Planning Conversation [facility] (Physician Orders for Scope of Treatment) Date of conversation: 3/31/2021   Location: [facility]  
Length (minutes): 30  
 
 
CODE STATUS- FULL CODE/FULL INTERVENTIONS Participants: 
 [x] Patient [x] Healthcare agent (already designated in existing ACP document) Name: Clarence Canchola Relationship to Patient:daughter Phone number: [phone] [x] Other Surrogate Decision Maker / Next of Kin Name: Sharon Gamboa Relationship to Patient:daughter Phone Number: [phone] Other persons present: 
 Name: Ion Overton [phone]  Relationship to patient: daughter Advanced Steps® ACP Facilitator: Elizabet Gonzales RN, Collette Florida, NP Conversation Topics Ms. Linda Garcia is a 80year old female with medical history of CKD stage IV, HTN, type II DM, anemia, GERD and dementia who presented to [facility] ED with complaints of decreased appetite and decreased voiding after being diagnosed with Covid19 4 days ago. Pt was seen at bedside with proper PPE. Pt was alerted to verbal stimuli. Only able to put a few words together and shake her head or grunt yes/no answers. Ms Luis Eduardo Rouse has and Advance Medical Directive on file. It names her daughter Clarence Canchola as primary Healthcare decision maker. Palliative team members Collette Florida, NP and this writer placed call to pt's daughter Briseyda Pruett. She was able to conference in her two other sister, Billy Herrera and Sharon Gamboa onto the call. Family shared that pt had been living at home with Briseyda Pruett as main caregiver with assist from [place] and [place]. Pt was able to stand and walk a little bit with assistance, and talk a little bit and answer a few questions. She did have some intermittent confusion. Per daughter pt had had a Dx of dementia for approx 13 years.   They have noticed her decline increased over the past month and since Covid DX, she has been increasingly weak, less responsive and confused. Up until last month pt was able to use a spoon to feed herself and hold a cup. They did share pt had difficulty with chewing and was pocketing food. Palliative team reviewed with family the benefits and burdens of CPR and intubation in a patient as Ms. Cathy Canales with advanced age, dementia, chronic disease, CKD stage 4 and now COVID 23. Family was not able to provide any decision on CODE STATUS currently. Ms Pia Adrian stated that this topic had been discussed with her by other medical team personnel and  \"I just had to walk away for the phone\". She expressed that the topic was \"Bringing her down, but glad that her other sisters were able to hear the information\". Palliative team did not continue goals of care discussion at this time. Team will allow family the time to speak among themselves with a plan for follow up. Provided family with phone contact for Palliative Department. Needs to discuss with spiritual/Mormon advisor: [] Yes  [x] No 
 
Needs more information about illness and complications:  [x] Yes  [] No 
 
 
Cardiopulmonary Resuscitation Order Elected for CPR:  [x]  Attempt Resuscitation []  Do Not Attempt Resuscitation The following was provided (check all that apply): Healthcare Decision Information Cards: 
 [x] Help with Breathing Facts 
 [] Tube Feeding Facts [x] CPR Facts [x] Review of existing Advance Directive 
[] Assistance with Completion of New Advance Directive  
[] Review of [place] POST Form Meeting Outcomes: 
 [] ACP discussion completed 
 [] [place] form completed 
[] [place] prepared for Provider review and signature 
 [] Original placed on Chart, if in facility (form to be sent with patient at discharge) [] Copy given to healthcare agent  
 [] Copy scanned to electronic medical record If ACP discussion not completed, last interview topic discussed: see below Follow-up plan:   
 [x] Schedule follow-up conversation to continue planning 
 [] Referred individual to Provider for additional questions/concerns  
 
 
[] This note routed to one or more involved healthcare providers Vance MORENON, RN, [facility] Palliative Medicine Inpatient RN Palliative [place] Line: [phone]

## 2021-03-31 NOTE — ED NOTES
Pt daughter reports pt takes seroquel to help her relax at night. Concerned for pt to receive medication.

## 2021-03-31 NOTE — PROGRESS NOTES
Problem: Dysphagia (Adult) Goal: *Acute Goals and Plan of Care (Insert Text) Description: Patient will: 1. Tolerate PO trials with 0 s/s overt distress in 4/5 trials 2. Utilize compensatory swallow strategies/maneuvers (decrease bite/sip, size/rate, alt. liq/sol) with min cues in 4/5 trials Recommend:  
NPO; consider comfort feeds vs alternative nutrition/hyration Strict aspiration precautions (HOB >30 degrees at all times, Oral care TID) Outcome: Progressing Towards Goal 
 
SPEECH LANGUAGE PATHOLOGY BEDSIDE SWALLOW EVALUATION Patient: Charanjit Adams (42 y.o. female) Date: 3/31/2021 Primary Diagnosis: DIDIER (acute kidney injury) ([facility] [address] [N17.9] COVID-19 [U07.1] Precautions: Aspiration PLOF: Unknown ASSESSMENT : 
Based on the objective data described below, the patient presents with severe oral and suspected severe pharyngeal dysphagia. Pt alert but confused, unable to follow commands. Oral mech exam revealed decreased orolingual strength/control and restricted mandible. Pt with minimal/absent bolus propulsion with puree, HTL and ice chip presentation. No pharyngeal swallow elicited despite max verbal/tactile stim/cues. Pt demo inadequate sensorimotor safety awareness/control for safe/efficient po intake. Recommend NPO; consider comfort feeds vs alternative nutrition/hydration source. Will continue to follow for further dysphagia management. D/w pt and RN. Patient will benefit from skilled intervention to address the above impairments. Patient's rehabilitation potential is considered to be Guarded Factors which may influence rehabilitation potential include:  
[]            None noted [x]            Mental ability/status [x]            Medical condition []            Home/family situation and support systems [x]            Safety awareness 
[]            Pain tolerance/management []            Other: PLAN : 
Recommendations and Planned Interventions: As above Frequency/Duration: Patient will be followed by speech-language pathology 1-2 times per day/4-7 days per week to address goals. Discharge Recommendations: To Be Determined SUBJECTIVE:  
Patient stated no OBJECTIVE:  
 
Past Medical History:  
Diagnosis Date Anxiety Brachial neuritis or radiculitis NOS Cervicalgia Cervicalgia COVID-19 Dementia ([facility] [address] Diabetes (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  
 left shoulder GERD (gastroesophageal reflux disease) Heart murmur Hyperkalemia 2011 Hypertension Lumbago Pain in joint, lower leg Pain in joint, multiple sites Past Surgical History:  
Procedure Laterality Date HX BLADDER REPAIR    
 prolapsed [address] HX CHOLECYSTECTOMY  5/1998 HX HERNIA REPAIR  7/1998  
 x2 [address] HX OOPHORECTOMY Prior Level of Function/Home Situation: 
Home Situation Home Environment: Private residence One/Two Story Residence: Other (Comment) Living Alone: No 
Support Systems: Family member(s) Patient Expects to be Discharged to[de-identified] Skilled nursing facility Current DME Used/Available at Home: Other (comment) Diet prior to admission: Unknown Current Diet:  NPO Cognitive and Communication Status: 
Neurologic State: Alert, Eyes open spontaneously Orientation Level: Unable to verbalize Cognition: No command following, Poor safety awareness, Decreased attention/concentration Oral Assessment: 
Oral Assessment Labial: No impairment Dentition: Edentulous Oral Hygiene: Good Lingual: Decreased rate;Decreased strength Velum: Unable to visualize Mandible: Restricted P.O. Trials: 
Patient [place] [facility] Vocal quality prior to P.O.: Low volume Consistency Presented: Ice chips;Puree; Honey thick liquid How Presented: SLP-fed/presented;Cup/sip;Spoon Bolus Acceptance: Impaired Bolus Formation/Control: Impaired Type of Impairment: Delayed; Incomplete;Posterior;Poor;Mastication Propulsion: Delayed (# of seconds); Discoordination Oral Residue: Greater than 50% of bolus; Lingual 
Initiation of Swallow: Absent Laryngeal Elevation: Absent Aspiration Signs/Symptoms: None(High risk ) Pharyngeal Phase Characteristics: (No pharyngeal swallow elicited ) Effective Modifications: None Cues for Modifications: Maximal 
Oral Phase Severity: Severe Pharyngeal Phase Severity : Severe PAIN: 
Start of Eval: unable to verbalize End of Eval: unable to verbalize After treatment:  
[]            Patient left in no apparent distress sitting up in chair 
[x]            Patient left in no apparent distress in bed 
[x]            Call bell left within reach [x]            Nursing notified []            Family present 
[]            Caregiver present 
[]            Bed alarm activated COMMUNICATION/EDUCATION:  
[x]            Aspiration precautions; swallow safety; compensatory techniques. []            Patient/family have participated as able in goal setting and plan of care. []            Patient/family agree to work toward stated goals and plan of care. []            Patient understands intent and goals of therapy; neutral about participation. [x]            Patient unable to participate in goal setting/plan of care; educ ongoing with interdisciplinary staff [x]         Posted safety precautions in patient's room. Thank you for this referral, Montserrat Tripp M.S., CCC-SLP Speech-Language Pathologist

## 2021-03-31 NOTE — PROGRESS NOTES
helped the patient Tu Tejada, who is a 80 y.o.,female, connect with the family with Zoom Video Connection. Assessment: 
There are no further spiritual or Hindu issues which require Spiritual Care Services interventions at this time. Plan: 
Chaplains will continue to follow and will provide pastoral care on an as needed/requested basis. [facility]

## 2021-03-31 NOTE — ED NOTES
Attempted to call report to [facility], was asked to call back in 5-10 minutes. Will attempt again.

## 2021-04-01 NOTE — PROGRESS NOTES
[facility] Hospitalists Progress Note Patient: Jami Phan Age: 80 y.o. : 1922 MR#: [id number] SSN: [id number] Date: 2021 Subjective/24-hour events:  
 
Remains NPO per SLP recs. No new clinical changes, however - respiratory status stable, patient without hypoxia and worsening SOB. Assessment:  
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  
IVF per nephrology - hydration continued but rate changed to 50 ml/hr. CT chest per ID recommendations. If no evidence for pneumonia will decrease/discontinue steroid therapy given azotemia/acute renal issues. Continue rocephin and azithromycin for now. Vitamin/mineral supplementation - C, D, zinc. 
Family meeting held yesterday with palliative medicine. Patient remains full code with full interventions. May need to readdress goals of care depending on clinical course, especially if there is lack of improvement in renal function and/or deterioration in respiratory status. Follow. Continue supportive care o/w. Case discussed with:  []Patient  []Family  [x]Nursing  [x]Case Management DVT Prophylaxis:  []Lovenox  [x]Hep SQ  []SCDs  []Coumadin   []On Heparin gtt Objective:  
VS:  
Visit Vitals BP (!) 164/57 (BP 1 Location: Left upper arm, BP Patient Position: At rest) Pulse (!) 54 Temp 97.5 °F (36.4 °C) Resp 20 Ht 5' 2\" (1.575 m) Wt 51.3 kg (113 lb 3.2 oz) SpO2 93% Breastfeeding No  
BMI 20.70 kg/m² Tmax/24hrs: Temp (24hrs), Av.5 °F (36.4 °C), Min:96.6 °F (35.9 °C), Max:98.2 °F (36.8 °C) No intake or output data in the 24 hours ending 21 1355 General:  Appears comfortable, In NAD. Nontoxic-appearing. Cardiovascular:  RRR. Pulmonary:  Lungs clear bilaterally, no wheezes.   
GI:  Abdomen soft, NTTP. Extremities:  Warm, no edema or ischemia. Neuro:  Sleeping but arousable. Confused. Moves extremities spontaneously. Labs:   
Recent Results (from the past 24 hour(s)) METABOLIC PANEL, BASIC Collection Time: 04/01/21  6:35 AM  
Result Value Ref Range Sodium 150 (H) 136 - 145 mmol/L Potassium 4.6 3.5 - 5.5 mmol/L Chloride 120 (H) 100 - 111 mmol/L  
 CO2 19 (L) 21 - 32 mmol/L Anion gap 11 3.0 - 18 mmol/L Glucose 113 (H) 74 - 99 mg/dL  (H) 7.0 - 18 MG/DL Creatinine 4.28 (H) 0.6 - 1.3 MG/DL  
 BUN/Creatinine ratio 37 (H) 12 - 20 GFR est AA 12 (L) >60 ml/min/1.73m2 GFR est non-AA 10 (L) >60 ml/min/1.73m2 Calcium 8.5 8.5 - 10.1 MG/DL  
CBC WITH AUTOMATED DIFF Collection Time: 04/01/21  6:35 AM  
Result Value Ref Range WBC 8.4 4.6 - 13.2 K/uL  
 RBC 3.56 (L) 4.20 - 5.30 M/uL HGB 9.8 (L) 12.0 - 16.0 g/dL HCT 31.1 (L) 35.0 - 45.0 % MCV 87.4 74.0 - 97.0 FL  
 MCH 27.5 24.0 - 34.0 PG  
 MCHC 31.5 31.0 - 37.0 g/dL  
 RDW 13.5 11.6 - 14.5 % PLATELET 967 798 - 036 K/uL MPV 10.8 9.2 - 11.8 FL  
 NEUTROPHILS PENDING % LYMPHOCYTES PENDING % MONOCYTES PENDING % EOSINOPHILS PENDING % BASOPHILS PENDING %  
 ABS. NEUTROPHILS PENDING K/UL  
 ABS. LYMPHOCYTES PENDING K/UL  
 ABS. MONOCYTES PENDING K/UL  
 ABS. EOSINOPHILS PENDING K/UL  
 ABS. BASOPHILS PENDING K/UL  
 DF PENDING   
D DIMER Collection Time: 04/01/21  6:35 AM  
Result Value Ref Range D DIMER 2.58 (H) <0.46 ug/ml(FEU) FERRITIN Collection Time: 04/01/21  6:35 AM  
Result Value Ref Range Ferritin 2,471 (H) 8 - 388 NG/ML  
C REACTIVE PROTEIN, QT Collection Time: 04/01/21  6:35 AM  
Result Value Ref Range C-Reactive protein 1.3 (H) 0 - 0.3 mg/dL Signed By: [place] MD   
 April 1, 2021

## 2021-04-01 NOTE — PROGRESS NOTES
Infectious Disease progress note Reason: COVID-19 infection Current abx Prior abx Ceftriaxone, azithromycin since 3/31/2021 Lines:  
 
 
Assessment : 
 
80 y.o. female with PMHx of CKD stage IV, HTN, type II DM, anemia, GERD and dementia who presented to [facility] ED on 3/30/2021 with complaints of decreased appetite and decreased voiding Clinical presentation consistent with acute kidney injury in a patient with recent COVID-19 infection No definitive evidence of  COVID-19 pneumonia at this time. No definitive infiltrates noted on chest x-ray. 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 Nephrology follow-up appreciated Leukopenia-likely due to COVID-19 infection Recommendations: 1. Obtain CT chest to evaluate for infiltrates & need for decadron since hypoxia could be due to volume overload 2. Discontinue ceftriaxone, azithromycin if no definitive evidence of bacterial pneumonia noted on subsequent clinical/radiological exam 
3. Follow-up nephrology recommendations 4. Agree with palliative care consult to address goals of care since patient is at high risk of clinical deterioration Above plan was discussed in details with  dr Leyda Baker, . Wong Bowling. Please call me if any further questions or concerns. Will continue to participate in the care of this patient. HPI: 
 
Patient attempts to communicate. However unable to communicate effectively. Detailed review of systems not feasible 
 
 
home Medication List  
 Details  
cloNIDine HCl (CATAPRES) 0.1 mg tablet Take 1 Tab by mouth every twelve (12) hours as needed (sweating, restlessness, hot flashes - PRN opiate withdrawl) for up to 12 doses. Qty: 12 Tab, Refills: 0  Associated Diagnoses: Encounter for long-term (current) use of high-risk medication  
  
promethazine (PHENERGAN) 12.5 mg tablet Take 1 Tab by mouth every eight (8) hours as needed for Nausea (nausea, vomiting - PRN opiate withdrawl) for up to 12 doses. Qty: 12 Tab, Refills: 0 Associated Diagnoses: Encounter for long-term (current) use of high-risk medication  
  
senna (SENNA) 8.6 mg tablet Take 1 Tab by mouth daily as needed for Constipation. Qty: 30 Tab, Refills: 2 Associated Diagnoses: Encounter for long-term (current) use of high-risk medication  
  
mv-min/iron/folic/calcium/vitK (WOMEN'S MULTIVITAMIN PO) Take 1 Tab by mouth daily. hydrocortisone (PROCTOZONE-HC) 2.5 % rectal cream Insert  into rectum four (4) times daily. Qty: 30 g, Refills: 0 Associated Diagnoses: External hemorrhoid  
  
polyethylene glycol (MIRALAX) 17 gram packet Take 1 Packet by mouth daily. Qty: 30 Packet, Refills: 11 CONTOUR NEXT TEST STRIPS strip USE TO TEST BLOOD SUGAR D Refills: 3  
  
cyanocobalamin, vitamin B-12, 2,500 mcg chew Take 2,500 mcg by mouth daily. cholecalciferol, vitamin D3, (VITAMIN D3) 2,000 unit tab Take 1 Cap by mouth daily. menthol (BIOFREEZE, MENTHOL,) 4 % gel Apply generous amount to affected area up to 3 times daily. Qty: 1 Tube, Refills: 3 Associated Diagnoses: Cervicalgia; Chronic pain of left knee; Chronic bilateral low back pain, with sciatica presence unspecified; Chronic pain syndrome  
  
naloxone (NARCAN) 4 mg/actuation nasal spray Use 1 spray intranasally, then discard. Repeat with new spray every 2 min as needed for opioid overdose symptoms, alternating nostrils. Indications: OPIATE-INDUCED RESPIRATORY DEPRESSION, Opioid Toxicity Qty: 1 Each, Refills: 0 Associated Diagnoses: High risk medication use  
  
pantoprazole (PROTONIX) 40 mg tablet Take 40 mg by mouth daily. amlodipine (NORVASC) 10 mg tablet Take 10 mg by mouth daily. furosemide (LASIX) 20 mg tablet Take 20 mg by mouth two (2) times a week. quetiapine (SEROQUEL) 100 mg tablet Take 200 mg by mouth nightly. donepezil (ARICEPT) 10 mg tablet Take 10 mg by mouth nightly. Current Facility-Administered Medications Medication Dose Route Frequency  sodium chloride (NS) flush 5-40 mL  5-40 mL IntraVENous Q8H  
 sodium chloride (NS) flush 5-40 mL  5-40 mL IntraVENous PRN  
 acetaminophen (TYLENOL) tablet 650 mg  650 mg Oral Q6H PRN Or  
 acetaminophen (TYLENOL) suppository 650 mg  650 mg Rectal Q6H PRN  polyethylene glycol (MIRALAX) packet 17 g  17 g Oral DAILY PRN  promethazine (PHENERGAN) tablet 12.5 mg  12.5 mg Oral Q6H PRN Or  
 ondansetron (ZOFRAN) injection 4 mg  4 mg IntraVENous Q6H PRN  
 heparin (porcine) injection 5,000 Units  5,000 Units SubCUTAneous Q8H  
 dexamethasone (DECADRON) 4 mg/mL injection 6 mg  6 mg IntraVENous Q24H  
 ascorbic acid (vitamin C) (VITAMIN C) tablet 500 mg  500 mg Oral BID  cholecalciferol (VITAMIN D3) capsule 5,000 Units  5,000 Units Oral DAILY  zinc sulfate (ZINCATE) 50 mg zinc (220 mg) capsule 1 Cap  1 Cap Oral DAILY  melatonin tablet 3 mg  3 mg Oral QHS  azithromycin (ZITHROMAX) 500 mg in 0.9% sodium chloride 250 mL (VIAL-MATE)  500 mg IntraVENous Q24H  cefTRIAXone (ROCEPHIN) 1 g in sterile water (preservative free) 10 mL IV syringe  1 g IntraVENous Q24H  
 amLODIPine (NORVASC) tablet 10 mg  10 mg Oral DAILY  hydrALAZINE (APRESOLINE) 20 mg/mL injection 10 mg  10 mg IntraVENous Q6H PRN  
 0.45% sodium chloride infusion  125 mL/hr IntraVENous CONTINUOUS Allergies: Celebrex [celecoxib], Codeine, Flexeril [cyclobenzaprine], Macrobid [nitrofurantoin monohyd/m-cryst], Pcn [penicillins], Remeron [mirtazapine], Sulfa (sulfonamide antibiotics), and Vicodin [hydrocodone-acetaminophen] Temp (24hrs), Av.7 °F (36.5 °C), Min:96.6 °F (35.9 °C), Max:98.6 °F (37 °C) Visit Vitals BP (!) 155/55 Pulse 64 Temp 97.7 °F (36.5 °C) Resp 20 Ht 5' 2\" (1.575 m) Wt 51.3 kg (113 lb 3.2 oz) SpO2 92% Breastfeeding No  
BMI 20.70 kg/m²  
 
 
ROS: Unable to obtain due to patient factors Physical Exam: 
 
  
General:   Ill-appearing, easily arousable, appears frail, appears stated age. Head: Normocephalic, without obvious abnormality, atraumatic. Eyes:  Conjunctivae/corneas clear. EOMs intact. Nose: Nares normal. No drainage or sinus tenderness. Neck: Supple, symmetrical, trachea midline, no JVD. Lungs:     Bilateral chest movements equal, no audible wheezes Heart:  Regular rate and rhythm on monitor Abdomen: Soft, non-tender. No guarding, no rigidity, no organomegaly Extremities: Extremities normal, atraumatic, no cyanosis or edema. Pulses: 2+ and symmetric all extremities. Skin:  No rashes or lesions Neurologic:   Attempts to answer questions, moves all 4 extremities. No gross motor or sensory deficits Labs: Results:  
Chemistry Recent Labs 04/01/21 
8515 03/31/21 
0525 03/30/21 
1356 * 159* 138* * 149* 148* K 4.6 4.8 4.4  
* 118* 113* CO2 19* 21 25 * 160* 160* CREA 4.28* 4.21* 4.31* CA 8.5 8.7 10.2* AGAP 11 10 10 BUCR 37* 38* 37* CBC w/Diff Recent Labs 04/01/21 
1891 03/31/21 
0525 03/30/21 
1356 WBC 8.4 3.6* 5.4  
RBC 3.56* 3.50* 3.97* HGB 9.8* 9.7* 11.1*  
HCT 31.1* 30.7* 36.0  268 238 GRANS PENDING 58 63 LYMPH PENDING 29 26 EOS PENDING 0 0 Microbiology No results for input(s): CULT in the last 72 hours. RADIOLOGY: 
 
All available imaging studies/reports in [facility] care for this admission were reviewed High complexity decision making was performed during the evaluation of this patient at high risk for decompensation with multiple organ involvement Disclaimer: Sections of this note are dictated utilizing voice recognition software, which may have resulted in some phonetic based errors in grammar and contents.  Even though attempts were made to correct all the mistakes, some may have been missed, and remained in the body of the document. If questions arise, please contact our department. Dr. Janet Abdalla, Infectious Disease Specialist 
[phone] April 1, 2021 
9:32 AM

## 2021-04-01 NOTE — PROGRESS NOTES
helped the patient Kamini Suárez, who is a 80 y.o.,female, connect with the family with Zoom Video Connection. Assessment: 
There are no further spiritual or Anabaptism issues which require Spiritual Care Services interventions at this time. Plan: 
Chaplains will continue to follow and will provide pastoral care on an as needed/requested basis.  recommends bedside caregivers page  on duty if patient shows signs of acute spiritual or emotional distress. Lissett Wharton

## 2021-04-01 NOTE — PROGRESS NOTES
Problem: Dysphagia (Adult) Goal: *Acute Goals and Plan of Care (Insert Text) Description: Patient will: 1. Tolerate PO trials with 0 s/s overt distress in 4/5 trials 2. Utilize compensatory swallow strategies/maneuvers (decrease bite/sip, size/rate, alt. liq/sol) with min cues in 4/5 trials Recommend:  
NPO; consider comfort feeds vs alternative nutrition/hyration Aspiration precautions (HOB >30 degrees at all times, Oral care TID) May have 2-3 ice chips after oral care PRN for comfort and swallow stim Outcome: Progressing Towards Goal 
  
SPEECH LANGUAGE PATHOLOGY DYSPHAGIA TREATMENT Patient: Jaimee Mixon (89 y.o. female) Date: 4/1/2021 Diagnosis: DIDIER (acute kidney injury) ([facility] [address] [N17.9] COVID-19 [U07.1] COVID-19 Precautions: Aspiration PLOF: Per H&P  
 
ASSESSMENT: 
Pt seen for FU dysphagia management. Pt alert, confused, generally only stating \"OK\" or making vocalizations during session. Pt given PO trials ice chips, water, and puree texture. Improved oral acceptance/initiation this date. Absent bolus manipulation with anterior oral holding with puree texture, eventually cleared with liquid wash. Prolonged manipulation of ice chips with decreased laryngeal elevation, no change in vocal/resp quality via cervical auscultation. Initial trial sip water via straw taken [facility]. Second trial of water resulted in delayed weak cough. Recommend continue NPO with consideration alternate means nutrition/hydration v. Comfort feeds. Pt may have 2-3 ice chips after oral care PRN for comfort and swallow stim. SLP will continue to follow per POC. Progression toward goals: 
[x]         Improving appropriately and progressing toward goals 
[]         Improving slowly and progressing toward goals 
[]         Not making progress toward goals and plan of care will be adjusted PLAN: 
Recommendations and Planned Interventions: 
See above, Patient continues to benefit from skilled intervention to address the above impairments. Continue treatment per established plan of care. Discharge Recommendations:  Jayme Anand SUBJECTIVE:  
Patient stated OK. OBJECTIVE:  
Cognitive and Communication Status: 
Neurologic State: Eyes open spontaneously Orientation Level: Oriented to person Cognition: Decreased command following Perception: Appears intact Perseveration: No perseveration noted Safety/Judgement: Decreased awareness of need for assistance, Decreased awareness of need for safety, Decreased insight into deficits, Fall prevention Dysphagia Treatment: 
Oral Assessment: 
Oral Assessment Labial: Right droop Dentition: Edentulous Oral Hygiene: [facility] Lingual: Decreased rate, Decreased strength Velum: Unable to visualize Mandible: Restricted P.O. Trials: 
 Patient Position: HOB 65* Vocal quality prior to P.O.: Low volume Consistency Presented: Ice chips, Thin liquid, Puree How Presented: SLP-fed/presented, Straw, Spoon Bolus Acceptance: No impairment Bolus Formation/Control: Impaired Type of Impairment: Delayed, Incomplete, Poor, Mastication Propulsion: Delayed (# of seconds), Discoordination Oral Residue: Greater than 50% of bolus Initiation of Swallow: Delayed (# of seconds) Laryngeal Elevation: Decreased Aspiration Signs/Symptoms: Delayed cough/throat clear Pharyngeal Phase Characteristics: Easily fatigued , Poor endurance, Suspected pharyngeal residue Effective Modifications: None Cues for Modifications: Maximal 
   
 
 Oral Phase Severity: Moderate-severe Pharyngeal Phase Severity : Moderate PAIN: 
Pain level pre-treatment: 0/10 Pain level post-treatment: 0/10 After treatment:  
[]              Patient left in no apparent distress sitting up in chair 
[x]              Patient left in no apparent distress in bed 
[x]              Call bell left within reach [x]              Nursing notified 
[]              Family present 
[] Caregiver present 
[]              Bed alarm activated COMMUNICATION/EDUCATION:  
[x] Aspiration precautions; swallow safety; compensatory techniques [x]        Patient unable to participate in education; education ongoing with staff 
[]  Posted safety precautions in patient's room. [] Oral-motor/laryngeal strengthening exercises Thank you for this referral. 
 
Jonelle Calvert M.S., CCC-SLP Speech-Language Pathologist 
 
Time Calculation: 14 mins

## 2021-04-01 NOTE — PROGRESS NOTES
Problem: Falls - Risk of 
Goal: *Absence of Falls Description: Document Jada Swift Fall Risk and appropriate interventions in the flowsheet. Outcome: Progressing Towards Goal 
Note: Fall Risk Interventions: 
  
 
Mentation Interventions: Bed/chair exit alarm, More frequent rounding, Reorient patient, Room close to nurse's station Medication Interventions: Evaluate medications/consider consulting pharmacy Elimination Interventions: Call light in reach

## 2021-04-01 NOTE — ROUTINE PROCESS
Bedside and Verbal shift change report given to [address] LEON (oncoming nurse) by Lazarus Rosin RN (offgoing nurse). Report included the following information SBAR, Kardex, Intake/Output, MAR and Recent Results.

## 2021-04-01 NOTE — PROGRESS NOTES
Progress Note 27-year-old female with past medical history of hypertension, CKD dementia admitted for Covid infection, following for renal failure. Impression & Plan:  
 
Overnight event noted. Afebrile Confused No accurate documented urine output. IMPRESSION:  
· Severe DIDIER in setting of Covid 19,dehydration. Could be due to pre renal etiology, Covid didier with glomerulopathy. High proteinuria points towards possibility of collapsing FSGS in setting of Covid 19. USG neg for obstruction. Proteinuria could be falsely high too due to dec cr excretion from DIDIER. · Hx CKD stage 4, baseline creatinine around 2 mg per DL. · Hypertension · Dementia PLAN:  
Her BUN remain on higher side, discussed with ID colleague plan to get CT chest without contrast if no severe pulmonary involvement plan to reduce steroid dose. Agree with gentle IV hydration, change rate to 50 cc/h. She is not a good candidate for dialysis will continue supportive care. I will arrange family meeting. Discussed with palliative care team, patient will likely benefit from home hospice. Discussed with Dr. Pam Deleon Facility-Administered Medications: None Current Facility-Administered Medications Medication Dose Route Frequency  sodium chloride (NS) flush 5-40 mL  5-40 mL IntraVENous Q8H  
 sodium chloride (NS) flush 5-40 mL  5-40 mL IntraVENous PRN  
 acetaminophen (TYLENOL) tablet 650 mg  650 mg Oral Q6H PRN Or  
 acetaminophen (TYLENOL) suppository 650 mg  650 mg Rectal Q6H PRN  polyethylene glycol (MIRALAX) packet 17 g  17 g Oral DAILY PRN  promethazine (PHENERGAN) tablet 12.5 mg  12.5 mg Oral Q6H PRN Or  
 ondansetron (ZOFRAN) injection 4 mg  4 mg IntraVENous Q6H PRN  
 heparin (porcine) injection 5,000 Units  5,000 Units SubCUTAneous Q8H  
 dexamethasone (DECADRON) 4 mg/mL injection 6 mg  6 mg IntraVENous Q24H  
 ascorbic acid (vitamin C) (VITAMIN C) tablet 500 mg  500 mg Oral BID  cholecalciferol (VITAMIN D3) capsule 5,000 Units  5,000 Units Oral DAILY  zinc sulfate (ZINCATE) 50 mg zinc (220 mg) capsule 1 Cap  1 Cap Oral DAILY  melatonin tablet 3 mg  3 mg Oral QHS  azithromycin (ZITHROMAX) 500 mg in 0.9% sodium chloride 250 mL (VIAL-MATE)  500 mg IntraVENous Q24H  cefTRIAXone (ROCEPHIN) 1 g in sterile water (preservative free) 10 mL IV syringe  1 g IntraVENous Q24H  
 amLODIPine (NORVASC) tablet 10 mg  10 mg Oral DAILY  hydrALAZINE (APRESOLINE) 20 mg/mL injection 10 mg  10 mg IntraVENous Q6H PRN  
 0.45% sodium chloride infusion  125 mL/hr IntraVENous CONTINUOUS Review of Systems:  
 
Underlying dementia Data Review: 
 
Labs: Results:  
   
Chemistry Recent Labs 04/01/21 
3192 03/31/21 
0525 03/30/21 
1356 * 159* 138* * 149* 148* K 4.6 4.8 4.4  
* 118* 113* CO2 19* 21 25 * 160* 160* CREA 4.28* 4.21* 4.31* CA 8.5 8.7 10.2* AGAP 11 10 10 BUCR 37* 38* 37* CBC w/Diff Recent Labs 04/01/21 
3226 03/31/21 
0525 03/30/21 
1356 WBC 8.4 3.6* 5.4  
RBC 3.56* 3.50* 3.97* HGB 9.8* 9.7* 11.1*  
HCT 31.1* 30.7* 36.0  268 238 GRANS PENDING 58 63 LYMPH PENDING 29 26 EOS PENDING 0 0 Coagulation No results for input(s): PTP, INR, APTT, INREXT, INREXT in the last 72 hours. Iron/Ferritin No results for input(s): IRON in the last 72 hours. No lab exists for component: TIBCCALC BNP No results for input(s): BNPP in the last 72 hours. Cardiac Enzymes Recent Labs  
  03/30/21 
1700 03/30/21 
1356 CPK 89 66 CKND1 1.9 2.0 Liver Enzymes No results for input(s): TP, ALB, TBIL, AP in the last 72 hours. No lab exists for component: SGOT, GPT, DBIL Thyroid Studies No results found for: T4, T3U, TSH, TSHEXT, TSHEXT Physical Assessment:  
 
Visit Vitals BP (!) 155/55 Pulse 64 Temp 97.7 °F (36.5 °C) Resp 20 Ht 5' 2\" (1.575 m) Wt 51.3 kg (113 lb 3.2 oz) SpO2 92% Breastfeeding No BMI 20.70 kg/m² Weight change: 0.091 kg (3.2 oz) No intake or output data in the 24 hours ending 04/01/21 1026 Physical Exam:  
General: comfortable, no acute respiratory distress HEENT sclera anicteric, CVS: S1S2 heard,  no rub RS: + air entry b/l, Abd: Soft, Non tender, Neuro:awake, Extrm: no edema, no cyanosis, clubbing Skin: no visible  Rash Musculoskeletal: No gross joints or bone deformities Procedures/imaging: see electronic medical records for all procedures, Xrays and details which were not copied into this note but were reviewed prior to creation of Charlene Pearson MD 
April 1, 2021 [facility] [phone]

## 2021-04-02 NOTE — PROGRESS NOTES
Pt's IV site was infiltrated, myself, david, RN, Scar Phoenix, RN as well as nursing supervisor have all tried to obtain access on pt but were all unsuccessful. IV meds held in [facility], will relay message to oncoming nurse and continue to monitor pt closely.

## 2021-04-02 NOTE — PROGRESS NOTES
Nutrition Assessment Type and Reason for Visit: Reassess, Positive nutrition screen Nutrition Recommendations/Plan: 
- Nutrition interventions pending goals of care- monitor readiness for NGT placement to provide enteral nutrition vs initiation of comfort diet. - EN regimen as needed- tube feeding of Nepro starting at 10 mL/hr advancing as tolerated by 10 mL q 12 hours to goal rate of 30 mL/hr with 100 mL q 4 hour water flushes with daily MVI. (Regimen providing 1296 kcal, 58 gm protein, 522 mL free water, 76% RDIs). - High risk for refeeding- start IV thiamine, monitor Mg & Phos - IVF per Nephrology. - Discontinue oral supplements 2/2 NPO. Nutrition Assessment:  NPO s/p SLP evaluation. Palliative care following for goals of care. Noted family reports pt with decline over the past month with difficulty chewing and pocketing foods-high risk for refeeding. 1/2 NS at 50 mL/hr with plan to start IV thiamine and MD to assess nutritional goals of care later today with family. Possible plan for NGT placement. Malnutrition Assessment: 
Malnutrition Status: Mild malnutrition(dementia, pocketing foods in mouth- possible higher severity- unable to complete NFPE at this time) Estimated Daily Nutrient Needs: 
Energy (kcal):  6268-3883 Protein (g):  41-51 Fluid (ml/day):  1198-5196 Nutrition Related Findings:  Loose BM 4/1. Vitamin C, D3 & zinc. Hypernatremia on 1/2 NS at 50 mL/hr. Decreased renal fx but not a good candidate for dialysis-Nephrology following. Current Nutrition Therapies: DIET NPO 
DIET NUTRITIONAL SUPPLEMENTS AM Snack; Other Anthropometric Measures: 
· Height:  5' 2\" (157.5 cm) · Current Body Wt:  51.3 kg (113 lb 1.5 oz) · BMI: 20.7 Nutrition Diagnosis:  
· Inadequate oral intake related to cognitive or neurological impairment, swallowing difficulty, renal dysfunction as evidenced by NPO or clear liquid status due to medical condition, swallowing study results Nutrition Intervention: 
Food and/or Nutrient Delivery: Continue NPO, Vitamin supplement, Mineral supplement, IV fluid delivery Nutrition Education and Counseling: Education not indicated Coordination of Nutrition Care: Continue to monitor while inpatient, Swallow evaluation(verbal orders from Dr. Lupe Shaw) Goals: 1. Provide nutrition intervention as appropriate with goals of care for the next 7 days. 2. Nutritional needs will be met through adequate oral intake or nutrition support within the next 7 days. Nutrition Monitoring and Evaluation:  
Behavioral-Environmental Outcomes: None identified Food/Nutrient Intake Outcomes: Diet advancement/tolerance, Vitamin/mineral intake, IVF intake Physical Signs/Symptoms Outcomes: Biochemical data, Chewing or swallowing, GI status, Fluid status or edema, Meal time behavior, Nutrition focused physical findings Discharge Planning: Too soon to determine Electronically signed by Teresa Mansfield RD on 4/2/2021 at 10:41 AM 
 
Contact Number: [phone]

## 2021-04-02 NOTE — PROGRESS NOTES
Problem: Falls - Risk of 
Goal: *Absence of Falls Description: Document Jay Lomelin Fall Risk and appropriate interventions in the flowsheet. Outcome: Progressing Towards Goal 
Note: Fall Risk Interventions: 
  
 
Mentation Interventions: Bed/chair exit alarm, More frequent rounding, Reorient patient, Room close to nurse's station, Update white board Medication Interventions: Bed/chair exit alarm, Patient to call before getting OOB Elimination Interventions: Bed/chair exit alarm, Call light in reach, Stay With Me (per policy) Problem: Airway Clearance - Ineffective Goal: Achieve or maintain patent airway Outcome: Progressing Towards Goal 
 Pt on 2 liters intermittently, no c/o SOB Problem: Isolation Precautions - Risk of Spread of Infection Goal: Prevent transmission of infectious organism to others Outcome: Progressing Towards Goal

## 2021-04-02 NOTE — PROGRESS NOTES
19: 40-Report received from previous nurse, Ronny Payne LPN. 20:00-m Assessment completed- see flow sheet. Bed alarm set on for safety. Socks applied to bilateral lower legs as patient frequently putting her legs through the bed rail on the right side of the bed. Bed rail padded with a sheet as to prevent her from putting her legs through the rail. 4/3/2021 00:55-  Patient's daughters, Farida Hanna and Hernandez Venegas called and was given an update on patient. 03:00- Left forearm IV infiltrate noted- IV and IV fluids discontinued. Charge nurse, Gabriela Neri RN aware. Continue to monitor. Periods of restlessness at times. 07:30- Report given to oncoming nurse, Toby Singer RN.

## 2021-04-02 NOTE — HOSPICE
Spoke with Merry Torres via phone Discussed [employer] philosophy, services, criteria, and IDT. Discussed caregiver need for round the clock care with Rakeshamy Melissa 
primary caregiver identified as Pa Long Caregiver concerns identified as n/a Answered all questions. Pa Logn voiced being relieved after hospice explanation provided because she had anxiety about the word hospice. Voiced appreciation for explanation and not focusing on talking about death and dying. Agreeable to have DME delivered Sunday between 2-6pm and pt to return home on Monday 4/5/21 at [address], pt's other daughter came in on the call at the end and had no questions either. Provided with 24/7 contact information. Hospice referral received. Chart review in process. Thank you for the referral to [employer]. If we can be of further assistance please contact [phone] Kaden White RN [place] [id number] [place], [facility] [address] 
[phone] Email: [email or web address]"

## 2021-04-02 NOTE — PROGRESS NOTES
Problem: Dysphagia (Adult) Goal: *Acute Goals and Plan of Care (Insert Text) Description: Patient will: 1. Tolerate PO trials with 0 s/s overt distress in 4/5 trials 2. Utilize compensatory swallow strategies/maneuvers (decrease bite/sip, size/rate, alt. liq/sol) with min cues in 4/5 trials Recommend:  
NPO; consider comfort feeds vs alternative nutrition/hyration Strict aspiration precautions (HOB >30 degrees at all times, Oral care TID) Outcome: Progressing Towards Goal 
  
SPEECH LANGUAGE PATHOLOGY DYSPHAGIA TREATMENT Patient: Amanda Larkin (65 y.o. female) Date: 4/2/2021 Diagnosis: DIDIER (acute kidney injury) ([facility] [address] [N17.9] COVID-19 [U07.1] COVID-19 Precautions: Aspiration PLOF:Per H&P  
 
ASSESSMENT: 
Pt seen for FU dysphagia management. Pt alert, confused, perseverating on \"oh boy\" and \"okay\" or making vocalizations during session. Pt given PO trials ice chips, water, nectar thick juice, and puree texture. Pt with intermittent delayed wet cough with water, ~50% of trials. Absent bolus manipulation with anterior oral holding with puree texture, eventually cleared with nectar thick liquid wash. Prolonged manipulation of ice chips with decreased laryngeal elevation, no change in vocal/resp quality via cervical auscultation. Necatar thick liquid taken without s/sx aspiration. RN reports occasional improved slearance of 1/2 tsp presentations of puree with meds, though waxing/waning. Recommend continue NPO with consideration alternate means nutrition/hydration v. Comfort feeds. Pt may have 2-3 ice chips after oral care PRN for comfort and swallow stim. SLP will continue to follow per POC. Progression toward goals: 
[]         Improving appropriately and progressing toward goals [x]         Improving slowly and progressing toward goals 
[]         Not making progress toward goals and plan of care will be adjusted PLAN: 
Recommendations and Planned Interventions: 
See above. Patient continues to benefit from skilled intervention to address the above impairments. Continue treatment per established plan of care. Discharge Recommendations:  Jayme Anand SUBJECTIVE:  
Patient stated Oh boy. OBJECTIVE:  
Cognitive and Communication Status: 
Neurologic State: Eyes open spontaneously Orientation Level: Oriented to person Cognition: Decreased attention/concentration, Decreased command following, Impaired decision making, Poor safety awareness Perception: Appears intact Perseveration: Perseverates during conversation(moaning and short phrases) Safety/Judgement: Decreased awareness of need for assistance, Decreased awareness of need for safety, Fall prevention, Lack of insight into deficits Dysphagia Treatment: 
Oral Assessment: 
Oral Assessment Labial: Decreased rate, Decreased seal, Left droop Dentition: Edentulous Oral Hygiene: [facility] Lingual: Decreased rate, Decreased strength Velum: Unable to visualize Mandible: No impairment P.O. Trials: 
 Patient Position: HOB 50* Vocal quality prior to P.O.: No impairment Consistency Presented: Ice chips, Thin liquid, Nectar thick liquid, Puree How Presented: SLP-fed/presented, Straw, Spoon Bolus Acceptance: No impairment Bolus Formation/Control: Impaired Type of Impairment: Delayed, Incomplete, Mastication Propulsion: Delayed (# of seconds) Oral Residue: Greater than 50% of bolus Initiation of Swallow: Delayed (# of seconds) Laryngeal Elevation: Decreased Aspiration Signs/Symptoms: Weak cough Pharyngeal Phase Characteristics: Easily fatigued , Poor endurance, Suspected pharyngeal residue Effective Modifications: Small sips and bites Cues for Modifications: Maximal 
   
 
 Oral Phase Severity: Moderate-severe Pharyngeal Phase Severity : Moderate PAIN: 
Pain level pre-treatment: 0/10 Pain level post-treatment: 0/10 After treatment:  
[]              Patient left in no apparent distress sitting up in chair 
[x]              Patient left in no apparent distress in bed 
[x]              Call bell left within reach [x]              Nursing notified 
[]              Family present 
[]              Caregiver present 
[]              Bed alarm activated COMMUNICATION/EDUCATION:  
[x] Aspiration precautions; swallow safety; compensatory techniques []        Patient unable to participate in education; education ongoing with staff 
[]  Posted safety precautions in patient's room. [] Oral-motor/laryngeal strengthening exercises Thank you for this referral. 
 
Jonelle Calvert M.S., CCC-SLP Speech-Language Pathologist 
 
Time Calculation: 17 mins

## 2021-04-02 NOTE — PROGRESS NOTES
[facility] Hospital[facility] Progress Note Patient: Nicholas Bowers Age: 80 y.o. : 1922 MR#: [id number] SSN: [id number] Date: 2021 Subjective/24-hour events:  
 
Remains relatively stable but no improvement in overall clinical status. UOP has continued to be low. Assessment:  
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  
Transitioned to comfort care measures only today following family meeting. Order for hospice evaluation already placed and this is currently in progress. Disposition once final hospice arrangements made. Case discussed with:  []Patient  []Family  [x]Nursing  [x]Case Management DVT Prophylaxis:  []Lovenox  [x]Hep SQ  []SCDs  []Coumadin   []On Heparin gtt Objective:  
VS:  
Visit Vitals BP (!) 180/78 (BP 1 Location: Left upper arm, BP Patient Position: At rest) Pulse 94 Temp 98.1 °F (36.7 °C) Resp 20 Ht 5' 2\" (1.575 m) Wt 51.3 kg (113 lb) SpO2 91% Breastfeeding No  
BMI 20.67 kg/m² Tmax/24hrs: Temp (24hrs), Av.9 °F (36.6 °C), Min:97.2 °F (36.2 °C), Max:98.2 °F (36.8 °C) No intake or output data in the 24 hours ending 21 1750 General:  Appears comfortable, In NAD. Cardiovascular:  RRR. Pulmonary:  Lungs clear bilaterally, no wheezes. GI:  Abdomen soft, NTTP. Extremities:  Warm, no edema or ischemia. Labs:   
Recent Results (from the past 24 hour(s)) CBC WITH AUTOMATED DIFF Collection Time: 21  4:34 AM  
Result Value Ref Range WBC 10.1 4.6 - 13.2 K/uL  
 RBC 4.15 (L) 4.20 - 5.30 M/uL  
 HGB 11.6 (L) 12.0 - 16.0 g/dL HCT 36.5 35.0 - 45.0 % MCV 88.0 74.0 - 97.0 FL  
 MCH 28.0 24.0 - 34.0 PG  
 MCHC 31.8 31.0 - 37.0 g/dL  
 RDW 13.5 11.6 - 14.5 % PLATELET 616 163 - 752 K/uL  MPV 10.6 9.2 - 11.8 FL  
 NEUTROPHILS 89 (H) 42 - 75 % BAND NEUTROPHILS 3 0 - 5 % LYMPHOCYTES 4 (L) 20 - 51 % MONOCYTES 3 2 - 9 % EOSINOPHILS 0 0 - 5 % BASOPHILS 0 0 - 3 % METAMYELOCYTES 1 (H) 0 %  
 ABS. NEUTROPHILS 9.3 (H) 1.8 - 8.0 K/UL  
 ABS. LYMPHOCYTES 0.4 (L) 0.8 - 3.5 K/UL  
 ABS. MONOCYTES 0.3 0 - 1.0 K/UL  
 ABS. EOSINOPHILS 0.0 0.0 - 0.4 K/UL  
 ABS. BASOPHILS 0.0 0.0 - 0.06 K/UL  
 DF MANUAL PLATELET COMMENTS ADEQUATE PLATELETS    
 RBC COMMENTS NORMOCYTIC, NORMOCHROMIC    
D DIMER Collection Time: 04/02/21  4:34 AM  
Result Value Ref Range D DIMER 2.64 (H) <0.46 ug/ml(FEU) FERRITIN Collection Time: 04/02/21  4:34 AM  
Result Value Ref Range Ferritin 2,296 (H) 8 - 948 NG/ML  
METABOLIC PANEL, BASIC Collection Time: 04/02/21  4:34 AM  
Result Value Ref Range Sodium 151 (H) 136 - 145 mmol/L Potassium 4.3 3.5 - 5.5 mmol/L Chloride 123 (H) 100 - 111 mmol/L  
 CO2 14 (L) 21 - 32 mmol/L Anion gap 14 3.0 - 18 mmol/L Glucose 106 (H) 74 - 99 mg/dL  (H) 7.0 - 18 MG/DL Creatinine 4.54 (H) 0.6 - 1.3 MG/DL  
 BUN/Creatinine ratio 35 (H) 12 - 20 GFR est AA 11 (L) >60 ml/min/1.73m2 GFR est non-AA 9 (L) >60 ml/min/1.73m2 Calcium 8.8 8.5 - 10.1 MG/DL  
C REACTIVE PROTEIN, QT Collection Time: 04/02/21 10:42 AM  
Result Value Ref Range C-Reactive protein 5.1 (H) 0 - 0.3 mg/dL Signed By: Greg Elizondo MD   
 April 2, 2021

## 2021-04-02 NOTE — PROGRESS NOTES
Palliative Medicine Consult [facility]: [phone] ([phone] [facility]: [phone] Patient Name: Luz Campbell YOB: 1922 Date of Follow-up Visit : 4/2/2021 Reason for Consult: Care Decisions Requesting Provider: Dr. Janeen Pagan Primary Care Physician: Saqib Michael MD 
  
 SUMMARY:  
Luz Campbell is a 80 y.o. 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 CHIEF COMPLAINT: altered mental status HPI/SUBJECTIVE:   
Past history as above. Ms. Gus Covington presents with decreased appetite and decline in function over the last 2-4 weeks coupled with a positive COVID-19 diagnosis. According to her daughters, she has become increasingly weak with poor appetite over the last month at home. 4/2/21:  Lying in bed, awake, picking at gown and sheets. Appears comfortable, NAD The patient is:  
[] Verbal and participatory [x] Non-participatory due to: Altered mental status and dementia GOALS OF CARE:  DNR/DNI, comfort measures, no feeding tubes Patient/Health Care Proxy Stated Goals: Comfort TREATMENT PREFERENCES:  
Code Status: DNR/DNI PALLIATIVE DIAGNOSES:  
1. Goals of Care 2. Stage IV CKD 3. COVID-19 pneumonia 4. Dementia 5. Debility PLAN:  
4/2/21:  Seen through window due to the COVID-19 pandemic. Lying in bed, awake, appears comfortable. Pt picking at gown and bed sheets. NAD noted. Spoke with daughters, Jason Pappas and Rosinaelian Ledbetter, in follow-up for goals of care and care decisions.   Discussed benefits and burdens of resuscitation and feeding tubes in the context of their mother's dementia, stage IV CKD and COVID-19 pneumonia. Explained that the burdens of resuscitation and feeding tubes outweigh the benefits and would recommend that in the event of cardiac arrest, do not resuscitate with CPR, chest compressions or shock and no intubation for any reason. Discussed the POST form and recommendations with Ade Corley and Sharan Ron. Also introduced hospice services as an additional support for their mom and the family at home. Hospice consult placed for an informational session with them. Daughters wished to have a meeting separately to discuss goals of care and the option of hospice at home and will call palliative back today with their decision. Daughter, Leticia Tejada, called back this afternoon to share that she and her other 2 sisters had spoken and they wish for mom to be a do not resuscitate and want to move her to comfort measures and bring her home on hospice sooner rather than later. POST form completed for DNR/DNI, transition to comfort measures, no feeding tubes. POST form sent to Angela Steven, [facility] and [facility], via HIPAA compliant DocuSign for electronic signature. Goals of care are DNR/DNI, comfort measures, no feeding tubes. Will continue all current meds, IV fluids and IV antibiotics until discharge to give Ms. Delroy Silvestre the opportunity to get as well as possible before going home with hospice. Family understands that patient aspirates and they understand the risk and want to initiate comfort feeds for pleasure and comfort. Please see below for previous conversations with the palliative team: 
 
1. Goals of Care - Seen at bedside with Ms Elvis Feliciano in full PPE for the COVID-19 pandemic. Ms. Potts Fairly is sitting in bed, looking down at her hands as she picks with her fingers and the bed covers. Alerts and raises head up in response to verbal stimuli.   Does not follow commands, says few words such as \"Please don't\" and \"Oh Lord\". Patient is unable to participate in a conversation. Conferenced with [facility] who shared that patient has lost IV access and she is pocketing food and meds. Speech has been consulted for swallow evaluation. There is an AMD on file which names her daughter, Shazia Jones, as RANDY. Called and spoke with Shazia Jones, daughter who also conferenced in her 2 sisters, Jordana Martin and Cris Olivarez to have a discussion with the palliative team.  Patient lives at home with Lorena Chavez who, along with her other two sisters take care of their mother. Daughters shared that their mom's condition has been declining more significantly in the last 2-4 weeks as in not walking as well with her walker, not reading as much as she used to when that was an enjoyable activity for her and worsening appetite. They shared that mom requires full assistance for bathing, dressing and toileting. She had been able to feed herself up to about 2 weeks ago and they shared that patient has been exhibiting coughing with eating/drinking. Lengthy discussion with all three daughters about their mom's overall condition in the context of advanced age, progressive dementia x15 years and stage IV CKD. Discussed goals of care including benefits and burdens of resuscitation, intubation and life support. Lorena Chavez shared with us that during our discussion of goals of care, she had to step away from the phone because she has heard all of this information before and she just couldn't handle hearing it all over again. She shared that it was good for her other two sisters to hear this information as they had not heard it before. We offered supportive and empathic listening and answered all questions to the best of our ability. Daughters are not yet ready to make a goals of care decision. At this time, goals of care remain full code, full aggressive measures.   Palliative will continue to follow for ongoing support and further goals of care conversations. 2. Stage IV CKD - Nephrology consulted who has discussed patient's poor prognosis with patient's daughters. They wish to continue with fluids for now and see how Ms. Ramone Bryant does and re-evaluate over time. 3. COVID-19 pneumonia - IV antibiotics, attending team managing 4. Dementia - FAST score 7a, diagnosed with dementia approximately 15 years ago 5. Debility - PPS 30, lives at home with her daughter, requires full assistance with bathing, dressing and toileting, uses walker for ambulating but has gotten much weaker recently and unable to use walker well x 1 month. Poor appetite, was feeding herself up until about 2 weeks ago. 6. Initial consult note routed to primary continuity provider 7. Communicated plan of care with: Palliative IDT Advance Care Planning: 
[] [facility] Interdisciplinary Team has updated the ACP Navigator with [address] and Patient Capacity Primary Decision [facility], [place] Care Agent):  Aliza Qiu, daughter - [phone] Medical Interventions: Comfort measures Artificially Administered Nutrition: No feeding tube As far as possible, the palliative care team has discussed with patient / health care proxy about goals of care / treatment preferences for patient. HISTORY:  
 
History obtained from:  
Principal Problem: 
  COVID-19 (3/30/2021) Active Problems: 
  DIDIER (acute kidney injury) ([address] (3/30/2021) Chronic kidney disease (CKD), stage IV (severe) ([facility]) () Debility () Past Medical History:  
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  
 left shoulder  GERD (gastroesophageal reflux disease)  Heart murmur  Hyperkalemia 2011  Hypertension  Lumbago  Pain in joint, lower leg  Pain in joint, multiple sites Past Surgical History:  
Procedure Laterality Date  HX BLADDER REPAIR    
 prolapsed  HX CATARACT REMOVAL  1991  
 HX CATARACT REMOVAL  1990  
 HX CHOLECYSTECTOMY  5/1998  HX HERNIA REPAIR  7/1998  
 x2 [address]  HX OOPHORECTOMY Family History Problem Relation Age of Onset  Diabetes Other  Hypertension Other  Stroke Other  Diabetes Mother History reviewed, no pertinent family history. Social History Tobacco Use  Smoking status: Never Smoker  Smokeless tobacco: Never Used Substance Use Topics  Alcohol use: No  
 
Allergies Allergen Reactions  Celebrex [Celecoxib] Unable to Obtain  Codeine Unable to Obtain  Flexeril [Cyclobenzaprine] Unable to Obtain  Macrobid [Nitrofurantoin Monohyd/M-Cryst] Unable to Consolidated Alex  Pcn [Penicillins] Unable to Consolidated Alex  Remeron [Mirtazapine] Unable to Consolidated Alex  Sulfa (Sulfonamide Antibiotics) Other (comments) Swelling and low heart rate  Vicodin [Hydrocodone-Acetaminophen] Unable to Obtain Current Facility-Administered Medications Medication Dose Route Frequency  thiamine (B-1) 200 mg in dextrose 5% 50 mL IVPB  200 mg IntraVENous DAILY  sodium bicarbonate (8.4%) 150 mEq in dextrose 5% 1,000 mL infusion   IntraVENous CONTINUOUS  
 sodium chloride (NS) flush 5-40 mL  5-40 mL IntraVENous Q8H  
 sodium chloride (NS) flush 5-40 mL  5-40 mL IntraVENous PRN  
 acetaminophen (TYLENOL) tablet 650 mg  650 mg Oral Q6H PRN Or  
 acetaminophen (TYLENOL) suppository 650 mg  650 mg Rectal Q6H PRN  polyethylene glycol (MIRALAX) packet 17 g  17 g Oral DAILY PRN  promethazine (PHENERGAN) tablet 12.5 mg  12.5 mg Oral Q6H PRN  Or  
 ondansetron (ZOFRAN) injection 4 mg  4 mg IntraVENous Q6H PRN  
 heparin (porcine) injection 5,000 Units  5,000 Units SubCUTAneous Q8H  
 dexamethasone (DECADRON) 4 mg/mL injection 6 mg  6 mg IntraVENous Q24H  
 ascorbic acid (vitamin C) (VITAMIN C) tablet 500 mg  500 mg Oral BID  cholecalciferol (VITAMIN D3) capsule 5,000 Units  5,000 Units Oral DAILY  zinc sulfate (ZINCATE) 50 mg zinc (220 mg) capsule 1 Cap  1 Cap Oral DAILY  melatonin tablet 3 mg  3 mg Oral QHS  azithromycin (ZITHROMAX) 500 mg in 0.9% sodium chloride 250 mL (VIAL-MATE)  500 mg IntraVENous Q24H  
 amLODIPine (NORVASC) tablet 10 mg  10 mg Oral DAILY  hydrALAZINE (APRESOLINE) 20 mg/mL injection 10 mg  10 mg IntraVENous Q6H PRN Clinical Pain Assessment (nonverbal scale for nonverbal patients): Clinical Pain Assessment Severity: 0 Activity (Movement): Lying quietly, normal position Duration: for how long has pt been experiencing pain (e.g., 2 days, 1 month, years) Frequency: how often pain is an issue (e.g., several times per day, once every few days, constant) FUNCTIONAL ASSESSMENT:  
 
Palliative Performance Scale (PPS): PPS: 30 
 
ECOG 
ECOG Status : Completely disabled PSYCHOSOCIAL/SPIRITUAL SCREENING:  
  
Any spiritual / Denominational concerns:  Unable to assess 
[] Yes /  [] No 
 
Caregiver Burnout: 
[] Yes /  [] No /  [x] No Caregiver Present Anticipatory grief assessment:  Unable to assess 
[] Normal  / [] Maladaptive REVIEW OF SYSTEMS:  
 
Systems: constitutional, ears/nose/mouth/throat, respiratory, gastrointestinal, genitourinary, musculoskeletal, integumentary, neurologic, . Positive findings noted below. Modified ESAS Completed by: provider Fatigue: 2 Pain: 0 Dyspnea: 0 Stool Occurrence(s): 2 Positive and pertinent negative findings in ROS are noted above in HPI. The following systems were [] reviewed / [x] unable to be reviewed as noted in HPI Other findings are noted below.  
 
 PHYSICAL EXAM:  
Seen through window due to the COVID-19 pandemic: 
 
Constitutional: Lying in bed, picking at gown and bed sheets, awake, NAD Respiratory: breathing not labored Genitourinary: wahl catheter in place, decreased urinary output Last bowel movement: 4/1/21 Skin: warm, dry Neurologic: awake, moves all extremities Other: Wt Readings from Last 3 Encounters:  
04/02/21 51.3 kg (113 lb)  
11/15/19 56.7 kg (125 lb)  
08/28/19 56.7 kg (125 lb) Blood pressure (!) 180/78, pulse 94, temperature 98.1 °F (36.7 °C), resp. rate 20, height 5' 2\" (1.575 m), weight 51.3 kg (113 lb), SpO2 91 %, not currently breastfeeding. Pain: 
Pain Scale 1: Adult Nonverbal Pain Scale Pain Intensity 1: 0 LAB AND IMAGING FINDINGS:  
 
Lab Results Component Value Date/Time WBC 10.1 04/02/2021 04:34 AM  
 HGB 11.6 (L) 04/02/2021 04:34 AM  
 PLATELET 247 19/75/1706 04:34 AM  
 
Lab Results Component Value Date/Time Sodium 151 (H) 04/02/2021 04:34 AM  
 Potassium 4.3 04/02/2021 04:34 AM  
 Chloride 123 (H) 04/02/2021 04:34 AM  
 CO2 14 (L) 04/02/2021 04:34 AM  
  (H) 04/02/2021 04:34 AM  
 Creatinine 4.54 (H) 04/02/2021 04:34 AM  
 Calcium 8.8 04/02/2021 04:34 AM  
 Magnesium 2.8 (H) 03/31/2021 05:25 AM  
 Phosphorus 3.2 02/09/2021 03:33 PM  
  
Lab Results Component Value Date/Time Alk. phosphatase 84 02/28/2014 04:26 PM  
 Protein, total 7.5 02/28/2014 04:26 PM  
 Albumin 3.5 02/09/2021 03:33 PM  
 Globulin 4.2 (H) 02/28/2014 04:26 PM  
 
No results found for: INR, PTMR, PTP, PT1, PT2, APTT, INREXT, INREXT Lab Results Component Value Date/Time Iron 61 02/09/2021 03:33 PM  
 TIBC 228 (L) 02/09/2021 03:33 PM  
 Iron % saturation 27 02/09/2021 03:33 PM  
 Ferritin 2,296 (H) 04/02/2021 04:34 AM  
  
No results found for: PH, PCO2, PO2 No components found for: [place] Lab Results Component Value Date/Time  CK 89 03/30/2021 05:00 PM  
 CK - MB 1.7 03/30/2021 05:00 PM  
  
 
   
 
Total time: 65  minutes 
 
> 50% counseling / coordination:  Time spent in direct consultation with the patient, medical team, and family Prolonged service was provided for  []30 min   []75 min in face to face time in the presence of the patient, spent as noted above. Time Start: 1400 Time End: 4825

## 2021-04-02 NOTE — PROGRESS NOTES
Infectious Disease progress note Reason: COVID-19 infection Current abx Prior abx Ceftriaxone, azithromycin since 3/31/2021 Lines:  
 
 
Assessment : 
 
80 y.o. 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 Nephrology follow-up appreciated Leukopenia-likely due to COVID-19 infection Recommendations: 1. Discontinue ceftriaxone 2. Continue azithromycin till 4/4 , continue Decadron 3. Follow-up nephrology recommendations Prog: poor - d/w palliative care team 
 
Will sign off. Please call if any new questions or concerns Above plan was discussed in details with  dr Karin Yoon, dr.. Shameka Ferguson. Please call me if any further questions or concerns. Will continue to participate in the care of this patient. HPI: 
 
Patient attempts to communicate. However unable to communicate effectively. Detailed review of systems not feasible 
 
 
home Medication List  
 Details  
cloNIDine HCl (CATAPRES) 0.1 mg tablet Take 1 Tab by mouth every twelve (12) hours as needed (sweating, restlessness, hot flashes - PRN opiate withdrawl) for up to 12 doses. Qty: 12 Tab, Refills: 0 Associated Diagnoses: Encounter for long-term (current) use of high-risk medication  
  
promethazine (PHENERGAN) 12.5 mg tablet Take 1 Tab by mouth every eight (8) hours as needed for Nausea (nausea, vomiting - PRN opiate withdrawl) for up to 12 doses. Qty: 12 Tab, Refills: 0  Associated Diagnoses: Encounter for long-term (current) use of high-risk medication  
  
senna (SENNA) 8.6 mg tablet Take 1 Tab by mouth daily as needed for Constipation. Qty: 30 Tab, Refills: 2 Associated Diagnoses: Encounter for long-term (current) use of high-risk medication  
  
mv-min/iron/folic/calcium/vitK (WOMEN'S MULTIVITAMIN PO) Take 1 Tab by mouth daily. hydrocortisone (PROCTOZONE-HC) 2.5 % rectal cream Insert  into rectum four (4) times daily. Qty: 30 g, Refills: 0 Associated Diagnoses: External hemorrhoid  
  
polyethylene glycol (MIRALAX) 17 gram packet Take 1 Packet by mouth daily. Qty: 30 Packet, Refills: 11 CONTOUR NEXT TEST STRIPS strip USE TO TEST BLOOD SUGAR D Refills: 3  
  
cyanocobalamin, vitamin B-12, 2,500 mcg chew Take 2,500 mcg by mouth daily. cholecalciferol, vitamin D3, (VITAMIN D3) 2,000 unit tab Take 1 Cap by mouth daily. menthol (BIOFREEZE, MENTHOL,) 4 % gel Apply generous amount to affected area up to 3 times daily. Qty: 1 Tube, Refills: 3 Associated Diagnoses: Cervicalgia; Chronic pain of left knee; Chronic bilateral low back pain, with sciatica presence unspecified; Chronic pain syndrome  
  
naloxone (NARCAN) 4 mg/actuation nasal spray Use 1 spray intranasally, then discard. Repeat with new spray every 2 min as needed for opioid overdose symptoms, alternating nostrils. Indications: OPIATE-INDUCED RESPIRATORY DEPRESSION, Opioid Toxicity Qty: 1 Each, Refills: 0 Associated Diagnoses: High risk medication use  
  
pantoprazole (PROTONIX) 40 mg tablet Take 40 mg by mouth daily. amlodipine (NORVASC) 10 mg tablet Take 10 mg by mouth daily. furosemide (LASIX) 20 mg tablet Take 20 mg by mouth two (2) times a week. quetiapine (SEROQUEL) 100 mg tablet Take 200 mg by mouth nightly. donepezil (ARICEPT) 10 mg tablet Take 10 mg by mouth nightly. Current Facility-Administered Medications Medication Dose Route Frequency  sodium chloride (NS) flush 5-40 mL  5-40 mL IntraVENous Q8H  
 sodium chloride (NS) flush 5-40 mL  5-40 mL IntraVENous PRN  
 acetaminophen (TYLENOL) tablet 650 mg  650 mg Oral Q6H PRN Or  
 acetaminophen (TYLENOL) suppository 650 mg  650 mg Rectal Q6H PRN  polyethylene glycol (MIRALAX) packet 17 g  17 g Oral DAILY PRN  promethazine (PHENERGAN) tablet 12.5 mg  12.5 mg Oral Q6H PRN Or  
 ondansetron (ZOFRAN) injection 4 mg  4 mg IntraVENous Q6H PRN  
 heparin (porcine) injection 5,000 Units  5,000 Units SubCUTAneous Q8H  
 dexamethasone (DECADRON) 4 mg/mL injection 6 mg  6 mg IntraVENous Q24H  
 ascorbic acid (vitamin C) (VITAMIN C) tablet 500 mg  500 mg Oral BID  cholecalciferol (VITAMIN D3) capsule 5,000 Units  5,000 Units Oral DAILY  zinc sulfate (ZINCATE) 50 mg zinc (220 mg) capsule 1 Cap  1 Cap Oral DAILY  melatonin tablet 3 mg  3 mg Oral QHS  azithromycin (ZITHROMAX) 500 mg in 0.9% sodium chloride 250 mL (VIAL-MATE)  500 mg IntraVENous Q24H  cefTRIAXone (ROCEPHIN) 1 g in sterile water (preservative free) 10 mL IV syringe  1 g IntraVENous Q24H  
 amLODIPine (NORVASC) tablet 10 mg  10 mg Oral DAILY  hydrALAZINE (APRESOLINE) 20 mg/mL injection 10 mg  10 mg IntraVENous Q6H PRN  
 0.45% sodium chloride infusion  50 mL/hr IntraVENous CONTINUOUS Allergies: Celebrex [celecoxib], Codeine, Flexeril [cyclobenzaprine], Macrobid [nitrofurantoin monohyd/m-cryst], Pcn [penicillins], Remeron [mirtazapine], Sulfa (sulfonamide antibiotics), and Vicodin [hydrocodone-acetaminophen] Temp (24hrs), Av.8 °F (36.6 °C), Min:97.2 °F (36.2 °C), Max:98.2 °F (36.8 °C) Visit Vitals BP (!) 166/74 (BP 1 Location: Left upper arm, BP Patient Position: At rest) Pulse 88 Temp 98.2 °F (36.8 °C) Resp 17 Ht 5' 2\" (1.575 m) Wt 51.3 kg (113 lb) SpO2 92% Breastfeeding No  
BMI 20.67 kg/m² ROS: Unable to obtain due to patient factors Physical Exam: 
 
  
General:   Ill-appearing, easily arousable, appears frail, appears stated age. Head: Normocephalic, without obvious abnormality, atraumatic.   
Eyes: Conjunctivae/corneas clear. EOMs intact. Nose: Nares normal. No drainage or sinus tenderness. Neck: Supple, symmetrical, trachea midline, no JVD. Lungs:     Bilateral chest movements equal, no audible wheezes Heart:  Regular rate and rhythm on monitor Abdomen: Soft, non-tender. No guarding, no rigidity, no organomegaly Extremities: Extremities normal, atraumatic, no cyanosis or edema. Pulses: 2+ and symmetric all extremities. Skin:  No rashes or lesions Neurologic:   Attempts to answer questions, moves all 4 extremities. No gross motor or sensory deficits Labs: Results:  
Chemistry Recent Labs 04/02/21 
9378 04/01/21 
8456 03/31/21 
6915 * 113* 159* * 150* 149*  
K 4.3 4.6 4.8  
* 120* 118* CO2 14* 19* 21 * 157* 160* CREA 4.54* 4.28* 4.21* CA 8.8 8.5 8.7 AGAP 14 11 10 BUCR 35* 37* 38* CBC w/Diff Recent Labs 04/02/21 
7305 04/01/21 
7949 03/31/21 
2453 WBC 10.1 8.4 3.6*  
RBC 4.15* 3.56* 3.50* HGB 11.6* 9.8* 9.7* HCT 36.5 31.1* 30.7*  281 268 GRANS 89* 87* 58  
LYMPH 4* 8* 29 EOS 0 0 0 Microbiology No results for input(s): CULT in the last 72 hours. RADIOLOGY: 
 
All available imaging studies/reports in [facility] for this admission were reviewed Disclaimer: Sections of this note are dictated utilizing voice recognition software, which may have resulted in some phonetic based errors in grammar and contents. Even though attempts were made to correct all the mistakes, some may have been missed, and remained in the body of the document. If questions arise, please contact our department. Dr. Jermaine Hart, Infectious Disease Specialist 
[phone] April 2, 2021 
9:32 AM

## 2021-04-02 NOTE — PROGRESS NOTES
Discharge/Transition Planning Case Management following and chart reviewed. Plan is Home and [address]. Daughter working on getting hospital bed at home. Pt needs assist with all ADL and has dementia. Cared for at home by daughter. Will need medical transport Patricia White RN BSN Outcomes Manager Pager # [phone]

## 2021-04-02 NOTE — PROGRESS NOTES
Progress Note 60-year-old female with past medical history of hypertension, CKD dementia admitted for Covid infection, following for renal failure. Impression & Plan:  
 
Overnight event noted. Remained afebrile No difficulty in oxygenation Urine output remains low. Her CT chest shows bilateral opacities suggestive of Covid pneumonia IMPRESSION:  
· Severe DIDIER in setting of Covid 19,dehydration. Could be due to pre renal etiology, Covid didier with glomerulopathy. High proteinuria points towards possibility of collapsing FSGS in setting of Covid 19. USG neg for obstruction. Proteinuria could be falsely high too due to dec cr excretion from DIDIER. · Hx CKD stage 4, baseline creatinine around 2 mg per DL. · Hypertension · Dementia PLAN:  
Her BUN is improving but acidosis is getting worst.  No improvement in hyponatremia. Given her age, severity of current infection, she is high risk of poor recovery of renal function. Change IV fluid to bicarbonate fluid. At present no urgent or emergent indication for dialysis. In my opinion given her age and other comorbidities dialysis does no improve overall outcome. In fact it pose a risk for hypotension, cardiac arrhythmia and catheter related complication. Family so far request to continue all aggressive care will continue to discuss with family. Miles Cleveland Discussed with palliative care team, patient will likely benefit from home hospice. Discussed with Dr. Chanel Neff Facility-Administered Medications: None Current Facility-Administered Medications Medication Dose Route Frequency  thiamine (B-1) 200 mg in dextrose 5% 50 mL IVPB  200 mg IntraVENous DAILY  sodium chloride (NS) flush 5-40 mL  5-40 mL IntraVENous Q8H  
 sodium chloride (NS) flush 5-40 mL  5-40 mL IntraVENous PRN  
 acetaminophen (TYLENOL) tablet 650 mg  650 mg Oral Q6H PRN Or  
 acetaminophen (TYLENOL) suppository 650 mg  650 mg Rectal Q6H PRN  polyethylene glycol (MIRALAX) packet 17 g  17 g Oral DAILY PRN  promethazine (PHENERGAN) tablet 12.5 mg  12.5 mg Oral Q6H PRN Or  
 ondansetron (ZOFRAN) injection 4 mg  4 mg IntraVENous Q6H PRN  
 heparin (porcine) injection 5,000 Units  5,000 Units SubCUTAneous Q8H  
 dexamethasone (DECADRON) 4 mg/mL injection 6 mg  6 mg IntraVENous Q24H  
 ascorbic acid (vitamin C) (VITAMIN C) tablet 500 mg  500 mg Oral BID  cholecalciferol (VITAMIN D3) capsule 5,000 Units  5,000 Units Oral DAILY  zinc sulfate (ZINCATE) 50 mg zinc (220 mg) capsule 1 Cap  1 Cap Oral DAILY  melatonin tablet 3 mg  3 mg Oral QHS  azithromycin (ZITHROMAX) 500 mg in 0.9% sodium chloride 250 mL (VIAL-MATE)  500 mg IntraVENous Q24H  cefTRIAXone (ROCEPHIN) 1 g in sterile water (preservative free) 10 mL IV syringe  1 g IntraVENous Q24H  
 amLODIPine (NORVASC) tablet 10 mg  10 mg Oral DAILY  hydrALAZINE (APRESOLINE) 20 mg/mL injection 10 mg  10 mg IntraVENous Q6H PRN  
 0.45% sodium chloride infusion  50 mL/hr IntraVENous CONTINUOUS Review of Systems:  
 
Underlying dementia Data Review: 
 
Labs: Results:  
   
Chemistry Recent Labs 04/02/21 
8012 04/01/21 
1748 03/31/21 
2855 * 113* 159* * 150* 149*  
K 4.3 4.6 4.8  
* 120* 118* CO2 14* 19* 21 * 157* 160* CREA 4.54* 4.28* 4.21* CA 8.8 8.5 8.7 AGAP 14 11 10 BUCR 35* 37* 38* CBC w/Diff Recent Labs 04/02/21 
2172 04/01/21 
8704 03/31/21 
3484 WBC 10.1 8.4 3.6*  
RBC 4.15* 3.56* 3.50* HGB 11.6* 9.8* 9.7* HCT 36.5 31.1* 30.7*  281 268 GRANS 89* 87* 58  
LYMPH 4* 8* 29 EOS 0 0 0 Coagulation No results for input(s): PTP, INR, APTT, INREXT, INREXT in the last 72 hours. Iron/Ferritin No results for input(s): IRON in the last 72 hours. No lab exists for component: TIBCCALC BNP No results for input(s): BNPP in the last 72 hours. Cardiac Enzymes Recent Labs  
  03/30/21 
1700 03/30/21 
1356 CPK 89 66 CKND1 1.9 2.0  
  
Liver Enzymes No results for input(s): TP, ALB, TBIL, AP in the last 72 hours. No lab exists for component: SGOT, GPT, DBIL Thyroid Studies No results found for: T4, T3U, TSH, TSHEXT, TSHEXT Physical Assessment:  
 
Visit Vitals BP (!) 166/74 (BP 1 Location: Left upper arm, BP Patient Position: At rest) Pulse 88 Temp 98.2 °F (36.8 °C) Resp 17 Ht 5' 2\" (1.575 m) Wt 51.3 kg (113 lb) SpO2 92% Breastfeeding No  
BMI 20.67 kg/m² Weight change: -0.091 kg (-3.2 oz) Intake/Output Summary (Last 24 hours) at 4/2/2021 1104 Last data filed at 4/1/2021 1606 Gross per 24 hour Intake  Output 400 ml Net -400 ml Physical Exam:  
General: comfortable, no acute respiratory distress HEENT sclera anicteric, CVS: S1S2 heard,  no rub RS: + air entry b/l, Abd: Soft, Non tender, Neuro:awake, Extrm: no edema, no cyanosis, clubbing Skin: no visible  Rash Musculoskeletal: No gross joints or bone deformities Procedures/imaging: see electronic medical records for all procedures, Xrays and details which were not copied into this note but were reviewed prior to creation of Uriel Puckett MD 
April 2, 2021 [facility] Nephrology Office [phone]

## 2021-04-03 NOTE — PROGRESS NOTES
[facility] Hospitalists Progress Note Patient: August Sharma Age: 80 y.o. : 1922 MR#: [id number] SSN: [id number] Date: 4/3/2021 Subjective/24-hour events:  
 
Nothing new/acute clinically overnight. Assessment:  
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  
Comfort measures as ordered. Plan for discharge home with hospice care services on Monday pending deliver of necessary equipment. Case discussed with:  []Patient  []Family  [x]Nursing  []Case Management DVT Prophylaxis:  []Lovenox  []Hep SQ  []SCDs  []Coumadin   []On Heparin gtt Objective:  
VS:  
Visit Vitals /69 (BP 1 Location: Left upper arm, BP Patient Position: At rest) Pulse (!) 135 Temp 97.4 °F (36.3 °C) Resp 20 Ht 5' 2\" (1.575 m) Wt 51.3 kg (113 lb) SpO2 (!) 81% Breastfeeding No  
BMI 20.67 kg/m² Tmax/24hrs: Temp (24hrs), Av °F (36.7 °C), Min:97.4 °F (36.3 °C), Max:98.7 °F (37.1 °C) Intake/Output Summary (Last 24 hours) at 4/3/2021 0957 Last data filed at 4/3/2021 0582 Gross per 24 hour Intake 325 ml Output 1050 ml Net -725 ml General:  Appears comfortable, In NAD. Pulmonary: effort nonlabored. Labs:   
Recent Results (from the past 24 hour(s)) C REACTIVE PROTEIN, QT Collection Time: 21 10:42 AM  
Result Value Ref Range C-Reactive protein 5.1 (H) 0 - 0.3 mg/dL Signed By: Angelito Felton MD   
 April 3, 2021

## 2021-04-03 NOTE — PROGRESS NOTES
Bedside shift change report given to Florentino (oncoming nurse) by Domenico Baker (offgoing nurse). Report included the following information SBAR, Kardex, ED Summary, OR Summary, Procedure Summary, Intake/Output, MAR, Recent Results and Cardiac Rhythm NSR.

## 2021-04-03 NOTE — PROGRESS NOTES
The  provided the following Interventions: 
 helped the patient Camila Merritt, who is a 80 y.o. female, connect with the family with the [address] [employer].  also offered support to family as they begin transitioning towards bringing Ms. Kanchan Avitia home. Offered assurance of prayers. The following outcomes were achieved: 
Family expressed apprecaition for the Zoom visit and for the chaplaincy dept's support. Assessment: 
There are no further spiritual or Baptism issues which require Spiritual Care Services interventions at this time. Plan: 
Chaplains will continue to follow and will provide pastoral care on an as needed/requested basis.  recommends bedside caregivers page  on duty if patient shows signs of acute spiritual or emotional distress. Chaplain Shilpa [facility] [phone]

## 2021-04-03 NOTE — PROGRESS NOTES
Problem: Falls - Risk of 
Goal: *Absence of Falls Description: Document Sy Montano Fall Risk and appropriate interventions in the flowsheet. Outcome: Progressing Towards Goal 
Note: Fall Risk Interventions: 
  
 
Mentation Interventions: Bed/chair exit alarm, Adequate sleep, hydration, pain control, More frequent rounding, Room close to nurse's station, Update white board Medication Interventions: Bed/chair exit alarm Elimination Interventions: Bed/chair exit alarm, Call light in reach, Toileting schedule/hourly rounds Problem: Patient Education: Go to Patient Education Activity Goal: Patient/Family Education Note: Unable to educate patient due to dementia. Problem: Risk for Spread of Infection Goal: Prevent transmission of infectious organism to others Description: Prevent the transmission of infectious organisms to other patients, staff members, and visitors. Outcome: Progressing Towards Goal 
  
Problem: Patient Education:  Go to Education Activity Goal: Patient/Family Education Note: Unable to educate patient due to dementia. Problem: Airway Clearance - Ineffective Goal: Achieve or maintain patent airway Outcome: Progressing Towards Goal 
  
Problem: Gas Exchange - Impaired Goal: Absence of hypoxia Outcome: Progressing Towards Goal 
  
Problem: Breathing Pattern - Ineffective Goal: Ability to achieve and maintain a regular respiratory rate Outcome: Progressing Towards Goal 
  
Problem: Body Temperature -  Risk of, Imbalanced Goal: Ability to maintain a body temperature within defined limits Outcome: Progressing Towards Goal 
Goal: Will regain or maintain usual level of consciousness Outcome: Progressing Towards Goal 
Goal: Complications related to the disease process, condition or treatment will be avoided or minimized Outcome: Progressing Towards Goal 
  
Problem: Isolation Precautions - Risk of Spread of Infection Goal: Prevent transmission of infectious organism to others Outcome: Progressing Towards Goal 
  
Problem: Pain Goal: *Control of Pain Outcome: Progressing Towards Goal 
  
Problem: Patient Education: Go to Patient Education Activity Goal: Patient/Family Education Note: Unable to educate patient due to dementia.

## 2021-04-04 NOTE — PROGRESS NOTES
Bedside shift change report given to Romel (oncoming nurse) by Barry Pimentel (offgoing nurse). Report included the following information SBAR, Kardex, OR Summary, Procedure Summary, Intake/Output, MAR, Recent Results and Cardiac Rhythm NSR/ST.

## 2021-04-04 NOTE — PROGRESS NOTES
Problem: Falls - Risk of 
Goal: *Absence of Falls Description: Document Otoniel Esposito Fall Risk and appropriate interventions in the flowsheet. Outcome: Progressing Towards Goal 
Note: Fall Risk Interventions: 
  
 
Mentation Interventions: Adequate sleep, hydration, pain control, Bed/chair exit alarm, More frequent rounding, Reorient patient, Room close to nurse's station, Toileting rounds Medication Interventions: Bed/chair exit alarm, Patient to call before getting OOB, Teach patient to arise slowly Elimination Interventions: Bed/chair exit alarm, Call light in reach, Toilet paper/wipes in reach, Toileting schedule/hourly rounds Problem: Patient Education: Go to Patient Education Activity Goal: Patient/Family Education Outcome: Progressing Towards Goal 
  
Problem: Risk for Spread of Infection Goal: Prevent transmission of infectious organism to others Description: Prevent the transmission of infectious organisms to other patients, staff members, and visitors. Outcome: Progressing Towards Goal 
  
Problem: Patient Education:  Go to Education Activity Goal: Patient/Family Education Outcome: Progressing Towards Goal 
  
Problem: Airway Clearance - Ineffective Goal: Achieve or maintain patent airway Outcome: Progressing Towards Goal 
  
Problem: Gas Exchange - Impaired Goal: Absence of hypoxia Outcome: Progressing Towards Goal 
Goal: Promote optimal lung function Outcome: Progressing Towards Goal 
  
Problem: Breathing Pattern - Ineffective Goal: Ability to achieve and maintain a regular respiratory rate Outcome: Progressing Towards Goal 
  
Problem: Body Temperature -  Risk of, Imbalanced Goal: Ability to maintain a body temperature within defined limits Outcome: Progressing Towards Goal 
Goal: Will regain or maintain usual level of consciousness Outcome: Progressing Towards Goal 
Goal: Complications related to the disease process, condition or treatment will be avoided or minimized Outcome: Progressing Towards Goal 
  
Problem: Isolation Precautions - Risk of Spread of Infection Goal: Prevent transmission of infectious organism to others Outcome: Progressing Towards Goal 
  
Problem: Nutrition Deficits Goal: Optimize nutrtional status Outcome: Progressing Towards Goal 
  
Problem: Risk for Fluid Volume Deficit Goal: Maintain normal heart rhythm Outcome: Progressing Towards Goal 
Goal: Maintain absence of muscle cramping Outcome: Progressing Towards Goal 
Goal: Maintain normal serum potassium, sodium, calcium, phosphorus, and pH Outcome: Progressing Towards Goal 
  
Problem: Loneliness or Risk for Loneliness Goal: Demonstrate positive use of time alone when socialization is not possible Outcome: Progressing Towards Goal 
  
Problem: Fatigue Goal: Verbalize increase energy and improved vitality Outcome: Progressing Towards Goal 
  
Problem: Patient Education: Go to Patient Education Activity Goal: Patient/Family Education Outcome: Progressing Towards Goal 
  
Problem: Pain Goal: *Control of Pain Outcome: Progressing Towards Goal 
  
Problem: Patient Education: Go to Patient Education Activity Goal: Patient/Family Education Outcome: Progressing Towards Goal 
  
Problem: Acute Renal Failure: Day 1 Goal: Off Pathway (Use only if patient is Off Pathway) Outcome: Progressing Towards Goal 
Goal: Activity/Safety Outcome: Progressing Towards Goal 
Goal: Consults, if ordered Outcome: Progressing Towards Goal 
Goal: Diagnostic Test/Procedures Outcome: Progressing Towards Goal 
Goal: Nutrition/Diet Outcome: Progressing Towards Goal 
Goal: Discharge Planning Outcome: Progressing Towards Goal 
Goal: Medications Outcome: Progressing Towards Goal 
Goal: Respiratory Outcome: Progressing Towards Goal 
Goal: Treatments/Interventions/Procedures Outcome: Progressing Towards Goal 
Goal: Psychosocial 
Outcome: Progressing Towards Goal 
Goal: *Optimal pain control at patient's stated goal Outcome: Progressing Towards Goal 
Goal: *Urinary output within identified parameters Outcome: Progressing Towards Goal 
Goal: *Hemodynamically stable Outcome: Progressing Towards Goal 
Goal: *Tolerating diet Outcome: Progressing Towards Goal 
  
Problem: Acute Renal Failure: Day 2 Goal: Off Pathway (Use only if patient is Off Pathway) Outcome: Progressing Towards Goal 
Goal: Activity/Safety Outcome: Progressing Towards Goal 
Goal: Consults, if ordered Outcome: Progressing Towards Goal 
Goal: Diagnostic Test/Procedures Outcome: Progressing Towards Goal 
Goal: Nutrition/Diet Outcome: Progressing Towards Goal 
Goal: Discharge Planning Outcome: Progressing Towards Goal 
Goal: Medications Outcome: Progressing Towards Goal 
Goal: Respiratory Outcome: Progressing Towards Goal 
Goal: Treatments/Interventions/Procedures Outcome: Progressing Towards Goal 
Goal: Psychosocial 
Outcome: Progressing Towards Goal 
Goal: *Optimal pain control at patient's stated goal 
Outcome: Progressing Towards Goal 
Goal: *Urinary output within identified parameters Outcome: Progressing Towards Goal 
Goal: *Hemodynamically stable Outcome: Progressing Towards Goal 
Goal: *Tolerating diet Outcome: Progressing Towards Goal 
Goal: *Lab values improving Outcome: Progressing Towards Goal 
  
Problem: Acute Renal Failure: Day 3 Goal: Off Pathway (Use only if patient is Off Pathway) Outcome: Progressing Towards Goal 
Goal: Activity/Safety Outcome: Progressing Towards Goal 
Goal: Consults, if ordered Outcome: Progressing Towards Goal 
Goal: Diagnostic Test/Procedures Outcome: Progressing Towards Goal 
Goal: Nutrition/Diet Outcome: Progressing Towards Goal 
Goal: Discharge Planning Outcome: Progressing Towards Goal 
Goal: Medications Outcome: Progressing Towards Goal 
Goal: Respiratory Outcome: Progressing Towards Goal 
Goal: Treatments/Interventions/Procedures Outcome: Progressing Towards Goal 
Goal: Psychosocial 
Outcome: Progressing Towards Goal 
Goal: *Optimal pain control at patient's stated goal 
Outcome: Progressing Towards Goal 
Goal: *Urinary output within identified parameters Outcome: Progressing Towards Goal 
Goal: *Hemodynamically stable Outcome: Progressing Towards Goal 
Goal: *Tolerating diet Outcome: Progressing Towards Goal 
Goal: *Lab values improving Outcome: Progressing Towards Goal 
  
Problem: Acute Renal Failure: Day 4 Goal: Off Pathway (Use only if patient is Off Pathway) Outcome: Progressing Towards Goal 
Goal: Activity/Safety Outcome: Progressing Towards Goal 
Goal: Consults, if ordered Outcome: Progressing Towards Goal 
Goal: Diagnostic Test/Procedures Outcome: Progressing Towards Goal 
Goal: Nutrition/Diet Outcome: Progressing Towards Goal 
Goal: Discharge Planning Outcome: Progressing Towards Goal 
Goal: Medications Outcome: Progressing Towards Goal 
Goal: Respiratory Outcome: Progressing Towards Goal 
Goal: Treatments/Interventions/Procedures Outcome: Progressing Towards Goal 
Goal: Psychosocial 
Outcome: Progressing Towards Goal 
Goal: *Optimal pain control at patient's stated goal 
Outcome: Progressing Towards Goal 
Goal: *Urinary output within identified parameters Outcome: Progressing Towards Goal 
Goal: *Hemodynamically stable Outcome: Progressing Towards Goal 
Goal: *Tolerating diet Outcome: Progressing Towards Goal 
Goal: *Lab values improving Outcome: Progressing Towards Goal 
  
Problem: Acute Renal Failure: Day 5 Goal: Off Pathway (Use only if patient is Off Pathway) Outcome: Progressing Towards Goal 
Goal: Activity/Safety Outcome: Progressing Towards Goal 
Goal: Diagnostic Test/Procedures Outcome: Progressing Towards Goal 
Goal: Nutrition/Diet Outcome: Progressing Towards Goal 
Goal: Discharge Planning Outcome: Progressing Towards Goal 
Goal: Medications Outcome: Progressing Towards Goal 
Goal: Respiratory Outcome: Progressing Towards Goal 
Goal: Treatments/Interventions/Procedures Outcome: Progressing Towards Goal 
Goal: Psychosocial 
Outcome: Progressing Towards Goal 
Goal: *Optimal pain control at patient's stated goal 
Outcome: Progressing Towards Goal 
Goal: *Urinary output within identified parameters Outcome: Progressing Towards Goal 
Goal: *Hemodynamically stable Outcome: Progressing Towards Goal 
Goal: *Tolerating diet Outcome: Progressing Towards Goal 
Goal: *Lab values improving Outcome: Progressing Towards Goal 
  
Problem: Acute Renal Failure: Day 6 Goal: Off Pathway (Use only if patient is Off Pathway) Outcome: Progressing Towards Goal 
Goal: Activity/Safety Outcome: Progressing Towards Goal 
Goal: Diagnostic Test/Procedures Outcome: Progressing Towards Goal 
Goal: Nutrition/Diet Outcome: Progressing Towards Goal 
Goal: Discharge Planning Outcome: Progressing Towards Goal 
Goal: Medications Outcome: Progressing Towards Goal 
Goal: Respiratory Outcome: Progressing Towards Goal 
Goal: Treatments/Interventions/Procedures Outcome: Progressing Towards Goal 
Goal: Psychosocial 
Outcome: Progressing Towards Goal 
  
Problem: Acute Renal Failure: Discharge Outcomes Goal: *Optimal pain control at patient's stated goal 
Outcome: Progressing Towards Goal 
Goal: *Urinary output within identified parameters Outcome: Progressing Towards Goal 
Goal: *Hemodynamically stable Outcome: Progressing Towards Goal 
Goal: *Tolerating diet Outcome: Progressing Towards Goal 
Goal: *Lab values stabilized Outcome: Progressing Towards Goal 
Goal: *Verbalizes understanding and describes medication purposes and frequencies Outcome: Progressing Towards Goal 
Goal: *Medication reconciliation Outcome: Progressing Towards Goal

## 2021-04-04 NOTE — PROGRESS NOTES
Bedside and Verbal shift change report given to Lynn Nunez RN (oncoming nurse) by Nickie Novak RN (offgoing nurse). Report included the following information SBAR, Kardex, Intake/Output, MAR and Accordion.

## 2021-04-04 NOTE — PROGRESS NOTES
[facility] Hospitalists Progress Note Patient: Camila Merritt Age: 80 y.o. : 1922 MR#: [id number] SSN: [id number] Date: 2021 Subjective/24-hour events:  
 
Nothing new overnight. Most recent BPs noted. Assessment:  
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  
Continue comfort measures. Home with hospice services in AM depending on overall condition and delivery of medical equipment. Case discussed with:  []Patient  []Family  [x]Nursing  []Case Management DVT Prophylaxis:  []Lovenox  []Hep SQ  []SCDs  []Coumadin   []On Heparin gtt Objective:  
VS:  
Visit Vitals BP (!) 78/47 (BP 1 Location: Left upper arm, BP Patient Position: At rest) Pulse 81 Temp 97.4 °F (36.3 °C) Resp 16 Ht 5' 2\" (1.575 m) Wt 51.3 kg (113 lb) SpO2 99% Breastfeeding No  
BMI 20.67 kg/m² Tmax/24hrs: Temp (24hrs), Av.8 °F (36.6 °C), Min:97.4 °F (36.3 °C), Max:98.2 °F (36.8 °C) Intake/Output Summary (Last 24 hours) at 2021 5480 Last data filed at 2021 7234 Gross per 24 hour Intake  Output 475 ml Net -475 ml General:  Appears comfortable, In NAD. Pulmonary: effort nonlabored. Labs:   
Recent Results (from the past 24 hour(s)) METABOLIC PANEL, BASIC Collection Time: 21  1:50 PM  
Result Value Ref Range Sodium 160 (H) 136 - 145 mmol/L Potassium 4.4 3.5 - 5.5 mmol/L Chloride 127 (H) 100 - 111 mmol/L  
 CO2 18 (L) 21 - 32 mmol/L Anion gap 15 3.0 - 18 mmol/L Glucose 138 (H) 74 - 99 mg/dL  (H) 7.0 - 18 MG/DL Creatinine 5.23 (H) 0.6 - 1.3 MG/DL  
 BUN/Creatinine ratio 33 (H) 12 - 20 GFR est AA 9 (L) >60 ml/min/1.73m2 GFR est non-AA 8 (L) >60 ml/min/1.73m2 Calcium 8.8 8.5 - 10.1 MG/DL Signed By: Liam Moe MD   
 April 4, 2021

## 2021-04-04 NOTE — PROGRESS NOTES
0715- Bedside and Verbal shift change report given to TORRI Urena (oncoming nurse) by Herve Khan RN (offgoing nurse). Report included the following information SBAR, Kardex, OR Summary, Procedure Summary, Intake/Output, MAR and Recent Results.

## 2021-04-05 NOTE — ROUTINE PROCESS
1935 Verbal shift change  Received from [place], RN (outgoing nurse), to L. Gaynell Kanner (oncoming)  Pt. Is AOX non verbal, Pt. No visual cues for  pain at this time. Will resume care and monitor Pt. Condition. comfort Care. 2000 Pt. Head to toe Assessment Done and documented. Pt. repositioned in bed, pt not showing any distress. 2130  Pt. Resting comfortably in bed. 
 
2300 Pt. Not in distress. 0030  Pt. Is calm resting in bed. 
 
0230  Pt. Not in distress. 0430  Pt. Able to rest and sleep well throughout the shift. 0530  Pt. Given complete care, changed gown and linens, wahl care done. 0630  Pt. Resting comfortably in bed. Verbal and bedside Shift changed report given to TORRI Do (oncoming RN) on Pt. Condition. Report consisted of patients Situation, History, Activities, intake/output,  Background, Assessment and Recommendations(SBAR).   
  
Information from the following report(s) Kardex, order Summary, Lab results and MAR was reviewed with the receiving nurse. 
  
Opportunity for questions and clarification was provided.

## 2021-04-05 NOTE — DISCHARGE INSTRUCTIONS
Patient Education   Patient Education     Patient Education        Deciding About Stopping Dialysis  How can you decide about stopping dialysis? What should you know about stopping dialysis? Dialysis is a process that filters waste from your blood when your kidneys can no longer do the job. When you have kidney failure, you may have either hemodialysis or peritoneal dialysis. Most people die within weeks of stopping dialysis. If you decide to stop, health professionals can help you have the highest quality of life possible. These are people who give end-of-life care. This may be done through hospice care. Hospice care offers the chance to think about personal goals. It can relieve pain. And it can help take care of your emotional and spiritual needs. No matter what you decide, take the time to let others know your wishes about your care. You can use a legal document to make sure that you get the medical treatment you want. This is called an advance directive. What are ochoa points about this decision? · You may feel better on dialysis than you did before you started treatment. But you may have side effects, such as appetite changes. Or you may start to have other problems. If this is the case, you may feel that continuing treatment is too hard. · If dialysis lets you do the activities you enjoyed before, you may feel that it hasn't changed your daily life that much. You may feel this way even if you can't do all of your old activities. Or you may feel that your quality of life on dialysis is not good. · Your diagnosis of kidney failure may force you to rethink your goals for your future. If you feel that your life has been rewarding and that you have met many goals, you may feel okay about stopping dialysis. But if you have goals you have not yet met, you may want to continue. · Most people die within weeks of stopping dialysis.  If you choose to stop, you should be ready to put your personal, financial, and legal affairs in order. You may want to continue dialysis if you aren't ready to face these issues. · Clearly state your wishes to your family. If you decide to stop treatment, will your family understand your reasons? Do they support your decision to continue (or stop) treatment? Why might you choose to stop dialysis? If you have been getting regular dialysis, and if a kidney transplant is not an option for you, stopping dialysis may:  · Give you more time each day to spend with friends and family instead of going to treatments. · Allow you to eat and drink what you want in the time you have left. You may welcome this if your diet has been limited while you have been on dialysis. · Relieve worries about paying for dialysis, if you have been concerned. · Reduce problems that come with regular dialysis. These problems may include infection or clotting of the access. · Encourage you to talk with your loved ones about end-of-life goals and wishes. Why might you choose to stay on dialysis? Staying on dialysis may:  · Allow you to live longer and have more time with your family and friends. · Give you time to complete your goals and projects. · Help you feel better for as long as possible. You may feel better physically on dialysis than you did before dialysis. · Allow you to do your normal activities. · Give you more time to put your affairs in order. Your decision  Thinking about the facts and your feelings can help you make a decision that is right for you. Be sure you understand the benefits and risks of your options, and think about what else you need to do before you make the decision. Where can you learn more? Go to http://www.gray.com/  Enter H994 in the search box to learn more about \"Deciding About Stopping Dialysis. \"  Current as of: December 17, 2020               Content Version: 12.8  © 6916-6602 [employer].    Care instructions adapted under license by [employer] (which disclaims liability or warranty for this information). If you have questions about a medical condition or this instruction, always ask your healthcare professional. [address] any warranty or liability for your use of this information. Learning About Indwelling Urinary Catheter Care to Prevent Infection  Overview     A urinary catheter is a flexible plastic tube that's used to drain urine from your bladder when you can't urinate on your own. The catheter allows urine to drain from the bladder into a bag. Two types of drainage bags may be used with a urinary catheter. · A bedside bag is a large bag that you can hang on the side of your bed or on a chair. You can use it overnight or anytime you will be sitting or lying down for a long time. · A leg bag is a small bag that you can use during the day. It is usually attached to your thigh or calf and hidden under your clothes. Having a urinary catheter increases your risk of getting a urinary tract infection. Germs may get on the catheter and cause an infection in your bladder or kidneys. The longer you have a catheter, the more likely it is that you will get an infection. You can help prevent this problem with good hygiene and careful handling of your catheter and drainage bags. How can you help prevent infection? Take care to stay clean  · Always wash your hands well before and after you handle your catheter. · Clean the skin around the catheter daily using soap and water. Dry with a clean towel afterward. You can shower with your catheter and drainage bag in place unless your doctor told you not to. · When you clean around the catheter, check the surrounding skin for signs of infection. Look for things like pus and irritated, swollen, red, or tender skin around the catheter. Be careful with your drainage bag  · Always keep the drainage bag below the level of your bladder.  This will help keep urine from flowing back into your bladder. · Check often to see that urine is flowing through the catheter into the drainage bag. · Empty the drainage bag when it is half full. This will keep it from overflowing or backing up. · When you empty the drainage bag, do not let the tubing or drain spout touch anything. · Keep the cap that comes with the tubing, and cover the tip of the tubing when not in use. Be careful with your catheter  · Do not unhook the catheter from the drain tube until you are ready to change the tubing and bag. That could let germs get into the tube. · Make sure that the catheter tubing does not get twisted or kinked. · Do not tug or pull on the catheter. And make sure that the drainage bag does not drag or pull on the catheter. · Do not put powder or lotion on the skin around the catheter. · Talk with your doctor about your options for sexual intercourse while wearing a catheter. How do you empty the bag? If your doctor has asked you to keep a record, write down the amount of urine in the bag before you empty it. Wash your hands before and after you touch the bag. 1. Remove the drain spout from its sleeve at the bottom of the drainage bag.  2. Open the valve on the drain spout. Let the urine flow out into the toilet or a container. Be careful not to let the tubing or drain spout touch anything. 3. After you empty the bag, close the valve. Then put the drain spout back into its sleeve at the bottom of the collection bag. How do you switch to a bedside bag for overnight use? Wash your hands before and after you handle the bags. 1. Empty the leg bag that is attached to the tubing and catheter. 2. Put a clean towel under the tubing attached to the leg bag.  3. Use an alcohol wipe to clean the tip of the tubing attached to the bedside bag.  4. To stop the flow of urine, pinch the catheter with your fingers just above the tubing connection.   5. Use a twisting motion to disconnect the leg bag tubing from the catheter. 6. Then securely connect the catheter to the tubing from the bedside bag. How do you clean a bedside bag? Many people clean their bedside bag in the morning if they switch to a leg bag. To clean a bedside drainage ba. Remove the bedside bag and attach the leg bag.  2. Fill the bedside bag with 2 parts vinegar and 3 parts water. Let it stand for 20 minutes. 3. Empty the bag, and let it air dry. When should you call for help? Call your doctor now or seek immediate medical care if:    · You have symptoms of a urinary infection. These may include:  ? Pain or burning when you urinate. ? A frequent need to urinate without being able to pass much urine. ? Pain in the flank, which is just below the rib cage and above the waist on either side of the back. ? Blood in your urine. ? A fever.     · Your urine smells bad.     · You see large blood clots in your urine.     · No urine or very little urine is flowing into the bag for 4 or more hours. Watch closely for changes in your health, and be sure to contact your doctor if:    · The area around the catheter becomes irritated, swollen, red, or tender, or there is pus draining from it.     · Urine is leaking from the place where the catheter enters your body. Follow-up care is a key part of your treatment and safety. Be sure to make and go to all appointments, and call your doctor if you are having problems. It's also a good idea to know your test results and keep a list of the medicines you take. Where can you learn more? Go to http://www.gray.com/  Enter U010 in the search box to learn more about \"Learning About Indwelling Urinary Catheter Care to Prevent Infection. \"  Current as of: 2020               Content Version: 12.8   Healthwise, [employer].    Care instructions adapted under license by [employer] (which disclaims liability or warranty for this information). If you have questions about a medical condition or this instruction, always ask your healthcare professional. [address] any warranty or liability for your use of this information. Learning About Indwelling Urinary Catheter Care to Prevent Infection  Overview     A urinary catheter is a flexible plastic tube that's used to drain urine from your bladder when you can't urinate on your own. The catheter allows urine to drain from the bladder into a bag. Two types of drainage bags may be used with a urinary catheter. · A bedside bag is a large bag that you can hang on the side of your bed or on a chair. You can use it overnight or anytime you will be sitting or lying down for a long time. · A leg bag is a small bag that you can use during the day. It is usually attached to your thigh or calf and hidden under your clothes. Having a urinary catheter increases your risk of getting a urinary tract infection. Germs may get on the catheter and cause an infection in your bladder or kidneys. The longer you have a catheter, the more likely it is that you will get an infection. You can help prevent this problem with good hygiene and careful handling of your catheter and drainage bags. How can you help prevent infection? Take care to stay clean  · Always wash your hands well before and after you handle your catheter. · Clean the skin around the catheter daily using soap and water. Dry with a clean towel afterward. You can shower with your catheter and drainage bag in place unless your doctor told you not to. · When you clean around the catheter, check the surrounding skin for signs of infection. Look for things like pus and irritated, swollen, red, or tender skin around the catheter. Be careful with your drainage bag  · Always keep the drainage bag below the level of your bladder. This will help keep urine from flowing back into your bladder.   · Check often to see that urine is flowing through the catheter into the drainage bag. · Empty the drainage bag when it is half full. This will keep it from overflowing or backing up. · When you empty the drainage bag, do not let the tubing or drain spout touch anything. · Keep the cap that comes with the tubing, and cover the tip of the tubing when not in use. Be careful with your catheter  · Do not unhook the catheter from the drain tube until you are ready to change the tubing and bag. That could let germs get into the tube. · Make sure that the catheter tubing does not get twisted or kinked. · Do not tug or pull on the catheter. And make sure that the drainage bag does not drag or pull on the catheter. · Do not put powder or lotion on the skin around the catheter. · Talk with your doctor about your options for sexual intercourse while wearing a catheter. How do you empty the bag? If your doctor has asked you to keep a record, write down the amount of urine in the bag before you empty it. Wash your hands before and after you touch the bag. 4. Remove the drain spout from its sleeve at the bottom of the drainage bag.  5. Open the valve on the drain spout. Let the urine flow out into the toilet or a container. Be careful not to let the tubing or drain spout touch anything. 6. After you empty the bag, close the valve. Then put the drain spout back into its sleeve at the bottom of the collection bag. How do you switch to a bedside bag for overnight use? Wash your hands before and after you handle the bags. 7. Empty the leg bag that is attached to the tubing and catheter. 8. Put a clean towel under the tubing attached to the leg bag.  9. Use an alcohol wipe to clean the tip of the tubing attached to the bedside bag. 10. To stop the flow of urine, pinch the catheter with your fingers just above the tubing connection. 11. Use a twisting motion to disconnect the leg bag tubing from the catheter.   12. Then securely connect the catheter to the tubing from the bedside bag. How do you clean a bedside bag? Many people clean their bedside bag in the morning if they switch to a leg bag. To clean a bedside drainage ba. Remove the bedside bag and attach the leg bag.  5. Fill the bedside bag with 2 parts vinegar and 3 parts water. Let it stand for 20 minutes. 6. Empty the bag, and let it air dry. When should you call for help? Call your doctor now or seek immediate medical care if:    · You have symptoms of a urinary infection. These may include:  ? Pain or burning when you urinate. ? A frequent need to urinate without being able to pass much urine. ? Pain in the flank, which is just below the rib cage and above the waist on either side of the back. ? Blood in your urine. ? A fever.     · Your urine smells bad.     · You see large blood clots in your urine.     · No urine or very little urine is flowing into the bag for 4 or more hours. Watch closely for changes in your health, and be sure to contact your doctor if:    · The area around the catheter becomes irritated, swollen, red, or tender, or there is pus draining from it.     · Urine is leaking from the place where the catheter enters your body. Follow-up care is a key part of your treatment and safety. Be sure to make and go to all appointments, and call your doctor if you are having problems. It's also a good idea to know your test results and keep a list of the medicines you take. Where can you learn more? Go to http://www.gray.com/  Enter U010 in the search box to learn more about \"Learning About Indwelling Urinary Catheter Care to Prevent Infection. \"  Current as of: 2020               Content Version: 12.8  © 5170-8998 [employer]. Care instructions adapted under license by [employer] (which disclaims liability or warranty for this information).  If you have questions about a medical condition or this instruction, always ask your healthcare professional. [place] [address] any warranty or liability for your use of this information.

## 2021-04-05 NOTE — PROGRESS NOTES
Problem: Falls - Risk of 
Goal: *Absence of Falls Description: Document Jason Shove Fall Risk and appropriate interventions in the flowsheet. Outcome: Progressing Towards Goal 
Note: Fall Risk Interventions: 
  
 
Mentation Interventions: Adequate sleep, hydration, pain control, Bed/chair exit alarm, More frequent rounding, Reorient patient, Room close to nurse's station, Toileting rounds Medication Interventions: Bed/chair exit alarm, Patient to call before getting OOB, Teach patient to arise slowly Elimination Interventions: Bed/chair exit alarm, Call light in reach, Toilet paper/wipes in reach, Toileting schedule/hourly rounds Problem: Patient Education: Go to Patient Education Activity Goal: Patient/Family Education Outcome: Progressing Towards Goal 
  
Problem: Risk for Spread of Infection Goal: Prevent transmission of infectious organism to others Description: Prevent the transmission of infectious organisms to other patients, staff members, and visitors. Outcome: Progressing Towards Goal 
  
Problem: Patient Education:  Go to Education Activity Goal: Patient/Family Education Outcome: Progressing Towards Goal

## 2021-04-05 NOTE — PROGRESS NOTES
Nutrition Note Transitioned to comfort measures only on 4/2 & Nepro Shake TID ordered by MD. Noted plan for discharge home today with hospice support. Nutrition Recommendations/Plan: 
- Continue to provide nutrition interventions consistent with goals of care: JAYLON Reddy. Electronically signed by Marie Coles RD on 4/5/2021 at 3:45 PM 
 
Contact: [phone]

## 2021-04-05 NOTE — DISCHARGE SUMMARY
Discharge Summary Patient: Doug Genao MRN: [id number]  CSN: [id number] YOB: 1922  Age: 80 y.o. Sex: female DOA: 3/30/2021 LOS:  LOS: 6 days   Discharge Date:   
 
Admission Diagnoses: DIDIER (acute kidney injury) ([facility] [address] [N17.9] COVID-19 [U07.1] Discharge Diagnoses:   
COVID-19 pneumonia DIDIER on CKD 4 Hyponatremia Elevated troponindemand ischemia History of hypertension History of type 2 diabetes Anemia of chronic disease Dementia Discharge Condition: Stable Consults: Infectious Disease and Nephrology PHYSICAL EXAM 
Visit Vitals BP (!) 161/66 (BP 1 Location: Right upper arm, BP Patient Position: At rest) Pulse 84 Temp 97.5 °F (36.4 °C) Resp (!) 34 Ht 5' 2\" (1.575 m) Wt 51.3 kg (113 lb) SpO2 (!) 89% Breastfeeding No  
BMI 20.67 kg/m² General: Lying in bed, no acute distress Lungs:  CTA Bilaterally. No Wheezing/Rhonchi/Rales. Heart:  Regular rate and Rhythm. Abdomen: Soft, Non distended, Non tender. + Bowel sounds. Extremities: No edema/ cyanosis/ clubbing Neurologic:  Baseline dementia HPI: 
Doug Genao is a 80 y.o. female with PMHx of CKD stage IV, HTN, type II DM, anemia, GERD and dementia who presented to [facility] ED with complaints of decreased appetite and decreased voiding after being diagnosed with Covid19 4 days ago. According to her daughter, multiple family members were diagnosed with [id number]. Before she got Covid, she was able to stand and walk a little bit with assistance, and talk a little bit and answer a few questions. She did have some intermittent confusion, but since getting Covid, she has been increasingly weak, less responsive and confused. She has not noticed any changes in her breathing pattern and she has not had any shortness of breath.   Her daughter also denies any other complaints. 
  
In the ED, her vitals were notable for some bradycardia with heart rate in the 50s, some mildly elevated blood pressures, some mild tachypnea with RR in the low 20s, and SPO2 in the low to upper 90s on room air. Labs were notable for WBCs WNL, Hgb 11.1, sodium 148, glucose 138, , creatinine 4.31 (baseline 2.0), magnesium 3.2, troponin 0.09 and then 0.11, and NT proBNP 3695. A UA had greater than 1000 mg/dL protein, moderate blood, and was negative for nitrites, leuk esterase and bacteria. An EKG showed sinus bradycardia with first-degree AV block at 57 bpm.  CXR showed a right midlung and bibasilar streaky opacities, at least partly representing atelectasis, with superimposed infiltrate also possible. Renal ultrasound showed chronic medical renal disease and right renal cysts. Rapid Covid19 testing was positive. He was treated with IV steroids and IVF. Ms. Tiana Weeks was then transferred to [facility] and is now admitted for Covid19 nephropathy and DIDIER on CKD stage IV. Hospital Course:  
80 y. o. female with PMHx of CKD stage IV, HTN, type II DM, anemia, GERD and dementia who presented to [facility] ED on 3/30/2021 with complaints of decreased appetite and decreased voiding Clinical presentation consistent with acute kidney injury in a patient with recent COVID-19 infection/pneumonia CT chest 4/1 reveals findings suggestive of COVID-19 pneumonia Acute on chronic kidney disease-could be due to volume depletion versus COVID-19 nephropathy. Nephrology follow-up appreciated Leukopenia-likely due to COVID-19 infection. Infectious disease consulted for COVID-19 infection. Continue IV antibiotics and Decadron. Nephrology consulted for DIDIERcontinue gentle hydration. Patient underwent SLP evaluation however recommended to continue n.p.o. given high possibility of aspiration. Palliative care consulted and family involved in decision-making. Patient eventually transitioned to comfort measures and is being discharged under hospice care. Imaging: XR Results (most recent): Results from [facility] [place] encounter on 03/30/21 XR CHEST PORT Narrative EXAMINATION: Chest single view INDICATION: Covid 19 COMPARISON: 2/28/2014 FINDINGS: Single frontal view the chest obtained. Low lung volumes and 
superimposed head over upper right chest limits evaluation. Mediastinal 
silhouette normal in size. Streaky right midlung and bibasilar densities. No 
confluent consolidation. No definite pneumothorax identified. Impression Right midlung and bibasilar streaky opacities, present to some degree on prior 
exam, likely at least partly representing atelectasis/scarring, but superimposed 
infiltrate also possible. Exam limitations as above. CT Results (most recent): 
Results from [facility] [place] encounter on 03/30/21 CT CHEST WO CONT Narrative CT CHEST WITHOUT ENHANCEMENT INDICATION: Pulmonary edema versus lung infiltrates. Recent Covid-19 infection. Acute kidney injury. Hypoxia. TECHNIQUE: Axial images obtained from the thoracic inlet to the level of the 
diaphragm without intravenous contrast. Coronal and sagittal reformatted images 
were obtained. All CT scans are performed using dose optimization techniques as 
appropriate to the performed exam including the following: Automated exposure 
control, adjustment of mA and/or kV according to patient size, and use of 
iterative reconstructive technique. COMPARISON: Chest film 3/30/2021, CT abdomen and pelvis 12/8/2007. No prior CT 
chest.. CHEST FINDINGS:  
The evaluation of the mediastinum, hilum and vascular structures is limited in 
the absence of intravenous contrast. 
 
Lungs: Limited by respiratory motion. Mild left and moderate right groundglass 
and consolidative lung opacities, some of which is peripheral. Greatest 
consolidation is in the right greater than left lower lobes. No interstitial 
pattern to indicate edema. Small calcified granuloma on the right. Pleura: Trace pleural fluid. Chronically elevated right hemidiaphragm. Lymph Nodes: Limited by motion artifact. Mild precarinal mediastinal adenopathy 
possible. Granulomatous subcarinal calcification. Cardiovascular: Generalized aortic wall calcifications without aortic aneurysm. Bones/soft tissues: T10 mild compression fracture deformity without retropulsion 
into the spinal canal is new from 2007 but is otherwise age-indeterminate. Mild 
vertebral osteophytes. Chronic healed left humeral neck fracture deformity. Upper Abdomen: Partial imaging of 2.8 cm right renal cyst. Cholecystectomy 
clips. Impression Bilateral lung opacities favor pneumonia. Features of Covid-19 pneumonia are 
present, although are nonspecific and can be seen in other types of viral 
illness, organizing pneumonia, and other types of pneumonitis. Procedures:  
None Discharge Medications:    
Current Discharge Medication List  
  
START taking these medications Details  
hyoscyamine SL (LEVSIN/SL) 0.125 mg SL tablet 1 Tab by SubLINGual route every four (4) hours as needed for Secretions. Qty: 15 Tab, Refills: 0 LORazepam (INTENSOL) 2 mg/mL concentrated solution Take 0.25 mL by mouth every four (4) hours as needed for Agitation, Anxiety, Restlessness or Shortness of Breath. Max Daily Amount: 3 mg. Qty: 30 mL, Refills: 0 Associated Diagnoses: Hospice care STOP taking these medications  
  
 cloNIDine HCl (CATAPRES) 0.1 mg tablet Comments:  
Reason for Stopping:   
   
 promethazine (PHENERGAN) 12.5 mg tablet Comments:  
Reason for Stopping:   
   
 senna (SENNA) 8.6 mg tablet Comments:  
Reason for Stopping:   
   
 mv-min/iron/folic/calcium/vitK (WOMEN'S MULTIVITAMIN PO) Comments:  
Reason for Stopping:   
   
 hydrocortisone (PROCTOZONE-HC) 2.5 % rectal cream Comments:  
Reason for Stopping:   
   
 polyethylene glycol (MIRALAX) 17 gram packet Comments:  
Reason for Stopping:  CONTOUR NEXT TEST STRIPS strip Comments:  
Reason for Stopping:   
   
 cyanocobalamin, vitamin B-12, 2,500 mcg chew Comments:  
Reason for Stopping:   
   
 cholecalciferol, vitamin D3, (VITAMIN D3) 2,000 unit tab Comments:  
Reason for Stopping:   
   
 menthol (BIOFREEZE, MENTHOL,) 4 % gel Comments:  
Reason for Stopping:   
   
 naloxone (NARCAN) 4 mg/actuation nasal spray Comments:  
Reason for Stopping:   
   
 pantoprazole (PROTONIX) 40 mg tablet Comments:  
Reason for Stopping:   
   
 amlodipine (NORVASC) 10 mg tablet Comments:  
Reason for Stopping:   
   
 furosemide (LASIX) 20 mg tablet Comments:  
Reason for Stopping:   
   
 quetiapine (SEROQUEL) 100 mg tablet Comments:  
Reason for Stopping:   
   
 donepezil (ARICEPT) 10 mg tablet Comments:  
Reason for Stopping:   
   
  
  
Current Facility-Administered Medications:  
  LORazepam (INTENSOL) 2 mg/mL oral concentrate 0.5 mg, 0.5 mg, Oral, Q4H PRN, Cloteal Kev, NP 
  hyoscyamine SL (LEVSIN/SL) tablet 0.125 mg, 0.125 mg, SubLINGual, Q4H PRN, Cloteal Kev, NP 
  bisacodyL (DULCOLAX) suppository 10 mg, 10 mg, Rectal, DAILY PRN, Cloteal Kev, NP 
  sodium chloride (NS) flush 5-40 mL, 5-40 mL, IntraVENous, Q8H, Manny Vásquez, DO, 10 mL at 04/04/21 0503   sodium chloride (NS) flush 5-40 mL, 5-40 mL, IntraVENous, PRN, Lieutenant Spice, DO 
  acetaminophen (TYLENOL) tablet 650 mg, 650 mg, Oral, Q6H PRN **OR** acetaminophen (TYLENOL) suppository 650 mg, 650 mg, Rectal, Q6H PRN, Lieutenant Spice, DO 
  polyethylene glycol (MIRALAX) packet 17 g, 17 g, Oral, DAILY PRN, Lieutenant Spice, DO 
  promethazine (PHENERGAN) tablet 12.5 mg, 12.5 mg, Oral, Q6H PRN **OR** ondansetron (ZOFRAN) injection 4 mg, 4 mg, IntraVENous, Q6H PRN, Lieutenant Spice, DO 
· It is important that you take the medication exactly as they are prescribed.   
· Keep your medication in the bottles provided by the pharmacist and keep a list of the medication names, dosages, and times to be taken in your wallet. · Do not take other medications without consulting your doctor. Discharge To: Hospice Minutes spent on discharge: 42 minutes spent coordinating this discharge (review instructions/follow-up, prescriptions, preparing report for sign off) Cornelio Verdin MD 
4/5/2021 12:56 PM

## 2021-04-05 NOTE — PROGRESS NOTES
Discharge/Transition Planning 1030: Equipment delivered this morning. This was verified with Kirill Washington from Hospice. Notified Dr Shakir Venegas via [employer]. 1130: Notified by Dr Shakir Venegas can set up transport. [address] [employer] for [employer] [phone] Confirmation # [id number]. Told it takes 30 min-3 hours for provider. Nurses station #given. Notified Kirill Washington with hospice Called and left voicemail for pt daughter, ms Jada Irby 1350: [address] [employer] to check on company. [employer] to  pt at 1440 Discharge order noted for today. Pt has been accepted to [facility] agency. Met with daughter and are agreeable to the transition plan today. Transport has been arranged through Medical transport Updated bedside RN, Ngoc Raza,  to the transition plan. Discharge information has been documented on the AVS. Patient and/or representative presented with the \"Important Message from [employer]" and refused to sign the document after explanation and time for questions. A copy of the IMM letter provided to patient and/or representative. Original placed in patient's chart. Elvia Bates RN BSN Outcomes Manager Pager # [phone]

## 2021-04-05 NOTE — HOSPICE
Pt/family pursuing hospice:yes Admission dx approved by Hospice Medical Director: Alzheimers dementia. Hospice related conditions- CKD, debility, pneumonia Anticipated hospital d/c date:4/5/21 Discussion occurred with patient and/or family about preference of hospitalization once admitted to hospice: yes Reviewed and discussed hospice philosophy and keeping the patient comfortable in their residence: yes (Document additional information below) Discussion with patient/family regarding around the clock caregiver in place due to patient safety in the home once discharged: yes Identified caregiver: (Document specifics discussed and/or any caregiver concerns identified). Narrative of events and/or assessment if applicable: DME: [facility] bed w/full rails and gel overlay, over the bed table, transport wc,  to be delivered on 4/4/21 btw 2-6pm  conf# [id number]. Transportation requested for 2pm 
 
Latha Chavez RN [place] [address] [place], [address] [place] [address] Str. 
[phone] Email: [email or web address]

## 2022-01-07 NOTE — PATIENT INSTRUCTIONS

## 2024-01-23 NOTE — PROGRESS NOTES
[address] [facility] Progress Note    Date: 2018    Name: Roxana Hurley    MRN: [id number]         : 1922     PROCRIT INJECTON    Ms. Malu Elliott was assessed and education was provided. Pt arrived to [facility] in wheelchair accompanied by her daughter at 80. Ms. Antony's vitals were reviewed and patient was observed for 5 minutes prior to treatment. Visit Vitals    /62 (BP 1 Location: Left arm, BP Patient Position: Sitting)    Pulse (!) 57    Temp 98.1 °F (36.7 °C)    Resp 18    SpO2 94%       Blood drawn via left A/C venipuncture X1 attempt. Gauze and tape applied to site. Lab results were obtained and reviewed. Recent Results (from the past 24 hour(s))   CBC WITH 3 PART DIFF    Collection Time: 18  2:17 PM   Result Value Ref Range    WBC 5.7 4.5 - 13.0 K/uL    RBC 3.50 (L) 4.10 - 5.10 M/uL    HGB 10.0 (L) 12.0 - 16 g/dL    HCT 31.8 (L) 36 - 48 %    MCV 90.9 78 - 102 FL    MCH 28.6 25.0 - 35.0 PG    MCHC 31.4 31 - 37 g/dL    RDW 12.1 11.5 - 14.5 %    PLATELET 910 264 - 561 K/uL    NEUTROPHILS 50 40 - 70 %    MIXED CELLS 14 0.1 - 17 %    LYMPHOCYTES 37 14 - 44 %    ABS. NEUTROPHILS 2.8 1.8 - 9.5 K/UL    ABS. MIXED CELLS 0.8 0.0 - 2.3 K/uL    ABS. LYMPHOCYTES 2.1 1.1 - 5.9 K/UL    DF AUTOMATED       HGB= 10.0 and HCT= 31.8    Procrit HELD per order parameters. Patient armband removed and shredded. Ms. Malu Elliott was discharged from [place] [address] in stable condition at 1435. She is to return on 2018 at 1500 for her next Procrit appointment.     Onesimo Da Silva RN  2018
[facility] Progress Note    Date: 2018    Name: Tim Blakely    MRN: [id number]         : 1922     PROCRIT INJECTON    Ms. Abdoulaye Simms was assessed and education was provided. Pt arrived to [facility] in wheelchair accompanied by her daughter at 80. Ms. Antony's vitals were reviewed and patient was observed for 5 minutes prior to treatment. Visit Vitals    /62 (BP 1 Location: Left arm, BP Patient Position: Sitting)    Pulse (!) 57    Temp 98.1 °F (36.7 °C)    Resp 18    SpO2 94%       Blood drawn via left A/C venipuncture X1 attempt for CBC, renal panel, ferritin and Iron Panel. Gauze and tape applied to site. Lab results were obtained and reviewed. Recent Results (from the past 24 hour(s))   CBC WITH 3 PART DIFF    Collection Time: 18  2:17 PM   Result Value Ref Range    WBC 5.7 4.5 - 13.0 K/uL    RBC 3.50 (L) 4.10 - 5.10 M/uL    HGB 10.0 (L) 12.0 - 16 g/dL    HCT 31.8 (L) 36 - 48 %    MCV 90.9 78 - 102 FL    MCH 28.6 25.0 - 35.0 PG    MCHC 31.4 31 - 37 g/dL    RDW 12.1 11.5 - 14.5 %    PLATELET 895 537 - 736 K/uL    NEUTROPHILS 50 40 - 70 %    MIXED CELLS 14 0.1 - 17 %    LYMPHOCYTES 37 14 - 44 %    ABS. NEUTROPHILS 2.8 1.8 - 9.5 K/UL    ABS. MIXED CELLS 0.8 0.0 - 2.3 K/uL    ABS. LYMPHOCYTES 2.1 1.1 - 5.9 K/UL    DF AUTOMATED       HGB= 10.0 and HCT= 31.8    Procrit HELD per order parameters. Patient armband removed and shredded. Ms. Abdoulaye Simms was discharged from [place] [address] in stable condition at 1435. She is to return on 2018 at 1500 for her next Procrit appointment.     Susanne Webb RN  2018
Plan: Discussed with pt treating hypopigmentation can be difficult. Recommended chemical peels to help even complexion. Quoted pt $315.\\n\\nRTC prn Chemical peel
Detail Level: Zone
Samples Given: Opzelura 1.5% cream QD